# Patient Record
Sex: FEMALE | Race: WHITE | NOT HISPANIC OR LATINO | Employment: OTHER | URBAN - METROPOLITAN AREA
[De-identification: names, ages, dates, MRNs, and addresses within clinical notes are randomized per-mention and may not be internally consistent; named-entity substitution may affect disease eponyms.]

---

## 2017-01-18 ENCOUNTER — OFFICE VISIT (OUTPATIENT)
Dept: INTERNAL MEDICINE | Facility: CLINIC | Age: 82
End: 2017-01-18
Payer: COMMERCIAL

## 2017-01-18 ENCOUNTER — TELEPHONE (OUTPATIENT)
Dept: INTERNAL MEDICINE | Facility: CLINIC | Age: 82
End: 2017-01-18

## 2017-01-18 VITALS
WEIGHT: 160.7 LBS | HEIGHT: 61 IN | DIASTOLIC BLOOD PRESSURE: 62 MMHG | HEART RATE: 61 BPM | OXYGEN SATURATION: 97 % | SYSTOLIC BLOOD PRESSURE: 104 MMHG | BODY MASS INDEX: 30.34 KG/M2 | TEMPERATURE: 97.5 F

## 2017-01-18 DIAGNOSIS — Z12.31 VISIT FOR SCREENING MAMMOGRAM: ICD-10-CM

## 2017-01-18 DIAGNOSIS — F33.0 MAJOR DEPRESSIVE DISORDER, RECURRENT EPISODE, MILD (H): ICD-10-CM

## 2017-01-18 DIAGNOSIS — E78.5 HYPERLIPIDEMIA LDL GOAL <130: ICD-10-CM

## 2017-01-18 DIAGNOSIS — Z00.00 MEDICARE ANNUAL WELLNESS VISIT, SUBSEQUENT: Primary | ICD-10-CM

## 2017-01-18 DIAGNOSIS — I10 ESSENTIAL HYPERTENSION, BENIGN: ICD-10-CM

## 2017-01-18 LAB
ALBUMIN SERPL-MCNC: 4.1 G/DL (ref 3.4–5)
ALP SERPL-CCNC: 77 U/L (ref 40–150)
ALT SERPL W P-5'-P-CCNC: 23 U/L (ref 0–50)
ANION GAP SERPL CALCULATED.3IONS-SCNC: 9 MMOL/L (ref 3–14)
AST SERPL W P-5'-P-CCNC: 20 U/L (ref 0–45)
BILIRUB SERPL-MCNC: 0.5 MG/DL (ref 0.2–1.3)
BUN SERPL-MCNC: 18 MG/DL (ref 7–30)
CALCIUM SERPL-MCNC: 9.1 MG/DL (ref 8.5–10.1)
CHLORIDE SERPL-SCNC: 100 MMOL/L (ref 94–109)
CHOLEST SERPL-MCNC: 191 MG/DL
CO2 SERPL-SCNC: 27 MMOL/L (ref 20–32)
CREAT SERPL-MCNC: 0.96 MG/DL (ref 0.52–1.04)
GFR SERPL CREATININE-BSD FRML MDRD: 56 ML/MIN/1.7M2
GLUCOSE SERPL-MCNC: 99 MG/DL (ref 70–99)
HDLC SERPL-MCNC: 94 MG/DL
HGB BLD-MCNC: 13.1 G/DL (ref 11.7–15.7)
LDLC SERPL CALC-MCNC: 66 MG/DL
NONHDLC SERPL-MCNC: 97 MG/DL
POTASSIUM SERPL-SCNC: 3.9 MMOL/L (ref 3.4–5.3)
PROT SERPL-MCNC: 7.4 G/DL (ref 6.8–8.8)
SODIUM SERPL-SCNC: 136 MMOL/L (ref 133–144)
TRIGL SERPL-MCNC: 153 MG/DL

## 2017-01-18 PROCEDURE — 85018 HEMOGLOBIN: CPT | Performed by: INTERNAL MEDICINE

## 2017-01-18 PROCEDURE — 80061 LIPID PANEL: CPT | Performed by: INTERNAL MEDICINE

## 2017-01-18 PROCEDURE — 99397 PER PM REEVAL EST PAT 65+ YR: CPT | Performed by: INTERNAL MEDICINE

## 2017-01-18 PROCEDURE — 80053 COMPREHEN METABOLIC PANEL: CPT | Performed by: INTERNAL MEDICINE

## 2017-01-18 PROCEDURE — 36415 COLL VENOUS BLD VENIPUNCTURE: CPT | Performed by: INTERNAL MEDICINE

## 2017-01-18 RX ORDER — LISINOPRIL AND HYDROCHLOROTHIAZIDE 12.5; 2 MG/1; MG/1
1 TABLET ORAL 2 TIMES DAILY
Qty: 180 TABLET | Refills: 3 | Status: SHIPPED | OUTPATIENT
Start: 2017-01-18 | End: 2018-03-26

## 2017-01-18 RX ORDER — SIMVASTATIN 20 MG
20 TABLET ORAL AT BEDTIME
Qty: 90 TABLET | Refills: 3 | Status: SHIPPED | OUTPATIENT
Start: 2017-01-18 | End: 2018-02-26

## 2017-01-18 RX ORDER — AMLODIPINE BESYLATE 5 MG/1
5 TABLET ORAL EVERY MORNING
Qty: 90 TABLET | Refills: 3 | Status: SHIPPED | OUTPATIENT
Start: 2017-01-18 | End: 2018-01-26

## 2017-01-18 RX ORDER — CITALOPRAM HYDROBROMIDE 20 MG/1
20 TABLET ORAL DAILY
Qty: 90 TABLET | Refills: 3 | Status: SHIPPED | OUTPATIENT
Start: 2017-01-18 | End: 2018-02-26

## 2017-01-18 NOTE — PROGRESS NOTES
SUBJECTIVE:                                                            Juany Collazo is a 85 year old female who presents for Preventive Visit  Are you in the first 12 months of your Medicare Part B coverage?  No    Healthy Habits:    Do you get at least three servings of calcium containing foods daily (dairy, green leafy vegetables, etc.)? yes    Amount of exercise or daily activities, outside of work: 4 day(s) per week    Problems taking medications regularly No    Medication side effects: No    Have you had an eye exam in the past two years? no    Do you see a dentist twice per year? yes    Do you have sleep apnea, excessive snoring or daytime drowsiness?no    COGNITIVE SCREEN  1) Repeat 3 items (Banana, Sunrise, Chair)    2) Clock draw: NORMAL  3) 3 item recall: Recalls 1 object   Results: NORMAL clock, 1-2 items recalled: COGNITIVE IMPAIRMENT LESS LIKELY    Mini-CogTM Copyright S Gordon. Licensed by the author for use in [facility]; reprinted with permission (carol@[email or web address]). All rights reserved.          All Histories reviewed and updated in EPIC as appropriate.  Social History   Substance Use Topics     Smoking status: Former Smoker     Quit date: 06/07/1966     Smokeless tobacco: Never Used     Alcohol Use: Yes      Comment: One beer day       The patient does not drink >3 drinks per day nor >7 drinks per week.    Today's PHQ-2 Score:   PHQ-2 ( 1999 Pfizer) 1/10/2017 6/15/2016   Q1: Little interest or pleasure in doing things 0 0   Q2: Feeling down, depressed or hopeless 0 0   PHQ-2 Score 0 0       Do you feel safe in your environment - Yes    Do you have a Health Care Directive?: Yes: Advance Directive has been received and scanned.    Current providers sharing in care for this patient include:   Patient Care Team:  Martinez Shafer MD as PCP - General  Roxie Hanson RN as       Hearing impairment: No    Ability to successfully perform activities of daily living: No,  "needs assistance with: transportation and medications     Fall risk:    Home safety:  none identified      The following health maintenance items are reviewed in Epic and correct as of today:  Health Maintenance   Topic Date Due     CHLAMYDIA SCREENING  01/07/1932     PHQ-9 Q6 MONTHS (NO INBASKET)  04/17/2014     MAMMO Q1 YR INBASKET MESSAGE  12/23/2014     COLONOSCOPY Q10 YR INBASKET MESSAGE  03/07/2016     INFLUENZA VACCINE (SYSTEM ASSIGNED)  09/01/2016     LIPID MONITORING Q1 YEAR( NO INBASKET)  01/06/2017     ADVANCE DIRECTIVE PLANNING Q5 YRS (NO INBASKET)  03/14/2017     FALL RISK ASSESSMENT  06/15/2017     DEPRESSION ACTION PLAN Q1 YR (NO INBASKET)  01/10/2018     TETANUS Q10 YR  03/02/2021     PNEUMOCOCCAL  Completed         Immunization History   Administered Date(s) Administered     Influenza (High Dose) 3 valent vaccine 09/30/2013, 10/14/2014, 01/06/2016     Influenza (IIV3) 11/19/2002, 12/22/2004, 10/06/2010, 09/01/2011, 11/10/2012     Pneumococcal (PCV 13) 01/06/2016     Pneumococcal 23 valent 01/01/2010     TD (ADULT, 7+) 03/02/2011     Tetanus 08/02/1989     Zoster vaccine, live 02/21/2008          ROS:  C: NEGATIVE for fever, chills, change in weight  E/M: NEGATIVE for ear, mouth and throat problems  R: NEGATIVE for significant cough or SOB  CV: NEGATIVE for chest pain, palpitations or peripheral edema  GI: NEGATIVE for nausea, abdominal pain, heartburn, or change in bowel habits  : NEGATIVE for frequency, dysuria, or hematuria  M: NEGATIVE for significant arthralgias or myalgia  H: NEGATIVE for bleeding problems  P: NEGATIVE for changes in mood or affect      OBJECTIVE:                                                            /62 mmHg  Pulse 61  Temp(Src) 97.5  F (36.4  C) (Oral)  Ht 5' 1\" (1.549 m)  Wt 160 lb 11.2 oz (72.893 kg)  BMI 30.38 kg/m2  SpO2 97% Estimated body mass index is 27.46 kg/(m^2) as calculated from the following:    Height as of 10/24/16: 5' 3\" (1.6 m).    Weight " as of 10/24/16: 155 lb (70.308 kg).  EXAM:   GENERAL: healthy, alert and no distress  EYES: Eyes grossly normal to inspection, PERRL and conjunctivae and sclerae normal  HENT: ear canals and TM's normal, nose and mouth without ulcers or lesions  NECK: no adenopathy, no asymmetry, masses, or scars and thyroid normal to palpation  RESP: lungs clear to auscultation - no rales, rhonchi or wheezes  CV: regular rate and rhythm, normal S1 S2, no S3 or S4, no click or rub, no peripheral edema and peripheral pulses strong  ABDOMEN: soft, nontender, no hepatosplenomegaly, no masses and bowel sounds normal  MS: no gross musculoskeletal defects noted, no edema  NEURO: Normal strength and tone, mentation intact and speech normal  NEURO: baseline memory changes stable  PSYCH: mentation appears normal, affect normal/bright    ASSESSMENT / PLAN:                                                            1. Medicare annual wellness visit, subsequent  As ordered    2. Major depressive disorder, recurrent episode, mild (H)  Stable per PHQ 9 done    3. Essential hypertension, benign  Stable on therapy  - Hemoglobin  - Comprehensive metabolic panel  - amLODIPine (NORVASC) 5 MG tablet; Take 1 tablet (5 mg) by mouth every morning  Dispense: 90 tablet; Refill: 3  - lisinopril-hydrochlorothiazide (PRINZIDE/ZESTORETIC) 20-12.5 MG per tablet; Take 1 tablet by mouth 2 times daily  Dispense: 180 tablet; Refill: 3    4. Hyperlipidemia LDL goal <130  Labs as ordered  - Lipid Profile  - Comprehensive metabolic panel  - simvastatin (ZOCOR) 20 MG tablet; Take 1 tablet (20 mg) by mouth At Bedtime  Dispense: 90 tablet; Refill: 3    5. Visit for screening mammogram  As screenng  - *MA Screening Digital Bilateral; Future    End of Life Planning:  Patient currently has an advanced directive: unsure.  Practitioner advised verification.    COUNSELING:  Reviewed preventive health counseling, as reflected in patient instructions       Regular exercise        "Healthy diet/nutrition        Estimated body mass index is 27.46 kg/(m^2) as calculated from the following:    Height as of 10/24/16: 5' 3\" (1.6 m).    Weight as of 10/24/16: 155 lb (70.308 kg).     reports that she quit smoking about 50 years ago. She has never used smokeless tobacco.      Appropriate preventive services were discussed with this patient, including applicable screening as appropriate for cardiovascular disease, diabetes, osteopenia/osteoporosis, and glaucoma.  As appropriate for age/gender, discussed screening for colorectal cancer, prostate cancer, breast cancer, and cervical cancer. Checklist reviewing preventive services available has been given to the patient.    Reviewed patients plan of care and provided an AVS. The Basic Care Plan (routine screening as documented in Health Maintenance) for Juany meets the Care Plan requirement. This Care Plan has been established and reviewed with the Patient.    Counseling Resources:  ATP IV Guidelines  Pooled Cohorts Equation Calculator  Breast Cancer Risk Calculator  FRAX Risk Assessment  ICSI Preventive Guidelines  Dietary Guidelines for Americans, 2010  USDA's MyPlate  ASA Prophylaxis  Lung CA Screening    Martinez Shafer MD  [facility]  "

## 2017-01-18 NOTE — NURSING NOTE
"Chief Complaint   Patient presents with     Wellness Visit       Initial /62 mmHg  Pulse 61  Temp(Src) 97.5  F (36.4  C) (Oral)  Ht 5' 1\" (1.549 m)  Wt 160 lb 11.2 oz (72.893 kg)  BMI 30.38 kg/m2  SpO2 97% Estimated body mass index is 30.38 kg/(m^2) as calculated from the following:    Height as of this encounter: 5' 1\" (1.549 m).    Weight as of this encounter: 160 lb 11.2 oz (72.893 kg).  BP completed using cuff size:     Viviana Marcelino CMA      "

## 2017-01-18 NOTE — PATIENT INSTRUCTIONS
Services Typically covered by Medicare Recommended Completed   Vaccines    Pneumonoccol    Influenza    Hepatitis B (if medium/high risk)     Once for patients after age 65    Yearly  Medium/high risk factors:    End Stage Kidney Disease    Hemophiliacs who received Factor XIII or IX concentrates    Clients of institutions for developmentally disabled    Persons who live in same house as a Hepatitis B carrier    Homosexual men    Illicit injectable drug users    Health care workers     Mammogram Covered: One-time screen between age 35-39, annually for age 40+     Pap and Pelvic Exam Covered: Annually if  high risk,  or childbearing age with abnormal Pap in last 3 years.  Q24 months for all other women     Prostate Cancer Screening    Digital rectal exam    PSA Covered: Annually for all men > age 50     Corolrectal Cancer Screening Screening colonoscopy every 10 years, more often for high risk patients     Diabetes Self-Management Training Requires referral by treating physician for patient with diabetes     Diabetes Screening    Fasting blood sugar or glucose tolerance test   Once yearly, twice yearly if prediabetic     Cardiovascular Screening Blood Tests    Total Cholesterol    HDL    Triglycerides Every 5 years     Medical Nutrition Therapy for Diabetes or Renal Disease Requires referral by treating physician for patient with diabetes or kidney disease     Glaucoma Screening Annually for patients with one of the following risk factors:    Diabetes Mellitus    Family history of Glaucoma    -American age 50 and over    -American age 65 and over     Bone Mass Measurement Every 24 months if one of the following risk factors:    Estrogen deficiency    Vertebral abnormalities on x-ray indicative of Osteoporosis, Osteopenia, or Vertebral fracture    Receiving/expected to receive the equivalent of at least 5 mg of Prednisone per day for > 3 months    Hyperparathyroidism    Patient being monitored for  response to Osteoporosis Therapy     One-time AAA screen  Must be ordered as part of Medicare IPPE   Any patient with a family history of AAA    Males Age 65-75, with history of smoking at least 100 cigarettes in lifetime     Smoking Cessation Counseling Beneficiaries who use tobacco are eligible to receive 2 cessation attempts per year; each attempt includes maximum of 4 sessions     HIV Screening Annually for beneficiaries at increased risk:       Increased risk for HIV infection is defined in the  National Coverage Determinations (NCD) Manual,  Publication 100-03 Sections 190.14 (diagnostic) and 210.7 (screening). See http://www.cms.gov/manuals/downloads/kdv208k6_Etok4.pdf and http://www.cms.gov/manuals/downloads/nwg461y9_Ecmt5.pdf on the Internet.  Three times per pregnancy for beneficiaries who are pregnant.     Future Annual Wellness Visit Annually, for all beneficiaries.

## 2017-01-18 NOTE — MR AVS SNAPSHOT
After Visit Summary   1/18/2017    Juany Collazo    MRN: [id number]           Patient Information     Date Of Birth          1/7/1932        Visit Information        Provider Department      1/18/2017 9:40 AM Martinez Shafer MD [facility]        Today's Diagnoses     Medicare annual wellness visit, subsequent    -  1     Major depressive disorder, recurrent episode, mild (H)         Essential hypertension, benign         Hyperlipidemia LDL goal <130         Visit for screening mammogram           Care Instructions          Services Typically covered by Medicare Recommended Completed   Vaccines    Pneumonoccol    Influenza    Hepatitis B (if medium/high risk)     Once for patients after age 65    Yearly  Medium/high risk factors:    End Stage Kidney Disease    Hemophiliacs who received Factor XIII or IX concentrates    Clients of institutions for developmentally disabled    Persons who live in same house as a Hepatitis B carrier    Homosexual men    Illicit injectable drug users    Health care workers     Mammogram Covered: One-time screen between age 35-39, annually for age 40+     Pap and Pelvic Exam Covered: Annually if  high risk,  or childbearing age with abnormal Pap in last 3 years.  Q24 months for all other women     Prostate Cancer Screening    Digital rectal exam    PSA Covered: Annually for all men > age 50     Corolrectal Cancer Screening Screening colonoscopy every 10 years, more often for high risk patients     Diabetes Self-Management Training Requires referral by treating physician for patient with diabetes     Diabetes Screening    Fasting blood sugar or glucose tolerance test   Once yearly, twice yearly if prediabetic     Cardiovascular Screening Blood Tests    Total Cholesterol    HDL    Triglycerides Every 5 years     Medical Nutrition Therapy for Diabetes or Renal Disease Requires referral by treating physician for patient with diabetes or kidney  disease     Glaucoma Screening Annually for patients with one of the following risk factors:    Diabetes Mellitus    Family history of Glaucoma    -American age 50 and over    -American age 65 and over     Bone Mass Measurement Every 24 months if one of the following risk factors:    Estrogen deficiency    Vertebral abnormalities on x-ray indicative of Osteoporosis, Osteopenia, or Vertebral fracture    Receiving/expected to receive the equivalent of at least 5 mg of Prednisone per day for > 3 months    Hyperparathyroidism    Patient being monitored for response to Osteoporosis Therapy     One-time AAA screen  Must be ordered as part of Medicare IPPE   Any patient with a family history of AAA    Males Age 65-75, with history of smoking at least 100 cigarettes in lifetime     Smoking Cessation Counseling Beneficiaries who use tobacco are eligible to receive 2 cessation attempts per year; each attempt includes maximum of 4 sessions     HIV Screening Annually for beneficiaries at increased risk:       Increased risk for HIV infection is defined in the  National Coverage Determinations (NCD) Manual,  Publication 100-03 Sections 190.14 (diagnostic) and 210.7 (screening). See http://www.cms.gov/manuals/downloads/tcz489l0_Jsbr3.pdf and http://www.cms.gov/manuals/downloads/cto952w3_Ttts9.pdf on the Internet.  Three times per pregnancy for beneficiaries who are pregnant.     Future Annual Wellness Visit Annually, for all beneficiaries.             Follow-ups after your visit        Future tests that were ordered for you today     Open Future Orders        Priority Expected Expires Ordered    *MA Screening Digital Bilateral Routine  1/18/2018 1/18/2017            Who to contact     If you have questions or need follow up information about today's clinic visit or your schedule please contact [facility] directly at [phone].  Normal or non-critical lab and imaging results will be  "communicated to you by MyChart, letter or phone within 4 business days after the clinic has received the results. If you do not hear from us within 7 days, please contact the clinic through [employer] or phone. If you have a critical or abnormal lab result, we will notify you by phone as soon as possible.  Submit refill requests through [employer] or call your pharmacy and they will forward the refill request to us. Please allow 3 business days for your refill to be completed.          Additional Information About Your Visit        [employer] Information     [employer] lets you send messages to your doctor, view your test results, renew your prescriptions, schedule appointments and more. To sign up, go to [email or web address] . Click on \"Log in\" on the left side of the screen, which will take you to the Welcome page. Then click on \"Sign up Now\" on the right side of the page.     You will be asked to enter the access code listed below, as well as some personal information. Please follow the directions to create your username and password.     Your access code is: [email or web address]  Expires: 2017  9:31 AM     Your access code will  in 90 days. If you need help or a new code, please call your [place] clinic or [phone].        Care EveryWhere ID     This is your Care EveryWhere ID. This could be used by other organizations to access your [place] medical records  [id number]        Your Vitals Were     Pulse Temperature Height BMI (Body Mass Index) Pulse Oximetry       61 97.5  F (36.4  C) (Oral) 5' 1\" (1.549 m) 30.38 kg/m2 97%        Blood Pressure from Last 3 Encounters:   17 104/62   10/24/16 137/66   16 143/81    Weight from Last 3 Encounters:   17 160 lb 11.2 oz (72.893 kg)   10/24/16 155 lb (70.308 kg)   16 162 lb (73.483 kg)              We Performed the Following     Comprehensive metabolic panel     Hemoglobin     Lipid Profile          Where to get your medicines      These " medications were sent to [id number] - [place], [place] - [address]  [address], [facility] [address]     Phone:  [phone]    - amLODIPine 5 MG tablet  - citalopram 20 MG tablet  - lisinopril-hydrochlorothiazide 20-12.5 MG per tablet  - simvastatin 20 MG tablet       Primary Care Provider Office Phone # Fax #    Martinez Shafer [phone]       [facility] [address]  [facility] [address]        Thank you!     Thank you for choosing [facility]  for your care. Our goal is always to provide you with excellent care. Hearing back from our patients is one way we can continue to improve our services. Please take a few minutes to complete the written survey that you may receive in the mail after your visit with us. Thank you!             Your Updated Medication List - Protect others around you: Learn how to safely use, store and throw away your medicines at www.disposemymeds.org.          This list is accurate as of: 1/18/17 10:10 AM.  Always use your most recent med list.                   Brand Name Dispense Instructions for use    amLODIPine 5 MG tablet    NORVASC    90 tablet    Take 1 tablet (5 mg) by mouth every morning       citalopram 20 MG tablet    celeXA    90 tablet    Take 1 tablet (20 mg) by mouth daily       desoximetasone 0.25 % cream    TOPICORT    60 g    Apply to face QAM x 1- 2 weeks then only as needed       donepezil 5 MG tablet    ARICEPT    90 tablet    Take 2 tablets (10 mg) by mouth At Bedtime       lisinopril-hydrochlorothiazide 20-12.5 MG per tablet    PRINZIDE/ZESTORETIC    180 tablet    Take 1 tablet by mouth 2 times daily       omeprazole 20 MG CR capsule    priLOSEC    90 capsule    Take 1 capsule (20 mg) by mouth every morning       simvastatin 20 MG tablet    ZOCOR    90 tablet    Take 1 tablet (20 mg) by mouth At Bedtime

## 2017-01-19 ASSESSMENT — PATIENT HEALTH QUESTIONNAIRE - PHQ9: SUM OF ALL RESPONSES TO PHQ QUESTIONS 1-9: 0

## 2017-03-28 ENCOUNTER — TELEPHONE (OUTPATIENT)
Dept: ORTHOPEDICS | Facility: CLINIC | Age: 82
End: 2017-03-28

## 2017-03-28 ENCOUNTER — TELEPHONE (OUTPATIENT)
Dept: INTERNAL MEDICINE | Facility: CLINIC | Age: 82
End: 2017-03-28

## 2017-03-28 NOTE — TELEPHONE ENCOUNTER
Spoke with Jeannette, pt's daughter, regarding frequency of cortisone injections.  She states her mother is currently with her in [place] and is having some knee pain, she was wondering about having her seen either out there or when she returns in May.  We discussed that injections can be done quarterly and her mother's last injection was on 10/24/2016 so if she is having increased pain it would be reasonable for her to get repeat injections at this time.  We discussed that eventually patient's have less relief from them but up until that time it is fine to continue to get them as she questioned how long they could continue with getting the injections she was under the impression that there was a max number of them, explained rather it is the time frame between injections and as they become less effective is when patient's stop getting them because they are no longer effective.  She was happy for the explanation, will call back regarding follow up injections.

## 2017-03-28 NOTE — TELEPHONE ENCOUNTER
Called [facility]. Per [facility], they have all refills on file. [facility] [place] will call [facility] here and figure things out. Pt's Daughter, Jeannette, informed.

## 2017-03-28 NOTE — TELEPHONE ENCOUNTER
Reason for Call:  Medication or medication refill:    Do you use a [facility]?  Name of the pharmacy and phone number for the current request:  [facility] [place] [facility]  [phone]    Name of the medication requested: citalopram, lisinopril, amlodipine, simvastatin, and omeprazole    Other request: PLEASE SEND TO PHARMACY IN [place].     Can we leave a detailed message on this number? YES    Phone number patient can be reached at: Other phone number:  [phone] - Daughter     Best Time: Any time    Call taken on 3/28/2017 at 12:23 PM by Ebonie Knight

## 2017-03-30 ENCOUNTER — TELEPHONE (OUTPATIENT)
Dept: INTERNAL MEDICINE | Facility: CLINIC | Age: 82
End: 2017-03-30

## 2017-03-30 NOTE — TELEPHONE ENCOUNTER
Daughter Yulissa is calling with a question about her mother's RX for omeprazole, wanting to clarify her dose that she takes. Advised daughter that pt takes omeprazole (PRILOSEC) 20 MG CR capsule, 1 tablet daily.

## 2017-03-30 NOTE — TELEPHONE ENCOUNTER
Pts daughter Jeannette would like to talk with Dr Shafer about pts medications and her current symptoms. [phone]. Jeannette has spoken with a nurse and now wants to talk with

## 2017-03-31 NOTE — TELEPHONE ENCOUNTER
Spoke with daughter and she stated that patient is doing much better today. She will keep us updated if patient chooses to stop Prilosec due to loose stool's. But believes patient should be ok now that she is taking the correct dose.

## 2017-03-31 NOTE — TELEPHONE ENCOUNTER
"JALYNI-Spoke with patients daughter and she states ,\" Pt is just having constant diarrhea and she thought it may be the medication just catching up since the patient was taking 40 mg and then the ENT had her taking another 20 mg for the past 3 months\" .No call needed unless MD feels necessary . Pt is currently in [place] so is unable to come in .Rolanda Mckinnon RN    "

## 2017-05-11 ENCOUNTER — TELEPHONE (OUTPATIENT)
Dept: ORTHOPEDICS | Facility: CLINIC | Age: 82
End: 2017-05-11

## 2017-05-11 NOTE — TELEPHONE ENCOUNTER
Daughter, Jeannette Shin, left voicemail yesterday stating patient had a cortisone injection of her left knee while visiting Jeannette in [place] on 4/7/17. She would like this noted in her chart.     Phone call to daughter. She states patient has had good relief for the injection. Informed this will be noted in her chart. She was appreciative of the return call.     PATSY Qiunteros RN

## 2017-06-03 ENCOUNTER — HEALTH MAINTENANCE LETTER (OUTPATIENT)
Age: 82
End: 2017-06-03

## 2018-01-18 ENCOUNTER — TRANSFERRED RECORDS (OUTPATIENT)
Dept: HEALTH INFORMATION MANAGEMENT | Facility: CLINIC | Age: 83
End: 2018-01-18

## 2018-01-26 DIAGNOSIS — K21.9 GERD (GASTROESOPHAGEAL REFLUX DISEASE): Primary | ICD-10-CM

## 2018-01-26 DIAGNOSIS — I10 ESSENTIAL HYPERTENSION, BENIGN: Primary | ICD-10-CM

## 2018-01-26 NOTE — TELEPHONE ENCOUNTER
Requested Prescriptions   Pending Prescriptions Disp Refills     Omeprazole (PRILOSEC) 20 MG CR capsule 90 capsule 2     Sig: Take 1 capsule (20 mg) by mouth every morning    There is no refill protocol information for this order          Last Written Prescription Date:  12/26/16  Last Fill Quantity: 90,  # refills: 2   Last Office Visit with [facility] primary care provider:  01/18/17   Future Office Visit:   0

## 2018-01-26 NOTE — TELEPHONE ENCOUNTER
Requested Prescriptions   Pending Prescriptions Disp Refills     amLODIPine (NORVASC) 5 MG tablet 90 tablet 3     Sig: Take 1 tablet (5 mg) by mouth every morning    There is no refill protocol information for this order      Last Written Prescription Date:  01/18/17  Last Fill Quantity: 90,  # refills: 3   Last Office Visit with [facility] care provider:  01/18/17   Future Office Visit:   0

## 2018-01-29 RX ORDER — AMLODIPINE BESYLATE 5 MG/1
5 TABLET ORAL EVERY MORNING
Qty: 30 TABLET | Refills: 0 | Status: SHIPPED | OUTPATIENT
Start: 2018-01-29 | End: 2018-03-13

## 2018-01-29 NOTE — TELEPHONE ENCOUNTER
"Requested Prescriptions   Pending Prescriptions Disp Refills     amLODIPine (NORVASC) 5 MG tablet 90 tablet 3     Sig: Take 1 tablet (5 mg) by mouth every morning    Calcium Channel Blockers Protocol  Failed    1/26/2018  8:49 AM       Failed - Blood pressure under 140/90    BP Readings from Last 3 Encounters:   01/18/17 104/62   10/24/16 137/66   08/30/16 143/81                Failed - Recent or future visit with authorizing provider    Patient had office visit in the last year or has a visit in the next 30 days with authorizing provider.  See \"Patient Info\" tab in inbasket, or \"Choose Columns\" in Meds & Orders section of the refill encounter.            Failed - Normal serum creatinine on file in past 12 months    Recent Labs   Lab Test  01/18/17   1029   CR  0.96            Passed - Patient is age 18 or older       Passed - No active pregnancy on record       Passed - No positive pregnancy test in past 12 months          "

## 2018-01-29 NOTE — TELEPHONE ENCOUNTER
"Requested Prescriptions   Pending Prescriptions Disp Refills     omeprazole (PRILOSEC) 20 MG CR capsule 90 capsule 2     Sig: Take 1 capsule (20 mg) by mouth every morning    PPI Protocol Failed    1/26/2018  8:43 AM       Failed - Recent or future visit with authorizing provider's specialty    Patient had office visit in the last year or has a visit in the next 30 days with authorizing provider.  See \"Patient Info\" tab in inbasket, or \"Choose Columns\" in Meds & Orders section of the refill encounter.            Passed - Not on Clopidogrel (unless Pantoprazole ordered)       Passed - No diagnosis of osteoporosis on record       Passed - Patient is age 18 or older       Passed - No active pregnacy on record       Passed - No positive pregnancy test in past 12 months          "

## 2018-02-26 DIAGNOSIS — E78.5 HYPERLIPIDEMIA LDL GOAL <130: ICD-10-CM

## 2018-02-26 DIAGNOSIS — F33.0 MAJOR DEPRESSIVE DISORDER, RECURRENT EPISODE, MILD (H): Primary | ICD-10-CM

## 2018-02-26 NOTE — LETTER
[facility]  [address] [id number] [phone]            Juany Collazo  [address] [place] [address]        February 27, 2018    Dear Juany,    While refilling your prescription today, we noticed that you are due for an appointment with your provider.  We will refill your prescription for 30 days, but a follow-up appointment must be made before any additional refills can be approved.     Taking care of your health is important to us and we look forward to seeing you in the near future.  Please call us at [phone] or [phone] (or use [phone]) to schedule an appointment.     Please disregard this notice if you have already made an appointment.    Sincerely,        [facility]

## 2018-02-26 NOTE — TELEPHONE ENCOUNTER
"Requested Prescriptions   Pending Prescriptions Disp Refills     citalopram (CELEXA) 20 MG tablet  Last Written Prescription Date:  01/18/2017  Last Fill Quantity: 90,  # refills: 3   Last office visit: 1/18/2017 with prescribing provider:     Future Office Visit:     90 tablet 3     Sig: Take 1 tablet (20 mg) by mouth daily    SSRIs Protocol Failed    2/26/2018  4:25 PM       Failed - Recent or future visit with authorizing provider    Patient had office visit in the last year or has a visit in the next 30 days with authorizing provider.  See \"Patient Info\" tab in inbasket, or \"Choose Columns\" in Meds & Orders section of the refill encounter.            Failed - No positive pregnancy test in last 12 months       Passed - Patient is age 18 or older       Passed - No active pregnancy on record        simvastatin (ZOCOR) 20 MG tablet  Last Written Prescription Date:  01/18/2017  Last Fill Quantity: 90,  # refills: 3   Last office visit: 1/18/2017 with prescribing provider:     Future Office Visit:     90 tablet 3     Sig: Take 1 tablet (20 mg) by mouth At Bedtime    Statins Protocol Failed    2/26/2018  4:25 PM       Failed - LDL on file in past 12 months    Recent Labs   Lab Test  01/18/17   1029   LDL  66            Failed - Recent or future visit with authorizing provider    Patient had office visit in the last year or has a visit in the next 30 days with authorizing provider.  See \"Patient Info\" tab in inbasket, or \"Choose Columns\" in Meds & Orders section of the refill encounter.            Failed - No positive pregnancy test in past 12 months       Passed - No abnormal creatine kinase in past 12 months    No lab results found.         Passed - Patient is age 18 or older       Passed - No active pregnancy on record          "

## 2018-02-27 RX ORDER — SIMVASTATIN 20 MG
20 TABLET ORAL AT BEDTIME
Qty: 30 TABLET | Refills: 0 | Status: SHIPPED | OUTPATIENT
Start: 2018-02-27 | End: 2018-03-26

## 2018-02-27 RX ORDER — CITALOPRAM HYDROBROMIDE 20 MG/1
20 TABLET ORAL DAILY
Qty: 30 TABLET | Refills: 0 | Status: SHIPPED | OUTPATIENT
Start: 2018-02-27 | End: 2018-03-26

## 2018-03-06 DIAGNOSIS — I10 ESSENTIAL HYPERTENSION, BENIGN: ICD-10-CM

## 2018-03-09 ENCOUNTER — TELEPHONE (OUTPATIENT)
Dept: NURSING | Facility: CLINIC | Age: 83
End: 2018-03-09

## 2018-03-09 NOTE — TELEPHONE ENCOUNTER
I do not know if I can place a referral seeing orthopedist in [place] based on the patient's insurance.  I will have asked symptoms the nurses to round to referrals to verify the answer to this question and will also route message to see if possible.  I am unclear if a referral based on [facility] insurance is possible as requested by loss

## 2018-03-09 NOTE — TELEPHONE ENCOUNTER
Patients daughter Jeannette calling on behalf of the patient.  Patient continues to have L) knee pain.  History of cortisone injection in 2017, was told if/when cortisone injection wore off she may be a candidate for knee surgery.  Patient in [place] now visiting daughter.  Requesting referral for orthopedic doctor Dr. Sae Olivier in [place] to be evaluated to find out if surgery is an option.  Would like to have surgery in [place] if possible so daughter can be there to help post-op.  Patient is coming back to [place] on 3/22/18 and has appointment scheduled with PCP on 3/26/18.  Wondering if referral can be placed prior to patient leaving [place] so she can see Dr. Olivier for a consult.  Daughter understands patient would have to have a pre-op appointment too if surgery is decided on.      Is this referral something you can place and/or should this request be sent to referrals?      Orthopedic MD Info:  [facility]   Dr. Sae Olivier  [address] [address] [address]  Phone # [phone]

## 2018-03-13 NOTE — TELEPHONE ENCOUNTER
We do not issue out of state referrals (per Jessy in [facility].) Pt would need to contact her insurance company to inquire about out of state coverage/ benefit level.

## 2018-03-13 NOTE — TELEPHONE ENCOUNTER
Please see comments below.  I have discussed this with extensively with our referral department and we do not issue out of state referrals (per Jessy in [facility].) Pt would need to contact her insurance company to inquire about out of state coverage/ benefit level.

## 2018-03-13 NOTE — TELEPHONE ENCOUNTER
amLODIPine (NORVASC) 5 MG tablet    Routing refill request to provider for review/approval because:  Jessa given x1 and patient did not follow up, please advise  Pt has future appt with PCP on Monday 3/26 at 8:40am.  Pt is still in [place]. Please send [place] supply of RX to [place] pharmacy pending.

## 2018-03-14 RX ORDER — AMLODIPINE BESYLATE 5 MG/1
5 TABLET ORAL EVERY MORNING
Qty: 30 TABLET | Refills: 0 | Status: SHIPPED | OUTPATIENT
Start: 2018-03-14 | End: 2018-03-26

## 2018-03-26 ENCOUNTER — OFFICE VISIT (OUTPATIENT)
Dept: INTERNAL MEDICINE | Facility: CLINIC | Age: 83
End: 2018-03-26
Payer: COMMERCIAL

## 2018-03-26 VITALS
WEIGHT: 160.9 LBS | OXYGEN SATURATION: 96 % | BODY MASS INDEX: 30.38 KG/M2 | DIASTOLIC BLOOD PRESSURE: 66 MMHG | RESPIRATION RATE: 14 BRPM | SYSTOLIC BLOOD PRESSURE: 116 MMHG | HEIGHT: 61 IN | TEMPERATURE: 98 F | HEART RATE: 61 BPM

## 2018-03-26 DIAGNOSIS — K21.9 GASTROESOPHAGEAL REFLUX DISEASE WITHOUT ESOPHAGITIS: ICD-10-CM

## 2018-03-26 DIAGNOSIS — Z78.0 ASYMPTOMATIC POSTMENOPAUSAL STATUS: ICD-10-CM

## 2018-03-26 DIAGNOSIS — E78.5 HYPERLIPIDEMIA LDL GOAL <130: ICD-10-CM

## 2018-03-26 DIAGNOSIS — I10 ESSENTIAL HYPERTENSION, BENIGN: ICD-10-CM

## 2018-03-26 DIAGNOSIS — L71.0 DERMATITIS, PERIORAL: ICD-10-CM

## 2018-03-26 DIAGNOSIS — F33.0 MAJOR DEPRESSIVE DISORDER, RECURRENT EPISODE, MILD (H): ICD-10-CM

## 2018-03-26 DIAGNOSIS — Z00.00 MEDICARE ANNUAL WELLNESS VISIT, SUBSEQUENT: Primary | ICD-10-CM

## 2018-03-26 LAB
ALBUMIN SERPL-MCNC: 3.9 G/DL (ref 3.4–5)
ALP SERPL-CCNC: 80 U/L (ref 40–150)
ALT SERPL W P-5'-P-CCNC: 19 U/L (ref 0–50)
ANION GAP SERPL CALCULATED.3IONS-SCNC: 7 MMOL/L (ref 3–14)
AST SERPL W P-5'-P-CCNC: 22 U/L (ref 0–45)
BILIRUB SERPL-MCNC: 0.6 MG/DL (ref 0.2–1.3)
BUN SERPL-MCNC: 13 MG/DL (ref 7–30)
CALCIUM SERPL-MCNC: 9.3 MG/DL (ref 8.5–10.1)
CHLORIDE SERPL-SCNC: 103 MMOL/L (ref 94–109)
CHOLEST SERPL-MCNC: 180 MG/DL
CO2 SERPL-SCNC: 28 MMOL/L (ref 20–32)
CREAT SERPL-MCNC: 0.72 MG/DL (ref 0.52–1.04)
GFR SERPL CREATININE-BSD FRML MDRD: 77 ML/MIN/1.7M2
GLUCOSE SERPL-MCNC: 96 MG/DL (ref 70–99)
HDLC SERPL-MCNC: 86 MG/DL
HGB BLD-MCNC: 12.2 G/DL (ref 11.7–15.7)
LDLC SERPL CALC-MCNC: 73 MG/DL
NONHDLC SERPL-MCNC: 94 MG/DL
POTASSIUM SERPL-SCNC: 4.3 MMOL/L (ref 3.4–5.3)
PROT SERPL-MCNC: 7.1 G/DL (ref 6.8–8.8)
SODIUM SERPL-SCNC: 138 MMOL/L (ref 133–144)
TRIGL SERPL-MCNC: 104 MG/DL

## 2018-03-26 PROCEDURE — 80061 LIPID PANEL: CPT | Performed by: INTERNAL MEDICINE

## 2018-03-26 PROCEDURE — G0439 PPPS, SUBSEQ VISIT: HCPCS | Performed by: INTERNAL MEDICINE

## 2018-03-26 PROCEDURE — 80053 COMPREHEN METABOLIC PANEL: CPT | Performed by: INTERNAL MEDICINE

## 2018-03-26 PROCEDURE — 36415 COLL VENOUS BLD VENIPUNCTURE: CPT | Performed by: INTERNAL MEDICINE

## 2018-03-26 PROCEDURE — 85018 HEMOGLOBIN: CPT | Performed by: INTERNAL MEDICINE

## 2018-03-26 RX ORDER — DESOXIMETASONE 2.5 MG/G
CREAM TOPICAL
Qty: 60 G | Refills: 1 | Status: ON HOLD | OUTPATIENT
Start: 2018-03-26 | End: 2018-11-13

## 2018-03-26 RX ORDER — SIMVASTATIN 20 MG
20 TABLET ORAL AT BEDTIME
Qty: 90 TABLET | Refills: 3 | Status: ON HOLD | OUTPATIENT
Start: 2018-03-26 | End: 2018-11-14

## 2018-03-26 RX ORDER — LISINOPRIL AND HYDROCHLOROTHIAZIDE 12.5; 2 MG/1; MG/1
1 TABLET ORAL 2 TIMES DAILY
Qty: 180 TABLET | Refills: 3 | Status: ON HOLD | OUTPATIENT
Start: 2018-03-26 | End: 2018-11-14

## 2018-03-26 RX ORDER — CITALOPRAM HYDROBROMIDE 20 MG/1
20 TABLET ORAL DAILY
Qty: 90 TABLET | Refills: 3 | Status: ON HOLD | OUTPATIENT
Start: 2018-03-26 | End: 2018-11-14

## 2018-03-26 RX ORDER — AMLODIPINE BESYLATE 5 MG/1
5 TABLET ORAL EVERY MORNING
Qty: 90 TABLET | Refills: 3 | Status: ON HOLD | OUTPATIENT
Start: 2018-03-26 | End: 2018-11-14

## 2018-03-26 ASSESSMENT — PAIN SCALES - GENERAL: PAINLEVEL: MODERATE PAIN (4)

## 2018-03-26 NOTE — MR AVS SNAPSHOT
After Visit Summary   3/26/2018    Juany Collazo    MRN: [id number]           Patient Information     Date Of Birth          1/7/1932        Visit Information        Provider Department      3/26/2018 8:40 AM Martinez Shafer MD [facility]        Today's Diagnoses     Medicare annual wellness visit, subsequent    -  1    Essential hypertension, benign        Major depressive disorder, recurrent episode, mild (H)        Hyperlipidemia LDL goal <130        Dermatitis, perioral        Gastroesophageal reflux disease without esophagitis        Asymptomatic postmenopausal status          Care Instructions          Services Typically covered by Medicare Recommended Completed   Vaccines    Pneumonoccol    Influenza    Hepatitis B (if medium/high risk)     Once for patients after age 65    Yearly  Medium/high risk factors:    End Stage Kidney Disease    Hemophiliacs who received Factor XIII or IX concentrates    Clients of institutions for developmentally disabled    Persons who live in same house as a Hepatitis B carrier    Homosexual men    Illicit injectable drug users    Health care workers     Mammogram Covered: One-time screen between age 35-39, annually for age 40+     Pap and Pelvic Exam Covered: Annually if  high risk,  or childbearing age with abnormal Pap in last 3 years.  Q24 months for all other women     Prostate Cancer Screening    Digital rectal exam    PSA Covered: Annually for all men > age 50     Corolrectal Cancer Screening Screening colonoscopy every 10 years, more often for high risk patients     Diabetes Self-Management Training Requires referral by treating physician for patient with diabetes     Diabetes Screening    Fasting blood sugar or glucose tolerance test   Once yearly, twice yearly if prediabetic     Cardiovascular Screening Blood Tests    Total Cholesterol    HDL    Triglycerides Every 5 years     Medical Nutrition Therapy for Diabetes or Renal  Disease Requires referral by treating physician for patient with diabetes or kidney disease     Glaucoma Screening Annually for patients with one of the following risk factors:    Diabetes Mellitus    Family history of Glaucoma    -American age 50 and over    -American age 65 and over     Bone Mass Measurement Every 24 months if one of the following risk factors:    Estrogen deficiency    Vertebral abnormalities on x-ray indicative of Osteoporosis, Osteopenia, or Vertebral fracture    Receiving/expected to receive the equivalent of at least 5 mg of Prednisone per day for > 3 months    Hyperparathyroidism    Patient being monitored for response to Osteoporosis Therapy     One-time AAA screen  Must be ordered as part of Medicare IPPE   Any patient with a family history of AAA    Males Age 65-75, with history of smoking at least 100 cigarettes in lifetime     Smoking Cessation Counseling Beneficiaries who use tobacco are eligible to receive 2 cessation attempts per year; each attempt includes maximum of 4 sessions     HIV Screening Annually for beneficiaries at increased risk:       Increased risk for HIV infection is defined in the  National Coverage Determinations (NCD) Manual,  Publication 100-03 Sections 190.14 (diagnostic) and 210.7 (screening). See http://www.cms.gov/manuals/downloads/bhe358q8_Adpf7.pdf and http://www.cms.gov/manuals/downloads/jhm860y6_Hboj8.pdf on the Internet.  Three times per pregnancy for beneficiaries who are pregnant.     Future Annual Wellness Visit Annually, for all beneficiaries.               Follow-ups after your visit        Follow-up notes from your care team     Return if symptoms worsen or fail to improve.      Future tests that were ordered for you today     Open Future Orders        Priority Expected Expires Ordered    DX Hip/Pelvis/Spine Routine  3/26/2019 3/26/2018            Who to contact     If you have questions or need follow up information about today's  "clinic visit or your schedule please contact [facility] directly at [phone].  Normal or non-critical lab and imaging results will be communicated to you by MyChart, letter or phone within 4 business days after the clinic has received the results. If you do not hear from us within 7 days, please contact the clinic through [facility] or phone. If you have a critical or abnormal lab result, we will notify you by phone as soon as possible.  Submit refill requests through [employer] or call your pharmacy and they will forward the refill request to us. Please allow 3 business days for your refill to be completed.          Additional Information About Your Visit        MyChart Information     [employer] lets you send messages to your doctor, view your test results, renew your prescriptions, schedule appointments and more. To sign up, go to [email or web address] . Click on \"Log in\" on the left side of the screen, which will take you to the Welcome page. Then click on \"Sign up Now\" on the right side of the page.     You will be asked to enter the access code listed below, as well as some personal information. Please follow the directions to create your username and password.     Your access code is: [email or web address]  Expires: 2018  9:13 AM     Your access code will  in 90 days. If you need help or a new code, please call your [place] clinic or [phone].        Care EveryWhere ID     This is your Care EveryWhere ID. This could be used by other organizations to access your [place] medical records  [id number]        Your Vitals Were     Pulse Temperature Respirations Height Pulse Oximetry BMI (Body Mass Index)    61 98  F (36.7  C) (Oral) 14 5' 1\" (1.549 m) 96% 30.4 kg/m2       Blood Pressure from Last 3 Encounters:   18 116/66   17 104/62   10/24/16 137/66    Weight from Last 3 Encounters:   18 160 lb 14.4 oz (73 kg)   17 160 lb 11.2 oz (72.9 kg)   10/24/16 155 lb " (70.3 kg)              We Performed the Following     Comprehensive metabolic panel     DEPRESSION ACTION PLAN (DAP)     Hemoglobin     Lipid Profile          Where to get your medicines      These medications were sent to [id number] [place] [address]  [address], [facility] [address]     Phone:  [phone]     amLODIPine 5 MG tablet    citalopram 20 MG tablet    desoximetasone 0.25 % cream    lisinopril-hydrochlorothiazide 20-12.5 MG per tablet    omeprazole 20 MG CR capsule    simvastatin 20 MG tablet          Primary Care Provider Office Phone # Fax #    Martinez Shafer [phone]       [address] [facility] [address]        Equal Access to Services     KJ RUIZ : Hadii aad ku hadasho Soomaali, waaxda luqadaha, qaybta kaalmada adeegyada, waxay idiin hayflorinan carmine coolye . So [facility] [phone].    ATENCIÓN: Si habla español, tiene a troy disposición servicios gratuitos de asistencia lingüística. [facility] [phone].    We comply with applicable federal civil rights laws and [place] laws. We do not discriminate on the basis of race, color, national origin, age, disability, sex, sexual orientation, or gender identity.            Thank you!     Thank you for choosing [facility]  for your care. Our goal is always to provide you with excellent care. Hearing back from our patients is one way we can continue to improve our services. Please take a few minutes to complete the written survey that you may receive in the mail after your visit with us. Thank you!             Your Updated Medication List - Protect others around you: Learn how to safely use, store and throw away your medicines at www.disposemymeds.org.          This list is accurate as of 3/26/18  9:13 AM.  Always use your most recent med list.                   Brand Name Dispense Instructions for use Diagnosis    amLODIPine 5 MG tablet    NORVASC    90  tablet    Take 1 tablet (5 mg) by mouth every morning    Essential hypertension, benign       citalopram 20 MG tablet    celeXA    90 tablet    Take 1 tablet (20 mg) by mouth daily    Major depressive disorder, recurrent episode, mild (H)       desoximetasone 0.25 % cream    TOPICORT    60 g    Apply to face QAM x 1- 2 weeks then only as needed    Dermatitis, perioral       donepezil 5 MG tablet    ARICEPT    90 tablet    Take 2 tablets (10 mg) by mouth At Bedtime    Memory loss       lisinopril-hydrochlorothiazide 20-12.5 MG per tablet    PRINZIDE/ZESTORETIC    180 tablet    Take 1 tablet by mouth 2 times daily    Essential hypertension, benign       omeprazole 20 MG CR capsule    priLOSEC    90 capsule    Take 1 capsule (20 mg) by mouth every morning    Gastroesophageal reflux disease without esophagitis       simvastatin 20 MG tablet    ZOCOR    90 tablet    Take 1 tablet (20 mg) by mouth At Bedtime    Hyperlipidemia LDL goal <130

## 2018-03-26 NOTE — PROGRESS NOTES
SUBJECTIVE:      Juany Collazo is a 86 year old female who presents for Preventive Visit    Are you in the first 12 months of your Medicare Part B coverage?  No     Healthy Habits:    Do you get at least three servings of calcium containing foods daily (dairy, green leafy vegetables, etc.)? yes    Amount of exercise or daily activities, outside of work: 4 day(s) per week    Problems taking medications regularly No    Medication side effects: No    Have you had an eye exam in the past two years? no    Do you see a dentist twice per year? yes    Do you have sleep apnea, excessive snoring or daytime drowsiness?no     COGNITIVE SCREEN  1) Repeat 3 items (Banana, Sunrise, Chair)    2) Clock draw: NORMAL  3) 3 item recall: Recalls 1 object   Results: NORMAL clock, 1-2 items recalled: COGNITIVE IMPAIRMENT LESS LIKELY     Mini-CogTM Copyright S Gordon. Licensed by the author for use in [facility]; reprinted with permission ([email or web address]). All rights reserved.         All Histories reviewed and updated in EPIC as appropriate.         Social History   Substance Use Topics     Smoking status: Former Smoker       Quit date: 06/07/1966     Smokeless tobacco: Never Used     Alcohol Use: Yes         Comment: One beer day         The patient does not drink >3 drinks per day nor >7 drinks per week.     Prior PHQ-2 Score:   PHQ-2 ( 1999 Pfizer) 1/10/2017 6/15/2016   Q1: Little interest or pleasure in doing things 0 0   Q2: Feeling down, depressed or hopeless 0 0   PHQ-2 Score 0 0         Do you feel safe in your environment - Yes     Do you have a Health Care Directive?: Yes: Advance Directive has been received and scanned.     Current providers sharing in care for this patient include:   Patient Care Team:  Martinez Shafer MD as PCP - General  Roxie Hanson RN as        Hearing impairment: No    Ability to successfully perform activities of daily living: No, needs assistance with:  transportation and medications     Fall risk:    Home safety:  none identified        The following health maintenance items are reviewed in Epic and correct as of today:       Health Maintenance   Topic Date Due     CHLAMYDIA SCREENING  01/07/1932     PHQ-9 Q6 MONTHS (NO INBASKET)  04/17/2014     MAMMO Q1 YR INBASKET MESSAGE  12/23/2014     COLONOSCOPY Q10 YR INBASKET MESSAGE  03/07/2016     INFLUENZA VACCINE (SYSTEM ASSIGNED)  09/01/2016     LIPID MONITORING Q1 YEAR( NO INBASKET)  01/06/2017     ADVANCE DIRECTIVE PLANNING Q5 YRS (NO INBASKET)  03/14/2017     FALL RISK ASSESSMENT  06/15/2017     DEPRESSION ACTION PLAN Q1 YR (NO INBASKET)  01/10/2018     TETANUS Q10 YR  03/02/2021     PNEUMOCOCCAL  Completed                 Immunization History, prior   Administered Date(s) Administered     Influenza (High Dose) 3 valent vaccine 09/30/2013, 10/14/2014, 01/06/2016     Influenza (IIV3) 11/19/2002, 12/22/2004, 10/06/2010, 09/01/2011, 11/10/2012     Pneumococcal (PCV 13) 01/06/2016     Pneumococcal 23 valent 01/01/2010     TD (ADULT, 7+) 03/02/2011     Tetanus 08/02/1989     Zoster vaccine, live 02/21/2008            ROS:  C: NEGATIVE for fever, chills, change in weight  E/M: NEGATIVE for ear, mouth and throat problems  R: NEGATIVE for significant cough or SOB  CV: NEGATIVE for chest pain, palpitations or peripheral edema  GI: NEGATIVE for nausea, abdominal pain, heartburn, or change in bowel habits  : NEGATIVE for frequency, dysuria, or hematuria  M: NEGATIVE for significant arthralgias or myalgia less some knee pain for which the patient states that she is potentially going to be having surgery in [place] so that her daughter can care for her.  This issue has been addressed through our referral department in regards to out of network and out-of-state referrals.  We are unable to refer to an out-of-state orthopedist thus the patient will need to locate a physician on her own and I have suggested that she will  "likely need a preoperative assessment based on the timing of this prior to her scheduling.  H: NEGATIVE for bleeding problems  P: NEGATIVE for changes in mood or affect        OBJECTIVE:      /66  Pulse 61  Temp 98  F (36.7  C) (Oral)  Resp 14  Ht 5' 1\" (1.549 m)  Wt 160 lb 14.4 oz (73 kg)  SpO2 96%  BMI 30.4 kg/m2    EXAM:   GENERAL: alert and no distress  EYES: Eyes grossly normal to inspection, PERRL and conjunctivae and sclerae normal  HENT: ear canals and TM's normal, nose and mouth without ulcers or lesions, aides in palce  NECK: no adenopathy, no asymmetry, masses, or scars and thyroid normal to palpation  RESP: lungs clear to auscultation - no rales, rhonchi or wheezes  CV: regular rate and rhythm, normal S1 S2, no S3 or S4, no click or rub, no peripheral edema and peripheral pulses strong  ABDOMEN: soft, nontender, no hepatosplenomegaly, no masses and bowel sounds normal  MS: no gross musculoskeletal defects noted, no edema  NEURO: baseline memory changes stable. No focal neurological changes noted.  PSYCH: mentation appears normal, affect normal/bright     ASSESSMENT/PLAN:     (Z00.00) Medicare annual wellness visit, subsequent  (primary encounter diagnosis)  Comment: Stable and appears doing well  Plan: Hemoglobin, Comprehensive metabolic panel,         Lipid Profile        We discussed the screening mammograms, colonoscopies and routine preventative care options.  She will tentatively be working on her knee in [place].    (I10) Essential hypertension, benign  Comment: Stable on therapy continue with medications as directed  Plan: amLODIPine (NORVASC) 5 MG tablet,         lisinopril-hydrochlorothiazide         (PRINZIDE/ZESTORETIC) 20-12.5 MG per tablet,         Comprehensive metabolic panel            (F33.0) Major depressive disorder, recurrent episode, mild (H)  Comment: Stable per PHQ 9 score of 0.  Continue with medications as directed  Plan: citalopram (CELEXA) 20 MG tablet        "     (E78.5) Hyperlipidemia LDL goal <130  Comment: On therapy with fasting labs pending.  Continue medications as ordered per  Plan: simvastatin (ZOCOR) 20 MG tablet, Lipid Profile            (L71.0) Dermatitis, perioral  Comment: Refilled per request topical use only.  Plan: desoximetasone (TOPICORT) 0.25 % cream            (K21.9) Gastroesophageal reflux disease without esophagitis  Comment: Stable on therapy refilled per request  Plan: omeprazole (PRILOSEC) 20 MG CR capsule            (Z78.0) Asymptomatic postmenopausal status  Comment: Discussed benefits of obtaining bone density scan.  Patient is interested and I have ordered, patient will schedule.  Plan: DX Hip/Pelvis/Spine            THE MEDICATION LIST HAS BEEN FULLY RECONCILED BY THE M.D. AND THE NURSING STAFF.

## 2018-03-26 NOTE — LETTER
My Depression Action Plan  Name: Juany Collazo   Date of Birth 1/7/1932  Date: 3/26/2018    My doctor: Martinez Shafer   My clinic: [address] [address] [phone]          GREEN    ZONE   Good Control    What it looks like:     Things are going generally well. You have normal up s and down s. You may even feel depressed from time to time, but bad moods usually last less than a day.   What you need to do:  1. Continue to care for yourself (see self care plan)  2. Check your depression survival kit and update it as needed  3. Follow your physician s recommendations including any medication.  4. Do not stop taking medication unless you consult with your physician first.           YELLOW         ZONE Getting Worse    What it looks like:     Depression is starting to interfere with your life.     It may be hard to get out of bed; you may be starting to isolate yourself from others.    Symptoms of depression are starting to last most all day and this has happened for several days.     You may have suicidal thoughts but they are not constant.   What you need to do:     1. Call your care team, your response to treatment will improve if you keep your care team informed of your progress. Yellow periods are signs an adjustment may need to be made.     2. Continue your self-care, even if you have to fake it!    3. Talk to someone in your support network    4. Open up your depression survival kit           RED    ZONE Medical Alert - Get Help    What it looks like:     Depression is seriously interfering with your life.     You may experience these or other symptoms: You can t get out of bed most days, can t work or engage in other necessary activities, you have trouble taking care of basic hygiene, or basic responsibilities, thoughts of suicide or death that will not go away, self-injurious behavior.     What you need to do:  1. Call your care  team and request a same-day appointment. If they are not available (weekends or after hours) call your local crisis line, emergency room or 911.            Depression Self Care Plan / Survival Kit    Self-Care for Depression  Here s the deal. Your body and mind are really not as separate as most people think.  What you do and think affects how you feel and how you feel influences what you do and think. This means if you do things that people who feel good do, it will help you feel better.  Sometimes this is all it takes.  There is also a place for medication and therapy depending on how severe your depression is, so be sure to consult with your medical provider and/ or Behavioral Health Consultant if your symptoms are worsening or not improving.     In order to better manage my stress, I will:    Exercise  Get some form of exercise, every day. This will help reduce pain and release endorphins, the  feel good  chemicals in your brain. This is almost as good as taking antidepressants!  This is not the same as joining a gym and then never going! (they count on that by the way ) It can be as simple as just going for a walk or doing some gardening, anything that will get you moving.      Hygiene   Maintain good hygiene (Get out of bed in the morning, Make your bed, Brush your teeth, Take a shower, and Get dressed like you were going to work, even if you are unemployed).  If your clothes don't fit try to get ones that do.    Diet  I will strive to eat foods that are good for me, drink plenty of water, and avoid excessive sugar, caffeine, alcohol, and other mood-altering substances.  Some foods that are helpful in depression are: complex carbohydrates, B vitamins, flaxseed, fish or fish oil, fresh fruits and vegetables.    Psychotherapy  I agree to participate in Individual Therapy (if recommended).    Medication  If prescribed medications, I agree to take them.  Missing doses can result in serious side effects.  I  understand that drinking alcohol, or other illicit drug use, may cause potential side effects.  I will not stop my medication abruptly without first discussing it with my provider.    Staying Connected With Others  I will stay in touch with my friends, family members, and my primary care provider/team.    Use your imagination  Be creative.  We all have a creative side; it doesn t matter if it s oil painting, sand castles, or mud pies! This will also kick up the endorphins.    Witness Beauty  (AKA stop and smell the roses) Take a look outside, even in mid-winter. Notice colors, textures. Watch the squirrels and birds.     Service to others  Be of service to others.  There is always someone else in need.  By helping others we can  get out of ourselves  and remember the really important things.  This also provides opportunities for practicing all the other parts of the program.    Humor  Laugh and be silly!  Adjust your TV habits for less news and crime-drama and more comedy.    Control your stress  Try breathing deep, massage therapy, biofeedback, and meditation. Find time to relax each day.     My support system    Clinic Contact:  Phone number:    Contact 1:  Phone number:    Contact 2:  Phone number:    Judaism/:  Phone number:    Therapist:  Phone number:    Local crisis center:    Phone number:    Other community support:  Phone number:

## 2018-03-26 NOTE — LETTER
[address] [address]  [phone]      3/26/2018       Juany Collazo  [address] [place] [address]        Dear Juany,    I am pleased to inform you that your routine blood work including your hemoglobin, sodium, potassium, calcium, kidney and liver function tests are all normal.    Your cholesterol looks good and I would not change anything at this point but would repeat your labs in 12 months.    Sincerely,      Martinez Shafer MD  Internal Medicine

## 2018-03-26 NOTE — PATIENT INSTRUCTIONS
Services Typically covered by Medicare Recommended Completed   Vaccines    Pneumonoccol    Influenza    Hepatitis B (if medium/high risk)     Once for patients after age 65    Yearly  Medium/high risk factors:    End Stage Kidney Disease    Hemophiliacs who received Factor XIII or IX concentrates    Clients of institutions for developmentally disabled    Persons who live in same house as a Hepatitis B carrier    Homosexual men    Illicit injectable drug users    Health care workers     Mammogram Covered: One-time screen between age 35-39, annually for age 40+     Pap and Pelvic Exam Covered: Annually if  high risk,  or childbearing age with abnormal Pap in last 3 years.  Q24 months for all other women     Prostate Cancer Screening    Digital rectal exam    PSA Covered: Annually for all men > age 50     Corolrectal Cancer Screening Screening colonoscopy every 10 years, more often for high risk patients     Diabetes Self-Management Training Requires referral by treating physician for patient with diabetes     Diabetes Screening    Fasting blood sugar or glucose tolerance test   Once yearly, twice yearly if prediabetic     Cardiovascular Screening Blood Tests    Total Cholesterol    HDL    Triglycerides Every 5 years     Medical Nutrition Therapy for Diabetes or Renal Disease Requires referral by treating physician for patient with diabetes or kidney disease     Glaucoma Screening Annually for patients with one of the following risk factors:    Diabetes Mellitus    Family history of Glaucoma    -American age 50 and over    -American age 65 and over     Bone Mass Measurement Every 24 months if one of the following risk factors:    Estrogen deficiency    Vertebral abnormalities on x-ray indicative of Osteoporosis, Osteopenia, or Vertebral fracture    Receiving/expected to receive the equivalent of at least 5 mg of Prednisone per day for > 3 months    Hyperparathyroidism    Patient being monitored for  response to Osteoporosis Therapy     One-time AAA screen  Must be ordered as part of Medicare IPPE   Any patient with a family history of AAA    Males Age 65-75, with history of smoking at least 100 cigarettes in lifetime     Smoking Cessation Counseling Beneficiaries who use tobacco are eligible to receive 2 cessation attempts per year; each attempt includes maximum of 4 sessions     HIV Screening Annually for beneficiaries at increased risk:       Increased risk for HIV infection is defined in the  National Coverage Determinations (NCD) Manual,  Publication 100-03 Sections 190.14 (diagnostic) and 210.7 (screening). See http://www.cms.gov/manuals/downloads/bhf233r6_Jjrm4.pdf and http://www.cms.gov/manuals/downloads/dbw086r4_Puuk9.pdf on the Internet.  Three times per pregnancy for beneficiaries who are pregnant.     Future Annual Wellness Visit Annually, for all beneficiaries.

## 2018-03-27 ASSESSMENT — PATIENT HEALTH QUESTIONNAIRE - PHQ9: SUM OF ALL RESPONSES TO PHQ QUESTIONS 1-9: 0

## 2018-04-25 ENCOUNTER — TELEPHONE (OUTPATIENT)
Dept: INTERNAL MEDICINE | Facility: CLINIC | Age: 83
End: 2018-04-25

## 2018-04-25 NOTE — TELEPHONE ENCOUNTER
Called Keisha back and she is interested in getting the CBC, TSH and Vit B12 results sent over to Dr.Vanda Alcala who is [facility] with [facility]. I will be faxing them over to [phone].  We also do not have the lab work for TSH & Vit B12. Will send what we have for lab work over for [facility] to look at.      Message gong out to Keisha to see if she wants Juany to have the TSH and Vit B12 done.

## 2018-04-25 NOTE — TELEPHONE ENCOUNTER
Reason for Call:  Other call back    Detailed comments: Daughter, Jeannette Shin, has questions about the pt's previous labs.  Jeannette has Power of .  She is interested in CBC, THS and Vit B12.    Phone Number Patient can be reached at: Jeannette [phone]             Best Time: anytime    Can we leave a detailed message on this number? YES    Call taken on 4/25/2018 at 10:00 AM by CODIE NICHOLS

## 2018-04-30 ENCOUNTER — OFFICE VISIT (OUTPATIENT)
Dept: ORTHOPEDICS | Facility: CLINIC | Age: 83
End: 2018-04-30
Payer: COMMERCIAL

## 2018-04-30 VITALS
HEIGHT: 61 IN | WEIGHT: 160 LBS | BODY MASS INDEX: 30.21 KG/M2 | SYSTOLIC BLOOD PRESSURE: 110 MMHG | DIASTOLIC BLOOD PRESSURE: 64 MMHG

## 2018-04-30 DIAGNOSIS — M17.0 PRIMARY OSTEOARTHRITIS OF BOTH KNEES: Primary | ICD-10-CM

## 2018-04-30 PROCEDURE — 20611 DRAIN/INJ JOINT/BURSA W/US: CPT | Mod: LT | Performed by: FAMILY MEDICINE

## 2018-04-30 PROCEDURE — 99214 OFFICE O/P EST MOD 30 MIN: CPT | Mod: 25 | Performed by: FAMILY MEDICINE

## 2018-04-30 NOTE — PROGRESS NOTES
"ASSESSMENT & PLAN    1. Primary osteoarthritis of both knees      Advanced arthritis in both knee, but left is painful  Discussed that since you're getting greater than 4 months relief can continue with cortisone  If you get less than 4 months relief can do Viscosupplement injections. These can take up to 4 weeks to help with pain  Steroid injection of the left knee was performed today in clinic    If you elect to have a knee replacement will need a pre-op physical from your primary care physician prior to seeing a surgeon.  If you need it in the future, I have placed the referral to Dr. Marcial in [place].     Follow up as needed.      -----    SUBJECTIVE:  Juany Collazo is a 86 year old female who is seen in follow-up for left knee pain.They were last seen 10/24/2016 .     She had about 100% relief for about 4 months duration. She has been having increased soreness int he knee for the last two months. She reports 10/10 pain scale today. No new injuries to the knee since her last visit.    The patient is seen with their daughter.    Patient's past medical, surgical, social, and family histories were reviewed today and no changes are noted.    REVIEW OF SYSTEMS:  Constitutional: NEGATIVE for fever, chills, change in weight  Skin: NEGATIVE for worrisome rashes, moles or lesions  GI/: NEGATIVE for bowel or bladder changes  Neuro: NEGATIVE for weakness, dizziness or paresthesias    OBJECTIVE:  /64  Ht 5' 1\" (1.549 m)  Wt 160 lb (72.6 kg)  BMI 30.23 kg/m2   General: healthy, alert and in no distress  HEENT: no scleral icterus or conjunctival erythema  Skin: no suspicious lesions or rash. No jaundice.  CV: no pedal edema  Resp: normal respiratory effort without conversational dyspnea   Psych: normal mood and affect  Gait: Using a wheelchair, normal steady gait with appropriate coordination and balance  Neuro: normal light touch sensory exam of the extremities.    MSK:  Deferred    Independent " visualization of the below image:  Known end-stage bilateral osteoarthritis    Left Knee Intra-Articular Corticosteroid Injection     Diagnoses (preoperative and postoperative):  Left knee pain/Primary osteoarthritis of left knee  Current Procedure (include preoperative):  Sonographically guided left knee intra-articular corticosteroid injection  Current Indication (include preoperative):  Alleviation of pain  REASON FOR REFERRAL:  Juany has requested a left knee intra-articular corticosteroid injection to help with modulation of pain. Sonographic guidance will be used to ensure accurate placement of the medication within the joint space as the patient's landmarks are not easily palpated.  PATIENT EDUCATION:  Ready to learn with no apparent learning barriers identified.  Learning preferences include listening. Explained diagnosis and treatment plan as well as treatment alternatives. Patient expressed understanding of the content.  Following denial of allergy and review of potential side effects and complications including but not necessarily limited to infection, bleeding, allergic reaction, post-injection flare, local tissue breakdown, injury to soft tissue and/or nerves and seizure, patient indicated their understanding and agreed to proceed. A written consent was obtained and is scanned into the chart. Written and signed consent obtained and is scanned into the chart.  PROCEDURE:  Prior to the procedure, the left knee joint was examined with a 12 MHz linear transducer to visualize the joint space and determine the approach for the procedure.  Procedure was carried out using sterile technique including Chloraprep scrub, a sterile transducer cover, and sterile transducer gel. A simple surgical tray was used.  PROCEDURAL PAUSE:  Procedural pause conducted to verify correct patient identity, procedure to be performed, and as applicable, correct side/site, correct patient position, availability of implants, special  equipment, or special requirements.  Patient position:  Supine  Transducer type:  12 MHz linear array transducer  Approach: Lateral to medial parallel to long axis of transducer  Local Anesthesia:  22 gauge 2.5 inch needle was used to anesthetize the skin, subcutaneous tissue and advanced into the left knee joint with 5 ml of 1% Lidocaine  Aspirate:  22 gauge 3.5 inch needle was used to aspirate 55cc of normal appearing joint fluid.  Injection: After confirming needle tip position, syringe was replaced with one containing 1 ml of 40 mg/ml Depo Medrol and 4 ml of 1% Lidocaine which was injected and seen filling the joint space. Needle was removed bandage placed over the wound.  AFTERCARE:  Patient tolerated the procedure without complication. After a short observation period, the patient was discharged under their own power and in excellent condition.  Pain noted to be a 10/10 before completion of the procedure and 0/10 after completion of the procedure.    Patient's conditions were thoroughly discussed during today's visit with 100% of the visit spent counseling the patient with total time spent face-to-face with the patient being 30 minutes with injection and aspiration taking 5 minutes    Tanika Gaitan DO [facility] [place]

## 2018-04-30 NOTE — LETTER
"    4/30/2018         RE: Juany Collazo  [address] [place] [address]        Dear Colleague,    Thank you for referring your patient, Juany Collazo, to the [facility]. Please see a copy of my visit note below.    ASSESSMENT & PLAN    1. Primary osteoarthritis of both knees      Advanced arthritis in both knee, but left is painful  Discussed that since you're getting greater than 4 months relief can continue with cortisone  If you get less than 4 months relief can do Viscosupplement injections. These can take up to 4 weeks to help with pain  Steroid injection of the left knee was performed today in clinic    If you elect to have a knee replacement will need a pre-op physical from your primary care physician prior to seeing a surgeon.  If you need it in the future, I have placed the referral to Dr. Marcial in [place].     Follow up as needed.      -----    SUBJECTIVE:  Juany Collazo is a 86 year old female who is seen in follow-up for left knee pain.They were last seen 10/24/2016 .     She had about 100% relief for about 4 months duration. She has been having increased soreness int he knee for the last two months. She reports 10/10 pain scale today. No new injuries to the knee since her last visit.    The patient is seen with their daughter.    Patient's past medical, surgical, social, and family histories were reviewed today and no changes are noted.    REVIEW OF SYSTEMS:  Constitutional: NEGATIVE for fever, chills, change in weight  Skin: NEGATIVE for worrisome rashes, moles or lesions  GI/: NEGATIVE for bowel or bladder changes  Neuro: NEGATIVE for weakness, dizziness or paresthesias    OBJECTIVE:  /64  Ht 5' 1\" (1.549 m)  Wt 160 lb (72.6 kg)  BMI 30.23 kg/m2   General: healthy, alert and in no distress  HEENT: no scleral icterus or conjunctival erythema  Skin: no suspicious lesions or rash. No jaundice.  CV: no pedal edema  Resp: normal respiratory " effort without conversational dyspnea   Psych: normal mood and affect  Gait: Using a wheelchair, normal steady gait with appropriate coordination and balance  Neuro: normal light touch sensory exam of the extremities.    MSK:  Deferred    Independent visualization of the below image:  Known end-stage bilateral osteoarthritis    Left Knee Intra-Articular Corticosteroid Injection     Diagnoses (preoperative and postoperative):  Left knee pain/Primary osteoarthritis of left knee  Current Procedure (include preoperative):  Sonographically guided left knee intra-articular corticosteroid injection  Current Indication (include preoperative):  Alleviation of pain  REASON FOR REFERRAL:  Juany has requested a left knee intra-articular corticosteroid injection to help with modulation of pain. Sonographic guidance will be used to ensure accurate placement of the medication within the joint space as the patient's landmarks are not easily palpated.  PATIENT EDUCATION:  Ready to learn with no apparent learning barriers identified.  Learning preferences include listening. Explained diagnosis and treatment plan as well as treatment alternatives. Patient expressed understanding of the content.  Following denial of allergy and review of potential side effects and complications including but not necessarily limited to infection, bleeding, allergic reaction, post-injection flare, local tissue breakdown, injury to soft tissue and/or nerves and seizure, patient indicated their understanding and agreed to proceed. A written consent was obtained and is scanned into the chart. Written and signed consent obtained and is scanned into the chart.  PROCEDURE:  Prior to the procedure, the left knee joint was examined with a 12 MHz linear transducer to visualize the joint space and determine the approach for the procedure.  Procedure was carried out using sterile technique including Chloraprep scrub, a sterile transducer cover, and sterile  transducer gel. A simple surgical tray was used.  PROCEDURAL PAUSE:  Procedural pause conducted to verify correct patient identity, procedure to be performed, and as applicable, correct side/site, correct patient position, availability of implants, special equipment, or special requirements.  Patient position:  Supine  Transducer type:  12 MHz linear array transducer  Approach: Lateral to medial parallel to long axis of transducer  Local Anesthesia:  22 gauge 2.5 inch needle was used to anesthetize the skin, subcutaneous tissue and advanced into the left knee joint with 5 ml of 1% Lidocaine  Aspirate:  22 gauge 3.5 inch needle was used to aspirate 55cc of normal appearing joint fluid.  Injection: After confirming needle tip position, syringe was replaced with one containing 1 ml of 40 mg/ml Depo Medrol and 4 ml of 1% Lidocaine which was injected and seen filling the joint space. Needle was removed bandage placed over the wound.  AFTERCARE:  Patient tolerated the procedure without complication. After a short observation period, the patient was discharged under their own power and in excellent condition.  Pain noted to be a 10/10 before completion of the procedure and 0/10 after completion of the procedure.    Patient's conditions were thoroughly discussed during today's visit with 100% of the visit spent counseling the patient with total time spent face-to-face with the patient being 30 minutes with injection and aspiration taking 5 minutes    Tanika Gaitan DO [facility]          Again, thank you for allowing me to participate in the care of your patient.        Sincerely,        Tanika Gaitan DO

## 2018-04-30 NOTE — PATIENT INSTRUCTIONS
1. Primary osteoarthritis of both knees      Advanced arthritis in both knee, but left is painful  Discussed that since you're getting greater than 4 months relief can continue with cortisone  If you get less than 4 months relief can do Viscosupplement injections. These can take up to 4 weeks to help with pain  Steroid injection of the left knee was performed today in clinic  - Ok to shower  - No bathtub, hot tub or swimming for 2 days  - The lidocaine (what is giving you pain relief right now) will likely stop working in 1-2 hours.  You will then have pain again, similar to before you received the injection. The corticosteroid will not start working until approximately 1-2 weeks from now.    If you elect to have a knee replacement will need a pre-op physical from your primary care physician prior to seeing a surgeon.  If you need it in the future, I have placed the referral to Dr. Marcial in [place].     Follow up as needed.

## 2018-04-30 NOTE — MR AVS SNAPSHOT
After Visit Summary   4/30/2018    Juany Collazo    MRN: [id number]           Patient Information     Date Of Birth          1/7/1932        Visit Information        Provider Department      4/30/2018 1:20 PM Tanika Gaitan, DO [facility]        Today's Diagnoses     Primary osteoarthritis of both knees    -  1      Care Instructions    1. Primary osteoarthritis of both knees      Advanced arthritis in both knee, but left is painful  Discussed that since you're getting greater than 4 months relief can continue with cortisone  If you get less than 4 months relief can do Viscosupplement injections. These can take up to 4 weeks to help with pain  Steroid injection of the left knee was performed today in clinic  - Ok to shower  - No bathtub, hot tub or swimming for 2 days  - The lidocaine (what is giving you pain relief right now) will likely stop working in 1-2 hours.  You will then have pain again, similar to before you received the injection. The corticosteroid will not start working until approximately 1-2 weeks from now.    If you elect to have a knee replacement will need a pre-op physical from your primary care physician prior to seeing a surgeon.  If you need it in the future, I have placed the referral to Dr. Marcial in [place].     Follow up as needed.            Follow-ups after your visit        Who to contact     If you have questions or need follow up information about today's clinic visit or your schedule please contact [facility] directly at [phone].  Normal or non-critical lab and imaging results will be communicated to you by MyChart, letter or phone within 4 business days after the clinic has received the results. If you do not hear from us within 7 days, please contact the clinic through MyChart or phone. If you have a critical or abnormal lab result, we will notify you by phone as soon as possible.  Submit refill requests  "through [email or web address] or call your pharmacy and they will forward the refill request to us. Please allow 3 business days for your refill to be completed.          Additional Information About Your Visit        [employer] Information     [email or web address] lets you send messages to your doctor, view your test results, renew your prescriptions, schedule appointments and more. To sign up, go to [email or web address] . Click on \"Log in\" on the left side of the screen, which will take you to the Welcome page. Then click on \"Sign up Now\" on the right side of the page.     You will be asked to enter the access code listed below, as well as some personal information. Please follow the directions to create your username and password.     Your access code is: [email or web address]  Expires: 2018  9:13 AM     Your access code will  in 90 days. If you need help or a new code, please call your [facility] clinic or [phone].        Care EveryWhere ID     This is your Care EveryWhere ID. This could be used by other organizations to access your [facility] medical records  [id number]        Your Vitals Were     Height BMI (Body Mass Index)                5' 1\" (1.549 m) 30.23 kg/m2           Blood Pressure from Last 3 Encounters:   18 110/64   18 116/66   17 104/62    Weight from Last 3 Encounters:   18 160 lb (72.6 kg)   18 160 lb 14.4 oz (73 kg)   17 160 lb 11.2 oz (72.9 kg)              Today, you had the following     No orders found for display       Primary Care Provider Office Phone # Fax #    Martinez Shafer [phone]       [address] [place] [address]        Equal Access to Services     KJ RUIZ : Huang Chang, wasrinath parkinson, qaybta kaalmanuel barba, mendez albright. So [facility] [phone].    ATENCIÓN: Si habla español, tiene a troy disposición servicios gratuitos de asistencia lingüística. Llame al [phone].    We " comply with applicable federal civil rights laws and [place] laws. We do not discriminate on the basis of race, color, national origin, age, disability, sex, sexual orientation, or gender identity.            Thank you!     Thank you for choosing [facility]  for your care. Our goal is always to provide you with excellent care. Hearing back from our patients is one way we can continue to improve our services. Please take a few minutes to complete the written survey that you may receive in the mail after your visit with us. Thank you!             Your Updated Medication List - Protect others around you: Learn how to safely use, store and throw away your medicines at www.disposemymeds.org.          This list is accurate as of 4/30/18  2:48 PM.  Always use your most recent med list.                   Brand Name Dispense Instructions for use Diagnosis    amLODIPine 5 MG tablet    NORVASC    90 tablet    Take 1 tablet (5 mg) by mouth every morning    Essential hypertension, benign       citalopram 20 MG tablet    celeXA    90 tablet    Take 1 tablet (20 mg) by mouth daily    Major depressive disorder, recurrent episode, mild (H)       desoximetasone 0.25 % cream    TOPICORT    60 g    Apply to face QAM x 1- 2 weeks then only as needed    Dermatitis, perioral       donepezil 5 MG tablet    ARICEPT    90 tablet    Take 2 tablets (10 mg) by mouth At Bedtime    Memory loss       lisinopril-hydrochlorothiazide 20-12.5 MG per tablet    PRINZIDE/ZESTORETIC    180 tablet    Take 1 tablet by mouth 2 times daily    Essential hypertension, benign       omeprazole 20 MG CR capsule    priLOSEC    90 capsule    Take 1 capsule (20 mg) by mouth every morning    Gastroesophageal reflux disease without esophagitis       simvastatin 20 MG tablet    ZOCOR    90 tablet    Take 1 tablet (20 mg) by mouth At Bedtime    Hyperlipidemia LDL goal <130

## 2018-05-01 RX ORDER — METHYLPREDNISOLONE ACETATE 40 MG/ML
40 INJECTION, SUSPENSION INTRA-ARTICULAR; INTRALESIONAL; INTRAMUSCULAR; SOFT TISSUE ONCE
Qty: 1 ML | Refills: 0 | OUTPATIENT
Start: 2018-05-01 | End: 2019-01-31

## 2018-08-10 ENCOUNTER — APPOINTMENT (OUTPATIENT)
Dept: ULTRASOUND IMAGING | Facility: CLINIC | Age: 83
End: 2018-08-10
Attending: EMERGENCY MEDICINE
Payer: COMMERCIAL

## 2018-08-10 ENCOUNTER — APPOINTMENT (OUTPATIENT)
Dept: GENERAL RADIOLOGY | Facility: CLINIC | Age: 83
End: 2018-08-10
Attending: EMERGENCY MEDICINE
Payer: COMMERCIAL

## 2018-08-10 ENCOUNTER — HOSPITAL ENCOUNTER (EMERGENCY)
Facility: CLINIC | Age: 83
Discharge: HOME | End: 2018-08-10
Attending: EMERGENCY MEDICINE | Admitting: EMERGENCY MEDICINE
Payer: COMMERCIAL

## 2018-08-10 VITALS
RESPIRATION RATE: 16 BRPM | OXYGEN SATURATION: 99 % | SYSTOLIC BLOOD PRESSURE: 147 MMHG | DIASTOLIC BLOOD PRESSURE: 91 MMHG | TEMPERATURE: 98.1 F | HEART RATE: 51 BPM

## 2018-08-10 DIAGNOSIS — M25.562 ACUTE PAIN OF LEFT KNEE: ICD-10-CM

## 2018-08-10 LAB
BASOPHILS # BLD AUTO: 0.2 10E9/L (ref 0–0.2)
BASOPHILS NFR BLD AUTO: 2 %
DIFFERENTIAL METHOD BLD: NORMAL
EOSINOPHIL # BLD AUTO: 0.2 10E9/L (ref 0–0.7)
EOSINOPHIL NFR BLD AUTO: 2 %
ERYTHROCYTE [DISTWIDTH] IN BLOOD BY AUTOMATED COUNT: 13.7 % (ref 10–15)
HCT VFR BLD AUTO: 39 % (ref 35–47)
HGB BLD-MCNC: 12.3 G/DL (ref 11.7–15.7)
LYMPHOCYTES # BLD AUTO: 4.6 10E9/L (ref 0.8–5.3)
LYMPHOCYTES NFR BLD AUTO: 45 %
MCH RBC QN AUTO: 28.5 PG (ref 26.5–33)
MCHC RBC AUTO-ENTMCNC: 31.5 G/DL (ref 31.5–36.5)
MCV RBC AUTO: 91 FL (ref 78–100)
MONOCYTES # BLD AUTO: 0.9 10E9/L (ref 0–1.3)
MONOCYTES NFR BLD AUTO: 9 %
NEUTROPHILS # BLD AUTO: 4.3 10E9/L (ref 1.6–8.3)
NEUTROPHILS NFR BLD AUTO: 42 %
PLATELET # BLD AUTO: 228 10E9/L (ref 150–450)
PLATELET # BLD EST: NORMAL 10*3/UL
RBC # BLD AUTO: 4.31 10E12/L (ref 3.8–5.2)
RBC MORPH BLD: NORMAL
VARIANT LYMPHS BLD QL SMEAR: PRESENT
WBC # BLD AUTO: 10.2 10E9/L (ref 4–11)

## 2018-08-10 PROCEDURE — 85025 COMPLETE CBC W/AUTO DIFF WBC: CPT | Performed by: EMERGENCY MEDICINE

## 2018-08-10 PROCEDURE — 25000132 ZZH RX MED GY IP 250 OP 250 PS 637: Performed by: EMERGENCY MEDICINE

## 2018-08-10 PROCEDURE — 93971 EXTREMITY STUDY: CPT | Mod: LT

## 2018-08-10 PROCEDURE — 73562 X-RAY EXAM OF KNEE 3: CPT | Mod: LT

## 2018-08-10 PROCEDURE — 36415 COLL VENOUS BLD VENIPUNCTURE: CPT | Performed by: EMERGENCY MEDICINE

## 2018-08-10 PROCEDURE — 99283 EMERGENCY DEPT VISIT LOW MDM: CPT | Mod: 25

## 2018-08-10 RX ORDER — IBUPROFEN 200 MG
400 TABLET ORAL ONCE
Status: COMPLETED | OUTPATIENT
Start: 2018-08-10 | End: 2018-08-10

## 2018-08-10 RX ORDER — OXYCODONE HYDROCHLORIDE 5 MG/1
5 TABLET ORAL ONCE
Status: COMPLETED | OUTPATIENT
Start: 2018-08-10 | End: 2018-08-10

## 2018-08-10 RX ADMIN — OXYCODONE HYDROCHLORIDE 5 MG: 5 TABLET ORAL at 09:05

## 2018-08-10 RX ADMIN — IBUPROFEN 400 MG: 200 TABLET, FILM COATED ORAL at 09:05

## 2018-08-10 ASSESSMENT — ENCOUNTER SYMPTOMS
JOINT SWELLING: 1
MYALGIAS: 1
ARTHRALGIAS: 1

## 2018-08-10 NOTE — ED TRIAGE NOTES
ABC's intact.  Alert and oriented x4.      Pt states she has hx of L knee pain.  This morning pt states pain significantly worse than usual as she tried to get out of bed.  Pt required assistance getting out of car.  Able to bear weight, but movement is limited.  CMS intact.

## 2018-08-10 NOTE — ED AVS SNAPSHOT
[facility]    [address]    [facility] [address]    Phone:  [phone]    Fax:  [phone]                                       Juany Collazo   MRN: [id number]    Department:  [facility]   Date of Visit:  8/10/2018           After Visit Summary Signature Page     I have received my discharge instructions, and my questions have been answered. I have discussed any challenges I see with this plan with the nurse or doctor.    ..........................................................................................................................................  Patient/Patient Representative Signature      ..........................................................................................................................................  Patient Representative Print Name and Relationship to Patient    ..................................................               ................................................  Date                                            Time    ..........................................................................................................................................  Reviewed by Signature/Title    ...................................................              ..............................................  Date                                                            Time

## 2018-08-10 NOTE — DISCHARGE INSTRUCTIONS
Please use Ace bandage for compression    Please alternate Tylenol and ibuprofen as needed for pain.    Please follow-up with [facility].    Please keep your leg elevated to reduce swelling    Return immediately to the ER with redness, warmth, fevers, worsening pain, difficulty bearing weight, or any other concerns.        ACE Wrap  Minor muscle or joint injuries are often treated with an elastic bandage. The bandage provides support and compression to the injured area. An elastic bandage is a stretchy, rolled bandage. Elastic bandages range in width from 2 to 6 inches. They can be used for a variety of injuries. The bandages are often called ACE bandages, after the most common brand name.  If used correctly, elastic bandages help control swelling and ease pain. An elastic bandage is also a good reminder not to overuse the injured area. However, elastic bandages do not provide a lot of support and will not prevent reinjury.  Home care    To apply an elastic bandage:    Check the skin before wrapping the injury. It should be clean, dry, and free of drainage.    Start wrapping below the injury and work your way toward the body. For an ankle sprain, start wrapping around the foot and work up toward the calf. This will help control swelling.    Overlap the edges of the bandage so it stays snuggly in place.    Wrap the bandage firmly, but not too tightly. A tight bandage can increase swelling on either end of the bandage. Make sure the bandage is wrinkle free.    Leave fingers and toes exposed.    Secure ends of the bandage (even self-sticking ones) with clips or tape.    Check frequently to ensure adequate circulation, especially in the fingers and toes. Loosen the bandage if there is local swelling, numbness, tingling, discomfort, coldness, or discoloration (skin pale or bluish in color).    Rewrap the bandage as needed during the day. Reroll the bandage as you unwind it.  Continue using the  elastic bandage until the pain and swelling are gone or as your healthcare provider advises.  If you have been told to ice the area, the ice can be secured in place with the elastic bandage. Wrap the ice pack with a thin towel to protect the skin. Do not put ice or an ice pack directly on the skin.  Ice the area for no more than 20 minutes at a time.    Follow-up care  Follow up with your healthcare provider, as advised.  When to seek medical advice  Call your healthcare provider for any of the following:    Pain and swelling that doesn't get better or gets worse    Trouble moving injured area    Skin discoloration, numbness, or tingling that doesn t go away after bandage is removed  Date Last Reviewed: 9/13/2015 2000-2017 [employer]. [address] [place] [address]. All rights reserved. This information is not intended as a substitute for professional medical care. Always follow your healthcare professional's instructions.          Arthralgia    Arthralgia is the term for pain in or around the joint. It is a symptom, not a disease. This pain may involve one or more joints. In some cases, the pain moves from joint to joint.  There are many causes for joint pain. These include:    Injury    Osteoarthritis (wearing out of the joint surface)    Gout (inflammation of the joint due to crystals in the joint fluid)    Infection inside the joint      Bursitis (inflammation of the fluid-filled sacs around the joint)    Autoimmune disorders such as rheumatoid arthritis or lupus    Tendonitis (inflammation of chords that attach muscle to bone)  Home care    Rest the involved joint(s) until your symptoms improve.     You may be prescribed pain medicine. If none is prescribed, you may use acetaminophen or ibuprofen to control pain and inflammation.  Follow-up care  Follow up with your healthcare provider or as advised.  When to seek medical advice  Contact your healthcare provider right away if any of  the following occurs:    Pain, swelling, or redness of joint increases    Pain worsens or recurs after a period of improvement    Pain moves to other joints    You cannot bear weight on the affected joint     You cannot move the affected joint    Joint appears deformed    New rash appears    Fever of 100.4 F (38 C) or higher, or as directed by your healthcare provider  Date Last Reviewed: 3/1/2017    2262-9226 [employer]. [address] [place] [address]. All rights reserved. This information is not intended as a substitute for professional medical care. Always follow your healthcare professional's instructions.

## 2018-08-10 NOTE — ED AVS SNAPSHOT
[facility] Emergency Department    [address]    [facility] [address]    Phone:  [phone]    Fax:  [phone]                                       Juany Collazo   MRN: [id number]    Department:  [facility] Emergency Department   Date of Visit:  8/10/2018           Patient Information     Date Of Birth          1/7/1932        Your diagnoses for this visit were:     Acute pain of left knee        You were seen by Puneet Gillette MD.      Follow-up Information     Follow up with [facility] Emergency Department.    Specialty:  EMERGENCY MEDICINE    Why:  If symptoms worsen    Contact information:    [address]  [facility] [address] [phone]        Discharge Instructions       Please use Ace bandage for compression    Please alternate Tylenol and ibuprofen as needed for pain.    Please follow-up with [facility].    Please keep your leg elevated to reduce swelling    Return immediately to the ER with redness, warmth, fevers, worsening pain, difficulty bearing weight, or any other concerns.        ACE Wrap  Minor muscle or joint injuries are often treated with an elastic bandage. The bandage provides support and compression to the injured area. An elastic bandage is a stretchy, rolled bandage. Elastic bandages range in width from 2 to 6 inches. They can be used for a variety of injuries. The bandages are often called ACE bandages, after the most common brand name.  If used correctly, elastic bandages help control swelling and ease pain. An elastic bandage is also a good reminder not to overuse the injured area. However, elastic bandages do not provide a lot of support and will not prevent reinjury.  Home care    To apply an elastic bandage:    Check the skin before wrapping the injury. It should be clean, dry, and free of drainage.    Start wrapping below the injury and work your way toward the body. For  an ankle sprain, start wrapping around the foot and work up toward the calf. This will help control swelling.    Overlap the edges of the bandage so it stays snuggly in place.    Wrap the bandage firmly, but not too tightly. A tight bandage can increase swelling on either end of the bandage. Make sure the bandage is wrinkle free.    Leave fingers and toes exposed.    Secure ends of the bandage (even self-sticking ones) with clips or tape.    Check frequently to ensure adequate circulation, especially in the fingers and toes. Loosen the bandage if there is local swelling, numbness, tingling, discomfort, coldness, or discoloration (skin pale or bluish in color).    Rewrap the bandage as needed during the day. Reroll the bandage as you unwind it.  Continue using the elastic bandage until the pain and swelling are gone or as your healthcare provider advises.  If you have been told to ice the area, the ice can be secured in place with the elastic bandage. Wrap the ice pack with a thin towel to protect the skin. Do not put ice or an ice pack directly on the skin.  Ice the area for no more than 20 minutes at a time.    Follow-up care  Follow up with your healthcare provider, as advised.  When to seek medical advice  Call your healthcare provider for any of the following:    Pain and swelling that doesn't get better or gets worse    Trouble moving injured area    Skin discoloration, numbness, or tingling that doesn t go away after bandage is removed  Date Last Reviewed: 9/13/2015 2000-2017 [employer]. [address] [place], [place] [id number]. All rights reserved. This information is not intended as a substitute for professional medical care. Always follow your healthcare professional's instructions.          Arthralgia    Arthralgia is the term for pain in or around the joint. It is a symptom, not a disease. This pain may involve one or more joints. In some cases, the pain moves from joint to  joint.  There are many causes for joint pain. These include:    Injury    Osteoarthritis (wearing out of the joint surface)    Gout (inflammation of the joint due to crystals in the joint fluid)    Infection inside the joint      Bursitis (inflammation of the fluid-filled sacs around the joint)    Autoimmune disorders such as rheumatoid arthritis or lupus    Tendonitis (inflammation of chords that attach muscle to bone)  Home care    Rest the involved joint(s) until your symptoms improve.     You may be prescribed pain medicine. If none is prescribed, you may use acetaminophen or ibuprofen to control pain and inflammation.  Follow-up care  Follow up with your healthcare provider or as advised.  When to seek medical advice  Contact your healthcare provider right away if any of the following occurs:    Pain, swelling, or redness of joint increases    Pain worsens or recurs after a period of improvement    Pain moves to other joints    You cannot bear weight on the affected joint     You cannot move the affected joint    Joint appears deformed    New rash appears    Fever of 100.4 F (38 C) or higher, or as directed by your healthcare provider  Date Last Reviewed: 3/1/2017    2860-5933 [employer]. [address] [place] [address]. All rights reserved. This information is not intended as a substitute for professional medical care. Always follow your healthcare professional's instructions.          24 Hour Appointment Hotline       To make an appointment at any [facility], call [phone] ([phone]). If you don't have a family doctor or clinic, we will help you find one. [place] clinics are conveniently located to serve the needs of you and your family.             Review of your medicines      Our records show that you are taking the medicines listed below. If these are incorrect, please call your family doctor or clinic.        Dose / Directions Last dose taken    amLODIPine 5 MG  tablet   Commonly known as:  NORVASC   Dose:  5 mg   Quantity:  90 tablet        Take 1 tablet (5 mg) by mouth every morning   Refills:  3        citalopram 20 MG tablet   Commonly known as:  celeXA   Dose:  20 mg   Quantity:  90 tablet        Take 1 tablet (20 mg) by mouth daily   Refills:  3        desoximetasone 0.25 % cream   Commonly known as:  TOPICORT   Quantity:  60 g        Apply to face QAM x 1- 2 weeks then only as needed   Refills:  1        donepezil 5 MG tablet   Commonly known as:  ARICEPT   Dose:  10 mg   Quantity:  90 tablet        Take 2 tablets (10 mg) by mouth At Bedtime   Refills:  1        lisinopril-hydrochlorothiazide 20-12.5 MG per tablet   Commonly known as:  PRINZIDE/ZESTORETIC   Dose:  1 tablet   Quantity:  180 tablet        Take 1 tablet by mouth 2 times daily   Refills:  3        omeprazole 20 MG CR capsule   Commonly known as:  priLOSEC   Dose:  20 mg   Quantity:  90 capsule        Take 1 capsule (20 mg) by mouth every morning   Refills:  3        simvastatin 20 MG tablet   Commonly known as:  ZOCOR   Dose:  20 mg   Quantity:  90 tablet        Take 1 tablet (20 mg) by mouth At Bedtime   Refills:  3                Procedures and tests performed during your visit     CBC + differential    US Lower Extremity Venous Duplex Left    XR Knee Left 3 Views      Orders Needing Specimen Collection     None      Pending Results     Date and Time Order Name Status Description    8/10/2018 1140 CBC + differential In process     8/10/2018 0842 US Lower Extremity Venous Duplex Left Preliminary             Pending Culture Results     No orders found from 8/8/2018 to 8/11/2018.            Pending Results Instructions     If you had any lab results that were not finalized at the time of your Discharge, you can call the ED Lab Result RN at [phone]. You will be contacted by this team for any positive Lab results or changes in treatment. The nurses are available 7 days a week from 10A to 6:30P.  You  can leave a message 24 hours per day and they will return your call.        Test Results From Your Hospital Stay        8/10/2018 10:27 AM      Narrative     XR KNEE LT 3 VW 8/10/2018 10:15 AM    HISTORY: Pain.    COMPARISON: None.        Impression     IMPRESSION: No evidence of acute fracture or malalignment. Moderate  degenerative change with tricompartmental joint space loss and  osteophytosis. There is a moderate knee effusion.    VETO MUSTAFA MD         8/10/2018 10:15 AM      Narrative     VENOUS DOPPLER LEFT LOWER EXTREMITY 8/10/2018 10:11 AM    HISTORY: Left knee pain.    COMPARISON: None.    FINDINGS: Color-flow imaging and Doppler waveform spectral analysis  were utilized. There is normal compressibility and spontaneous flow  throughout the left common femoral, superficial femoral, popliteal and  visualized calf veins. There is a 6.4 x 4.8 x 1.7 cm fluid collection  containing some debris anterior to the knee. This may represent  prepatellar bursitis or a prepatellar hematoma/seroma.        Impression     IMPRESSION: No evidence for deep venous thrombosis. Prepatellar fluid  collection.         8/10/2018 12:14 PM      Component Results     Component Value Ref Range & Units Status    WBC 10.2 4.0 - 11.0 10e9/L Final    RBC Count 4.31 3.8 - 5.2 10e12/L Final    Hemoglobin 12.3 11.7 - 15.7 g/dL Final    Hematocrit 39.0 35.0 - 47.0 % Final    MCV 91 78 - 100 fl Final    MCH 28.5 26.5 - 33.0 pg Final    MCHC 31.5 31.5 - 36.5 g/dL Final    RDW 13.7 10.0 - 15.0 % Final    Platelet Count 228 150 - 450 10e9/L Final    Diff Method PENDING  Incomplete                Clinical Quality Measure: Blood Pressure Screening     Your blood pressure was checked while you were in the emergency department today. The last reading we obtained was  BP: 142/67 . Please read the guidelines below about what these numbers mean and what you should do about them.  If your systolic blood pressure (the top number) is less than 120 and  "your diastolic blood pressure (the bottom number) is less than 80, then your blood pressure is normal. There is nothing more that you need to do about it.  If your systolic blood pressure (the top number) is 120-139 or your diastolic blood pressure (the bottom number) is 80-89, your blood pressure may be higher than it should be. You should have your blood pressure rechecked within a year by a primary care provider.  If your systolic blood pressure (the top number) is 140 or greater or your diastolic blood pressure (the bottom number) is 90 or greater, you may have high blood pressure. High blood pressure is treatable, but if left untreated over time it can put you at risk for heart attack, stroke, or kidney failure. You should have your blood pressure rechecked by a primary care provider within the next 4 weeks.  If your provider in the emergency department today gave you specific instructions to follow-up with your doctor or provider even sooner than that, you should follow that instruction and not wait for up to 4 weeks for your follow-up visit.        Thank you for choosing [facility]       Thank you for choosing [facility] for your care. Our goal is always to provide you with excellent care. Hearing back from our patients is one way we can continue to improve our services. Please take a few minutes to complete the written survey that you may receive in the mail after you visit with us. Thank you!        [employer] Information     [employer] lets you send messages to your doctor, view your test results, renew your prescriptions, schedule appointments and more. To sign up, go to [email or web address] . Click on \"Log in\" on the left side of the screen, which will take you to the Welcome page. Then click on \"Sign up Now\" on the right side of the page.     You will be asked to enter the access code listed below, as well as some personal information. Please follow the directions to create your username and password.   "   Your access code is: [email or web address]  Expires: 2018 12:43 PM     Your access code will  in 90 days. If you need help or a new code, please call your [place] clinic or [phone].        Care EveryWhere ID     This is your Care EveryWhere ID. This could be used by other organizations to access your [place] medical records  [id number]        Equal Access to Services     [facility] : Hadii jeff evans Sojag, waaxda tesha, qaандрейta kaalmada freda, mendez cooley . So [facility] [phone].    ATENCIÓN: Si habla edison, tiene a troy disposición servicios gratuitos de asistencia lingüística. Llame al [phone].    We comply with applicable federal civil rights laws and [place] laws. We do not discriminate on the basis of race, color, national origin, age, disability, sex, sexual orientation, or gender identity.            After Visit Summary       This is your record. Keep this with you and show to your community pharmacist(s) and doctor(s) at your next visit.

## 2018-08-10 NOTE — ED PROVIDER NOTES
"  History     Chief Complaint:  Knee Pain    HPI   Juany Collazo is a 86 year old female with a history of hypertension and hyperlipidemia, who presents with her daughter left knee pain. The patient states that she has been receiving injections for chronic pain in her left knee, her last injection was a few months ago. She has been working with [facility] for this. This morning, however, she was unable to get out of bed due to this pain, and notes that \"she is ready for surgery.\" She denies fall or injury prior to the development of this chronic, or sudden worsening pain, and she does not endorse pain to any other region. The patient is unsure is she has a history of gout, but denies a history of blood clots. She is not currently taking any oral medication for this pain. The patient is otherwise generally healthy, and states that she does water aerobics 4 times a week at the [facility]. She further denies leg swelling or hip pain. She denies fevers, chills, skin redness, or recent infectious symptoms.    Allergies:  No known drug allergies    Medications:    Norvasc  Celexa  Topicort  Aricept  Prinzide  Prilosec  Zocor    Past Medical History:    Gastroesophageal reflux  Memory loss  Depression  Hypertension  Hyperlipidemia  Chronic knee pain    Past Surgical History:    Right hip surgery  Tubal ligation  Decompression lumbar    Family History:    Cardiovascular  Musculoskeletal disorder  Hypertension  Arthritis  Cataracts  Osteoporosis    Social History:  The patient was accompanied to the ED by her laughter Nany.  Smoking Status: Former  Smokeless Tobacco: Never  Alcohol Use: Yes  Marital Status:  Single     Review of Systems   Cardiovascular: Negative for leg swelling.   Musculoskeletal: Positive for arthralgias, joint swelling and myalgias.   All other systems reviewed and are negative.    Physical Exam     Patient Vitals for the past 24 hrs:   BP Temp Temp src Pulse Heart Rate Resp SpO2 "   08/10/18 1258 - - - - 62 16 99 %   08/10/18 1249 (!) 147/91 - - - - - -   08/10/18 1215 - - - - - - 100 %   08/10/18 1115 - - - - - - 99 %   08/10/18 1045 - - - - - - 99 %   08/10/18 0814 142/67 98.1  F (36.7  C) Temporal 51 - 18 99 %     Physical Exam  General:                        Well-nourished                        Speaking in full sentences  Eyes:                        Conjunctiva without injection or scleral icterus  ENT:                        Moist mucous membranes                        Nares patent                        Pinnae normal  Neck:                        Full ROM                        No stiffness appreciated  Resp:                        Lungs CTAB                        No crackles, wheezing or audible rubs                        Good air movement  CV:                                        Normal rate, regular rhythm                        S1 and S2 present                        No murmur, gallop or rub  Skin:                        Warm, dry, well perfused                        No rashes or open wounds on exposed skin  MSK:                        L Knee:                        Pain to palpation about lateral and superior aspect of knee                        No medial knee tenderness to palpation                        Minimal effusion                        Extremity warm and well-perfused                        No fullness to popliteal fossa                        2+ dorsalis pedis pulse                        No leg length discrepancy                        Able to passively flex at the hip                        Limited flexion at the knee secondary to pain             Gait stable  Neuro:                        Alert                        Answers questions appropriately                        Moves all extremities equally  Psych:                        Normal affect, normal mood    Emergency Department Course     Imaging:  Radiology findings were communicated with the patient  who voiced understanding of the findings.  XR Knee Left 3 Views:  IMPRESSION: No evidence of acute fracture or malalignment. Moderate  degenerative change with tricompartmental joint space loss and  osteophytosis. There is a moderate knee effusion.    VETO MUSTAFA MD    US Lower Extremity Venous Duplex Left:  IMPRESSION: No evidence for deep venous thrombosis. Prepatellar fluid  Collection.    Per report by radiology    Laboratory:  Laboratory findings were communicated with the patient who voiced understanding of the findings.  CBC: WNL (WBC 10.2, HGB 12.3, )    Interventions:  0905 - Advil 400 mg PO  0905 - Roxicodone 5 mg PO    Emergency Department Course:  Nursing notes and vitals reviewed.    The patient was sent for a XR knee and US lower extremity while in the emergency department, results above.     IV was inserted and blood was drawn for laboratory testing, results above.    0834: I performed an exam of the patient as documented above.     Case discussed with [facility].    1224: Patient rechecked and updated. She is feeling improved and ambulatory in the ED    Findings and plan explained to the Patient. Patient discharged home with instructions regarding supportive care, medications, and reasons to return. The importance of close follow-up was reviewed.     Impression & Plan      Medical Decision Making:  Vale is a pleasant 86 year old female with a history of chronic left knee pain status post multiple steroid injections in the past, accompanied to the ER by her daughter for evaluation of knee pain. VS on presentation reveal elevated BP though otherwise unremarkable. Examination is notable for a well appearing female, resting comfortably on the gurney, without associated fever. Her exam reveals tenderness to palpation over the superior and lateral aspect of the left knee. Symptoms at present are most likely secondary to underlying arthritis vs. possible prepatellar bursitis.  Patient denies any associated traumatic injuries or falls. X-ray obtained of the knee demonstrates moderate degenerative changes, as well as a associated knee effusion. US obtained which revealed no evidence of DVT, though note is made of a 6.4 by 4.8 by 1.7 cm fluid collection in the prepatellar position. I am most suspicious this is contributing to patient's discomfort, as she notes worsening pain with passive flexion at the knee. I strongly considered a septic process though clinically feel this to be unlikely. Patient is without associated fevers, and exhibits no overlying erythema or warmth. I discussed with the patient and daughter present at bedside the option of arthrocentesis, although clinically I feel the risks outweigh the benefits. WBC count return within normal limits. Septic bursitis is also felt unlikely in the absence of warmth, or overlying erythema about the knee. Case also discussed with [facility], who recommended supportive cares at this time, ACE bandage, and close follow-up.  At this point, I feel patient can be safely discharged home. She was noting improvement in symptoms following the above interventions. She was ambulatory in the ED. I have recommended rest, ice, elevation, alternating tylenol/ibuprofen, and follow-up early this coming week with [facility], with whom she has been following with previously. I counseled them to monitor closely for signs of worsening pain, or signs which would be suggestive of infection including erythema, warmth, severe pain, fevers, or any other concerns. Patient felt comfortable with this plan of care. All of her questions were answered prior to discharge.      Diagnosis:    ICD-10-CM    1. Acute pain of left knee M25.562 CBC + differential       Disposition:  discharged to home    Neena Heath  8/10/2018   [facility] EMERGENCY DEPARTMENT  INeena am serving as a scribe at 8:34 AM on  8/10/2018 to document services personally performed by Puneet Gillette MD based on my observations and the provider's statements to me.       Puneet Gillette MD  08/11/18 7182

## 2018-08-13 ENCOUNTER — TELEPHONE (OUTPATIENT)
Dept: ORTHOPEDICS | Facility: CLINIC | Age: 83
End: 2018-08-13

## 2018-08-13 NOTE — TELEPHONE ENCOUNTER
Juany Collazo is a 86 year old female whose daughter, Jeannette Shin, called and left [facility].  Patient has an appointment scheduled with Dr. Gaitan on 8/17/18. She wants to make sure we have the POA on file for herself so that she may call and speak to a nurse after her appointment regarding her care.     Per chart review there is no consent to communicate with Jeannette. There is a consent for 2 other daughters.   There is no MEDICAL power of  on file, only a FINANCIAL POA.   There is no Health Care Directive on file indicating a health care agent if patient is not able to speak for herself.     Phone call to Jeannette, daughter, who lives in [place], and informed we do not have a consent on file to speak with her. Explained that a written consent to communicate was needed or we could obtain a verbal consent from patient for this call only.    Also explained that a financial POA is separate from medical care and recommended they speak to PCP office to have an advance care directive completed if patient still had medical capacity.   She states patient is still making her own medical decisions with assistance from family. Other sister came with her to the appointment.   Patient does have LTC insurance and has caregivers that come into her home.   Mother does not remember something from one day to the next. They recently sold her piano and were downsizing patient's home. She states patient was agreeable to that.   A few weeks later, the patient went with a caregiver and bought a new piano.     She wants to make sure we get the medical records from her recent ED visit. Informed that if patient were seen at a [facility], we have access to the same computer system. Recommended she check with sister.   She also wanted to know if a patient needs knee replacement surgery, what the recovery was.   Informed it varies depending on a patient's health, but could be 6-12 weeks. She also asked if patient's went  to a TCU. Informed it is determined at the time of discharge if patient is not able to be cared for in the home or to go up and down stairs then they may be referred to TCU.   She states she can come stay with patient, patient can stay with her sister, or they can arrange 24 hr care if needed.   Asked that she write all her questions down and discuss with her sister, Nany,  that is coming to the appointment with patient on 8/17/18.   She asks that a consent to communicate be faxed to her at: [phone] to discuss with patient and sisters.   She was appreciative of information and verbalized understanding.     Consent faxed to number above.     PATSY Quinteros RN

## 2018-08-14 ENCOUNTER — DOCUMENTATION ONLY (OUTPATIENT)
Dept: OTHER | Facility: CLINIC | Age: 83
End: 2018-08-14

## 2018-08-17 ENCOUNTER — OFFICE VISIT (OUTPATIENT)
Dept: ORTHOPEDICS | Facility: CLINIC | Age: 83
End: 2018-08-17
Payer: COMMERCIAL

## 2018-08-17 DIAGNOSIS — M17.12 PRIMARY OSTEOARTHRITIS OF LEFT KNEE: Primary | ICD-10-CM

## 2018-08-17 PROCEDURE — 20611 DRAIN/INJ JOINT/BURSA W/US: CPT | Mod: LT | Performed by: FAMILY MEDICINE

## 2018-08-17 RX ORDER — METHYLPREDNISOLONE ACETATE 40 MG/ML
40 INJECTION, SUSPENSION INTRA-ARTICULAR; INTRALESIONAL; INTRAMUSCULAR; SOFT TISSUE
Status: DISCONTINUED | OUTPATIENT
Start: 2018-08-17 | End: 2018-11-14

## 2018-08-17 RX ADMIN — METHYLPREDNISOLONE ACETATE 40 MG: 40 INJECTION, SUSPENSION INTRA-ARTICULAR; INTRALESIONAL; INTRAMUSCULAR; SOFT TISSUE at 09:51

## 2018-08-17 NOTE — MR AVS SNAPSHOT
"              After Visit Summary   8/17/2018    Juany Collazo    MRN: [id number]           Patient Information     Date Of Birth          1/7/1932        Visit Information        Provider Department      8/17/2018 9:40 AM Tanika Gaitan DO [facility]        Today's Diagnoses     Primary osteoarthritis of left knee    -  1      Care Instructions    1. Primary osteoarthritis of left knee      Steroid injection of the left knee was performed today in clinic  - Ok to shower  - No bathtub, hot tub or swimming for 2 days  - The lidocaine (what is giving you pain relief right now) will likely stop working in 1-2 hours.  You will then have pain again, similar to before you received the injection. The corticosteroid will not start working until approximately 1-2 weeks from now.  Can repeat in 4 months or anytime thereafter    Follow up as needed.             Follow-ups after your visit        Who to contact     If you have questions or need follow up information about today's clinic visit or your schedule please contact [facility] directly at [phone].  Normal or non-critical lab and imaging results will be communicated to you by Screwpulphart, letter or phone within 4 business days after the clinic has received the results. If you do not hear from us within 7 days, please contact the clinic through [employer] or phone. If you have a critical or abnormal lab result, we will notify you by phone as soon as possible.  Submit refill requests through [employer] or call your pharmacy and they will forward the refill request to us. Please allow 3 business days for your refill to be completed.          Additional Information About Your Visit        [employer] Information     [employer] lets you send messages to your doctor, view your test results, renew your prescriptions, schedule appointments and more. To sign up, go to [email or web address] . Click on \"Log in\" on the left side of the " "screen, which will take you to the Welcome page. Then click on \"Sign up Now\" on the right side of the page.     You will be asked to enter the access code listed below, as well as some personal information. Please follow the directions to create your username and password.     Your access code is: [email or web address]  Expires: 2018 12:43 PM     Your access code will  in 90 days. If you need help or a new code, please call your [place] clinic or [phone].        Care EveryWhere ID     This is your Care EveryWhere ID. This could be used by other organizations to access your [place] medical records  [id number]         Blood Pressure from Last 3 Encounters:   08/10/18 (!) 147/91   18 110/64   18 116/66    Weight from Last 3 Encounters:   18 160 lb (72.6 kg)   18 160 lb 14.4 oz (73 kg)   17 160 lb 11.2 oz (72.9 kg)              We Performed the Following     Large Joint Injection/Arthocentesis        Primary Care Provider Office Phone # Fax #    Martinez Shafer [phone]       [address] [place] [id number]        Equal Access to Services     KJ RUIZ AH: Hadii aad ku hadasho Soomaali, waaxda luqadaha, qaybta kaalmada adeegyada, waxay idiin haychloe cooley . So [facility] [phone].    ATENCIÓN: Si habla español, tiene a troy disposición servicios gratuitos de asistencia lingüística. [facility] [phone].    We comply with applicable federal civil rights laws and [place] laws. We do not discriminate on the basis of race, color, national origin, age, disability, sex, sexual orientation, or gender identity.            Thank you!     Thank you for choosing [facility]  for your care. Our goal is always to provide you with excellent care. Hearing back from our patients is one way we can continue to improve our services. Please take a few minutes to complete the written survey that you may receive in the mail after your " visit with us. Thank you!             Your Updated Medication List - Protect others around you: Learn how to safely use, store and throw away your medicines at www.disposemymeds.org.          This list is accurate as of 8/17/18 10:08 AM.  Always use your most recent med list.                   Brand Name Dispense Instructions for use Diagnosis    amLODIPine 5 MG tablet    NORVASC    90 tablet    Take 1 tablet (5 mg) by mouth every morning    Essential hypertension, benign       citalopram 20 MG tablet    celeXA    90 tablet    Take 1 tablet (20 mg) by mouth daily    Major depressive disorder, recurrent episode, mild (H)       desoximetasone 0.25 % cream    TOPICORT    60 g    Apply to face QAM x 1- 2 weeks then only as needed    Dermatitis, perioral       donepezil 5 MG tablet    ARICEPT    90 tablet    Take 2 tablets (10 mg) by mouth At Bedtime    Memory loss       lisinopril-hydrochlorothiazide 20-12.5 MG per tablet    PRINZIDE/ZESTORETIC    180 tablet    Take 1 tablet by mouth 2 times daily    Essential hypertension, benign       omeprazole 20 MG CR capsule    priLOSEC    90 capsule    Take 1 capsule (20 mg) by mouth every morning    Gastroesophageal reflux disease without esophagitis       simvastatin 20 MG tablet    ZOCOR    90 tablet    Take 1 tablet (20 mg) by mouth At Bedtime    Hyperlipidemia LDL goal <130

## 2018-08-17 NOTE — LETTER
8/17/2018         RE: Juany Collazo  [address] [place] [address]        Dear Colleague,    Thank you for referring your patient, Juany Collazo, to the [facility]. Please see a copy of my visit note below.    SUBJECTIVE:  Juany Collazo is a 86 year old female who is seen in follow-up for left knee osteoarthritis.They were last seen April 30, 2018 with a left knee intra-articular corticosteroid injection completed at that time.  Patient reports moderate relief of pain that lasted for approximately 3 months.    They indicate that their current pain level is 2/10.     The patient is seen with their daughter.    Large Joint Injection/Arthocentesis  Date/Time: 8/17/2018 9:51 AM  Performed by: GREG GAITAN  Authorized by: GREG GAITAN     Indications:  Osteoarthritis and pain  Needle Size:  22 G  Guidance: ultrasound    Approach:  Superolateral  Location:  Knee  Site:  L knee joint  Medications:  40 mg methylPREDNISolone acetate 40 MG/ML  Aspirate amount (mL):  29  Aspirate:  Serous  Outcome:  Tolerated well, no immediate complications  Procedure discussed: discussed risks, benefits, and alternatives    Consent Given by:  Patient  Timeout: timeout called immediately prior to procedure    Prep: patient was prepped and draped in usual sterile fashion       Pain noted to be a 2/10 before completion of the procedure and 0/10 after completion of the procedure.    Greg Gaitan DO [facility]                Again, thank you for allowing me to participate in the care of your patient.        Sincerely,        Greg Gaitan DO

## 2018-08-17 NOTE — PATIENT INSTRUCTIONS
1. Primary osteoarthritis of left knee      Steroid injection of the left knee was performed today in clinic  - Ok to shower  - No bathtub, hot tub or swimming for 2 days  - The lidocaine (what is giving you pain relief right now) will likely stop working in 1-2 hours.  You will then have pain again, similar to before you received the injection. The corticosteroid will not start working until approximately 1-2 weeks from now.  Can repeat in 4 months or anytime thereafter    Follow up as needed.

## 2018-08-17 NOTE — PROGRESS NOTES
SUBJECTIVE:  Juany Collazo is a 86 year old female who is seen in follow-up for left knee osteoarthritis.They were last seen April 30, 2018 with a left knee intra-articular corticosteroid injection completed at that time.  Patient reports moderate relief of pain that lasted for approximately 3 months.    They indicate that their current pain level is 2/10.     The patient is seen with their daughter.    Large Joint Injection/Arthocentesis  Date/Time: 8/17/2018 9:51 AM  Performed by: GREG GAITAN  Authorized by: GREG GAITAN     Indications:  Osteoarthritis and pain  Needle Size:  22 G  Guidance: ultrasound    Approach:  Superolateral  Location:  Knee  Site:  L knee joint  Medications:  40 mg methylPREDNISolone acetate 40 MG/ML  Aspirate amount (mL):  29  Aspirate:  Serous  Outcome:  Tolerated well, no immediate complications  Procedure discussed: discussed risks, benefits, and alternatives    Consent Given by:  Patient  Timeout: timeout called immediately prior to procedure    Prep: patient was prepped and draped in usual sterile fashion       Pain noted to be a 2/10 before completion of the procedure and 0/10 after completion of the procedure.    Greg Gaitan DO [facility]

## 2018-11-13 ENCOUNTER — APPOINTMENT (OUTPATIENT)
Dept: GENERAL RADIOLOGY | Facility: CLINIC | Age: 83
End: 2018-11-13
Attending: EMERGENCY MEDICINE
Payer: COMMERCIAL

## 2018-11-13 ENCOUNTER — APPOINTMENT (OUTPATIENT)
Dept: CT IMAGING | Facility: CLINIC | Age: 83
End: 2018-11-13
Attending: EMERGENCY MEDICINE
Payer: COMMERCIAL

## 2018-11-13 ENCOUNTER — HOSPITAL ENCOUNTER (EMERGENCY)
Facility: CLINIC | Age: 83
Discharge: ANOTHER HOSPITAL | End: 2018-11-13
Attending: EMERGENCY MEDICINE | Admitting: EMERGENCY MEDICINE
Payer: COMMERCIAL

## 2018-11-13 ENCOUNTER — HOSPITAL ENCOUNTER (OUTPATIENT)
Facility: CLINIC | Age: 83
Setting detail: OBSERVATION
Discharge: HOME | End: 2018-11-14
Attending: EMERGENCY MEDICINE | Admitting: SURGERY
Payer: COMMERCIAL

## 2018-11-13 VITALS
OXYGEN SATURATION: 96 % | RESPIRATION RATE: 11 BRPM | SYSTOLIC BLOOD PRESSURE: 145 MMHG | TEMPERATURE: 98.5 F | DIASTOLIC BLOOD PRESSURE: 65 MMHG | HEART RATE: 59 BPM

## 2018-11-13 DIAGNOSIS — S05.91XA RIGHT EYE INJURY, INITIAL ENCOUNTER: ICD-10-CM

## 2018-11-13 DIAGNOSIS — K21.9 GASTROESOPHAGEAL REFLUX DISEASE WITHOUT ESOPHAGITIS: ICD-10-CM

## 2018-11-13 DIAGNOSIS — S05.8X1A BLUNT EYE TRAUMA, RIGHT, INITIAL ENCOUNTER: ICD-10-CM
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
## 2018-11-13 DIAGNOSIS — F33.0 MAJOR DEPRESSIVE DISORDER, RECURRENT EPISODE, MILD (H): ICD-10-CM

## 2018-11-13 DIAGNOSIS — I10 ESSENTIAL HYPERTENSION, BENIGN: ICD-10-CM

## 2018-11-13 DIAGNOSIS — S80.02XA CONTUSION OF LEFT KNEE, INITIAL ENCOUNTER: ICD-10-CM

## 2018-11-13 DIAGNOSIS — E78.5 HYPERLIPIDEMIA LDL GOAL <130: ICD-10-CM
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
## 2018-11-13 LAB
ALBUMIN SERPL-MCNC: 3.6 G/DL (ref 3.4–5)
ALBUMIN UR-MCNC: NEGATIVE MG/DL
ALP SERPL-CCNC: 78 U/L (ref 40–150)
ALT SERPL W P-5'-P-CCNC: 20 U/L (ref 0–50)
ANION GAP SERPL CALCULATED.3IONS-SCNC: 9 MMOL/L (ref 3–14)
APPEARANCE UR: CLEAR
AST SERPL W P-5'-P-CCNC: 17 U/L (ref 0–45)
BASOPHILS # BLD AUTO: 0 10E9/L (ref 0–0.2)
BASOPHILS NFR BLD AUTO: 0.3 %
BILIRUB SERPL-MCNC: 0.5 MG/DL (ref 0.2–1.3)
BILIRUB UR QL STRIP: ABNORMAL
BUN SERPL-MCNC: 12 MG/DL (ref 7–30)
CALCIUM SERPL-MCNC: 9 MG/DL (ref 8.5–10.1)
CHLORIDE SERPL-SCNC: 103 MMOL/L (ref 94–109)
CO2 SERPL-SCNC: 25 MMOL/L (ref 20–32)
COLOR UR AUTO: ABNORMAL
CREAT SERPL-MCNC: 0.72 MG/DL (ref 0.52–1.04)
DIFFERENTIAL METHOD BLD: NORMAL
EOSINOPHIL # BLD AUTO: 0.1 10E9/L (ref 0–0.7)
EOSINOPHIL NFR BLD AUTO: 0.6 %
ERYTHROCYTE [DISTWIDTH] IN BLOOD BY AUTOMATED COUNT: 13.7 % (ref 10–15)
ETHANOL SERPL-MCNC: <0.01 G/DL
GFR SERPL CREATININE-BSD FRML MDRD: 77 ML/MIN/1.7M2
GLUCOSE SERPL-MCNC: 93 MG/DL (ref 70–99)
GLUCOSE UR STRIP-MCNC: NEGATIVE MG/DL
HCT VFR BLD AUTO: 40.7 % (ref 35–47)
HGB BLD-MCNC: 13 G/DL (ref 11.7–15.7)
HGB BLD-MCNC: 13.1 G/DL (ref 11.7–15.7)
HGB UR QL STRIP: NEGATIVE
IMM GRANULOCYTES # BLD: 0 10E9/L (ref 0–0.4)
IMM GRANULOCYTES NFR BLD: 0.1 %
INTERPRETATION ECG - MUSE: NORMAL
KETONES UR STRIP-MCNC: NEGATIVE MG/DL
LEUKOCYTE ESTERASE UR QL STRIP: NEGATIVE
LYMPHOCYTES # BLD AUTO: 3.5 10E9/L (ref 0.8–5.3)
LYMPHOCYTES NFR BLD AUTO: 37.3 %
MAGNESIUM SERPL-MCNC: 2.2 MG/DL (ref 1.6–2.3)
MCH RBC QN AUTO: 29 PG (ref 26.5–33)
MCHC RBC AUTO-ENTMCNC: 31.9 G/DL (ref 31.5–36.5)
MCV RBC AUTO: 91 FL (ref 78–100)
MONOCYTES # BLD AUTO: 0.6 10E9/L (ref 0–1.3)
MONOCYTES NFR BLD AUTO: 6.8 %
NEUTROPHILS # BLD AUTO: 5.1 10E9/L (ref 1.6–8.3)
NEUTROPHILS NFR BLD AUTO: 54.9 %
NITRATE UR QL: NEGATIVE
NRBC # BLD AUTO: 0 10*3/UL
NRBC BLD AUTO-RTO: 0 /100
PH UR STRIP: 6.5 PH (ref 5–7)
PLATELET # BLD AUTO: 242 10E9/L (ref 150–450)
POTASSIUM SERPL-SCNC: 3.6 MMOL/L (ref 3.4–5.3)
PROT SERPL-MCNC: 6.7 G/DL (ref 6.8–8.8)
RBC # BLD AUTO: 4.48 10E12/L (ref 3.8–5.2)
RBC #/AREA URNS AUTO: 0 /HPF (ref 0–2)
SODIUM SERPL-SCNC: 137 MMOL/L (ref 133–144)
SOURCE: ABNORMAL
SP GR UR STRIP: 1.01 (ref 1–1.03)
UROBILINOGEN UR STRIP-MCNC: 0.2 MG/DL (ref 0–2)
WBC # BLD AUTO: 9.3 10E9/L (ref 4–11)
WBC #/AREA URNS AUTO: <1 /HPF (ref 0–5)

## 2018-11-13 PROCEDURE — 99285 EMERGENCY DEPT VISIT HI MDM: CPT | Mod: 25 | Performed by: EMERGENCY MEDICINE

## 2018-11-13 PROCEDURE — 80320 DRUG SCREEN QUANTALCOHOLS: CPT | Performed by: EMERGENCY MEDICINE

## 2018-11-13 PROCEDURE — 68200001 ZZH PARTIAL TRAUMA W/O CC LEVEL II: Performed by: EMERGENCY MEDICINE

## 2018-11-13 PROCEDURE — 93005 ELECTROCARDIOGRAM TRACING: CPT | Performed by: EMERGENCY MEDICINE

## 2018-11-13 PROCEDURE — 81001 URINALYSIS AUTO W/SCOPE: CPT | Performed by: EMERGENCY MEDICINE

## 2018-11-13 PROCEDURE — 83735 ASSAY OF MAGNESIUM: CPT | Performed by: EMERGENCY MEDICINE

## 2018-11-13 PROCEDURE — 96365 THER/PROPH/DIAG IV INF INIT: CPT | Performed by: EMERGENCY MEDICINE

## 2018-11-13 PROCEDURE — 70450 CT HEAD/BRAIN W/O DYE: CPT

## 2018-11-13 PROCEDURE — 25000132 ZZH RX MED GY IP 250 OP 250 PS 637: Performed by: SURGERY

## 2018-11-13 PROCEDURE — 99222 1ST HOSP IP/OBS MODERATE 55: CPT | Performed by: NURSE PRACTITIONER

## 2018-11-13 PROCEDURE — 40000141 ZZH STATISTIC PERIPHERAL IV START W/O US GUIDANCE

## 2018-11-13 PROCEDURE — 85018 HEMOGLOBIN: CPT | Performed by: EMERGENCY MEDICINE

## 2018-11-13 PROCEDURE — 99285 EMERGENCY DEPT VISIT HI MDM: CPT | Mod: 25

## 2018-11-13 PROCEDURE — 73560 X-RAY EXAM OF KNEE 1 OR 2: CPT | Mod: LT

## 2018-11-13 PROCEDURE — 40000827 ZZH STATISTIC TRAUMA CODE W/ ACCESS

## 2018-11-13 PROCEDURE — 93010 ELECTROCARDIOGRAM REPORT: CPT | Mod: Z6 | Performed by: EMERGENCY MEDICINE

## 2018-11-13 PROCEDURE — 96375 TX/PRO/DX INJ NEW DRUG ADDON: CPT | Performed by: EMERGENCY MEDICINE

## 2018-11-13 PROCEDURE — 71045 X-RAY EXAM CHEST 1 VIEW: CPT

## 2018-11-13 PROCEDURE — 25000132 ZZH RX MED GY IP 250 OP 250 PS 637: Performed by: NURSE PRACTITIONER

## 2018-11-13 PROCEDURE — 80053 COMPREHEN METABOLIC PANEL: CPT | Performed by: EMERGENCY MEDICINE

## 2018-11-13 PROCEDURE — 72125 CT NECK SPINE W/O DYE: CPT

## 2018-11-13 PROCEDURE — G0378 HOSPITAL OBSERVATION PER HR: HCPCS

## 2018-11-13 PROCEDURE — 85025 COMPLETE CBC W/AUTO DIFF WBC: CPT | Performed by: EMERGENCY MEDICINE

## 2018-11-13 PROCEDURE — 99291 CRITICAL CARE FIRST HOUR: CPT | Mod: 25 | Performed by: EMERGENCY MEDICINE

## 2018-11-13 PROCEDURE — 25000128 H RX IP 250 OP 636: Performed by: EMERGENCY MEDICINE

## 2018-11-13 PROCEDURE — 70480 CT ORBIT/EAR/FOSSA W/O DYE: CPT | Mod: XS

## 2018-11-13 RX ORDER — ONDANSETRON 4 MG/1
4 TABLET, ORALLY DISINTEGRATING ORAL EVERY 6 HOURS PRN
Status: DISCONTINUED | OUTPATIENT
Start: 2018-11-13 | End: 2018-11-14 | Stop reason: HOSPADM

## 2018-11-13 RX ORDER — DONEPEZIL HYDROCHLORIDE 10 MG/1
10 TABLET, FILM COATED ORAL AT BEDTIME
Status: ON HOLD | COMMUNITY
End: 2018-11-14

## 2018-11-13 RX ORDER — LISINOPRIL AND HYDROCHLOROTHIAZIDE 12.5; 2 MG/1; MG/1
1 TABLET ORAL 2 TIMES DAILY
Status: DISCONTINUED | OUTPATIENT
Start: 2018-11-13 | End: 2018-11-14 | Stop reason: HOSPADM

## 2018-11-13 RX ORDER — LEVOFLOXACIN 5 MG/ML
500 INJECTION, SOLUTION INTRAVENOUS ONCE
Status: COMPLETED | OUTPATIENT
Start: 2018-11-13 | End: 2018-11-13

## 2018-11-13 RX ORDER — DONEPEZIL HYDROCHLORIDE 10 MG/1
10 TABLET, FILM COATED ORAL AT BEDTIME
Status: DISCONTINUED | OUTPATIENT
Start: 2018-11-13 | End: 2018-11-13

## 2018-11-13 RX ORDER — LIDOCAINE 4 G/G
2 PATCH TOPICAL
Status: DISCONTINUED | OUTPATIENT
Start: 2018-11-13 | End: 2018-11-14 | Stop reason: HOSPADM

## 2018-11-13 RX ORDER — AMOXICILLIN 250 MG
2 CAPSULE ORAL AT BEDTIME
Status: DISCONTINUED | OUTPATIENT
Start: 2018-11-13 | End: 2018-11-14 | Stop reason: HOSPADM

## 2018-11-13 RX ORDER — ACETAMINOPHEN 650 MG/1
650 SUPPOSITORY RECTAL EVERY 4 HOURS PRN
Status: DISCONTINUED | OUTPATIENT
Start: 2018-11-13 | End: 2018-11-14 | Stop reason: HOSPADM

## 2018-11-13 RX ORDER — CITALOPRAM HYDROBROMIDE 20 MG/1
20 TABLET ORAL DAILY
Status: DISCONTINUED | OUTPATIENT
Start: 2018-11-13 | End: 2018-11-14 | Stop reason: HOSPADM

## 2018-11-13 RX ORDER — ACETAMINOPHEN 325 MG/1
650 TABLET ORAL EVERY 4 HOURS PRN
Status: DISCONTINUED | OUTPATIENT
Start: 2018-11-13 | End: 2018-11-14

## 2018-11-13 RX ORDER — AMLODIPINE BESYLATE 2.5 MG/1
5 TABLET ORAL EVERY MORNING
Status: DISCONTINUED | OUTPATIENT
Start: 2018-11-14 | End: 2018-11-14 | Stop reason: HOSPADM

## 2018-11-13 RX ORDER — SIMVASTATIN 20 MG
20 TABLET ORAL AT BEDTIME
Status: DISCONTINUED | OUTPATIENT
Start: 2018-11-13 | End: 2018-11-14 | Stop reason: HOSPADM

## 2018-11-13 RX ORDER — LIDOCAINE 40 MG/G
CREAM TOPICAL
Status: DISCONTINUED | OUTPATIENT
Start: 2018-11-13 | End: 2018-11-14 | Stop reason: HOSPADM

## 2018-11-13 RX ORDER — DONEPEZIL HYDROCHLORIDE 10 MG/1
10 TABLET, FILM COATED ORAL AT BEDTIME
Status: DISCONTINUED | OUTPATIENT
Start: 2018-11-13 | End: 2018-11-14 | Stop reason: HOSPADM

## 2018-11-13 RX ORDER — HYDROMORPHONE HYDROCHLORIDE 1 MG/ML
0.5 INJECTION, SOLUTION INTRAMUSCULAR; INTRAVENOUS; SUBCUTANEOUS
Status: COMPLETED | OUTPATIENT
Start: 2018-11-13 | End: 2018-11-13

## 2018-11-13 RX ORDER — LANOLIN ALCOHOL/MO/W.PET/CERES
3 CREAM (GRAM) TOPICAL
Status: DISCONTINUED | OUTPATIENT
Start: 2018-11-13 | End: 2018-11-14 | Stop reason: HOSPADM

## 2018-11-13 RX ORDER — NALOXONE HYDROCHLORIDE 0.4 MG/ML
.1-.4 INJECTION, SOLUTION INTRAMUSCULAR; INTRAVENOUS; SUBCUTANEOUS
Status: DISCONTINUED | OUTPATIENT
Start: 2018-11-13 | End: 2018-11-14 | Stop reason: HOSPADM

## 2018-11-13 RX ORDER — ONDANSETRON 2 MG/ML
4 INJECTION INTRAMUSCULAR; INTRAVENOUS EVERY 6 HOURS PRN
Status: DISCONTINUED | OUTPATIENT
Start: 2018-11-13 | End: 2018-11-14 | Stop reason: HOSPADM

## 2018-11-13 RX ADMIN — DONEPEZIL HYDROCHLORIDE 10 MG: 10 TABLET, FILM COATED ORAL at 21:32

## 2018-11-13 RX ADMIN — CITALOPRAM HYDROBROMIDE 20 MG: 20 TABLET ORAL at 14:42

## 2018-11-13 RX ADMIN — LIDOCAINE 1 PATCH: 560 PATCH PERCUTANEOUS; TOPICAL; TRANSDERMAL at 19:45

## 2018-11-13 RX ADMIN — OXYCODONE HYDROCHLORIDE 2.5 MG: 5 TABLET ORAL at 17:45

## 2018-11-13 RX ADMIN — MELATONIN TAB 3 MG 3 MG: 3 TAB at 22:09

## 2018-11-13 RX ADMIN — Medication 0.5 MG: at 11:57

## 2018-11-13 RX ADMIN — LEVOFLOXACIN 500 MG: 5 INJECTION, SOLUTION INTRAVENOUS at 10:03

## 2018-11-13 RX ADMIN — ACETAMINOPHEN 650 MG: 325 TABLET, FILM COATED ORAL at 15:42

## 2018-11-13 RX ADMIN — OXYCODONE HYDROCHLORIDE 2.5 MG: 5 TABLET ORAL at 21:32

## 2018-11-13 RX ADMIN — SENNOSIDES AND DOCUSATE SODIUM 2 TABLET: 8.6; 5 TABLET ORAL at 21:32

## 2018-11-13 RX ADMIN — SIMVASTATIN 20 MG: 20 TABLET, FILM COATED ORAL at 21:32

## 2018-11-13 RX ADMIN — ACETAMINOPHEN 650 MG: 325 TABLET, FILM COATED ORAL at 19:54

## 2018-11-13 RX ADMIN — LISINOPRIL AND HYDROCHLOROTHIAZIDE 1 TABLET: 12.5; 2 TABLET ORAL at 19:44

## 2018-11-13 ASSESSMENT — ENCOUNTER SYMPTOMS
ARTHRALGIAS: 1
MYALGIAS: 1
WOUND: 1
NECK PAIN: 0
EYE PAIN: 1
HEADACHES: 1
NUMBNESS: 0
HEADACHES: 1
WEAKNESS: 0
WOUND: 1
NECK PAIN: 0

## 2018-11-13 ASSESSMENT — PAIN DESCRIPTION - DESCRIPTORS
DESCRIPTORS: ACHING;TENDER
DESCRIPTORS: ACHING;TENDER

## 2018-11-13 NOTE — H&P
[facility]    History and Physical / Consult note: Trauma Service    Date of Admission:  11/13/2018    Time of Admission/Consult Request (page/call): TTA white activation 11/13/2018 @ 0947am    Time of my evaluation: 11/13 @0950am  Consulting services:  Opthalmology - Called by ED    Assessment & Plan   Trauma mechanism: Fall down 2-3 stairs  Time/date of injury:11/13 about 0700am  Known Injuries:  Possible right globe injury  Right medial canthus laceration  Right check abrasion  Left knee abrasion  Possible non displaced posterior 6th rib fracture  Other diagnoses:   1. Acute traumatic pain  2. Hypertension  3. HLD  4. GERD  5. Chronic left knee pain, arthritis  6. Chronic left knee joint effusion  7. 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 Admit to trauma Obs Dr. Patel. Completed trauma imaging includes CT head, C Spine, CT right orbit, CXR, Left knee xray.  2. Opthalmology consult for possible eye injury. Right pupil noted to be more dilated than the left on presentation and and slightly more oval.  3. Cervical spine stenosis: No report of cervical spine tenderness, no neurological deficits, denies numbness, tingling no focal weakness on exam. Neurosurgery consulted for further insight and recommendations.  Neurochecks every 4 hours. Per Neurosurgery no need for further imaging or bracing at this time. C spine cleared per Neurosurgery.  4. Left knee effusion, abrasion: Tender to palpation with adequate ROM. Will curbside Ortho for need for further imaging/ work up.  5. OK for diet  6. Possible non displaced posterior rib fracture: Pain control, incentive spirometer /acapella  7. Pain control: PRN Tylenol  8. OK out of bed, ambulate, ice to left knee for comfort  9. PT/ OT consults    Code status: Full code confirmed with patient .     General Cares:  GI Prophylaxis: Continue PTA PPI  DVT Prophylaxis:  PCD's  Date of last stool/Bowel Regimen:PTA/ ordered  Pulmonary toilet:IS    ETOH: This patient was asked if in the last 3-6 months there has been a time when she had 4 or more drinks in a single day/outing.. Patient answer to the screening question was in the negative. No intervention needed.  Primary Care Physician   Martinez Shafer    Chief Complaint   Fall down stairs    History is obtained from the patient, electronic health record and emergency department physician    History of Present Illness    Juany Collazo is a 86 year old female with a history of hypertension and hyperlipidemia who presents via EMS to the emergency department for evaluation after a fall. She patient reports that her stair case only has a railing part of the way down and she missed the bottom step and was not able to catch herself.  She hit her right eye on a piece of furniture resulting in a laceration above the right eye and pain in the eye. She activated her life alert button and was able to get help. She has had right blurry vision since but is able to see light. She also complains of left knee pain. She arrived with a right eye shield and mild right eye pain. She is not anticoagulated. Her last oral intact was at 0700am. Denies chest pain or shortness of breath.    Past Medical History    I have reviewed this patient's medical history and updated it with pertinent information if needed.   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       Past Surgical History   I have reviewed this patient's surgical history and updated it with pertinent information if needed.  Past Surgical History:   Procedure  Laterality Date     C NONSPECIFIC PROCEDURE  1998    right hip replacement     C NONSPECIFIC PROCEDURE  1970    tubal ligation     C NONSPECIFIC PROCEDURE  1998    hip replacement     DECOMPRESSION LUMBAR ONE LEVEL  4/19/2013    Procedure: DECOMPRESSION LUMBAR ONE LEVEL;  Decompression Bilateral L4-5  ;  Surgeon: Lang Garcia MD;  Location: [facility] OR     Prior to Admission Medications   Prior to Admission Medications   Prescriptions Last Dose Informant Patient Reported? Taking?   amLODIPine (NORVASC) 5 MG tablet   No No   Sig: Take 1 tablet (5 mg) by mouth every morning   citalopram (CELEXA) 20 MG tablet   No No   Sig: Take 1 tablet (20 mg) by mouth daily   desoximetasone (TOPICORT) 0.25 % cream   No No   Sig: Apply to face QAM x 1- 2 weeks then only as needed   donepezil (ARICEPT) 5 MG tablet   No No   Sig: Take 2 tablets (10 mg) by mouth At Bedtime   lisinopril-hydrochlorothiazide (PRINZIDE/ZESTORETIC) 20-12.5 MG per tablet   No No   Sig: Take 1 tablet by mouth 2 times daily   omeprazole (PRILOSEC) 20 MG CR capsule   No No   Sig: Take 1 capsule (20 mg) by mouth every morning   simvastatin (ZOCOR) 20 MG tablet   No No   Sig: Take 1 tablet (20 mg) by mouth At Bedtime      Facility-Administered Medications Last Administration Doses Remaining   methylPREDNISolone acetate (DEPO-MEDROL) injection 40 mg 8/17/2018  9:51 AM         Allergies   Allergies   Allergen Reactions     No Known Drug Allergies        Social History   Social History     Social History     Marital status: Single     Spouse name: N/A     Number of children: N/A     Years of education: N/A     Occupational History     Not on file.     Social History Main Topics     Smoking status: Former Smoker     Quit date: 6/7/1966     Smokeless tobacco: Never Used     Alcohol use Yes      Comment: One beer day     Drug use: No     Sexual activity: Yes     Birth control/ protection: Surgical      Comment: Has had TL     Other Topics Concern     Not on file      Social History Narrative       Family History   Family history reviewed with patient and is noncontributory.    Review of Systems   CONSTITUTIONAL: No fever, chills, sweats, fatigue   EYES: Right eye blurry vision  ENT: no decrease in hearing, no tinnitus, no vertigo, no hoarseness  RESPIRATORY: no shortness of breath, no cough, no sputum   CARDIOVASCULAR: no palpitations, no chest  pain, no exertional chest pain or pressure  GASTROINTESTINAL: no nausea or vomiting, or abd pain  GENITOURINARY: no dysuria, no frequency or hesitancy, no hematuria  MUSCULOSKELETAL: left knee pain  SKIN: right cheek abrasion, left knee abrasion  NEUROLOGIC: no numbness or tingling of hands, no numbness or tingling  of feet, no syncope, no tremors or weakness,  PSYCHIATRIC: no sleep disturbances, no anxiety or depression    Physical Exam                      Vital Signs with Ranges  Temp:  [98.5  F (36.9  C)] 98.5  F (36.9  C)  Pulse:  [59] 59  Heart Rate:  [55-62] 58  Resp:  [8-19] 11  BP: (136-149)/(62-74) 145/65  SpO2:  [95 %-100 %] 96 % 0 lbs 0 oz    Primary Survey:  Airway: patient talking  Breathing: symmetric respiratory effort bilaterally  Circulation: central pulses present and peripheral pulses present  Disability: Pupils - left 2 mm and brisk, right 4 mm more dilated and oval shaped     Zuly Coma Scale - Total 15/15    Secondary Survey:  General: alert, oriented to person, place, time  Neuro: PERRLA. EOMI. CN II-XII grossly intact. No focal deficits. Strength 5/5 x 4 extremities.  Sensation intact.  Head: atraumatic, normocephalic, trachea midline  Eyes: Pupils - left 2 mm and brisk, right 4 mm more dilated and oval shaped, left medial canthus laceration  Ears: mod cerumen bilateral TMs and non-inflamed external ear canals  Nose: nares patent, no drainage, nasal septum non-tender  Mouth/Throat: no exudates or erythema,  no dental tenderness or malocclusions, no tongue lacerations  Neck: switched to [place] cervical collar  present. No midline posterior tenderness, full AROM without pain.   Chest/Pulmonary: normal respiratory rate and rhythm,  bilateral clear breath sounds, no wheezes, rales or rhonchi, no chest wall tenderness or deformities,   Cardiovascular: S1, S2,  normal and regular rate and rhythm, no murmurs  Abdomen: soft, non-tender, no guarding, no rebound tenderness and no tenderness to palpation  : normal external genitalia, pelvis stable to lateral compression,  no shaw, no urine assess  Musculoskel/Extremities: normal extremities, full AROM of major joints, left knee tenderness with abrasion,+2 PP. no edema.   Back/Spine: old surgical scar, depressed about the L1-L2 level, no midline tenderness, no sacral tenderness, no abrasions or contusions  Hands: no gross deformities of hands or fingers. Full AROM of hand and fingers in flexion and extension.  strength equal and symmetric.   Psychiatric: affect/mood normal, cooperative, normal judgement/insight and memory intact  Skin: left check abrasion, left medial canthus laceration,      Results for orders placed or performed during the hospital encounter of 11/13/18 (from the past 24 hour(s))   Alcohol ethyl   Result Value Ref Range    Ethanol g/dL <0.01 <0.01 g/dL   Hemoglobin   Result Value Ref Range    Hemoglobin 13.1 11.7 - 15.7 g/dL   CBC with platelets differential   Result Value Ref Range    WBC 9.3 4.0 - 11.0 10e9/L    RBC Count 4.48 3.8 - 5.2 10e12/L    Hemoglobin 13.0 11.7 - 15.7 g/dL    Hematocrit 40.7 35.0 - 47.0 %    MCV 91 78 - 100 fl    MCH 29.0 26.5 - 33.0 pg    MCHC 31.9 31.5 - 36.5 g/dL    RDW 13.7 10.0 - 15.0 %    Platelet Count 242 150 - 450 10e9/L    Diff Method Automated Method     % Neutrophils 54.9 %    % Lymphocytes 37.3 %    % Monocytes 6.8 %    % Eosinophils 0.6 %    % Basophils 0.3 %    % Immature Granulocytes 0.1 %    Nucleated RBCs 0 0 /100    Absolute Neutrophil 5.1 1.6 - 8.3 10e9/L    Absolute Lymphocytes 3.5 0.8 - 5.3 10e9/L    Absolute  Monocytes 0.6 0.0 - 1.3 10e9/L    Absolute Eosinophils 0.1 0.0 - 0.7 10e9/L    Absolute Basophils 0.0 0.0 - 0.2 10e9/L    Abs Immature Granulocytes 0.0 0 - 0.4 10e9/L    Absolute Nucleated RBC 0.0    Comprehensive metabolic panel   Result Value Ref Range    Sodium 137 133 - 144 mmol/L    Potassium 3.6 3.4 - 5.3 mmol/L    Chloride 103 94 - 109 mmol/L    Carbon Dioxide 25 20 - 32 mmol/L    Anion Gap 9 3 - 14 mmol/L    Glucose 93 70 - 99 mg/dL    Urea Nitrogen 12 7 - 30 mg/dL    Creatinine 0.72 0.52 - 1.04 mg/dL    GFR Estimate 77 >60 mL/min/1.7m2    GFR Estimate If Black >90 >60 mL/min/1.7m2    Calcium 9.0 8.5 - 10.1 mg/dL    Bilirubin Total 0.5 0.2 - 1.3 mg/dL    Albumin 3.6 3.4 - 5.0 g/dL    Protein Total 6.7 (L) 6.8 - 8.8 g/dL    Alkaline Phosphatase 78 40 - 150 U/L    ALT 20 0 - 50 U/L    AST 17 0 - 45 U/L   Magnesium   Result Value Ref Range    Magnesium 2.2 1.6 - 2.3 mg/dL   EKG 12-lead, tracing only   Result Value Ref Range    Interpretation ECG Click View Image link to view waveform and result    XR Chest Port 1 View    Narrative    PRELIMINARY REPORT - The following report is a preliminary  interpretation.      Impression    IMPRESSION:  1.  Question of nondisplaced fracture vs artifact in the posterior  right sixth rib. Consider dedicated rib series if clinically  indicated.  2.  No nondisplaced rib fractures, pneumothorax, or appreciable  airspace disease.    Case discussed with senior radiology resident.   XR Knee Port Left 1/2 Views    Narrative    Exam: 2 views of the left knee dated 11/13/2018.    COMPARISON: 8/10/2018.    CLINICAL HISTORY: Fall.    FINDINGS: AP and lateral views of the left knee were obtained. Large  left knee joint effusion. Tricompartment osteoarthrosis in the left  knee with joint space narrowing and osteophytic spurring in all 3  joint compartments. No displaced fractures are noted.      Impression    IMPRESSION:  1. Large left knee joint effusion, stable since the  comparison  radiographs of 8/10/2018. No displaced fractures noted.  2. 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  3. Findings were discussed with the ordering ER physician Dr. Tere Miranda at 1040 on 11/13/2018.    KRUNAL LEWIS MD       Studies:  XR Chest Port 1 View    (Results Pending)   XR Knee Port Left 1/2 Views    (Results Pending)       Aurora De Los Santos

## 2018-11-13 NOTE — ED NOTES
Bed: ED08  Expected date: 11/13/18  Expected time: 6:19 AM  Means of arrival: Ambulance  Comments:  Eye injury

## 2018-11-13 NOTE — IP AVS SNAPSHOT
"` `     UNIT 6D OBSERVATION [facility]: [phone]                                              INTERAGENCY TRANSFER FORM - NURSING   2018                    Hospital Admission Date: 2018  OLE ROBISON   : 1932  Sex: Female        Attending Provider: Saúl Patel MD     Allergies:  No Known Drug Allergies    Infection:  None   Service:  TRAUMA    Ht:  1.549 m (5' 1\")   Wt:  73.7 kg (162 lb 6.4 oz)   Admission Wt:  73.7 kg (162 lb 6.4 oz)    BMI:  30.69 kg/m 2   BSA:  1.78 m 2            Patient PCP Information     Provider PCP Type    Martinez Shafer MD General      Current Code Status     Date Active Code Status Order ID Comments User Context       Prior      Code Status History     Date Active Date Inactive Code Status Order ID Comments User Context    2018  3:07 PM  Full Code [id number]  Aurora Wong APRN CNP Outpatient    2018  2:11 PM 2018  3:07 PM Full Code [id number]  Aurora Wong APRN CNP Inpatient    2013  7:41 PM 2013  3:46 PM Full Code [id number]  Krysten Garcia, IZABELA Inpatient      Advance Directives        Scanned docmt in ACP Activity?           No scanned doc        Hospital Problems as of 2018              Priority Class Noted POA    Fall Medium  2018 Yes      Non-Hospital Problems as of 2018              Priority Class Noted    Essential hypertension, benign Medium  10/27/2003    Disorder of bone and cartilage Medium  2006    HYPERLIPIDEMIA LDL GOAL <130 Medium  10/31/2010    Lumbar stenosis Medium  2013    Health Care Home Medium  2013    Confusion Medium  2013    Memory loss Medium  2014    Major depressive disorder, recurrent episode, mild (H) Medium  2017    Gastroesophageal reflux disease without esophagitis Medium  3/26/2018      Immunizations     Name Date      Influenza (High Dose) 3 valent vaccine 10/01/16     Influenza (High Dose) 3 valent vaccine 16     " "Influenza (High Dose) 3 valent vaccine 10/14/14     Influenza (High Dose) 3 valent vaccine 09/30/13     Influenza (IIV3) PF 11/10/12     Influenza (IIV3) PF 09/01/11     Influenza (IIV3) PF 10/06/10     Influenza (IIV3) PF 12/22/04     Influenza (IIV3) PF 11/19/02     Pneumo Conj 13-V (2010&after) 01/06/16     Pneumococcal 23 valent 01/01/10     TD (ADULT, 7+) 03/02/11     Tetanus 08/02/89     Zoster vaccine, live 02/21/08          END      ASSESSMENT     Discharge Profile Flowsheet     DISCHARGE NEEDS ASSESSMENT     Inspection of bony prominences  Full 11/14/18 1105    Concerns Comments  -- (Pt would like HC RN and possible Hospital bed.) 04/20/13 1125   Full except areas not inspected   Buttock, left;Hip, right;Hip, left;Buttock, right;Sacrum;Coccyx 11/14/18 0021    Anticipated Changes Related to Illness  none 11/13/18 1458   Inspection under devices  Full 11/14/18 0021    Equipment Used at Home  grab bar 09/30/13 1008   Skin WDL  ex;characteristics 11/14/18 1105    GASTROINTESTINAL (ADULT,PEDIATRIC,OB)     Skin Integrity  scar(s) 11/14/18 1105    GI WDL  WDL 11/14/18 1105   SAFETY      Passing flatus  yes 11/14/18 1105   Safety WDL  WDL 11/14/18 1105    COMMUNICATION ASSESSMENT     Safety Factors  ID band on;upper side rails raised x 2;call light in reach;bed in low position 11/14/18 0021    Patient's communication style  spoken language (English or Bilingual) 11/13/18 1458   All Alarms  alarm(s) activated and audible 11/13/18 1701    SKIN                        Assessment WDL (Within Defined Limits) Definitions           Safety WDL     Effective: 09/28/15    Row Information: <b>WDL Definition:</b> Bed in low position, wheels locked; call light in reach; upper side rails up x 2; ID band on<br> <font color=\"gray\"><i>Item=AS safety wdl>>List=AS safety wdl>>Version=F14</i></font>      Skin WDL     Effective: 09/28/15    Row Information: <b>WDL Definition:</b> Warm; dry; intact; elastic; without discoloration; " "pressure points without redness<br> <font color=\"gray\"><i>Item=AS skin wdl>>List=AS skin wdl>>Version=F14</i></font>      Vitals     Vital Signs Flowsheet     VITAL SIGNS     Pain Control  partially effective 11/13/18 2217    Temp  98.2  F (36.8  C) 11/14/18 1446   Functioning  can do most things, but pain gets in the way of some 11/13/18 2217    Temp src  Oral 11/14/18 1446   Sleep  awake with occasional pain 11/13/18 1031    Resp  16 11/14/18 1446   BILL COMA SCALE      Pulse  59 11/14/18 1021   Best Eye Response  4-->(E4) spontaneous 11/14/18 1105    Heart Rate  52 11/14/18 1446   Best Motor Response  6-->(M6) obeys commands 11/14/18 1105    Pulse/Heart Rate Source  Monitor 11/14/18 1446   Best Verbal Response  5-->(V5) oriented 11/14/18 1105    BP  130/67 11/14/18 1446   [place] Coma Scale Score  15 11/14/18 1105    BP Location  Right arm 11/14/18 1446   HEIGHT AND WEIGHT      OXYGEN THERAPY     Weight  73.7 kg (162 lb 6.4 oz) 11/13/18 1031    SpO2  97 % 11/14/18 1446   POSITIONING      O2 Device  None (Room air) 11/14/18 1446   Body Position  independently positioning 11/14/18 1105    PAIN/COMFORT     Head of Bed (HOB)  HOB at 20-30 degrees 11/14/18 1105    Patient Currently in Pain  yes 11/14/18 1108   DAILY CARE      Preferred Pain Scale  CAPA (Clinically Aligned Pain Assessment) ([facility] and [facility] Ad[facility] Only) 11/14/18 1108   Activity Management  activity adjusted per tolerance 11/14/18 1105    Pain Location  Knee 11/14/18 1108   Activity Assistance Provided  assistance, 1 person 11/14/18 1105    Pain Orientation  Left 11/14/18 1108   RESPIRATORY MONITORING      Pain Descriptors  Aching 11/14/18 1108   Respiratory Monitoring (EtCO2)  0 mmHg 11/13/18 1321    Pain Intervention(s)  Medication (See eMAR);Cold applied;Repositioned 11/14/18 1108   EKG MONITORING      CLINICALLY ALIGNED PAIN ASSESSMENT (CAPA) ([facility] ADULTS ONLY)     Cardiac Regularity  Regular 11/13/18 1350    Comfort "  tolerable with discomfort 11/13/18 2217   Cardiac Rhythm  NSR 11/13/18 1350    Change in Pain  about the same 11/13/18 2217                 Patient Lines/Drains/Airways Status    Active LINES/DRAINS/AIRWAYS     Name: Placement date: Placement time: Site: Days: Last dressing change:    Peripheral IV 11/13/18 Left;Anterior Lower forearm 11/13/18   0957   Lower forearm   1     Peripheral IV 11/13/18 Right Upper forearm 11/13/18      Upper forearm   1     Wound 11/13/18 Anterior Forehead Abrasion(s) Scabed 11/13/18   1500   Forehead   1     Wound 11/13/18 Right Cheek Abrasion(s) Scabed 11/13/18   1500   Cheek   1     Incision/Surgical Site 04/19/13 Midline;Lower Back 04/19/13   0803    2035             Patient Lines/Drains/Airways Status    Active PICC/CVC     None            Intake/Output Detail Report     Date Intake Output Net    Shift IV Piggyback Total Urine Total       Layne 11/13/18 0700 - 11/13/18 1459 -- -- 400 400 -400    Noc 11/13/18 1500 - 11/13/18 2359 -- -- -- -- 0    Day 11/14/18 0000 - 11/14/18 0659 -- -- 200 200 -200    Layne 11/14/18 0700 - 11/14/18 1459 -- -- -- -- 0    Noc 11/14/18 1500 - 11/14/18 2359 -- -- -- -- 0      Last Void/BM       Most Recent Value    Urine Occurrence 1 at 11/14/2018 0030    Stool Occurrence       Case Management/Discharge Planning     Case Management/Discharge Planning Flowsheet     REFERRAL INFORMATION     Anticipated Changes Related to Illness  none 11/13/18 1458    Arrived From  home 04/20/13 1125   Equipment Used at Home  grab bar 09/30/13 1008    LIVING ENVIRONMENT     ABUSE RISK SCREEN      Lives With  alone 11/14/18 0935   QUESTION TO PATIENT:  Has a member of your family or a partner(now or in the past) intimidated, hurt, manipulated, or controlled you in any way?  no 11/13/18 1032    Living Arrangements  house 11/14/18 0935   QUESTION TO PATIENT: Do you feel safe going back to the place where you are living?  yes 11/13/18 1032    COPING/STRESS     OBSERVATION: Is  there reason to believe there has been maltreatment of a vulnerable adult (ie. Physical/Sexual/Emotional abuse, self neglect, lack of adequate food, shelter, medical care, or financial exploitation)?  no 11/13/18 1032    Major Change/Loss/Stressor  none 09/29/13 2018   OTHER      Patient Personal Strengths  expressive of needs 04/20/13 1125   Are you depressed or being treated for depression?  Yes 11/13/18 1840    ASSESSMENT/CONCERNS TO BE ADDRESSED     HOMICIDE RISK      Concerns Comments  -- (Pt would like HC RN and possible Hospital bed.) 04/20/13 1125   Feels Like Hurting Others  no 11/13/18 1032    DISCHARGE PLANNING

## 2018-11-13 NOTE — ED NOTES
This RN called patient's daughter, Kelly, and updated her on patient status and transfer to [facility]. Patient's daughter has no other questions at this time.

## 2018-11-13 NOTE — IP AVS SNAPSHOT
` `     UNIT 6D OBSERVATION [facility] BANK: [phone]            Medication Administration Report for Juany Collazo as of 11/14/18 1514   Legend:    Given Hold Not Given Due Canceled Entry Other Actions    Time Time (Time) Time  Time-Action       Inactive    Active    Linked        Medications 11/08/18 11/09/18 11/10/18 11/11/18 11/12/18 11/13/18 11/14/18    acetaminophen (TYLENOL) Suppository 650 mg  Dose: 650 mg  Freq: EVERY 4 HOURS PRN Route: RE  PRN Reason: mild pain  Start: 11/13/18 1411   Admin Instructions: Alternate ibuprofen (if ordered) with acetaminophen.  Maximum acetaminophen dose from all sources = 75 mg/kg/day not to exceed 4 grams/day.    Admin. Amount: 1 suppository (1 × 650 mg suppository)  Dispense Loc: [facility]               acetaminophen (TYLENOL) tablet 975 mg  Dose: 975 mg  Freq: EVERY 6 HOURS Route: PO  Start: 11/14/18 1047   Admin Instructions: Alternate ibuprofen (if ordered) with acetaminophen  Maximum acetaminophen dose from all sources = 75 mg/kg/day not to exceed 4 grams/day.    Admin. Amount: 3 tablet (3 × 325 mg tablet)  Last Admin: 11/14/18 1021  Dispense Loc: [facility] ADS 6DO           1021 (975 mg)-Given       [ ] 1700       [ ] 2300           amLODIPine (NORVASC) tablet 5 mg  Dose: 5 mg  Freq: EVERY MORNING Route: PO  Start: 11/14/18 0800   Admin. Amount: 2 tablet (2 × 2.5 mg tablet)  Last Admin: 11/14/18 1022  Dispense Loc: [facility] ADS 6DO           1022 (5 mg)-Given           citalopram (celeXA) tablet 20 mg  Dose: 20 mg  Freq: DAILY Route: PO  Start: 11/13/18 1415   Admin. Amount: 1 tablet (1 × 20 mg tablet)  Last Admin: 11/14/18 1022  Dispense Loc: [facility] 6DO          1442 (20 mg)-Given        1022 (20 mg)-Given           cyclopentolate (CYCLOGYL) 1 % ophthalmic solution 1 drop  Dose: 1 drop  Freq: 2 TIMES DAILY Route: RIGHT EYE  Start: 11/14/18 1430   Admin. Amount: 1 drop  Dispense Loc: [facility]  Volume: 15 mL           [ ] 1430       [  ] 2000           donepezil (ARICEPT) tablet 10 mg  Dose: 10 mg  Freq: AT BEDTIME Route: PO  Start: 11/13/18 2200   Admin. Amount: 1 tablet (1 × 10 mg tablet)  Last Admin: 11/13/18 2132  Dispense Loc: [facility]          2132 (10 mg)-Given        [ ] 2200           erythromycin (ROMYCIN) ophthalmic ointment  Freq: 3 TIMES DAILY Route: RIGHT EYE  Start: 11/14/18 1400   Admin Instructions: To eye lid abrasion    Last Admin: 11/14/18 1315  Dispense Loc: [facility]           1315 (1 g)-Given       [ ] 2000           ibuprofen (ADVIL/MOTRIN) tablet 200 mg  Dose: 200 mg  Freq: EVERY 6 HOURS PRN Route: PO  PRN Reason: moderate pain  Start: 11/14/18 1145   Admin. Amount: 1 tablet (1 × 200 mg tablet)  Last Admin: 11/14/18 1316  Dispense Loc: [facility] ADS 6DO           1316 (200 mg)-Given           Lidocaine (LIDOCARE) 4 % Patch 2 patch  Dose: 2 patch  Freq: EVERY 24 HOURS 2000 Route: TD  Start: 11/13/18 2000   Admin Instructions: Apply patch(s) to both knees. To prevent lidocaine toxicity, patient should be patch free for 12 hrs daily. Patches may be cut to smaller size prior to removing release liner.  NEVER APPLY HEAT OVER PATCH which increases absorption and risk of local anesthetic toxicity. Do not apply over area where liposomal bupivacaine was injected for 96 hours post injection.    Admin. Amount: 2 patch  Last Admin: 11/13/18 1945  Dispense Loc: [facility] ADS 6DO  Infused Over: 12 Hours          1945 (1 patch)-Given [C]        [ ] 2000          And  lidocaine patch REMOVAL  Freq: EVERY 24 HOURS 0800 Route: TD  Start: 11/14/18 0800   Admin Instructions: Remove lidocaine Patch.    Dispense Loc: [facility]           0734 ( )-Patch Removed          And  lidocaine patch in PLACE  Freq: EVERY 8 HOURS Route: TD  Start: 11/13/18 2000   Admin Instructions: Chart every shift, confirming that patch is still in place on patient (no barcode scan needed). See patch order for dose information.  NEVER  "APPLY HEAT OVER PATCH which will increase absorption and may lead to risk of local anesthetic toxicity. Do not apply over area where liposomal bupivacaine injected for 96 hours.    Last Admin: 11/14/18 0400  Dispense Loc: [facility]          1946 ( )-Given        0400 ( )-Patch in Place       1233 ( )-Patch Removed       [ ] 2000           lidocaine (LMX4) cream  Freq: EVERY 1 HOUR PRN Route: Top  PRN Reason: pain  PRN Comment: with VAD insertion or accessing implanted port.  Start: 11/13/18 1411   Admin Instructions: Do NOT give if patient has a history of allergy to any local anesthetic or any \"gita\" product.  Apply 30 minutes prior to VAD insertion or port access. MAX Dose: 2.5 g (  of 5 g tube).    Dispense Loc: [facility] ADS 6DO               lidocaine 1 % 1 mL  Dose: 1 mL  Freq: EVERY 1 HOUR PRN Route: OTHER  PRN Comment: mild pain with VAD insertion or accessing implanted port  Start: 11/13/18 1411   Admin Instructions: Do NOT give if patient has a history of allergy to any local anesthetic or any \"gita\" product. MAX dose 1 mL subcutaneous OR intradermal in divided doses.    Admin. Amount: 1 mL  Dispense Loc: [facility] ADS 6DO  Volume: 2 mL               lisinopril-hydrochlorothiazide (PRINZIDE/ZESTORETIC) 20-12.5 MG per tablet 1 tablet  Dose: 1 tablet  Freq: 2 TIMES DAILY Route: PO  Start: 11/13/18 2000   Admin. Amount: 1 tablet  Last Admin: 11/14/18 1022  Dispense Loc: [facility]          1944 (1 tablet)-Given        1022 (1 tablet)-Given       [ ] 2000           melatonin tablet 3 mg  Dose: 3 mg  Freq: AT BEDTIME PRN Route: PO  PRN Reason: sleep  Start: 11/13/18 2155   Admin. Amount: 1 tablet (1 × 3 mg tablet)  Last Admin: 11/13/18 2209  Dispense Loc: [facility] ADS 6DO          2209 (3 mg)-Given            menthol (ICY HOT) 5 % patch 1 patch  Dose: 1 patch  Freq: EVERY 8 HOURS PRN Route: Top  PRN Reason: muscle soreness  Start: 11/14/18 0854   Admin Instructions: Please apply when " lidoderm patch is off   Apply to Skin.  Remove 'old patch' and chart on Medication Patch Removal order when new patch is applied. Avoid placing heating pad over the patch.    Admin. Amount: 1 patch  Dispense Loc: [facility]              And  menthol (ICY HOT) Patch in Place  Freq: EVERY 8 HOURS Route: TD  Start: 11/14/18 0900   Admin Instructions: Chart every shift, confirming that patch is still in place on patient (no barcode scan needed). See patch order for dose information.    Dispense Loc: [facility]           (1022)-Not Given       [ ] 1700          And  menthol (ICY HOT) patch REMOVAL  Freq: EVERY 8 HOURS PRN Route: TD  PRN Comment: for patch removal  Start: 11/14/18 0854   Admin Instructions: Remove patch when new patch is applied or patch is discontinued.    Dispense Loc: [facility]               naloxone (NARCAN) injection 0.1-0.4 mg  Dose: 0.1-0.4 mg  Freq: EVERY 2 MIN PRN Route: IV  PRN Reason: opioid reversal  Start: 11/13/18 1411   Admin Instructions: For respiratory rate LESS than or EQUAL to 8.  Partial reversal dose:  0.1 mg titrated q 2 minutes for Analgesia Side Effects Monitoring Sedation Level of 3 (frequently drowsy, arousable, drifts to sleep during conversation).Full reversal dose:  0.4 mg bolus for Analgesia Side Effects Monitoring Sedation Level of 4 (somnolent, minimal or no response to stimulation).  For ordered IV doses 0.1-2mg give IVP. Give each 0.4mg over 15 seconds in emergency situations. For non-emergent situations further dilute in 9mL of NS to facilitate titration of response.    Admin. Amount: 0.1-0.4 mg = 0.25-1 mL Conc: 0.4 mg/mL  Dispense Loc: [facility] ADS 6DO  Volume: 1 mL               omeprazole (priLOSEC) CR capsule 20 mg  Dose: 20 mg  Freq: EVERY MORNING Route: PO  Start: 11/14/18 0800   Admin. Amount: 1 capsule (1 × 20 mg capsule)  Last Admin: 11/14/18 1022  Dispense Loc: [facility] 6DO           1022 (20 mg)-Given           ondansetron  (ZOFRAN-ODT) ODT tab 4 mg  Dose: 4 mg  Freq: EVERY 6 HOURS PRN Route: PO  PRN Reasons: nausea,vomiting  Start: 11/13/18 1411   Admin Instructions: This is Step 1 of nausea and vomiting management.  If nausea not resolved in 15 minutes, go to Step 2 prochlorperazine (COMPAZINE). Do not push through foil backing. Peel back foil and gently remove. Place on tongue immediately. Administration with liquid unnecessary  With dry hands, peel back foil backing and gently remove tablet; do not push oral disintegrating tablet through foil backing; administer immediately on tongue and oral disintegrating tablet dissolves in seconds; then swallow with saliva; liquid not required.    Admin. Amount: 1 tablet (1 × 4 mg tablet)  Dispense Loc: [facility] ADS 6DO              Or  ondansetron (ZOFRAN) injection 4 mg  Dose: 4 mg  Freq: EVERY 6 HOURS PRN Route: IV  PRN Reasons: nausea,vomiting  Start: 11/13/18 1411   Admin Instructions: This is Step 1 of nausea and vomiting management.  If nausea not resolved in 15 minutes, go to Step 2 prochlorperazine (COMPAZINE).  Irritant. For ordered IV doses 0.1-4 mg, give IV Push undiluted over 2-5 minutes.    Admin. Amount: 4 mg = 2 mL Conc: 4 mg/2 mL  Dispense Loc: [facility] 6DO  Infused Over: 2-5 Minutes  Volume: 2 mL               oxyCODONE IR (ROXICODONE) tablet 5 mg  Dose: 5 mg  Freq: EVERY 4 HOURS PRN Route: PO  PRN Reason: moderate to severe pain  Start: 11/14/18 1200   Admin. Amount: 1 tablet (1 × 5 mg tablet)  Last Admin: 11/14/18 1316  Dispense Loc: [facility] 6DO           1316 (5 mg)-Given           polyethylene glycol (MIRALAX/GLYCOLAX) Packet 17 g  Dose: 17 g  Freq: DAILY Route: PO  Start: 11/14/18 1200   Admin Instructions: 1 Packet = 17 grams. Mixed prescribed dose in 8 ounces of water. Follow with 8 oz. of water.    Admin. Amount: 17 g  Dispense Loc: [facility] 6DO           (0674)-Not Given [C]           prednisoLONE acetate (PRED FORTE) 1 % ophthalmic susp 1 drop  Dose: 1  drop  Freq: 3 TIMES DAILY Route: RIGHT EYE  Start: 18 1400   Admin. Amount: 1 drop  Last Admin: 18 1316  Dispense Loc: [facility]  Volume: 1 mL           1316 (1 drop)-Given       [ ] 2000           senna-docusate (SENOKOT-S;PERICOLACE) 8.6-50 MG per tablet 2 tablet  Dose: 2 tablet  Freq: AT BEDTIME Route: PO  Start: 18   Admin. Amount: 2 tablet  Last Admin: 18  Dispense Loc: [facility] ADS 6DO          2132 (2 tablet)-Given        [ ] 220           simvastatin (ZOCOR) tablet 20 mg  Dose: 20 mg  Freq: AT BEDTIME Route: PO  Start: 18   Admin. Amount: 1 tablet (1 × 20 mg tablet)  Last Admin: 18  Dispense Loc: [facility] AD[facility] 6DO          2132 (20 mg)-Given        [ ] 2200           sodium chloride (PF) 0.9% PF flush 3 mL  Dose: 3 mL  Freq: EVERY 8 HOURS Route: IK  Start: 18 1415   Admin Instructions: And Q1H PRN, to lock peripheral IV dormant line.    Admin. Amount: 3 mL  Last Admin: 18 0655  Dispense Loc: [facility] Stock  Volume: 3 mL   Current Line: Peripheral IV 18 Left;Anterior Lower forearm         1442 (3 mL)-Given       2137 (3 mL)-Given        0655 (3 mL)-Given       (1316)-Not Given       [ ] 2215           sodium chloride (PF) 0.9% PF flush 3 mL  Dose: 3 mL  Freq: EVERY 1 HOUR PRN Route: IK  PRN Reason: line flush  Start: 18 1411   Admin Instructions: for peripheral IV flush post IV meds    Admin. Amount: 3 mL  Dispense Loc: [facility]  Volume: 3 mL              Completed Medications  Medications 11/08/18 11/09/18 11/10/18 11/11/18 11/12/18 11/13/18 11/14/18         Dose: 0.5 mg  Freq: ONCE PRN Route: IV  PRN Reason: moderate to severe pain  Start: 18 1153   End: 18 1157   Admin Instructions: For ordered IV doses 0.1-4 mg give IV Push undiluted. Administer each 2mg over 2-5 minutes.    Admin. Amount: 0.5 mg  Last Admin: 18  Dispense Loc: [facility] ADS ED  Administrations Remainin    Current Line: Peripheral IV 18 Left;Anterior Lower forearm         1157 (0.5 mg)-Given              Dose: 500 mg  Freq: ONCE Route: IV  Indications Comment: possible open globe  Last Dose: Stopped (18 110)  Start: 18 0957   End: 18 110   Admin Instructions: Irritant. Administer at a rate of no greater than 100 mL/hr    Admin. Amount: 500 mg = 100 mL Conc: 500 mg/100 mL  Last Admin: 18 1003  Dispense Loc: [facility] ADS ED  Infused Over: 60 Minutes  Administrations Remainin          1003 (500 mg)-New Bag       1105-Stopped           Discontinued Medications  Medications 11/08/18 11/09/18 11/10/18 11/11/18 11/12/18 11/13/18 11/14/18         Dose: 650 mg  Freq: EVERY 4 HOURS PRN Route: PO  PRN Reason: mild pain  Start: 18 1411   End: 18 0854   Admin Instructions: Alternate ibuprofen (if ordered) with acetaminophen  Maximum acetaminophen dose from all sources = 75 mg/kg/day not to exceed 4 grams/day.    Admin. Amount: 2 tablet (2 × 325 mg tablet)  Last Admin: 18 0447  Dispense Loc: [facility] 6DO          1542 (650 mg)-Given       1954 (650 mg)-Given        0026 (650 mg)-Given       0447 (650 mg)-Given       0854-Med Discontinued         Dose: 1 drop  Freq: 2 TIMES DAILY Route: RIGHT EYE  Start: 18 1030   End: 18 1412   Admin. Amount: 1 drop  Dispense Loc: [facility]  Volume: 15 mL           [ ] 1030       1412-Med Discontinued         Dose: 10 mg  Freq: AT BEDTIME Route: PO  Start: 18 2200   End: 18 1422   Admin. Amount: 1 tablet (1 × 10 mg tablet)          1422-Med Discontinued          Dose: 2.5 mg  Freq: EVERY 4 HOURS PRN Route: PO  PRN Reason: moderate to severe pain  Start: 18 1740   End: 18 1147   Admin. Amount: 1 half-tab (1 × 2.5 mg half-tab)  Last Admin: 18 1022  Dispense Loc: [facility] 6DO          1745 (2.5 mg)-Given       2132 (2.5 mg)-Given        0447 (2.5 mg)-Given       1022 (2.5 mg)-Given        1147-Med Discontinued

## 2018-11-13 NOTE — ED NOTES
Bed: ED01  Expected date:   Expected time:   Means of arrival:   Comments:  [facility] Transfer    86 y.o. Female  Fall this AM, missed last step and fell onto knees then hit right eye on furniture. VSS. No pain meds given at [facility]. Chronic knee pain, Left knee pain has worsened since fall. Concern for eye injury. CT of neck showed compression fracture - has c-collar placed.   
Trauma RN Response to TTA Summary:    Paper charting assumed upon arrival at 09:52AM; activation 09:47AM.    EMS collar changed to ASPEN C-collar 10:08AM - CMS intact pre/post change.    
c-collar removed by neurosurgeon.   
none

## 2018-11-13 NOTE — CONSULTS
"[facility]       NEUROSURGERY CONSULTATION NOTE    This consultation was requested by Aurora Wong NP from the Trauma service.    Reason for Consultation: \"severe cervical stenosis with possible central cord compression, no tenderness or symptoms\"    HPI:  Juany Collazo is a 85 yo female with history of decompressive lumbar laminectomy, GERD, depression, hypertension, and hyperlipidemia who presents after a mechanical fall this morning. Patient stated she woke up and was walking down her stairs but missed the bottom step. She thereafter fell forward and hit her wall. She states its secondary to not turning on the lights this morning. Patient was brought into hospital after she triggered her lifealert system. The patient does state that she hit her head. She is able to recall today's events. She denies any warfarin, aspirin, or plavix use. She denies any neck pain at present. She denies any numbness or tingling in any extremity at present or focal limb weakness. She denies any historical issue with coordination issues of any limb.     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     PAST SURGICAL HISTORY:   Past Surgical History:   Procedure Laterality Date     C NONSPECIFIC PROCEDURE  1998    right hip replacement     C NONSPECIFIC PROCEDURE  1970    tubal ligation     C NONSPECIFIC PROCEDURE  1998    hip replacement     DECOMPRESSION LUMBAR ONE LEVEL  4/19/2013    Procedure: " DECOMPRESSION LUMBAR ONE LEVEL;  Decompression Bilateral L4-5  ;  Surgeon: Lang Garcia MD;  Location: [facility] OR     FAMILY HISTORY:   Family History   Problem Relation Age of Onset     Cardiovascular Brother      Musculoskeletal Disorder Brother      Muscular Dystrophy     Hypertension Father      Cardiovascular Father      Hypertension Mother      Arthritis Mother      Eye Disorder Mother      Cataract     Osteoporosis Mother      Osteoporosis Maternal Aunt      SOCIAL HISTORY:   Social History   Substance Use Topics     Smoking status: Former Smoker     Quit date: 6/7/1966     Smokeless tobacco: Never Used     Alcohol use Yes      Comment: One beer day     MEDICATIONS:  Current Outpatient Prescriptions   Medication Sig Dispense Refill     amLODIPine (NORVASC) 5 MG tablet Take 1 tablet (5 mg) by mouth every morning 90 tablet 3     citalopram (CELEXA) 20 MG tablet Take 1 tablet (20 mg) by mouth daily 90 tablet 3     desoximetasone (TOPICORT) 0.25 % cream Apply to face QAM x 1- 2 weeks then only as needed 60 g 1     donepezil (ARICEPT) 5 MG tablet Take 2 tablets (10 mg) by mouth At Bedtime 90 tablet 1     lisinopril-hydrochlorothiazide (PRINZIDE/ZESTORETIC) 20-12.5 MG per tablet Take 1 tablet by mouth 2 times daily 180 tablet 3     omeprazole (PRILOSEC) 20 MG CR capsule Take 1 capsule (20 mg) by mouth every morning 90 capsule 3     simvastatin (ZOCOR) 20 MG tablet Take 1 tablet (20 mg) by mouth At Bedtime 90 tablet 3     Allergies:  Allergies   Allergen Reactions     No Known Drug Allergies        ROS: 10 point ROS of systems including Constitutional, Eyes, Respiratory, Cardiovascular, Gastroenterology, Genitourinary, Integumentary, Muscularskeletal, Psychiatric were all negative except for pertinent positives noted in my HPI.    Physical exam:   Blood pressure 146/68, pulse 58, temperature 97.6  F (36.4  C), temperature source Oral, resp. rate 18, weight 73.7 kg (162 lb 6.4 oz), SpO2 96 %.  General: awake and  "alert  HEENT: multiple lacerations and contusions. Eye with corneal injection. Somewhat limited neck range of motion (stated to be at baseline), and no pain with neck flexion or bilateral rotation. No midline spinal tenderness.   PULM: breathing comfortably on room air  NEUROLOGIC:  -- Awake; Alert; oriented x 4  -- Follows commands briskly  -- Speech fluent, spontaneous. No aphasia or dysarthria.  -- no gaze preference. No apparent hemineglect.  -- pupils reactive bilaterally. Extraocular muscles intact.   -- face symmetrical, tongue midline  -- palate elevates symmetrically, uvula midline  -- hard of hearing bilaterally   Motor:   Delt Bi Tri Hand Flexion/  Extension Iliopsoas Quadriceps Hamstrings Tibialis Anterior Gastroc    C5 C6 C7 C8/T1 L2 L3 L4-S1 L4 S1   R 5 5 5 5 5 3* 3* 5 5   L 5 5 5 5 5 3* 3* 5 5   * - pain-limited  Sensory:  intact to LT x 4 extremities   Reflexes:   Bi BR Pollo Pat Bab    C5-6 C6 UMN L2-4 UMN   R 2+ 2+ Norm Unable to elicit Mute   L 2+ 2+ Norm Unable to elicit Mute     IMAGING:  CT cervical spine 11/13/18:   1. Marked hypertrophic changes and calcification posterior to the  odontoid process. Although on an unenhanced scan, the relationship of  this to the cervical cord is poorly seen, significant central stenosis  is suspected. This could be better visualized with MR. Nonetheless,  this could result in compression of the cervical cord at the C1 level.  2. No apparent acute fracture or dislocation.  3. Degenerative changes throughout the cervical spine as above.     LABS:   BMP: within normal limits except for hypoproteinemia  CBC: within normal limits     ASSESSMENT:  Asymptomatic cervical stenosis     RECOMMENDATIONS:  No neurosurgical intervention indicated at this time   No additional imaging  No need for cervical collar    The patient was discussed with Dr. Bruce David, neurosurgery chief resident, and he agrees with the above.    Brandyn \"Bradley\" MD Robyn   Neurosurgery, " PGY-2    Please contact neurosurgery resident on call with questions.    Dial * * [phone], enter [phone] when prompted.

## 2018-11-13 NOTE — ED PROVIDER NOTES
History     Chief Complaint   Patient presents with     Fall     HPI  Juany Collazo is a 86 year old female with a history of hypertension, hyperlipidemia, lumbar stenosis, GERD and depression, who presents to the Emergency Department by ambulance from [facility] for evaluation following a mechanical fall earlier this morning. Patient reportedly was attempting to walk down her steps, however, she missed a step and subsequently fell down three steps, during which she reportedly struck her head against furniture in the process, and sustained an injury to her right eye resulting in a laceration above the eye. Patient denies any loss of consciousness and this was an unwitnessed fall, but she was able to use her Life Alert necklace in order to contact paramedics. Patient was initially evaluated at [facility] around 06:30 am, at which time she rated her pain a 2/10 in severity and complained of a mild headache. Additionally, she reported having an exacerbation of her chronic left knee pain. During her evaluation there, patient received CT imaging of the orbits, head and cervical spine, all which were unremarkable besides the CT spine concerning for a C1 cord compression due to cervical stenosis without signs of fracture, however, there was concern of there being a possible globe rupture based on her exam prompting her to be sent here for further evaluation. While en route here, patient received 100 mg of fentanyl. Per EMS, her O2 saturation was noted to drop to 90 about three times after fentanyl, otherwise VS were WNL. Here, patient continues to have some anterior left knee pain. Patient reports having a change in vision from her baseline. She denies any neck pain but has a mild headache. She denies any weakness, numbness or paraesthesias of the upper and lower extremities. Her last known meal or drink was yesterday evening.     I have reviewed the Medications, Allergies, Past Medical and Surgical  History, and Social History in the [facility] system.    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       PAST SURGICAL HISTORY:   Past Surgical History:   Procedure Laterality Date     C NONSPECIFIC PROCEDURE  1998    right hip replacement     C NONSPECIFIC PROCEDURE  1970    tubal ligation     C NONSPECIFIC PROCEDURE  1998    hip replacement     DECOMPRESSION LUMBAR ONE LEVEL  4/19/2013    Procedure: DECOMPRESSION LUMBAR ONE LEVEL;  Decompression Bilateral L4-5  ;  Surgeon: Lang Garcia MD;  Location: [facility] OR       FAMILY HISTORY:   Family History   Problem Relation Age of Onset     Cardiovascular Brother      Musculoskeletal Disorder Brother      Muscular Dystrophy     Hypertension Father      Cardiovascular Father      Hypertension Mother      Arthritis Mother      Eye Disorder Mother      Cataract     Osteoporosis Mother      Osteoporosis Maternal Aunt        SOCIAL HISTORY:   Social History   Substance Use Topics     Smoking status: Former Smoker     Quit date: 6/7/1966     Smokeless tobacco: Never Used     Alcohol use Yes      Comment: One beer day     Current Facility-Administered Medications   Medication     levofloxacin (LEVAQUIN) infusion 500 mg     methylPREDNISolone acetate (DEPO-MEDROL) injection 40 mg     Current Outpatient Prescriptions   Medication     amLODIPine (NORVASC) 5 MG tablet     citalopram (CELEXA) 20 MG tablet     desoximetasone (TOPICORT) 0.25 % cream     donepezil (ARICEPT) 5 MG tablet     lisinopril-hydrochlorothiazide (PRINZIDE/ZESTORETIC) 20-12.5 MG per tablet     omeprazole (PRILOSEC) 20 MG CR capsule     simvastatin (ZOCOR)  20 MG tablet        Allergies   Allergen Reactions     No Known Drug Allergies        Review of Systems   Eyes: Positive for visual disturbance (right eye).   Musculoskeletal: Negative for neck pain.        Positive for left anterior knee pain.   Skin: Positive for wound (laceration at right medial canthus).   Neurological: Positive for headaches (mild). Negative for syncope, weakness and numbness.   All other systems reviewed and are negative.      Physical Exam        Physical Exam   General: patient is alert and oriented, GCS 15   Head: atraumatic and normocephalic   EENT: moist mucus membranes without tonsillar erythema or exudates, no malocclusion, no dental trauma, left pupil 2 mm reactive, right pupil oblong shaped 3mm and unreactive, injection of the conjunctiva,  Small laceration along the right medial canthus, no hemotypanum  Neck: patient in cervical spine collar, no midline c-spine TTP  Cardiovascular: regular rate and rhythm, extremities warm and well perfused, no lower extremity edema  Pulmonary: lungs clear to auscultation bilaterally, no chest wall TTP   Abdomen: soft, non-tender, non-distended  Musculoskeletal: normal range of motion of the extremities, TTP of the left anterior knee with superficial abrasion, no midline thoracic or lumbar spine TTP  Neurological: alert and oriented, moving all extremities symmetrically, no facial droop, no slurring of speech, strength 5/5 and symmetric in , elbow flexion/extension, knee flexion/extension and ankle plantar/dorsiflexion, sensation to light touch in distal upper and lower extremities intact  Skin: warm, dry, small abrasion to right cheek      ED Course     ED Course     Procedures             EKG Interpretation:      Interpreted by Tere Miranda  Time reviewed: 1025  Symptoms at time of EKG: None   Rhythm: sinus bradycardia  Rate: Bradycardia  Axis: Normal  Ectopy: none  Conduction: normal  ST Segments/ T Waves: No acute ischemic changes  Q  Waves: none  Comparison to prior: Unchanged    Clinical Impression: sinus bradycardia        Critical Care time:  was 30 minutes for this patient excluding procedures.  Trauma:  Level of trauma activation: Partial  C-collar and immobilization: applied prior to arrival.  CSpine Clearance: Patient left in collar  GCS at arrival: 15  GCS at disposition: unchanged  Full Primary and Secondary survey with appropriate immobilization of spine completed in exam section.  Consults prior to admission or transfer: Neurosurgery called at 1030, ophthalmology called at 1000  Procedures done in the ED: none          Labs Ordered and Resulted from Time of ED Arrival Up to the Time of Departure from the ED - No data to display         Assessments & Plan (with Medical Decision Making)   Patient is an 86-year-old female who presents following a mechanical fall down approximately 3 stairs striking a piece of furniture at the bottom of the stairs. Patient was initially seen at [facility] and transferred for further trauma evaluation as well as Ophthalmology consultation. Imaging has been reviewed from the outside facility including CT of the head, orbit and cervical spine. CT head and orbital CT are unremarkable. Her cervical spine is concerning for possible C1 cord compression. She is she is neurovascularly intact without any reported numbness or weakness into the extremities. Patient was placed in an [place] collar and will await Neurosurgery consultation. Patient has been placed on cervical spine precautions until that time. In addition, she reports left knee pain following a fall. X-rays are unremarkable. She does have chronic left knee pain and likely exacerbation of her underlying degenerative disease. Ophthalmology has been consulted in regards to eye trauma. She does have a small tear of the medial canthus as well as an unreactive irregularly shaped pupil on the right eye. Patient has had an eye patch in place and was given IV  levofloxacin in the ED per Ophthalmology recommendations. Patient has been made n.p.o. Her last tetanus immunization was in 2011. Patient will be admitted to Trauma Surgery for further workup and management.     RE-evaluation: Patient seen by ophthalmology no concerns for open globe but felt to have traumatic anisocoria.  Recommended Pred forte drops, cyclopentolate, and erythromycin ointment.  Patient is also been seen by neurosurgery and cleared cervical spine.  The x-ray shows a large left joint effusion unchanged from prior imaging without evidence of fracture.  Chest x-ray shows a possible nondisplaced fracture of the posterior right sixth rib however does not have significant overlying tenderness in this area.  Patient awaiting trauma admission.    This part of the medical record was transcribed by Uzma Avalos, Medical Scribe, from a dictation done by Tere Miranda MD.     I have reviewed the nursing notes.    I have reviewed the findings, diagnosis, plan and need for follow up with the patient.    New Prescriptions    No medications on file       Final diagnoses:   Fall, initial encounter   Contusion of left knee, initial encounter   Blunt eye trauma, right, initial encounter     I, Uzma Avalos, am serving as a trained medical scribe to document services personally performed by Tere Miranda MD, based on the provider's statements to me.   I, Tere Miranda MD, was physically present and have reviewed and verified the accuracy of this note documented by Uzma Avalos.    11/13/2018   [facility], [place], EMERGENCY DEPARTMENT     Tere Miranda MD  11/13/18 6810

## 2018-11-13 NOTE — PLAN OF CARE
Problem: Patient Care Overview  Goal: Plan of Care/Patient Progress Review  Outcome: No Change  Patient admitted to unit at about 1415. Patient brought from ED downstairs. Originally was at [facility].   Patient stated she fell at home.  Admission started. Will need to be finished.   Focused assessment completed. A more general assessment will need to be completed.   Granddaughter came to see her at 1445. Is with her now.

## 2018-11-13 NOTE — IP AVS SNAPSHOT
Unit 6D Observation [address] [address]    Phone:  [phone]    Fax:  [phone]                                       After Visit Summary   11/13/2018    Juany Collazo    MRN: [id number]           After Visit Summary Signature Page     I have received my discharge instructions, and my questions have been answered. I have discussed any challenges I see with this plan with the nurse or doctor.    ..........................................................................................................................................  Patient/Patient Representative Signature      ..........................................................................................................................................  Patient Representative Print Name and Relationship to Patient    ..................................................               ................................................  Date                                   Time    ..........................................................................................................................................  Reviewed by Signature/Title    ...................................................              ..............................................  Date                                               Time          22EPIC Rev 08/18

## 2018-11-13 NOTE — IP AVS SNAPSHOT
` ` Patient Information     Patient Name Sex Juany Stephen ([id number]) Female 1932       Room Bed    7526 6509-56      Patient Demographics     Address Phone E-mail Address    [address] [place] [address] [phone] (Home)  [phone] (Work) *Preferred*  [phone] (Mobile) [email or web address]      Patient Ethnicity & Race     Ethnic Group Patient Race    American White      Emergency Contact(s)     Name Relation Home Work Mobile    Jeannette Shin  Daughter [phone]    Kelly Downing Daughter [phone]    Nany Beltran Daughter [phone]        Documents on File        Status Date Received Description       Documents for the Patient    Face Sheet Received () 09     Privacy Notice - [place] Received 03     Insurance Card  () 10/27/03     Insurance Card  () 04     Insurance Card  () 05     Consent Form  05     Waiver - Payment  05     Other  05 CAN POLICY    Insurance Card  () 06     Insurance Card  () 08     External Medication Information Consent Accepted () 10/06/10     Face Sheet Received () 04/06/10     Patient ID Received 13     Consent for Services - Hospital/Clinic  () 10/06/10     Consent for Services - Hospital/Clinic Received () 11     Consent for Services - Hospital/Clinic Received () 11     Consent to Communicate Received () 11     External Medication Information Consent Accepted () 12     Consent for Services - Hospital/Clinic Received () 12     Insurance Card Received () 12 [facility]    Consent for EHR Access  13 Copied from existing Consent for services - C/HOD collected on 2012    [facility] Specified Other       External Medication Information Consent Accepted 13     Insurance Card Received () 13 [facility]     Consent to Communicate Received () 13     Consent to Communicate Received 13 EMAIL CONSENT    Consent for Services - Hospital/Clinic Received () 13     Insurance Card Received 12/23/15 MEDICARE    Consent for Services - Hospital/Clinic Received () 12/23/15     Insurance Card Received () 12/23/15     Insurance Card Received () 16 ucare    Consent for Services/Privacy Notice - Hospital/Clinic Received () 16     Legal Documentation  16 STATUTORY SHORT FORM POWER OF   2.12.15    HIM CANELO Authorization - File Only  16 MYCHART ACCESS    HIM CANELO Authorization - File Only  16 MYCHART ACCESS    Insurance Card Received () 17 ucare    HIM CANELO Authorization  () 17 Authorization for batch [employer] 17    HIM CANELO Authorization  () 17 Authorization for batch [employer] een 17    Care Everywhere Prospective Auth Received 18     Consent for Services/Privacy Notice - Hospital/Clinic Received 18     Consent for Services - Hospital and Clinic Received 18     HIE Auth Received 18     Insurance Card Received 18 [place] [address]    Power of  Received 18 Legal Guardianship 2/12/15    Consent to Communicate Received 18     Advance Directives and Living Will Received 18 Health Care Directive 13    Advance Directives and Living Will Not Received  Validation of AD 13    Advance Directives and Living Will Received (Deleted) 08     Consent to Communicate Received (Deleted) 12     Insurance Card  (Deleted)  [facility]    Power of  Received (Deleted) 18 to be deleted    Advance Directives and Living Will Received (Deleted) 18     Advance Directives and Living Will Received (Deleted) 18        Documents for the Encounter    CMS IM for Patient Signature Received 18       Admission Information     Attending Provider  Admitting Provider Admission Type Admission Date/Time    Saúl Patel MD Harmon, Saúl Powell MD Emergency 11/13/18  0948    Discharge Date Hospital Service Auth/Cert Status Service Area     Trauma Incomplete [facility]    Unit Room/Bed Admission Status       UU U6D OBSERVATION 6509/6509-01 Admission (Confirmed)       Admission     Complaint    Fall      Hospital Account     Name Acct ID Class Status Primary Coverage    Juany Collazo [id number] Observation Open UCARE - UCARE FOR SENIORS            Guarantor Account (for Hospital Account #[id number])     Name Relation to Pt Service Area Active? Acct Type    Juany Collazo  FCS Yes Personal/Family    Address Phone          [address] [place] [address] [phone](H)  [phone](O)              Coverage Information (for Hospital Account #[id number])     F/O Payor/Plan Precert #    UCARE/UCARE FOR SENIORS     Subscriber Subscriber #    Juany Collazo [id number]    Address Phone    [address]  [place] [address] [phone]

## 2018-11-13 NOTE — CONSULTS
OPHTHALMOLOGY CONSULT NOTE  11/13/18    Patient: Juany Collazo  Consulted by: Trauma  Reason for Consult: Globe trauma    HISTORY OF PRESENTING ILLNESS:     Juany Collazo is a 86 year old female who presents with right eye blunt trauma who reportedly fell by missing the last step walking down stairs and afterward she struck a smooth wall at the corner. She believes it was a smooth surface and no object penetrated her eye. She denies loss of consciousness and could not recall if her affected eye had any vision changes immediately after the injury. She pressed her Life Alert button shortly thereafter. She cannot assess if her vision has gotten progressively worse since the injury but states it is decreased from baseline. She does not endorse flashes of light or eruption of floaters. She does not endorse endorse blackened visual field cuts. Does not report pain with eye movements or diplopia. Denies any discharge.    Review of systems were otherwise negative except for that which has been stated above.    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       Past Surgical History:   Procedure Laterality Date     C NONSPECIFIC PROCEDURE  1998    right hip replacement     C NONSPECIFIC PROCEDURE  1970    tubal ligation     C NONSPECIFIC PROCEDURE  1998    hip replacement     DECOMPRESSION LUMBAR ONE LEVEL  4/19/2013    Procedure: DECOMPRESSION LUMBAR ONE LEVEL;  Decompression Bilateral L4-5  ;  Surgeon: Lang Garcia MD;  Location:  OR       EXAMINATION:     Visual Acuity:  Right Eye 20/400 ; Left Eye 20/40   Pupils: Anisocoria with R > L,   Right pupil irregular; no afferent pupillary defect by reverse  Left regular and reactive to light; with no afferent pupillary defect.    Intraocular Pressure: RE  16 ; LE 14  mmHg by tonopen (<5% error).   Motility: RE Full; LE Full  Confrontational Visual Field: RE Full; LE Full     External/Slit Lamp Exam   RIGHT EYE   Lids/Lashes: Small lower lid abrasion   Conj/Sclera: Injected nasally   Cornea:  Clear   Ant Chamber:  Deep, C/F   Iris: Irregular, minimally reactive   Lens: 3+ nuclear sclerosis, cortical cataract  LEFT   Lids/lashes: No Abnormality   Conj/Sclera: White and Quiet   Cornea:  Clear   Ant Chamber:  Deep and Quiet   Iris: Round and Reactive   Lens: posterior chamber intraocular lens (PCIOL)    Dilated Fundus Exam  Eyes Dilated? Yes   Time: 10 AM With Phenylephrine 2.5% and Mydriacyl 1%    (Normally, dilation with the above lasts about 4-6 hours)  RIGHT:   Anterior Vitreous: Clear   Media: Clear   Optic Nerve: Normal Contours and Size   Macula: Flat, Drusen   Vessels: Normal Caliber and Distribution   Retinal Periphery: No Holes or Tears Identified, Pigmentary changes  LEFT:   Anterior Vitreous: Clear   Media: Clear   Optic Nerve: Normal Contours and Size   Macula: Flat, Drusen   Vessels: Normal Caliber and Distribution   Retinal Periphery: No Holes or Tears Identified, Pigmentary changes    Labs/Studies/Imaging Performed  CT ORIBT: There are no facial bone fractures. The paranasal sinuses  are well aerated. The globes bilaterally are unremarkable.       ASSESSMENT/PLAN:     Juany Collazo is a 86 year old female who presents with:    Trauma to the right eye   No globe rupture   Anterior chamber formed   Irregular pupil   Traumatic iritis   Retina attached    Nuclear sclerosis and cortical cataract right eye   Work up post hospital admission    Pseudophakia, left eye   Monitor    Plan:   Start Pred Forte three times a  day   Cyclopentolate three times a day   Erythromycin ointment for eyelid abrasions   Follow up in eye clinic in 2 weeks      It is our pleasure to participate in this patient's care and treatment. Please contact us with any further questions or concerns.    Trevon Emerson MD   PGY-2 Ophthalmology  [phone]      I did not physically examine this patient; however, I did review the findings, assessment, and plan with the resident and agree with the above note.    Dottie Jose MD  Cornea Fellow

## 2018-11-13 NOTE — IP AVS SNAPSHOT
MRN:[id number]                      After Visit Summary   11/13/2018    Juany Collazo    MRN: [id number]           Thank you!     Thank you for choosing [facility] for your care. Our goal is always to provide you with excellent care. Hearing back from our patients is one way we can continue to improve our services. Please take a few minutes to complete the written survey that you may receive in the mail after you visit with us. Thank you!        Patient Information     Date Of Birth          1/7/1932        About your hospital stay     You were admitted on:  November 13, 2018 You last received care in the:  Unit 6D [facility]ation [facility]    You were discharged on:  November 14, 2018        Reason for your hospital stay       Fall  Left knee contussion                  Who to Call     For medical emergencies, please call 911.  For non-urgent questions about your medical care, please call your primary care provider or clinic, [phone]          Attending Provider     Provider Specialty    Tere Miranda MD Emergency Medicine Emergency Medical Services    Saúl Patel MD General Surgery       Primary Care Provider Office Phone # Fax #    Martinez Shafer [phone]      After Care Instructions     Activity - Up with nursing assistance           Advance Diet as Tolerated       Follow this diet upon discharge: Regular            Fall precautions           General info for SNF       Length of Stay Estimate: Short Term Care: Estimated # of Days <30  Condition at Discharge: Stable  Level of care:skilled   Rehabilitation Potential: Good  Admission H&P remains valid and up-to-date: Yes  Recent Chemotherapy: N/A  Use Nursing Home Standing Orders: Yes            Intake and output       Every shift            Mantoux instructions       Give two-step Mantoux (PPD) Per Facility Policy Yes                  Follow-up Appointments     Follow Up and recommended labs and tests   "     Follow up with your primary care provider for continued medical care and hospital follow up in 5-10 days.     Medication Therapy Management Services  If you have any questions regarding your medications after discharge, this service is available to you.  Please call:  [phone] or [phone] (toll-free)  [facility]  [address] [place] [address]  [email or web address]    Orthopaedic Clinic  follow up with Dr. Steen as soon as she is able to discuss further treatment options/  Elective TKA of her left knee  [facility]  Floor 4   [address] [place] [address]   Appointments: [phone]     Ophthalmology (Eye) Clinic : Follow up in eye clinic in 2 weeks  [facility]  Floor 4   [address] [place] [address]   Appointments: [phone]     You have been involved in a recent trauma incident resulting in an injury.  Studies show us that people affected by trauma have higher levels of post-traumatic stress disorder (PTSD) and/or depressive symptoms during the year following an injury.     Please answer the following:  Had migraines about the event(s) or thought about the event(s) when you didn't want to?  Tried hard not to think about the event(s) or went out of your way to avoid situations that reminded you of the event(s)?  Been constantly on guard, watchful, or easily startled?  Felt numb or detached from people, activities, or your surroundings?   Felt guilty or unable to stop blaming yourself or others for the event(s) or any problems the event (s) may have caused?    If you answered \"yes\" to 3 or more of these questions, or if you simply want to discuss any of your feelings further, we recommend that you talk with your Primary Care Provider or a mental health professional.                  Your next 10 appointments already scheduled     Dec 12, 2018  1:30 PM CST   (Arrive by 1:15 PM)   NEW " "KNEE with Darci Steen MD   [facility]    [address]  4th Floor  [facility] [place] [address] [phone]            Mar 27, 2019  9:20 AM CDT   PHYSICAL with Martinez Shafer MD   [facility]    [address] [id number] [phone]              Additional Services     Occupational Therapy Adult Consult       Evaluate and treat as clinically indicated.    Reason:  Fall, knee pain            Physical Therapy Adult Consult       Evaluate and treat as clinically indicated.    Reason:  Fall, knee pain                  Pending Results     Date and Time Order Name Status Description    11/14/2018 1012 XR Pelvis and Hip Bilateral 2 Views In process             Statement of Approval     Ordered          11/14/18 1507  I have reviewed and agree with all the recommendations and orders detailed in this document.  EFFECTIVE NOW     Approved and electronically signed by:  Aurora Wong APRN CNP             Admission Information     Date & Time Provider Department Dept. Phone    11/13/2018 Saúl Patel MD Unit [facility]D [facility] [phone]      Your Vitals Were     Blood Pressure Pulse Temperature Respirations Weight Pulse Oximetry    130/67 (BP Location: Right arm) 59 98.2  F (36.8  C) (Oral) 16 73.7 kg (162 lb 6.4 oz) 97%    BMI (Body Mass Index)                   30.69 kg/m2           [employer] Information     in2nite lets you send messages to your doctor, view your test results, renew your prescriptions, schedule appointments and more. To sign up, go to [email or web address] . Click on \"Log in\" on the left side of the screen, which will take you to the Welcome page. Then click on \"Sign up Now\" on the right side of the page.     You will be asked to enter the access code listed below, as well as some personal information. Please follow the " directions to create your username and password.     Your access code is: [email or web address]  Expires: 2019  1:21 PM     Your access code will  in 90 days. If you need help or a new code, please call your [place] clinic or [phone].        Care EveryWhere ID     This is your Care EveryWhere ID. This could be used by other organizations to access your [place] medical records  [id number]        Equal Access to Services     [facility] : Hadii aad ku hadasho Soomaali, waaxda luqadaha, qaybta kaalmada adeegyada, waxay duncanin hayaan adedarío neffpricillajuani cooley . So [facility] [phone].    ATENCIÓN: Si habla español, tiene a troy disposición servicios gratuitos de asistencia lingüística. [facility] [phone].    We comply with applicable federal civil rights laws and [place] laws. We do not discriminate on the basis of race, color, national origin, age, disability, sex, sexual orientation, or gender identity.               Review of your medicines      START taking        Dose / Directions    acetaminophen 325 MG tablet   Commonly known as:  TYLENOL   Used for:  Contusion of left knee, initial encounter        Dose:  975 mg   Take 3 tablets (975 mg) by mouth every 6 hours   Quantity:  30 tablet   Refills:  0       cyclopentolate 1 % ophthalmic solution   Commonly known as:  CYCLOGYL   Used for:  Right eye injury, initial encounter        Dose:  1 drop   Place 1 drop into the right eye 2 times daily   Quantity:  1 Bottle   Refills:  0       erythromycin ophthalmic ointment   Commonly known as:  ROMYCIN   Used for:  Right eye injury, initial encounter        Place into the right eye 3 times daily   Refills:  0       ibuprofen 200 MG tablet   Commonly known as:  ADVIL/MOTRIN   Used for:  Contusion of left knee, initial encounter        Dose:  200 mg   Take 1 tablet (200 mg) by mouth every 6 hours as needed for moderate pain   Quantity:  30 tablet   Refills:  0       Lidocaine 4 % Patch   Commonly known as:  LIDOCARE    Used for:  Contusion of left knee, initial encounter        Dose:  2 patch   Place 2 patches onto the skin every 24 hours   Quantity:  12 patch   Refills:  0       oxyCODONE IR 5 MG tablet   Commonly known as:  ROXICODONE   Used for:  Contusion of left knee, initial encounter        Dose:  5 mg   Take 1 tablet (5 mg) by mouth every 4 hours as needed for moderate to severe pain   Quantity:  15 tablet   Refills:  0       polyethylene glycol Packet   Commonly known as:  MIRALAX/GLYCOLAX   Used for:  Contusion of left knee, initial encounter        Dose:  17 g   Start taking on:  11/15/2018   Take 17 g by mouth daily   Quantity:  7 packet   Refills:  0       prednisoLONE acetate 1 % ophthalmic susp   Commonly known as:  PRED FORTE   Used for:  Right eye injury, initial encounter        Dose:  1 drop   Place 1 drop into the right eye 3 times daily   Quantity:  1 Bottle   Refills:  0       senna-docusate 8.6-50 MG per tablet   Commonly known as:  SENOKOT-S;PERICOLACE   Used for:  Contusion of left knee, initial encounter        Dose:  2 tablet   Take 2 tablets by mouth At Bedtime   Quantity:  30 tablet   Refills:  0         CONTINUE these medicines which have NOT CHANGED        Dose / Directions    amLODIPine 5 MG tablet   Commonly known as:  NORVASC   Used for:  Essential hypertension, benign        Dose:  5 mg   Take 1 tablet (5 mg) by mouth every morning   Quantity:  90 tablet   Refills:  0       citalopram 20 MG tablet   Commonly known as:  celeXA   Used for:  Major depressive disorder, recurrent episode, mild (H)        Dose:  20 mg   Take 1 tablet (20 mg) by mouth daily   Quantity:  30 tablet   Refills:  3       donepezil 10 MG tablet   Commonly known as:  ARICEPT   Used for:  Major depressive disorder, recurrent episode, mild (H)        Dose:  10 mg   Take 1 tablet (10 mg) by mouth At Bedtime   Quantity:  30 tablet   Refills:  0       lisinopril-hydrochlorothiazide 20-12.5 MG per tablet   Commonly known as:   PRINZIDE/ZESTORETIC   Used for:  Essential hypertension, benign        Dose:  1 tablet   Take 1 tablet by mouth 2 times daily   Quantity:  30 tablet   Refills:  3       omeprazole 20 MG CR capsule   Commonly known as:  priLOSEC   Used for:  Gastroesophageal reflux disease without esophagitis        Dose:  20 mg   Take 1 capsule (20 mg) by mouth every morning   Quantity:  30 capsule   Refills:  3       simvastatin 20 MG tablet   Commonly known as:  ZOCOR   Used for:  Hyperlipidemia LDL goal <130        Dose:  20 mg   Take 1 tablet (20 mg) by mouth At Bedtime   Quantity:  90 tablet   Refills:  3            Where to get your medicines      Some of these will need a paper prescription and others can be bought over the counter. Ask your nurse if you have questions.     You don't need a prescription for these medications     acetaminophen 325 MG tablet    amLODIPine 5 MG tablet    citalopram 20 MG tablet    cyclopentolate 1 % ophthalmic solution    donepezil 10 MG tablet    erythromycin ophthalmic ointment    ibuprofen 200 MG tablet    Lidocaine 4 % Patch    lisinopril-hydrochlorothiazide 20-12.5 MG per tablet    omeprazole 20 MG CR capsule    polyethylene glycol Packet    prednisoLONE acetate 1 % ophthalmic susp    senna-docusate 8.6-50 MG per tablet    simvastatin 20 MG tablet         Information about where to get these medications is not yet available     ! Ask your nurse or doctor about these medications     oxyCODONE IR 5 MG tablet                Protect others around you: Learn how to safely use, store and throw away your medicines at www.disposemymeds.org.        Information about OPIOIDS     PRESCRIPTION OPIOIDS: WHAT YOU NEED TO KNOW   We gave you an opioid (narcotic) pain medicine. It is important to manage your pain, but opioids are not always the best choice. You should first try all the other options your care team gave you. Take this medicine for as short a time (and as few doses) as possible.    Some  activities can increase your pain, such as bandage changes or therapy sessions. It may help to take your pain medicine 30 to 60 minutes before these activities. Reduce your stress by getting enough sleep, working on hobbies you enjoy and practicing relaxation or meditation. Talk to your care team about ways to manage your pain beyond prescription opioids.    These medicines have risks:    DO NOT drive when on new or higher doses of pain medicine. These medicines can affect your alertness and reaction times, and you could be arrested for driving under the influence (DUI). If you need to use opioids long-term, talk to your care team about driving.    DO NOT operate heavy machinery    DO NOT do any other dangerous activities while taking these medicines.    DO NOT drink any alcohol while taking these medicines.     If the opioid prescribed includes acetaminophen, DO NOT take with any other medicines that contain acetaminophen. Read all labels carefully. Look for the word  acetaminophen  or  Tylenol.  Ask your pharmacist if you have questions or are unsure.    You can get addicted to pain medicines, especially if you have a history of addiction (chemical, alcohol or substance dependence). Talk to your care team about ways to reduce this risk.    All opioids tend to cause constipation. Drink plenty of water and eat foods that have a lot of fiber, such as fruits, vegetables, prune juice, apple juice and high-fiber cereal. Take a laxative (Miralax, milk of magnesia, Colace, Senna) if you don t move your bowels at least every other day. Other side effects include upset stomach, sleepiness, dizziness, throwing up, tolerance (needing more of the medicine to have the same effect), physical dependence and slowed breathing.    Store your pills in a secure place, locked if possible. We will not replace any lost or stolen medicine. If you don t finish your medicine, please throw away (dispose) as directed by your pharmacist. The  [place] Pollution Control Agency has more information about safe disposal: https://[email or web address]/living-green/managing-unwanted-medications             Medication List: This is a list of all your medications and when to take them. Check marks below indicate your daily home schedule. Keep this list as a reference.      Medications           Morning Afternoon Evening Bedtime As Needed    acetaminophen 325 MG tablet   Commonly known as:  TYLENOL   Take 3 tablets (975 mg) by mouth every 6 hours   Last time this was given:  975 mg on 11/14/2018 10:21 AM                                amLODIPine 5 MG tablet   Commonly known as:  NORVASC   Take 1 tablet (5 mg) by mouth every morning   Last time this was given:  5 mg on 11/14/2018 10:22 AM                                citalopram 20 MG tablet   Commonly known as:  celeXA   Take 1 tablet (20 mg) by mouth daily   Last time this was given:  20 mg on 11/14/2018 10:22 AM                                cyclopentolate 1 % ophthalmic solution   Commonly known as:  CYCLOGYL   Place 1 drop into the right eye 2 times daily                                donepezil 10 MG tablet   Commonly known as:  ARICEPT   Take 1 tablet (10 mg) by mouth At Bedtime   Last time this was given:  10 mg on 11/13/2018  9:32 PM                                erythromycin ophthalmic ointment   Commonly known as:  ROMYCIN   Place into the right eye 3 times daily   Last time this was given:  1 g on 11/14/2018  1:15 PM                                ibuprofen 200 MG tablet   Commonly known as:  ADVIL/MOTRIN   Take 1 tablet (200 mg) by mouth every 6 hours as needed for moderate pain   Last time this was given:  200 mg on 11/14/2018  1:16 PM                                Lidocaine 4 % Patch   Commonly known as:  LIDOCARE   Place 2 patches onto the skin every 24 hours   Last time this was given:  1 patch on 11/13/2018  7:45 PM                                lisinopril-hydrochlorothiazide 20-12.5 MG per  tablet   Commonly known as:  PRINZIDE/ZESTORETIC   Take 1 tablet by mouth 2 times daily   Last time this was given:  1 tablet on 11/14/2018 10:22 AM                                omeprazole 20 MG CR capsule   Commonly known as:  priLOSEC   Take 1 capsule (20 mg) by mouth every morning   Last time this was given:  20 mg on 11/14/2018 10:22 AM                                oxyCODONE IR 5 MG tablet   Commonly known as:  ROXICODONE   Take 1 tablet (5 mg) by mouth every 4 hours as needed for moderate to severe pain   Last time this was given:  5 mg on 11/14/2018  1:16 PM                                polyethylene glycol Packet   Commonly known as:  MIRALAX/GLYCOLAX   Take 17 g by mouth daily   Start taking on:  11/15/2018                                prednisoLONE acetate 1 % ophthalmic susp   Commonly known as:  PRED FORTE   Place 1 drop into the right eye 3 times daily   Last time this was given:  1 drop on 11/14/2018  1:16 PM                                senna-docusate 8.6-50 MG per tablet   Commonly known as:  SENOKOT-S;PERICOLACE   Take 2 tablets by mouth At Bedtime   Last time this was given:  2 tablets on 11/13/2018  9:32 PM                                simvastatin 20 MG tablet   Commonly known as:  ZOCOR   Take 1 tablet (20 mg) by mouth At Bedtime   Last time this was given:  20 mg on 11/13/2018  9:32 PM

## 2018-11-13 NOTE — PHARMACY-ADMISSION MEDICATION HISTORY
Admission medication history interview status for the 11/13/2018 admission is complete. See Epic admission navigator for allergy information, pharmacy, prior to admission medications and immunization status.     Medication history interview sources:  Patient, patient's pharmacy ([facility]), [employer], Care Everywhere    Changes made to PTA medication list (reason)  Added: None  Deleted: desoximetasone (Topicort) cream (no longer taking per patient)  Changed: donepezil (Aricept) 5 mg tablet was changed to 10 mg tablet; no dose change (per patient and per clarification with [facility])    Additional medication history information (including reliability of information, actions taken by pharmacist):  - Patient does not recall taking citalopram (Celexa). Patient stated that she fills all her medications at [facility] in [place], therefore called the pharmacy to clarify about citalopram and they stated that citalopram was dispensed on 10/1 as a 90 day supply. Patient stated that she is taking a total of six medications, therefore kept citalopram on patient's PTA medication list.   - Patient stated that she did not take any of her medications today PTA.  - Clarified with patient about medication allergies and NKDA was noted.  - Patient has moderate reliability as a medication history source. She was somewhat unsure of her medication strengths but was able to recall medication frequency and last doses taken. Patient's pharmacy was able to confirm medication strengths and sig information.     Prior to Admission medications    Medication Sig Last Dose Taking? Auth Provider   amLODIPine (NORVASC) 5 MG tablet Take 1 tablet (5 mg) by mouth every morning 11/12/2018 at AM Yes Martinez Shafer MD   citalopram (CELEXA) 20 MG tablet Take 1 tablet (20 mg) by mouth daily 11/12/2018 at PM Yes Martinez Shafer MD   donepezil (ARICEPT) 10 MG tablet Take 10 mg by mouth At Bedtime 11/12/2018 at HS Yes Unknown,  Entered By History   lisinopril-hydrochlorothiazide (PRINZIDE/ZESTORETIC) 20-12.5 MG per tablet Take 1 tablet by mouth 2 times daily 11/12/2018 at PM Yes Martinez Shafer MD   omeprazole (PRILOSEC) 20 MG CR capsule Take 1 capsule (20 mg) by mouth every morning 11/12/2018 at AM Yes Martinez Shafer MD   simvastatin (ZOCOR) 20 MG tablet Take 1 tablet (20 mg) by mouth At Bedtime 11/12/2018 at HS Yes Martinez Shafer MD     Medication history completed by: Cyndi Stuart, Pharm.D. IV Student

## 2018-11-13 NOTE — ED PROVIDER NOTES
History     Chief Complaint:  Fall     HPI:   The history is provided by the patient.      Juany Collazo is a 86 year old female with a history of hypertension and hyperlipidemia who presents via EMS to the emergency department for evaluation after a fall. The patient reports that her stair case only has a railing part of the way down and she missed the bottom step and was not able to catch herself.  She hit her right eye on a piece of furniture resulting in a laceration above the right eye and pain in the eye. She has had right blurry vision since but is able to see light. She also developed left arm pain and acute on chronic left knee pain. EMS was activated and she was brought to the emergency department for evaluation. Here, the patient rates her overall pain a 2/10 in severity and has a mild headache. She denies any recent alcohol use and has no neck pain. She has no other concerns at this time.     Allergies:  No known drug allergies      Medications:    Amlodipine   Celexa   Aricept   Lisinopril-hydrochlorothiazide   Prilosec   Zocor      Past Medical History:    Hypertension, benign   Bone cartilage disorder  Carpal tunnel syndrome   Chronic pain   GERD  Blood transfusion  Hyperlipidemia   Major depression     Past Surgical History:    Right hip replacement   Tubal ligation   Decompression lumbar one level    Family History:    Brother  - cardiovascular, muscular dystrophy   Father - hypertension, cardiovascular   Mother - hypertension, arthritis, cataract, osteoporosis     Social History:  Presents via EMS    Tobacco use: Former smoker quit 1966   Alcohol use: one beer a day   PCP: Martinez Shafer    Marital Status:  Single      Review of Systems   Eyes: Positive for pain.   Musculoskeletal: Positive for arthralgias and myalgias. Negative for neck pain.   Skin: Positive for wound.   Neurological: Positive for headaches.   All other systems reviewed and are negative.      Physical Exam     Patient  Vitals for the past 24 hrs:   BP Temp Temp src Pulse Heart Rate Resp SpO2   11/13/18 0845 145/65 - - - 58 11 96 %   11/13/18 0830 138/63 - - - 56 14 96 %   11/13/18 0815 136/62 - - - 55 8 95 %   11/13/18 0730 149/67 - - - 57 12 96 %   11/13/18 0715 148/67 - - - 55 10 95 %   11/13/18 0700 145/66 - - - 62 19 100 %   11/13/18 0645 147/70 - - - 56 9 98 %   11/13/18 0633 148/74 98.5  F (36.9  C) Temporal 59 - 18 99 %        Physical Exam    General: Patient is alert and interactive when I enter the room  Head:  The scalp, face, and head appear normal  Eyes:  Mild right conjunctival injection, right pupil irregular and minimally reactive, left pupil 1mm and reactive, tear in medial cantus of right eye, no proptorsis, corneal clear bilaterally   ENT:    The nose is normal, two punctate lesions on forehead and just underneath right eye     Pinnae are normal    External acoustic canals are normal  Neck:  Trachea midline, no cervical spine tenderness  CV:  Pulses are normal, RRR  Resp:  No respiratory distress, CTAB    Abdomen:      Soft, non-tender, non-distended  Musc:  Normal muscular tone    Moderate left knee effusion with tenderness, limited ROM secondary to pain, abrasion overlying knee     No asymmetric leg swelling    Good capillary refill noted  Skin:  No rash or lesions noted  Neuro:  Speech is normal and fluent. Face is symmetric.     Moving all extremities well.   Psych: Awake. Alert.  Normal affect.  Appropriate interactions.    Emergency Department Course     Imaging:  Radiographic findings were communicated with the patient who voiced understanding of the findings.     CT Orbits wo contrast:  IMPRESSION: There are no facial bone fractures. The paranasal sinuses  are well aerated. The globes bilaterally are unremarkable.    CT Head without contrast:  IMPRESSION: Diffuse cerebral volume loss and cerebral white matter  changes consistent with chronic small vessel ischemic disease. No  evidence for acute  intracranial pathology.      CT Cervical spine without contrast:  IMPRESSION:  1. Marked hypertrophic changes and calcification posterior to the  odontoid process. Although on an unenhanced scan, the relationship of  this to the cervical cord is poorly seen, significant central stenosis  is suspected. This could be better visualized with MR. Nonetheless,  this could result in compression of the cervical cord at the C1 level.  2. No apparent acute fracture or dislocation.  3. Degenerative changes throughout the cervical spine as above.    Imaging independently reviewed and agree with radiologist interpretation.     Emergency Department Course:  Past medical records, nursing notes, and vitals reviewed.  0713: I performed an exam of the patient and obtained history, as documented above.    The patient was sent for a CT x 3 while in the emergency department, findings above.     Findings and plan explained to the Patient.    0814: Patient will be transferred to [facility] [place] via EMS. Discussed the case with Dr. Miranda, who will admit the patient to a monitored bed for further monitoring, evaluation, and treatment.      Impression & Plan      Medical Decision Making:  Juany Collazo is a 86 year old female who presents with a fall. Patient arrived in a C-collar. Of note, the patient is complaining of right eye pain and left knee pain, she does have a tear in her medial canthus and her pupil is quite irregularly shaped. It is also nonreactive. Her left pupil is about 1 mm. She has no proptosis and no obvious scleral laceration or corneal laceration. However, I am very concerned about globe rupture. Minimal trauma was done to the eye in terms of the exam. Patient will need an opthalmology consult, however, here we will get CT's of her orbits, head, and neck. CT of orbits and head were normal. C-spine was done and revealed possible C1 compression. She has no neck pain and her neuro was intact at this point. However, I  do not think we should remove her form the C-collar. She is also complaining of knee pain, but due to the concern for globe rupture I think she can wait to obtain x-rays at the [place]. I discussed the case with the ER at the . We will arrange for transfer. Patient remained hemodynamically stable here so I believe she is stable for transfer. Patient is comfortable with this plan. Patient transferred.     Diagnosis:    ICD-10-CM    1. Right eye injury, initial encounter S05.91XA        Disposition:  Transferred to the [facility], under the care of Dr. Miranda.     Scribe Disclosure:   I, Wayne Guevara, am serving as a scribe at 7:13 AM on 11/13/2018 to document services personally performed by Christine Nava MD based on my observations and the provider's statements to me.       Christine Nava MD  11/13/2018   [facility] EMERGENCY DEPARTMENT       Christine Nava MD  11/14/18 0845

## 2018-11-13 NOTE — IP AVS SNAPSHOT
` `     UNIT 6D OBSERVATION [facility]: [phone]                 INTERAGENCY TRANSFER FORM - NOTES (H&P, Discharge Summary, Consults, Procedures, Therapies)   2018                    Hospital Admission Date: 2018  OLE ROBISON   : 1932  Sex: Female        Patient PCP Information     Provider PCP Type    Martinez Shafer MD General         History & Physicals      H&P by Aurora Wong APRN CNP at 2018 10:26 AM     Author:  Aurora Wong APRN CNP Service:  Trauma Author Type:  Nurse Practitioner    Filed:  2018  5:08 PM Date of Service:  2018 10:26 AM Creation Time:  2018 10:26 AM    Status:  Attested :  Aurora Wong APRN CNP (Nurse Practitioner)    Cosigner:  Saúl Patel MD at 2018  5:36 PM        Attestation signed by Saúl Patel MD at 2018  5:36 PM         Trauma Staff:    I agree with the assessment and the plan that I formulated for the trauma care.  I agree with the documentation above.    Saúl Patel MD, PhD                               [facility], [place]    History and Physical / Consult note: Trauma Service    Date of Admission:  2018    Time of Admission/Consult Request (page/call):[AK1.1] TTA white activation[AK1.2] 2018 @ 0947am[AK1.3]    Time of my evaluation:[AK1.1]  @0950am[AK1.3]  Consulting services:[AK1.1]  Opthalmology - Called by ED[AK1.3]    Assessment & Plan   Trauma mechanism:[AK1.1] Fall down 2-3 stairs[AK1.3]  Time/date of injury:[AK1.1] about 0700am[AK1.3]  Known Injuries:[AK1.1]  Possible right globe injury  Right medial canthus laceration  Right check abrasion  Left knee abrasion  Possible non displaced posterior 6th rib fracture[AK1.4]  Other diagnoses:[AK1.1]   1. Acute traumatic pain  2. Hypertension  3. HLD  4. GERD  5. Chronic left knee pain, arthritis  6. Chronic left knee joint effusion  7. 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 Admit to trauma Obs Dr. Patel. Completed trauma imaging includes CT head, C Spine, CT right orbit, CXR, Left knee xray.  2. Opthalmology consult for possible eye injury. Right pupil noted to be more dilated than the left on presentation and and slightly more oval.  3. Cervical spine stenosis: No report of cervical spine tenderness, no neurological deficits, denies numbness, tingling no focal weakness on exam. Neurosurgery consulted for further insight and recommendations.  Neurochecks every 4 hours.[AK1.4] Per Neurosurgery no need for further imaging or bracing at this time. C spine cleared per Neurosurgery.[AK1.5]  4. Left knee effusion, abrasion: Tender to palpation with adequate ROM. Will curbside Ortho for need for further imaging/ work up.  5. OK for diet  6. Possible non displaced posterior rib fracture: Pain control, incentive spirometer /acapella  7. Pain control: PRN Tylenol[AK1.4]  8. OK out of bed, ambulate, ice to left knee for comfort[AK1.5]  9. PT/ OT consults[AK1.4]    Code status:[AK1.1] Full code[AK1.4] confirmed with[AK1.1] patient[AK1.4] .     General Cares:  GI Prophylaxis:[AK1.1] Continue PTA PPI[AK1.4]  DVT Prophylaxis:[AK1.1] PCD's[AK1.4]  Date of last stool/Bowel Regimen:[AK1.1]PTA/ ordered[AK1.4]  Pulmonary toilet:[AK1.1]IS[AK1.4]    ETOH: This patient was asked if in the last 3-6 months there has been a time when[AK1.1] she had 4 or more drinks in a single day/outing.[AK1.4]. Patient answer to the screening question was[AK1.1] in the negative. No intervention needed.[AK1.4]  Primary Care Physician   Martinez Shafer    Chief Complaint[AK1.1]   Fall down stairs    History is obtained from the patient, electronic health record and emergency department physician[AK1.4]    History of Present Illness[AK1.1]    Juany Collazo is a 86 year old female with a history of hypertension  and hyperlipidemia who presents via EMS to the emergency department for evaluation after a fall. She patient reports that her stair case only has a railing part of the way down and she missed the bottom step and was not able to catch herself.  She hit her right eye on a piece of furniture resulting in a laceration above the right eye and pain in the eye. She activated her life alert button and was able to get help. She has had right blurry vision since but is able to see light. She also complains of left knee pain. She arrived with a right eye shield and mild right eye pain. She is not anticoagulated. Her last oral intact was at 0700am. 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[AK1.6]       Past Surgical History[AK1.1]   I have reviewed this patient's surgical history and updated it with pertinent information if needed.[AK1.4]  Past Surgical History:   Procedure Laterality Date     C NONSPECIFIC PROCEDURE  1998    right hip replacement     C NONSPECIFIC PROCEDURE  1970    tubal ligation     C NONSPECIFIC PROCEDURE  1998    hip replacement     DECOMPRESSION LUMBAR ONE LEVEL  4/19/2013    Procedure: DECOMPRESSION LUMBAR ONE LEVEL;  Decompression Bilateral L4-5  ;  Surgeon: Lang Garcia MD;  Location:  OR[AK1.6]     Prior to Admission Medications   Prior to Admission Medications   Prescriptions Last Dose Informant Patient Reported? Taking?   amLODIPine (NORVASC) 5 MG tablet   No No    Sig: Take 1 tablet (5 mg) by mouth every morning   citalopram (CELEXA) 20 MG tablet   No No   Sig: Take 1 tablet (20 mg) by mouth daily   desoximetasone (TOPICORT) 0.25 % cream   No No   Sig: Apply to face QAM x 1- 2 weeks then only as needed   donepezil (ARICEPT) 5 MG tablet   No No   Sig: Take 2 tablets (10 mg) by mouth At Bedtime   lisinopril-hydrochlorothiazide (PRINZIDE/ZESTORETIC) 20-12.5 MG per tablet   No No   Sig: Take 1 tablet by mouth 2 times daily   omeprazole (PRILOSEC) 20 MG CR capsule   No No   Sig: Take 1 capsule (20 mg) by mouth every morning   simvastatin (ZOCOR) 20 MG tablet   No No   Sig: Take 1 tablet (20 mg) by mouth At Bedtime      Facility-Administered Medications Last Administration Doses Remaining   methylPREDNISolone acetate (DEPO-MEDROL) injection 40 mg 8/17/2018  9:51 AM         Allergies   Allergies   Allergen Reactions     No Known Drug Allergies        Social History   Social History     Social History     Marital status: Single     Spouse name: N/A     Number of children: N/A     Years of education: N/A     Occupational History     Not on file.     Social History Main Topics     Smoking status: Former Smoker     Quit date: 6/7/1966     Smokeless tobacco: Never Used     Alcohol use Yes      Comment: One beer day     Drug use: No     Sexual activity: Yes     Birth control/ protection: Surgical      Comment: Has had TL     Other Topics Concern     Not on file     Social History Narrative       Family History[AK1.1]   Family history reviewed with patient and is noncontributory.[AK1.4]    Review of Systems   CONSTITUTIONAL: No fever, chills, sweats, fatigue   EYES:[AK1.1] Right eye blurry vision[AK1.4]  ENT: no decrease in hearing, no tinnitus, no vertigo, no hoarseness  RESPIRATORY: no shortness of breath, no cough, no sputum   CARDIOVASCULAR: no palpitations, no chest  pain, no exertional chest pain or pressure  GASTROINTESTINAL: no nausea or vomiting, or abd pain  GENITOURINARY: no  dysuria, no frequency or hesitancy, no hematuria  MUSCULOSKELETAL:[AK1.1] left knee pain[AK1.4]  SKIN:[AK1.1] right cheek abrasion, left knee abrasion[AK1.4]  NEUROLOGIC: no numbness or tingling of hands, no numbness or tingling  of feet, no syncope, no tremors or weakness[AK1.1],[AK1.4]  PSYCHIATRIC: no sleep disturbances, no anxiety or depression    Physical Exam                      Vital Signs with Ranges  Temp:  [98.5  F (36.9  C)] 98.5  F (36.9  C)  Pulse:  [59] 59  Heart Rate:  [55-62] 58  Resp:  [8-19] 11  BP: (136-149)/(62-74) 145/65  SpO2:  [95 %-100 %] 96 % 0 lbs 0 oz    Primary Survey:  Airway: patient talking  Breathing: symmetric respiratory effort bilaterally  Circulation: central pulses present and peripheral pulses present  Disability: Pupils - left[AK1.1] 2[AK1.4] mm and brisk, right 4 mm[AK1.1] more dilated and oval shaped[AK1.4]     Hico Coma Scale - Total[AK1.1] 15[AK1.4]/15    Secondary Survey:  General: alert, oriented to person, place, time  Neuro: PERRLA. EOMI. CN II-XII grossly intact. No focal deficits. Strength[AK1.1] 5[AK1.4]/5 x 4 extremities.  Sensation intact.  Head: atraumatic, normocephalic, trachea midline  Eyes: Pupils - left[AK1.1] 2[AK1.4] mm and brisk, right 4 mm[AK1.1] more dilated and oval shaped, left medial canthus laceration[AK1.4]  Ears:[AK1.1] mod cerumen[AK1.4] bilateral TMs and non-inflamed external ear canals  Nose: nares patent, no drainage, nasal septum non-tender  Mouth/Throat: no exudates or erythema,  no dental tenderness or malocclusions, no tongue lacerations  Neck:[AK1.1] switched to [place][AK1.4] cervical collar present. No midline posterior tenderness, full AROM without pain.   Chest/Pulmonary: normal respiratory rate and rhythm,  bilateral clear breath sounds, no wheezes, rales or rhonchi, no chest wall tenderness or deformities,   Cardiovascular: S1, S2,  normal and regular rate and rhythm, no murmurs  Abdomen: soft, non-tender, no guarding, no rebound  tenderness and no tenderness to palpation  : normal external genitalia, pelvis stable to lateral compression,  no shaw, no urine assess  Musculoskel/Extremities: normal extremities, full AROM of major joints,[AK1.1] left knee tenderness with abrasion,+2[AK1.4] PP.[AK1.1] no[AK1.4] edema.   Back/Spine:[AK1.1] old surgical scar, depressed about the L1-L2 level,[AK1.4] no midline tenderness, no sacral tenderness,[AK1.1] no[AK1.4] abrasions or contusions  Hands: no gross deformities of hands or fingers. Full AROM of hand and fingers in flexion and extension.  strength equal and symmetric.   Psychiatric: affect/mood normal, cooperative, normal judgement/insight and memory intact  Skin:[AK1.1] left check abrasion, left medial canthus laceration,[AK1.4]      Results for orders placed or performed during the hospital encounter of 11/13/18 (from the past 24 hour(s))   Alcohol ethyl   Result Value Ref Range    Ethanol g/dL <0.01 <0.01 g/dL   Hemoglobin   Result Value Ref Range    Hemoglobin 13.1 11.7 - 15.7 g/dL   CBC with platelets differential   Result Value Ref Range    WBC 9.3 4.0 - 11.0 10e9/L    RBC Count 4.48 3.8 - 5.2 10e12/L    Hemoglobin 13.0 11.7 - 15.7 g/dL    Hematocrit 40.7 35.0 - 47.0 %    MCV 91 78 - 100 fl    MCH 29.0 26.5 - 33.0 pg    MCHC 31.9 31.5 - 36.5 g/dL    RDW 13.7 10.0 - 15.0 %    Platelet Count 242 150 - 450 10e9/L    Diff Method Automated Method     % Neutrophils 54.9 %    % Lymphocytes 37.3 %    % Monocytes 6.8 %    % Eosinophils 0.6 %    % Basophils 0.3 %    % Immature Granulocytes 0.1 %    Nucleated RBCs 0 0 /100    Absolute Neutrophil 5.1 1.6 - 8.3 10e9/L    Absolute Lymphocytes 3.5 0.8 - 5.3 10e9/L    Absolute Monocytes 0.6 0.0 - 1.3 10e9/L    Absolute Eosinophils 0.1 0.0 - 0.7 10e9/L    Absolute Basophils 0.0 0.0 - 0.2 10e9/L    Abs Immature Granulocytes 0.0 0 - 0.4 10e9/L    Absolute Nucleated RBC 0.0    Comprehensive metabolic panel   Result Value Ref Range    Sodium 137 133 - 144  mmol/L    Potassium 3.6 3.4 - 5.3 mmol/L    Chloride 103 94 - 109 mmol/L    Carbon Dioxide 25 20 - 32 mmol/L    Anion Gap 9 3 - 14 mmol/L    Glucose 93 70 - 99 mg/dL    Urea Nitrogen 12 7 - 30 mg/dL    Creatinine 0.72 0.52 - 1.04 mg/dL    GFR Estimate 77 >60 mL/min/1.7m2    GFR Estimate If Black >90 >60 mL/min/1.7m2    Calcium 9.0 8.5 - 10.1 mg/dL    Bilirubin Total 0.5 0.2 - 1.3 mg/dL    Albumin 3.6 3.4 - 5.0 g/dL    Protein Total 6.7 (L) 6.8 - 8.8 g/dL    Alkaline Phosphatase 78 40 - 150 U/L    ALT 20 0 - 50 U/L    AST 17 0 - 45 U/L   Magnesium   Result Value Ref Range    Magnesium 2.2 1.6 - 2.3 mg/dL   EKG 12-lead, tracing only   Result Value Ref Range    Interpretation ECG Click View Image link to view waveform and result    XR Chest Port 1 View    Narrative    PRELIMINARY REPORT - The following report is a preliminary  interpretation.      Impression    IMPRESSION:  1.  Question of nondisplaced fracture vs artifact in the posterior  right sixth rib. Consider dedicated rib series if clinically  indicated.  2.  No nondisplaced rib fractures, pneumothorax, or appreciable  airspace disease.    Case discussed with senior radiology resident.   XR Knee Port Left 1/2 Views    Narrative    Exam: 2 views of the left knee dated 11/13/2018.    COMPARISON: 8/10/2018.    CLINICAL HISTORY: Fall.    FINDINGS: AP and lateral views of the left knee were obtained. Large  left knee joint effusion. Tricompartment osteoarthrosis in the left  knee with joint space narrowing and osteophytic spurring in all 3  joint compartments. No displaced fractures are noted.      Impression    IMPRESSION:  1. Large left knee joint effusion, stable since the comparison  radiographs of 8/10/2018. No displaced fractures noted.  2. 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  3. Findings were discussed with the ordering ER physician Dr. Tere Miranda at 1040 on 11/13/2018.    KRUNAL LEWIS MD[AK1.7]       Studies:  XR Chest Port 1 View     (Results Pending)   XR Knee Port Left 1/2 Views    (Results Pending)       Aurora OBREGON Kube[AK1.1]CNP[AK1.8]       Revision History        User Key Date/Time User Provider Type Action    > AK1.2 11/13/2018  5:08 PM Aurora Wong, BETH MOSES Nurse Practitioner Sign     [N/A] 11/13/2018  2:44 PM KubeAuroraa, APRN CNP Nurse Practitioner Sign     AK1.8 11/13/2018  2:43 PM MiobeAurora, APRN CNP Nurse Practitioner Incomplete Revision     AK1.5 11/13/2018  2:42 PM MiobeAuroraa, BETH CNP Nurse Practitioner      AK1.7 11/13/2018 12:03 PM Aurora Wong, BETH CNP Nurse Practitioner Sign     AK1.6 11/13/2018 11:56 AM Aurora Wong, BETH MOSES Nurse Practitioner      AK1.4 11/13/2018 11:38 AM Aurora Wonga, BETH CNP Nurse Practitioner      AK1.3 11/13/2018 10:46 AM Aurora Wonga, BETH CNP Nurse Practitioner      AK1.1 11/13/2018 10:26 AM Aurora Wong, BETH MOSES Nurse Practitioner                   Discharge Summaries     No notes of this type exist for this encounter.         Consult Notes      Consults by Darci Moran PA-C at 11/14/2018 11:08 AM     Author:  Darci Moran PA-C Service:  Orthopedics Author Type:  Physician Assistant - C    Filed:  11/14/2018 11:08 AM Date of Service:  11/14/2018 11:08 AM Creation Time:  11/14/2018 10:13 AM    Status:  Signed :  Darci Moran PA-C (Physician Assistant - C)     Consult Orders:    1. Orthopaedic Surgery Adult/Peds IP Consult: Patient to be seen: Routine within 24 hrs; Call back #: [phone]; persistent left knee pain, imaging with stable effusion, please eval thanks you; Consultant may enter orders: Yes [[id number]] ordered by Aurora Wong APRN CNP at 11/14/18 0853                [facility]  ORTHOPAEDIC SURGERY CONSULT - HISTORY AND PHYSICAL    DATE OF CONSULT: 11/14/2018   REQUESTING PROVIDER: Saúl Patel MD, MD    TIME OF CONSULT: 0930  TIME  CONSULT PAGE RETURNED TO REQUESTING PROVIDER: [id number]  TIME OF PATIENT EVALUATION: 9080    CC: left knee pain  DATE OF INJURY: acute on chronic, 11/3/2018      HISTORY OF PRESENT ILLNESS:   Juany Collazo is a 86 year old female with PMH including hypertension, hyperlipidemia, end-stage bilateral knee osteoarthritis who presents with complaint of left knee pain.  Patient reports that she has had ongoing issues with left knee pain dating back several years.  Unfortunately, yesterday she was mobilizing down the stairs of her home when she lost her footing and tripped on the second last stair.  She fell to the ground, striking her forehead and left knee in the process, and was unable to get up.  The fall was unwitnessed, however, she denies any consciousness or experiencing symptoms of dizziness before and after the fall.  She immediately called life alert and was brought to the [facility] emergency department for evaluation.     Today, Juany complains of 10/10 dull, throbbing left knee pain.  Although she endorses baseline 7 out of 10 pain, she reports that her current pain symptoms are worse than usual.  In addition to usual range of motion activities, she reports that walking has exacerbated her symptoms since the incident yesterday.  In fact, she has been unable to ambulate with physical therapy while being admitted to observation the hospital.    As mentioned, Juany has been dealing with bilateral knee pain for several years.  Of note, she has [facility] sports medicine providers with some frequency over the last 2 years.  In addition to the usual conservative measures (daily Advil, activity modification, and pool therapy) she has attempted a handful of Synvisc injections.  Although these interventions were initially useful in decreasing her symptoms of pain, these conservative measures are no longer useful.  During her last appointment with the sports medicine provider, Juany  discussed the possibility of pursuing surgical intervention. She has not yet met with an orthopedic surgeon to discuss this matter.    With regard to Juany's baseline physical activity, she is a very active 86-year-old woman.  She lives independently in a two-story home and receives support from a daughter that lives locally.  She enjoys participating in activities at the [facility], and particularly enjoys pool therapy.  She enjoys traveling to visit both the son in [place] and a daughter in [place].  It is her hope to return to these activities without the nuisance of knee pain.    In addition to the above complaints, the patient also reports the following:   General: denies recent fever, chills, sweats, fatigue, recent changes in weight or appetite  Integument: denies new rashes, sores, lumps/bumps not otherwise noted above; she did receive a contusion around the left orbit as a result of her fall yesterday  Respiratory: denies recent cough, sputum production, hemoptysis, dyspnea, and wheezing  Cardiac: denies chest pain, SOB, palpitations  GI: denies abdominal pain, nausea, vomiting, change in bowel habits - diarrhea or constipation  Peripheral Vascular: denies history of DVT, peripheral edema  Musculoskeletal:  denies joint pain, swollen or stiff joints not otherwise noted above; she reports some additional pain in her left shoulder  Neurological: denies fainting, blackouts, focal weakness or paralysis, numbness/loss of sensation, tingling/altered sensation, tremors or other involuntary movements not otherwise noted above  Hematological: denies blood abnormalities other than those noted above    History obtained from patient interview and chart review. All relevant notes were reviewed and HPI/pertinent ROS were updated to reflect their current presentation and hospital course.       PAST MEDICAL HISTORY:   Past Medical History:   Diagnosis Date     BENIGN HYPERTENSION 10/27/2003     BONE & CARTILAGE DIS  NOS 1/18/2006     Carpal tunnel syndrome      Chronic pain     right leg     Gastro-oesophageal reflux disease      GERD (gastroesophageal reflux disease) 10/14/2008     History of blood transfusion      Hyperlipidemia LDL goal <130 10/31/2010     Mild major depression (H) 4/23/2014       Patient denies any personal history of bleeding disorders, clotting disorders, or adverse reactions to anesthesia.      PAST SURGICAL HISTORY:   Past Surgical History:   Procedure Laterality Date     C NONSPECIFIC PROCEDURE  1998    right hip replacement     C NONSPECIFIC PROCEDURE  1970    tubal ligation     C NONSPECIFIC PROCEDURE  1998    hip replacement     DECOMPRESSION LUMBAR ONE LEVEL  4/19/2013    Procedure: DECOMPRESSION LUMBAR ONE LEVEL;  Decompression Bilateral L4-5  ;  Surgeon: Lang Garcia MD;  Location:  OR         MEDICATIONS:   Prior to Admission medications    Medication Sig Last Dose Taking? Auth Provider   amLODIPine (NORVASC) 5 MG tablet Take 1 tablet (5 mg) by mouth every morning 11/12/2018 at AM Yes Martinez Shafer MD   citalopram (CELEXA) 20 MG tablet Take 1 tablet (20 mg) by mouth daily 11/12/2018 at PM Yes Martinez Shafer MD   donepezil (ARICEPT) 10 MG tablet Take 10 mg by mouth At Bedtime 11/12/2018 at HS Yes Unknown, Entered By History   lisinopril-hydrochlorothiazide (PRINZIDE/ZESTORETIC) 20-12.5 MG per tablet Take 1 tablet by mouth 2 times daily 11/12/2018 at PM Yes Martinez Shafer MD   omeprazole (PRILOSEC) 20 MG CR capsule Take 1 capsule (20 mg) by mouth every morning 11/12/2018 at AM Yes Martinez Shafer MD   simvastatin (ZOCOR) 20 MG tablet Take 1 tablet (20 mg) by mouth At Bedtime 11/12/2018 at HS Yes Martinez Shafer MD         ALLERGIES:   No known drug allergies      SOCIAL HISTORY:   Living situation: Juany lives at home in a two-story, and receives support from a daughter that lives locally in [place].  She has a service that comes to her home 3 hours a day and helps her  transport to various activities.  In addition, the service provides assistance with cooking and cleaning.    Social History     Occupational History     Not on file.     Social History Main Topics     Smoking status: Former Smoker     Quit date: 6/7/1966     Smokeless tobacco: Never Used     Alcohol use Yes      Comment: One beer day     Drug use: No     Sexual activity: Yes     Birth control/ protection: Surgical      Comment: Has had TL       FAMILY HISTORY:  Patient denies known family history of bleeding, clotting, or anesthesia related complications.     REVIEW OF SYSTEMS:   A brief 10-point ROS was performed during the patient encounter. All pertinent items are included in the HPI. Other systems were negative, as below:    Head:  denies new headache, dizziness, light headedness  Eyes: denies new changes in vision, blurred vision, diplopia, excessive tearing  Ears: denies new hearing loss, pain  Nose: denies recent nasal congestion   Mouth: denies new oral sores, ulcers  Throat:  denies new pain, hoarseness, difficulty swallowing  Urinary: denies new onset dysuria, hematuria, polyuria, nocturia   Endocrine:  denies excessive sweating, polyuria, polydipsia, polyphagia   Psychiatric: denies new onset depression, sleep disturbances, substance abuse      PHYSICAL EXAM:   Vitals:    11/13/18 1513 11/13/18 2040 11/13/18 2209 11/14/18 0700   BP: 142/59  112/54 117/61   BP Location: Left arm  Left arm Left arm   Pulse:       Resp: 16  16 16   Temp: 98.1  F (36.7  C)  98  F (36.7  C) 97.7  F (36.5  C)   TempSrc: Oral  Oral Oral   SpO2: 99% 98% 98% 96%   Weight:         General: Awake, alert, appropriate, following commands, NAD  Neuro: CN II-XII grossly intact  Psych: Appropriate affect, orientation to place but not time and date  Skin: contusion of the the left orbit but no orther rashes, lumps or bumps; skin color normal  HEENT:  Normocephalic, contusion as noted above; EOMs grossly intact; external ear without  erythema or edema bilaterally; no septal deviation  Lungs: Breathing comfortably and nonlabored, no wheezes or stridor noted  Heart/Cardiovascular: Regular pulse, no peripheral cyanosis  Abdomen: Soft, non-tender, non-distended   Musculoskeletal:   Pelvis: Stable to AP and lateral compression, non-tender.  Upper Extremity:     No deformity, skin intact.     No significant tenderness to palpation over clavicle, AC joint, shoulder, arm, elbow, forearm, wrist.     Normal ROM of shoulder, elbow, and wrist, without pain    Motor intact distally throughout the radial, ulnar, and median nerve distributions as indicated by intact, 5/5 strength with thumbs up, finger crossing, and OK sign    Neurologic: SILT ax/m/r/u nerve distributions.     Vascular: Radial pulse palpable, 2+.   Right Lower Extremity:     No deformity, skin intact.     No significant tenderness to palpation over thigh,  leg, ankle/foot.  Mild medial lateral knee joint line tenderness    Normal ROM of hip, knee, and ankle, without tenderness     Motor intact distally throughout the tibial and peroneal nerve distributions as indicated by intact 5/5 strength with TA/GSC/EHL/FHL firing    SILT sp/dp/tibial/saph/sural nerves    DP/PT pulses palpable, 2+, toes warm and well perfused  Left Lower Extremity:     Small, superficial contusion over anterior knee, skin intact.  Moderate effusion of left knee with no fluid wave appreciated    No significant tenderness to palpation over thigh, leg, ankle/foot; moderate joint line tenderness of knee medially, laterally, and posteriorly     ROM of hip, knee limited due to knee pain; ROM ankle, without tenderness; knee is stable to varus and valgus stress; anterior drawer and posterior drawer are negative with no ligamentous laxity     Motor intact distally throughout the tibial and peroneal nerve distributions as indicated by intact 5/5 strength with TA/GSC/EHL/FHL firing    SILT sp/dp/tibial/saph/sural nerves    DP/PT  pulses palpable, 2+, toes warm and well perfused      LABS:  CBC  Hemoglobin   Date Value Ref Range Status   11/13/2018 13.1 11.7 - 15.7 g/dL Final   11/13/2018 13.0 11.7 - 15.7 g/dL Final     WBC   Date Value Ref Range Status   11/13/2018 9.3 4.0 - 11.0 10e9/L Final     Hematocrit   Date Value Ref Range Status   11/13/2018 40.7 35.0 - 47.0 % Final     Platelet Count   Date Value Ref Range Status   11/13/2018 242 150 - 450 10e9/L Final       CREATININE  Creatinine   Date Value Ref Range Status   11/13/2018 0.72 0.52 - 1.04 mg/dL Final       GLUCOSE  Glucose   Date Value Ref Range Status   11/13/2018 93 70 - 99 mg/dL Final       INR  INR   Date Value Ref Range Status   09/29/2013 0.92 0.86 - 1.14 Final       INFLAMMATORY LABS  WBC   Date Value Ref Range Status   11/13/2018 9.3 4.0 - 11.0 10e9/L Final         IMAGING:  AP and lateral imaging of the left knee obtained on 11/13/2018 were reviewed.  Imaging demonstrated genu valgum alignment.  Tricompartmental osteoarthritis of the left knee, lateral compartment greater than medial compartment.  There are extensive osteophytes about the lateral compartment. Mild, stable effusion of the left knee when compared to previous images obtained in July 2018.  There are no fractures demonstrated on imaging.  No lipohemarthrosis appreciated on lateral imaging. I/S ratio 0.85    ASSESSMENT AND PLAN:    Juany Collazo is a 86 year old female with PMH including hypertension and hyperlipidemia who presents with complaint of left knee pain secondary to acute on chronic left knee osteoarthritis.     This patient with history of known tricompartmental osteoarthritis presents with left knee pain following a fall yesterday. She is afebrile and WBC count is within normal limits. XR imaging demonstrates the presence of a stable effusion, tricompartmental joint space narrowing with the presence of osteophytes, and no evidence of fracture of lipohemarthrosis. The I/S ratio is 0.85. On PE,  "there is no obvious ligamentous laxity. Therefore, the likelihood of infectious etiologies and fracture/ligamentous injuries are low. SLR is limited by knee pain, however, I/S ratio is within normal limits making quad or patellar tendon rupture unlikely. Instead, the origin of her pain is most likely related to acute on chronic osteoarthritis, exacerbated by her recent fall.     The plan going forward should be to optimize conservative measures - aggressive elevation and compression (\"toes above nose\") to improve effusion, ice and NSAIDs to provide symptom relief. She may participate in PT/OT as tolerated. Given that she has attempted injections and other conservative measures in the past and has not experienced significant relief, a conversation about elective total knee arthroplasty is likely the most reasonable next step in her treatment. She will be referred to Darci Steen MD to discuss elective left TKA.     Activity: as tolerated; emphasize quad strength  Weight bearing status: as tolerated  Imaging: obtain XR imaging of pelvis to assess prior ANTONIO components  Labs: none indicated  Bracing/Splinting: none; consider compression (tubigrip, ace wrap, or JEFFERSON hose) around knee and lower extremity to decrease effusion   Elevation: Elevate frequently on pillows to maintain toes above nose     Follow-up: Clinic with Dr. Steen at the earliest convenience of patient to discuss elective TKA   Disposition: Pending normal imaging of AP/lateral pelvis, patient may be discharged per primary team      Darci Moran PA-C  Orthopaedic Surgery  Pager: [phone]     Thank you for allowing me to participate in this patient's care. Please page me at [phone] with any questions/concerns. If there is no response, if it is a weekend, or if it is during evening hours, please page the orthopaedic surgery resident on call.[PH1.1]       Revision History        User Key Date/Time User Provider Type Action    > PH1.1 11/14/2018 " 11:08 AM Darci Moran PA-C Physician Assistant - [facility] Sign            Consults by Brigid Tong LICSW at 11/14/2018  9:22 AM     Author:  Brigid Tong LICSW Service:  Social Work Author Type:      Filed:  11/14/2018  9:49 AM Date of Service:  11/14/2018  9:22 AM Creation Time:  11/14/2018  9:20 AM    Status:  Signed :  Brigid Tong LICSW ()     Consult Orders:    1. Social Work IP Consult [[id number]] ordered by Aurora Wong APRN CNP at 11/14/18 0746                Social Work Services Progress Note    Hospital Day: 1  Date of Initial Social Work Evaluation:  Not Completed   Collaborated with:  PT, pt and pt's dtr, Jeannette Shin ([phone])    Data:  SW consulted for discharge planning. Pt was transferred from [facility], yesterday, after sustain a fall in her home. Pt sustained eye injury and is having difficulty walking due to knee pain. PTA, pt lives alone in a three story home, was receiving 3hrs/day of private pay help and had family checking in on her. Today, pt unable to safely mobilize with PT and is agreeable to TCU placement. Discussed with pt that potentially [facility] may waive 3 night inpatient stay requirement, but this cannot be confirmed until a referral has been made. Pt is aware that if [facility] does not waive the 3 night inpatient stay Discussed location of where she would want to be placed and she prefers [place] as her dtr lives in [place]. Discussed [facility] and pt requested referral to this facility.     Intervention:  Discharge Planning     Assessment:  Pt is pleasant and participating in discharge planning. Pt appears motivated to return to her home after a short stay at TCU. Pt's dtr, Jeannette, is primary contact, and has been updated on plan and agreeable. Family would prefer that transportation be arranged by writer once TCU location finalized. Pt's dtr aware that pt's insurance will likely not pay for  discharge transport and she has no concerns.     Plan:    Anticipated Disposition:  Facility:  [facility] -referral sent via [facility]    Barriers to d/c plan:  None anticipated     Follow Up:  CHARLES will continue to follow.[NC1.1]          Revision History        User Key Date/Time User Provider Type Action    > NC1.1 11/14/2018  9:49 AM Brigid Tong, [facility]  Sign            Consults by Trevon Emerson MD at 11/13/2018 10:37 AM     Author:  Trevon Emerson MD Service:  Ophthalmology Author Type:  Resident    Filed:  11/13/2018  7:17 PM Date of Service:  11/13/2018 10:37 AM Creation Time:  11/13/2018 12:47 PM    Status:  Cosign Needed :  Trevon Emerson MD (Resident)    Cosign Required:  Yes               OPHTHALMOLOGY CONSULT NOTE  11/13/18    Patient: Juany Collazo  Consulted by:[SW1.1] Trauma[SW1.2]  Reason for Consult:[SW1.1] Globe trauma[TT1.1]    HISTORY OF PRESENTING ILLNESS:     Juany Collazo is a 86 year old female who[SW1.1] presents with right eye blunt trauma who reportedly fell by missing the last step walking down stairs[TT1.2] and[SW1.3] afterward she struck a smooth wall at the corner. She believes it was a smooth surface and no object penetrated her ey[TT1.2]e[SW1.3]. She denies loss of consciousness and could not recall if her affected eye had any vision changes immediately after the injury. She pressed her Life Alert button shortly thereafter. She cannot assess if her vision has gotten progressively worse since the injury but[TT1.2] states[SW1.3] it is decreased from baseline. She does not endorse flashes of light or eruption of floaters. She does not endorse endorse blackened visual field cuts.[TT1.2] Does not report pain with eye movements or diplopia. Denies any discharge.[SW1.3]    Review of systems were otherwise negative except for that which has been stated above.    OCULAR/MEDICAL/SURGICAL HISTORIES:     Past Ocular History:[SW1.1]  Cataract  extraction/PCIOL left eye[SW1.3]    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       Past Surgical History:   Procedure Laterality Date     C NONSPECIFIC PROCEDURE  1998    right hip replacement     C NONSPECIFIC PROCEDURE  1970    tubal ligation     C NONSPECIFIC PROCEDURE  1998    hip replacement     DECOMPRESSION LUMBAR ONE LEVEL  4/19/2013    Procedure: DECOMPRESSION LUMBAR ONE LEVEL;  Decompression Bilateral L4-5  ;  Surgeon: Lang Garcia MD;  Location: [facility] OR[SW1.4]       EXAMINATION:     Visual Acuity: Right Eye 20/[SW1.1]400[TT1.1] ; Left Eye 20/[SW1.1]40[TT1.1]   Pupils:[SW1.1] Anisocoria with[SW1.3] R > L,[TT1.1]   Right pupil irregular; no afferent pupillary defect by reverse  Left regular and reactive to light;[SW1.3] with no afferent pupillary defect.    Intraocular Pressure: RE[SW1.1]  16[TT1.1] ; LE[SW1.1] 14[TT1.1]  mmHg by tonopen (<5% error).   Motility: RE Full; LE Full  Confrontational Visual Field: RE Full; LE Full     External/Slit Lamp Exam   RIGHT EYE   Lids/Lashes:[SW1.1] Small lower lid abrasion[SW1.3]   Conj/Sclera:[SW1.1] Injected nasally[SW1.3]   Cornea:  Clear   Ant Chamber:  Deep[SW1.1], C/F[SW1.3]   Iris:[SW1.1] Irregular, minimally reactive[SW1.3]   Lens:[SW1.1] 3+ nuclear sclerosis, cortical cataracts[SW1.3]  LEFT   Lids/lashes: No Abnormality   Conj/Sclera: White and Quiet   Cornea:  Clear   Ant Chamber:  Deep and Quiet   Iris: Round and Reactive   Lens:[SW1.1] posterior chamber intraocular lens (PCIOL)[SW1.3]    Dilated Fundus Exam  Eyes Dilated?[SW1.1] Yes[SW1.3]   Time:[SW1.1] 10 AM[SW1.3] With Phenylephrine 2.5% and Mydriacyl 1%     (Normally, dilation with the above lasts about 4-6 hours)  RIGHT:   Anterior Vitreous: Clear   Media: Clear   Optic Nerve: Normal Contours and Size   Macula: Flat[SW1.1],[SW1.2] Drusen[SW1.3]   Vessels: Normal Caliber and Distribution   Retinal Periphery: No Holes or Tears Identified[SW1.1], Pigmentary changes[SW1.2]  LEFT:   Anterior Vitreous: Clear   Media: Clear   Optic Nerve: Normal Contours and Size   Macula: Flat[SW1.1], Drusen[SW1.2]   Vessels: Normal Caliber and Distribution   Retinal Periphery: No Holes or Tears Identified[SW1.1], Pigmentary changes[SW1.2]    Labs/Studies/Imaging Performed[SW1.1]  CT ORIBT: There are no facial bone fractures. The paranasal sinuses  are well aerated. The globes bilaterally are unremarkable.[SW1.2]       ASSESSMENT/PLAN:     Juany Collazo is a 86 year old female who presents with:    Trauma to the right eye   No globe rupture[SW1.1]   Anterior chamber[SW1.2] formed[SW1.1]   Irregular pupil   Traumatic iritis   Retina attached    Nuclear sclerosis and cortical cataract right eye   Work up post hospital admission    Pseudophakia, left eye   Monitor[SW1.2]    Plan:   Start Pred Forte three times a day   Cyclopentolate three times a day   Erythromycin ointment for eyelid abrasions   Follow up in eye clinic in 2 weeks      It is our pleasure to participate in this patient's care and treatment. Please contact us with any further questions or concerns.    Trevon Emerson MD   PGY-2 Ophthalmology  [phone][SW1.1]       Revision History        User Key Date/Time User Provider Type Action    > SW1.2 11/13/2018  7:17 PM Trevon Emerson MD Resident Sign     SW1.3 11/13/2018  2:45 PM Trevon Emerson MD Resident      TT1.1 11/13/2018  1:25 PM Kulkarni,  Pharmacy Intern Pend     TT1.2 11/13/2018  1:12 PM Kulkarni,  Pharmacy Intern      SW1.4 11/13/2018 12:49 PM Trevon Emerson MD Resident Pend     SW1.1 11/13/2018 12:47 PM Trevon Emerson MD Resident             Consults by Do  "MD Brandyn at 11/13/2018 11:33 AM     Author:  Brandyn Carlson MD Service:  Neurosurgery Author Type:  Resident    Filed:  11/13/2018  2:46 PM Date of Service:  11/13/2018 11:33 AM Creation Time:  11/13/2018 11:33 AM    Status:  Attested :  Brandyn Carlson MD (Resident)    Cosigner:  Maite Belle MD at 11/13/2018  6:28 PM         Consult Orders:    1. Neurosurgery Adult IP Consult: Patient to be seen: Routine within 24 hrs; Call back #: [phone]; severe cervical stenosis with possible central cord compression, no tenderness or symptoms; Consultant may enter orders: Yes [[id number]] ordered by Aurora Wong APRN CNP at 11/13/18 1045           Attestation signed by Maite Belle MD at 11/13/2018  6:28 PM        Attestation:  Physician Attestation   I, Maite Belle, saw this patient with the resident and agree with the resident/fellow's findings and plan of care as documented in the note.      I personally reviewed vital signs, medications, labs and imaging.    Key findings: patient has no signs or symptoms of cervical myelopathy or radiculopathy, therefore we do not recommend any further imaging workup of her cervical stenosis. She suffered a mechanical fall without lights on and has no focal neurologic deficits and no myelopathic findings on her exam. Neurosurgery will sign off.    Maite Belle MD  Date of Service (when I saw the patient): 11/13/18                               [facility]       NEUROSURGERY CONSULTATION NOTE    This consultation was requested by[TD1.1] Aurora Wong NP[TD1.2] from the[TD1.1] Trauma[TD1.2] service.    Reason for Consultation[TD1.1]: \"severe cervical stenosis with possible central cord compression, no tenderness or symptoms\"[TD1.2]    HPI:[TD1.1]  Juany Collazo is a 87 yo female with history of decompressive lumbar laminectomy, GERD, depression, hypertension, and hyperlipidemia " who presents after a mechanical fall this morning. Patient stated she woke up and was walking down her stairs but missed the bottom step. She thereafter fell forward and hit her wall. She states its secondary to not turning on the lights this morning. Patient was brought into hospital after she triggered her [employer] system. The patient does state that she hit her head. She is able to recall today's events. She denies any warfarin, aspirin, or plavix use.[TD1.2] She denies any neck pain at present. She denies any numbness or tingling in any extremity at present or focal limb weakness. She denies any historical issue with coordination issues of any limb.[TD1.3]     Trauma:  Consult[TD1.2] order[TD1.4] placed @ 1045 11/13  Patient seen @ 1039[TD1.2] 11/13  Cervical collar cleared in ED   No procedures performed[TD1.4]     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     PAST SURGICAL HISTORY:   Past Surgical History:   Procedure Laterality Date     C NONSPECIFIC PROCEDURE  1998    right hip replacement     C NONSPECIFIC PROCEDURE  1970    tubal ligation     C NONSPECIFIC PROCEDURE  1998    hip replacement     DECOMPRESSION LUMBAR ONE LEVEL  4/19/2013    Procedure: DECOMPRESSION LUMBAR ONE LEVEL;  Decompression Bilateral L4-5  ;  Surgeon: Lang Garcia MD;  Location: [facility] OR     FAMILY HISTORY:   Family History   Problem Relation Age of Onset     Cardiovascular Brother      Musculoskeletal Disorder Brother      Muscular Dystrophy     Hypertension Father      Cardiovascular Father      Hypertension Mother      Arthritis Mother      Eye Disorder Mother       Cataract     Osteoporosis Mother      Osteoporosis Maternal Aunt      SOCIAL HISTORY:   Social History   Substance Use Topics     Smoking status: Former Smoker     Quit date: 6/7/1966     Smokeless tobacco: Never Used     Alcohol use Yes      Comment: One beer day     MEDICATIONS:  Current Outpatient Prescriptions   Medication Sig Dispense Refill     amLODIPine (NORVASC) 5 MG tablet Take 1 tablet (5 mg) by mouth every morning 90 tablet 3     citalopram (CELEXA) 20 MG tablet Take 1 tablet (20 mg) by mouth daily 90 tablet 3     desoximetasone (TOPICORT) 0.25 % cream Apply to face QAM x 1- 2 weeks then only as needed 60 g 1     donepezil (ARICEPT) 5 MG tablet Take 2 tablets (10 mg) by mouth At Bedtime 90 tablet 1     lisinopril-hydrochlorothiazide (PRINZIDE/ZESTORETIC) 20-12.5 MG per tablet Take 1 tablet by mouth 2 times daily 180 tablet 3     omeprazole (PRILOSEC) 20 MG CR capsule Take 1 capsule (20 mg) by mouth every morning 90 capsule 3     simvastatin (ZOCOR) 20 MG tablet Take 1 tablet (20 mg) by mouth At Bedtime 90 tablet 3     Allergies:  Allergies   Allergen Reactions     No Known Drug Allergies        ROS: 10 point ROS of systems including Constitutional, Eyes, Respiratory, Cardiovascular, Gastroenterology, Genitourinary, Integumentary, Muscularskeletal, Psychiatric were all negative except for pertinent positives noted in my HPI.    Physical exam:   Blood pressure 146/68, pulse 58, temperature 97.6  F (36.4  C), temperature source Oral, resp. rate 18, weight 73.7 kg (162 lb 6.4 oz), SpO2 96 %.  General: awake and alert  HEENT:[TD1.1] multiple lacerations and contusions. Eye with corneal injection.[TD1.2] Somewhat limited neck range of motion (stated to be at baseline), and no pain with neck flexion or bilateral rotation. No midline spinal tenderness.[TD1.3]   PULM: breathing comfortably on room air  NEUROLOGIC:  -- Awake; Alert; oriented x[TD1.1] 4[TD1.2]  -- Follows commands briskly  -- Speech fluent,  "spontaneous. No aphasia or dysarthria.  -- no gaze preference. No apparent hemineglect.  --[TD1.1] pupils reactive bilaterally. Extraocular muscles intact.[TD1.2]   -- face symmetrical, tongue midline  -- palate elevates symmetrically, uvula midline  --[TD1.1] hard of hearing bilaterally[TD1.2]   Motor:   Delt Bi Tri Hand Flexion/  Extension Iliopsoas Quadriceps Hamstrings Tibialis Anterior Gastroc    C5 C6 C7 C8/T1 L2 L3 L4-S1 L4 S1   R 5 5 5 5 5[TD1.1] 3* 3*[TD1.2] 5 5   L 5 5 5 5 5[TD1.1] 3* 3*[TD1.2] 5 5[TD1.1]   * - pain-limited[TD1.2]  Sensory:  intact to LT x 4 extremities   Reflexes:   Bi BR Pollo Pat Bab    C5-6 C6 UMN L2-4 UMN   R 2+ 2+ Norm[TD1.1] Unable to elicit Mute[TD1.2]   L 2+ 2+ Norm[TD1.1] Unable to elicit Mute[TD1.2]     IMAGING:  CT[TD1.1] cervical spine 11/13/18:   1. Marked hypertrophic changes and calcification posterior to the  odontoid process. Although on an unenhanced scan, the relationship of  this to the cervical cord is poorly seen, significant central stenosis  is suspected. This could be better visualized with MR. Nonetheless,  this could result in compression of the cervical cord at the C1 level.  2. No apparent acute fracture or dislocation.  3. Degenerative changes throughout the cervical spine as above.   [TD1.2]  LABS:   BMP:[TD1.1] within normal limits except for hypoproteinemia  CBC: within normal limits[TD1.2]     ASSESSMENT:[TD1.1]  Asymptomatic cervical stenosis[TD1.2]     RECOMMENDATIONS:  No neurosurgical intervention indicated at this time[TD1.1]   No additional imaging  No need for cervical collar[TD1.4]    The patient was discussed with [TD1.1] Bruce David[TD1.4], neurosurgery chief resident, and he agrees with the above.    Brandyn \"Bradley\" MD Robyn   Neurosurgery, PGY-2    Please contact neurosurgery resident on call with questions.    Dial * * *[phone], enter [phone] when prompted.[TD1.1]          Revision History        User Key Date/Time User Provider Type Action    > " TD1.3 11/13/2018  2:46 PM Brandyn Carlson MD Resident Sign     TD1.4 11/13/2018 12:24 PM Brandyn Carlson MD Resident Sign     TD1.2 11/13/2018 12:20 PM Brandyn Carlson MD Resident Share     TD1.1 11/13/2018 11:33 AM Brandyn Carlson MD Resident Share                     Progress Notes - Physician (Notes from 11/11/18 through 11/14/18)      Progress Notes by Jessica Forbes Chi, OD at 11/14/2018  1:21 PM     Author:  Jessica Forbes Chi, OD Service:  Ophthalmology Author Type:  Optometrist    Filed:  11/14/2018  2:38 PM Date of Service:  11/14/2018  1:21 PM Creation Time:  11/14/2018  1:21 PM    Status:  Signed :  Jessica Forbes Chi, OD (Optometrist)           OPHTHALMOLOGY PROGRESS NOTE  11/1[DT1.1]4[DT1.2]/18    Patient: Juany Collazo  Consulted by: Trauma  Reason for Consult: Globe trauma    HISTORY OF PRESENTING ILLNESS:     Juany Collazo is a 86 year old female who presents with right eye blunt trauma who reportedly fell by missing the last step walking down stairs and afterward she struck a smooth wall at the corner. She believes it was a smooth surface and no object penetrated her eye. She denies loss of consciousness and could not recall if her affected eye had any vision changes immediately after the injury. She pressed her Life Alert button shortly thereafter. She cannot assess if her vision has gotten progressively worse since the injury but states it is decreased from baseline. She does not endorse flashes of light or eruption of floaters. She does not endorse endorse blackened visual field cuts. Does not report pain with eye movements or diplopia. Denies any discharge.[DT1.1]    Interval history: Patient is feeling better. Vision is stable.[DT1.2]    Review of systems were otherwise negative except for that which has been stated above.    OCULAR/MEDICAL/SURGICAL HISTORIES:     Past Ocular History:  Cataract extraction/PCIOL left eye    Past Medical History:   Diagnosis Date     BENIGN HYPERTENSION 10/27/2003      BONE & CARTILAGE DIS NOS 1/18/2006     Carpal tunnel syndrome      Chronic pain     right leg     Gastro-oesophageal reflux disease      GERD (gastroesophageal reflux disease) 10/14/2008     History of blood transfusion      Hyperlipidemia LDL goal <130 10/31/2010     Mild major depression (H) 4/23/2014       Past Surgical History:   Procedure Laterality Date     C NONSPECIFIC PROCEDURE  1998    right hip replacement     C NONSPECIFIC PROCEDURE  1970    tubal ligation     C NONSPECIFIC PROCEDURE  1998    hip replacement     DECOMPRESSION LUMBAR ONE LEVEL  4/19/2013    Procedure: DECOMPRESSION LUMBAR ONE LEVEL;  Decompression Bilateral L4-5  ;  Surgeon: Lang Garcia MD;  Location: [facility] OR       EXAMINATION:     Visual Acuity[DT1.1] with +2.50 readers[DT1.3]: Right Eye 20/40[DT1.1]-2[DT1.3] ; Left Eye 20/[DT1.1]25+2[DT1.3]   Pupils: Anisocoria with R > L,   Right pupil irregular; no afferent pupillary defect by reverse  Left regular and reactive to light; with no afferent pupillary defect.    Intraocular Pressure: RE  1[DT1.1]2[DT1.3] ; LE 1[DT1.1]5[DT1.3]  mmHg by tonopen (<5% error).   Motility: RE Full; LE Full  Confrontational Visual Field: RE Full; LE Full     External/Slit Lamp Exam   RIGHT EYE   Lids/Lashes: Small lower lid abrasion   Conj/Sclera:[DT1.1] 1+ injection[DT1.3]   Cornea:  Clear   Ant Chamber:  Deep[DT1.1] and Quiet[DT1.2]   Iris: Irregular, minimally reactive   Lens: 3+ nuclear sclerosis, cortical cataracts  LEFT   Lids/lashes: No Abnormality   Conj/Sclera: White and Quiet   Cornea:  Clear   Ant Chamber:  Deep and Quiet   Iris: Round and Reactive   Lens: posterior chamber intraocular lens (PCIOL)    Dilated Fundus Exam  Eyes Dilated?[DT1.1] No[DT1.3]    Labs/Studies/Imaging Performed  CT ORIBT: There are no facial bone fractures. The paranasal sinuses  are well aerated. The globes bilaterally are unremarkable.       ASSESSMENT/PLAN:     Juany Collazo is a 86 year old female who presents  with:    Trauma to the right eye   No globe rupture   Anterior chamber formed   Irregular pupil   Traumatic iritis   Retina attached    Nuclear sclerosis and cortical cataract right eye   Work up post hospital admission    Pseudophakia, left eye   Monitor    Plan:   Continue Pred Forte three times a day[DT1.1]   Start c[DT1.2]yclopentolate three times a day   Erythromycin ointment for eyelid abrasions   Follow up in eye clinic in 2 weeks      It is our pleasure to participate in this patient's care and treatment. Please contact us with any further questions or concerns.[DT1.1]    Jessica Forbes O.D.  Ophthalmology  Adjunct [DT1.2]       Revision History        User Key Date/Time User Provider Type Action    > DT1.2 11/14/2018  2:38 PM Jessica Forbes Chi, OD Optometrist Sign     DT1.3 11/14/2018  2:17 PM Jessica Forbes Chi, OD Optometrist      DT1.1 11/14/2018  1:21 PM Jessica Forbes Chi, ELVIRA Optometrist             Progress Notes by Katie Law PT at 11/14/2018  9:52 AM     Author:  Katie Law PT Service:  (none) Author Type:  Physical Therapist    Filed:  11/14/2018  9:53 AM Date of Service:  11/14/2018  9:52 AM Creation Time:  11/14/2018  9:52 AM    Status:  Signed :  Katie Law PT (Physical Therapist)            11/14/18 0927   Quick Adds   Type of Visit Initial PT Evaluation   Living Environment   Lives With alone   Living Arrangements house   Number of Stairs to Enter Home 2   Number of Stairs Within Home 13   Living Environment Comment pt lives alone in 3 level home; reports 13 stairs to access second level.    Self-Care   Usual Activity Tolerance moderate   Current Activity Tolerance poor   Activity/Exercise/Self-Care Comment indep with ADLs   Functional Level Prior   Ambulation 0-->independent   Transferring 0-->independent   Toileting 0-->independent   Bathing 0-->independent   Dressing 0-->independent   Eating 0-->independent   Communication 0-->understands/communicates without  difficulty   Swallowing 0-->swallows foods/liquids without difficulty   Cognition 1 - attention or memory deficits   Fall history within last six months yes   Number of times patient has fallen within last six months 1   Prior Functional Level Comment indep with functional mobility   General Information   Onset of Illness/Injury or Date of Surgery - Date 11/13/18   Referring Physician Aurora Wong APRN CNP   Patient/Family Goals Statement reduce pain L knee   Pertinent History of Current Problem (include personal factors and/or comorbidities that impact the POC) Juany Collazo is a 85 yo female with history of decompressive lumbar laminectomy, GERD, depression, hypertension, and hyperlipidemia who presents after a mechanical fall this morning. Patient stated she woke up and was walking down her stairs but missed the bottom step.   Precautions/Limitations fall precautions   General Info Comments activity: up with assist   Cognitive Status Examination   Cognitive Comment A&Ox4   Pain Assessment   Patient Currently in Pain Yes, see Vital Sign flowsheet   Integumentary/Edema   Integumentary/Edema Comments L knee swollen   Strength   Strength Comments fair functional strength, needs CGA for sit to stand transfers   Bed Mobility   Bed Mobility Comments mod I sup to sit   Transfer Skills   Transfer Comments CGA sit to stand   Gait   Gait Comments 2 steps with FWW CGA x2   Balance   Balance Comments seated and standing static balance good   Sensory Examination   Sensory Perception no deficits were identified   Coordination   Coordination no deficits were identified   Muscle Tone   Muscle Tone no deficits were identified   General Therapy Interventions   Planned Therapy Interventions balance training;bed mobility training;gait training;ROM;strengthening;transfer training;risk factor education;progressive activity/exercise;home program guidelines   Clinical Impression   Criteria for Skilled Therapeutic Intervention  "yes, treatment indicated   PT Diagnosis impaired functional mobility   Influenced by the following impairments weakness, pain, decreased activity tolerance   Functional limitations due to impairments decreased indep with functional mobility   Clinical Presentation Stable/Uncomplicated   Clinical Presentation Rationale clinical judgment   Clinical Decision Making (Complexity) Low complexity   Therapy Frequency` daily   Predicted Duration of Therapy Intervention (days/wks) 2 days   Anticipated Equipment Needs at Discharge front wheeled walker   Anticipated Discharge Disposition Transitional Care Facility   Risk & Benefits of therapy have been explained Yes   Patient, Family & other staff in agreement with plan of care Yes   [facility] TM \"6 Clicks\"   2016, Trustees of [facility], under license to [employer].  All rights reserved.   6 Clicks Short Forms Basic Mobility Inpatient Short Form   [facility]  \"6 Clicks\" V.2 Basic Mobility Inpatient Short Form   1. Turning from your back to your side while in a flat bed without using bedrails? 4 - None   2. Moving from lying on your back to sitting on the side of a flat bed without using bedrails? 4 - None   3. Moving to and from a bed to a chair (including a wheelchair)? 2 - A Lot   4. Standing up from a chair using your arms (e.g., wheelchair, or bedside chair)? 3 - A Little   5. To walk in hospital room? 2 - A Lot   6. Climbing 3-5 steps with a railing? 1 - Total   Basic Mobility Raw Score (Score out of 24.Lower scores equate to lower levels of function) 16   Total Evaluation Time   Total Evaluation Time (Minutes) 7[KA1.1]        Revision History        User Key Date/Time User Provider Type Action    > KA1.1 11/14/2018  9:53 AM Katie Law, PT Physical Therapist Sign            Progress Notes by Han, Soo Yeon, MSW at 11/13/2018  7:58 PM     Author:  Han, Soo Yeon, MSW Service:  Social Work Author Type:      Filed:  " "11/13/2018  8:33 PM Date of Service:  11/13/2018  7:58 PM Creation Time:  11/13/2018  7:58 PM    Status:  Signed :  Han, Soo Yeon, MSW ()         Adult On Call Social Work Services Progress Note    Hospital Day: 1  Date of Initial Social Work Evaluation: Not assessed  Collaborated with:  6D bedside RN, patient's daughter-Jeannette    Data:  Patient is an 86 year old female on observation status on Unit 6D after a mechanical fall at home.  Adult On Call SW received page from 6D RN reporting patient's daughter Jeannette would like return phone call from SW regarding concerns about patient's safety/ability to return home following anticipated discharge tomorrow (11/14).    Intervention:  Social work contacted patient's daughter, Jeannette, over the phone.  Jeannette indicated that the family (particularly patient's children) have attempted to talk with patient about moving out of her home in the past but \"she does not want to.\" Jeannette stated that she is concerned about patient's ability to be safe in the home which she described as \"a three story house where you can't stay on just one level and it's a bit cluttered.\" Jeannette also reported that her siblings attempt to check on the patient multiple times during a week in addition to patient having [facility] [employer] and [facility] home care services.  Jeannette indicated [facility] staff comes into the home at least four mornings a week to \"drive mom around to different errands, picking up food, [facility].\" Daughter indicated she and her siblings are \"interested in hearing any recommendations from the medical team.\" SW explained that an order had been placed for physical therapy to see patient.  SW explained that therapy would assist by providing recommendations about patient's safety and needs following an assessment.  SW also explained that patient is currently observation status and would therefore likely not have insurance coverage for a TCU stay " due to lack of three day inpatient hospitalization (Medicare requirement, patient has [facility] insurance).  SW also explained to Jeannette that patient may receive recommendations for Home health services or outpatient therapies and explained these options.  SW indicated that per report of RN, it likely appears that patient would discharge home at this time and discussed strategies for maximizing safety in the home.  Daughter Jeannette indicated understanding of education given and options discussed.  She also requested an update from PT once they have worked with patient.  SW agreed to pass on this request and notified bedside RN of Jeannette's wishes.     Assessment:  Daughter expressed concerns about patient's safety in home. Patient has been resistant to discussion with family about moving out of her home, currently receives homecare and life alert services.  PT has been consulted to see patient, will await recommendations. Patient currently on observation status.    Plan:    Anticipated Disposition:  TBD; likely home    Barriers to d/c plan:  Medical stability, patient needing rehab stay    Follow Up:  SW continues to be available to assist with discharge planning, as needed.    Soo Yeon Han, MSW, Health system  Emergency Department/Adult On Call Social Work Pager: [phone][SH1.1]         Revision History        User Key Date/Time User Provider Type Action    > SH1.1 11/13/2018  8:33 PM Han, Soo Yeon, MSW  Sign            Progress Notes by Shona Retana RN at 11/13/2018  2:11 PM     Author:  Shona Retana RN Service:  Charge Nurse ([facility]) Author Type:  Registered Nurse    Filed:  11/13/2018  2:11 PM Date of Service:  11/13/2018  2:11 PM Creation Time:  11/13/2018  2:11 PM    Status:  Signed :  Shona Retana RN (Registered Nurse)         Arrived to unit via cart[WT1.1]     Revision History        User Key Date/Time User Provider Type Action    > WT1.1 11/13/2018  2:11 PM Shona Retana RN Registered  Nurse Sign            ED Provider Notes by Tere Miranda MD at 11/13/2018  9:47 AM     Author:  Tere Miarnda MD Service:  Emergency Medicine Author Type:  Physician    Filed:  11/13/2018  2:02 PM Date of Service:  11/13/2018  9:47 AM Creation Time:  11/13/2018  9:49 AM    Status:  Signed :  Tere Miranda MD (Physician)           History     Chief Complaint   Patient presents with     Fall     HPI  Juany Collazo is a 86 year old female with a history of[LM1.1] hypertension, hyperlipidemia, lumbar stenosis, GERD and depression[LM1.2], who presents to the Emergency Department by ambulance from [facility] for evaluation following a mechanical fall earlier this morning. Patient reportedly[LM1.1] was attempting to walk down her steps, however, she missed a step and subsequently fell down three steps, during which she reportedly struck her head against furniture in the process, and sustained an injury to her right eye resulting in a laceration above the eye. Patient denies any loss of consciousness and this was an unwitnessed fall, but she was able to use her Life [employer] necklace in order to contact paramedics. Patient was initially evaluated at [facility] around 06:30 am, at which time she rated her pain a 2/10 in severity and complained of a mild headache. Additionally, she reported having[LM1.2] an[AO1.1] exacerbation of her chronic left knee pain. During her evaluation there, patient received CT imaging of the orbits, head and cervical spine, all which were unremarkable besides the CT spine concerning for a C1 cord compression[LM1.2] due to cervical stenosis without signs of fracture[AO1.1], however, there was concern of there being a possible globe rupture based on her exam prompting her to be sent here for further evaluation. While en route here, patient received 100 mg of[LM1.2] fentanyl[AO1.1]. Per EMS, her[LM1.2] O2 saturation[LM1.1] was noted to drop to[LM1.2]  90[LM1.1] about three times[LM1.2] after fentanyl, otherwise VS were WNL[AO1.1]. Here, patient continues to have some anterior left knee pain. Patient reports having a change in vision from her baseline. She denies any neck pain but has a mild headache. She denies any weakness, numbness or paraesthesias of the upper and lower extremities. 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[LM1.3]       PAST SURGICAL HISTORY:[LM1.2]   Past Surgical History:   Procedure Laterality Date     C NONSPECIFIC PROCEDURE  1998    right hip replacement     C NONSPECIFIC PROCEDURE  1970    tubal ligation     C NONSPECIFIC PROCEDURE  1998    hip replacement     DECOMPRESSION LUMBAR ONE LEVEL  4/19/2013    Procedure: DECOMPRESSION LUMBAR ONE LEVEL;  Decompression Bilateral L4-5  ;  Surgeon: Lnag Garcia MD;  Location: [facility] OR[LM1.3]       FAMILY HISTORY:[LM1.2]   Family History   Problem Relation Age of Onset     Cardiovascular Brother      Musculoskeletal Disorder Brother      Muscular Dystrophy     Hypertension Father      Cardiovascular Father      Hypertension Mother      Arthritis Mother      Eye Disorder Mother      Cataract     Osteoporosis Mother      Osteoporosis Maternal Aunt[LM1.3]        SOCIAL HISTORY:[LM1.2]   Social History   Substance Use Topics     Smoking status: Former Smoker     Quit date: 6/7/1966     Smokeless tobacco: Never Used     Alcohol use Yes       Comment: One beer day     Current Facility-Administered Medications   Medication     levofloxacin (LEVAQUIN) infusion 500 mg     methylPREDNISolone acetate (DEPO-MEDROL) injection 40 mg     Current Outpatient Prescriptions   Medication     amLODIPine (NORVASC) 5 MG tablet     citalopram (CELEXA) 20 MG tablet     desoximetasone (TOPICORT) 0.25 % cream     donepezil (ARICEPT) 5 MG tablet     lisinopril-hydrochlorothiazide (PRINZIDE/ZESTORETIC) 20-12.5 MG per tablet     omeprazole (PRILOSEC) 20 MG CR capsule     simvastatin (ZOCOR) 20 MG tablet        Allergies   Allergen Reactions     No Known Drug Allergies[LM1.3]        Review of Systems   Eyes: Positive for visual disturbance (right eye).   Musculoskeletal: Negative for neck pain.        Positive for left anterior knee pain.   Skin: Positive for[LM1.2] wound (laceration at right medial canthus)[AO1.1].   Neurological: Positive for headaches (mild). Negative for syncope, weakness and numbness.   All other systems reviewed and are negative.[LM1.2]      Physical Exam        Physical Exam[LM1.1]   General: patient is alert and oriented, GCS 15   Head: atraumatic and normocephalic   EENT: moist mucus membranes without tonsillar erythema or exudates, no malocclusion, no dental trauma, left pupil 2 mm reactive, right pupil oblong shaped 3mm and unreactive, injection of the conjunctiva,  Small laceration along the right medial canthus, no hemotypanum  Neck: patient in cervical spine collar, no midline c-spine TTP  Cardiovascular: regular rate and rhythm, extremities warm and well perfused, no lower extremity edema  Pulmonary: lungs clear to auscultation bilaterally, no chest wall TTP   Abdomen: soft, non-tender, non-distended  Musculoskeletal: normal range of motion of the extremities, TTP of the left anterior knee with superficial abrasion, no midline thoracic or lumbar spine TTP  Neurological: alert and oriented, moving all extremities symmetrically, no facial droop, no  slurring of speech, strength 5/5 and symmetric in , elbow flexion/extension, knee flexion/extension and ankle plantar/dorsiflexion, sensation to light touch in distal upper and lower extremities intact  Skin: warm, dry, small abrasion to right cheek[AO1.1]      ED Course     ED Course     Procedures[LM1.1]             EKG Interpretation:      Interpreted by Tere Miranda  Time reviewed: 1025  Symptoms at time of EKG: None   Rhythm: sinus bradycardia  Rate: Bradycardia  Axis: Normal  Ectopy: none  Conduction: normal  ST Segments/ T Waves: No acute ischemic changes  Q Waves: none  Comparison to prior: Unchanged    Clinical Impression: sinus bradycardia[AO1.1]        Critical Care time:[LM1.1]  was 30 minutes for this patient excluding procedures.  Trauma:  Level of trauma activation: Partial  C-collar and immobilization: applied prior to arrival.  CSpine Clearance: Patient left in collar  GCS at arrival: 15  GCS at disposition: unchanged  Full Primary and Secondary survey with appropriate immobilization of spine completed in exam section.  Consults prior to admission or transfer: Neurosurgery called at[AO1.1] 1030[AO1.2], ophthalmology called at[AO1.1] 1000[AO1.2]  Procedures done in the ED: none[AO1.1]          Labs Ordered and Resulted from Time of ED Arrival Up to the Time of Departure from the ED - No data to display         Assessments & Plan (with Medical Decision Making)[LM1.1]   Patient is an 86-year-old female who presents following a mechanical fall down approximately 3 stairs striking a piece of furniture at the bottom of the stairs. Patient was initially seen at [facility] and transferred for further trauma evaluation as well as Ophthalmology consultation. Imaging has been reviewed from the outside facility including CT of the head, orbit and cervical spine. CT head and orbital CT are unremarkable. Her cervical spine is concerning for possible C1 cord compression. She is she is  neurovascularly intact without any reported numbness or weakness into the extremities. Patient was placed in an [place] collar and will await Neurosurgery consultation. Patient has been placed on cervical spine precautions until that time. In addition, she reports left knee pain following a fall. X-rays are unremarkable. She does have chronic left knee pain and likely exacerbation of her underlying degenerative disease. Ophthalmology has been consulted in regards to eye trauma. She does have a small tear of the medial canthus as well as an unreactive irregularly shaped pupil on the right eye. Patient has had an eye patch in place and was given IV levofloxacin in the ED per Ophthalmology recommendations. Patient has been made n.p.o. Her last tetanus immunization was in 2011. Patient will be admitted to Trauma Surgery for further workup and management.[LM1.2]     RE-evaluation: Patient seen by ophthalmology no concerns for open globe but felt to have traumatic anisocoria.  Recommended Pred forte drops[AO1.1], cyclopentolate, and erythromycin ointment[AO1.2].  Patient is also been seen by neurosurgery and cleared cervical spine.  The x-ray shows a large left joint effusion unchanged from prior imaging without evidence of fracture.  Chest x-ray shows a possible nondisplaced fracture of the posterior right sixth rib however does not have significant overlying tenderness in this area.  Patient awaiting trauma admission.[AO1.1]    This part of the medical record was transcribed by Uzma Avalos, Medical Scribe, from a dictation done by Tere Miranda MD.[LM1.2]     I have reviewed the nursing notes.    I have reviewed the findings, diagnosis, plan and need for follow up with the patient.[LM1.1]    New Prescriptions    No medications on file[AO1.3]       Final diagnoses:   Fall, initial encounter   Contusion of left knee, initial encounter   Blunt eye trauma, right, initial encounter[AO1.4]     I, Uzma Avalos, am serving as a  trained medical scribe to document services personally performed by Tere Miranda MD, based on the provider's statements to me.   I, Tere Miranda MD, was physically present and have reviewed and verified the accuracy of this note documented by Uzma Avalos.[LM1.2]    11/13/2018   [facility], [place], EMERGENCY DEPARTMENT[LM1.1]     Tere Miranda MD  11/13/18 1402  [AO1.5]     Revision History        User Key Date/Time User Provider Type Action    > AO1.5 11/13/2018  2:02 PM Tere Miranda MD Physician Sign     AO1.4 11/13/2018  2:01 PM Tere Miranda MD Physician      AO1.3 11/13/2018  2:00 PM Tere Miranda MD Physician      AO1.2 11/13/2018  1:59 PM Tere Miranda MD Physician Share     AO1.1 11/13/2018  1:58 PM Tere Miranda MD Physician Share     LM1.3 11/13/2018 10:36 AM Uzma Avalos Share     LM1.2 11/13/2018 10:10 AM Uzma Avalos      LM1.1 11/13/2018  9:54 AM Uzma Avalos            ED Notes by Bibi Leos RN at 11/13/2018  9:48 AM     Author:  Bibi Leos RN Service:  (none) Author Type:  Registered Nurse    Filed:  11/13/2018  1:50 PM Date of Service:  11/13/2018  9:48 AM Creation Time:  11/13/2018  9:48 AM    Status:  Addendum :  Bibi Leos RN (Registered Nurse)         Bed: ED01  Expected date:   Expected time:   Means of arrival:   Comments:  [facility] Transfer[CS1.1]    86 y.o. Female  Fall this AM, missed last step and fell onto knees then hit right eye on furniture. VSS. No pain meds given at [facility]. Chronic knee pain, Left knee pain has worsened since fall. Concern for eye injury. CT of neck showed compression fracture - has c-collar placed.[MM1.1]      Revision History        User Key Date/Time User Provider Type Action    > MM1.1 11/13/2018  1:50 PM Bibi Leos, RN Registered Nurse Addend     CS1.1 11/13/2018  9:48 AM Flex Ji, RN Registered Nurse Sign            ED Notes by Dafne,  IZABELA Mtz at 11/13/2018 12:00 PM     Author:  Bibi Leos RN Service:  (none) Author Type:  Registered Nurse    Filed:  11/13/2018  1:46 PM Date of Service:  11/13/2018 12:00 PM Creation Time:  11/13/2018  1:46 PM    Status:  Signed :  Bibi Leos RN (Registered Nurse)         c-collar removed by neurosurgeon.[MM1.1]      Revision History        User Key Date/Time User Provider Type Action    > MM1.1 11/13/2018  1:46 PM Bibi Leos RN Registered Nurse Sign            ED Notes by Chester Harris RN at 11/13/2018 10:08 AM     Author:  Steven, Chester, RN Service:  Emergency Medicine Author Type:  Registered Nurse    Filed:  11/13/2018 11:08 AM Date of Service:  11/13/2018 10:08 AM Creation Time:  11/13/2018 10:45 AM    Status:  Signed :  Chester Harris RN (Registered Nurse)         Trauma RN Response to TTA Summary:    Paper charting assumed upon arrival at 09:52AM; activation 09:47AM.    EMS collar changed to ASPEN C-collar 10:08AM - CMS intact pre/post change.[MB1.1]       Revision History        User Key Date/Time User Provider Type Action    > MB1.1 11/13/2018 11:08 AM Chester Harris, RN Registered Nurse Sign                  Procedure Notes     No notes of this type exist for this encounter.         Progress Notes - Therapies (Notes from 11/11/18 through 11/14/18)      Progress Notes by Katie Law PT at 11/14/2018  9:52 AM     Author:  Katie Law PT Service:  (none) Author Type:  Physical Therapist    Filed:  11/14/2018  9:53 AM Date of Service:  11/14/2018  9:52 AM Creation Time:  11/14/2018  9:52 AM    Status:  Signed :  Katie Law PT (Physical Therapist)            11/14/18 0927   Quick Adds   Type of Visit Initial PT Evaluation   Living Environment   Lives With alone   Living Arrangements house   Number of Stairs to Enter Home 2   Number of Stairs Within Home 13   Living Environment Comment pt lives alone in 3 level home; reports 13 stairs to access  second level.    Self-Care   Usual Activity Tolerance moderate   Current Activity Tolerance poor   Activity/Exercise/Self-Care Comment indep with ADLs   Functional Level Prior   Ambulation 0-->independent   Transferring 0-->independent   Toileting 0-->independent   Bathing 0-->independent   Dressing 0-->independent   Eating 0-->independent   Communication 0-->understands/communicates without difficulty   Swallowing 0-->swallows foods/liquids without difficulty   Cognition 1 - attention or memory deficits   Fall history within last six months yes   Number of times patient has fallen within last six months 1   Prior Functional Level Comment indep with functional mobility   General Information   Onset of Illness/Injury or Date of Surgery - Date 11/13/18   Referring Physician Aurora Wong APRN CNP   Patient/Family Goals Statement reduce pain L knee   Pertinent History of Current Problem (include personal factors and/or comorbidities that impact the POC) Juany Collazo is a 85 yo female with history of decompressive lumbar laminectomy, GERD, depression, hypertension, and hyperlipidemia who presents after a mechanical fall this morning. Patient stated she woke up and was walking down her stairs but missed the bottom step.   Precautions/Limitations fall precautions   General Info Comments activity: up with assist   Cognitive Status Examination   Cognitive Comment A&Ox4   Pain Assessment   Patient Currently in Pain Yes, see Vital Sign flowsheet   Integumentary/Edema   Integumentary/Edema Comments L knee swollen   Strength   Strength Comments fair functional strength, needs CGA for sit to stand transfers   Bed Mobility   Bed Mobility Comments mod I sup to sit   Transfer Skills   Transfer Comments CGA sit to stand   Gait   Gait Comments 2 steps with FWW CGA x2   Balance   Balance Comments seated and standing static balance good   Sensory Examination   Sensory Perception no deficits were identified   Coordination  "  Coordination no deficits were identified   Muscle Tone   Muscle Tone no deficits were identified   General Therapy Interventions   Planned Therapy Interventions balance training;bed mobility training;gait training;ROM;strengthening;transfer training;risk factor education;progressive activity/exercise;home program guidelines   Clinical Impression   Criteria for Skilled Therapeutic Intervention yes, treatment indicated   PT Diagnosis impaired functional mobility   Influenced by the following impairments weakness, pain, decreased activity tolerance   Functional limitations due to impairments decreased indep with functional mobility   Clinical Presentation Stable/Uncomplicated   Clinical Presentation Rationale clinical judgment   Clinical Decision Making (Complexity) Low complexity   Therapy Frequency` daily   Predicted Duration of Therapy Intervention (days/wks) 2 days   Anticipated Equipment Needs at Discharge front wheeled walker   Anticipated Discharge Disposition Transitional Care Facility   Risk & Benefits of therapy have been explained Yes   Patient, Family & other staff in agreement with plan of care Yes   [facility] \"6 Clicks\"   2016, Trustees of [facility], under license to "[employer].  All rights reserved.   6 Clicks Short Forms Basic Mobility Inpatient Short Form   [facility]  \"6 Clicks\" V.2 Basic Mobility Inpatient Short Form   1. Turning from your back to your side while in a flat bed without using bedrails? 4 - None   2. Moving from lying on your back to sitting on the side of a flat bed without using bedrails? 4 - None   3. Moving to and from a bed to a chair (including a wheelchair)? 2 - A Lot   4. Standing up from a chair using your arms (e.g., wheelchair, or bedside chair)? 3 - A Little   5. To walk in hospital room? 2 - A Lot   6. Climbing 3-5 steps with a railing? 1 - Total   Basic Mobility Raw Score (Score out of 24.Lower scores equate to lower levels of " function) 16   Total Evaluation Time   Total Evaluation Time (Minutes) 7[KA1.1]        Revision History        User Key Date/Time User Provider Type Action    > KA1.1 11/14/2018  9:53 AM Katie Law PT Physical Therapist Sign

## 2018-11-13 NOTE — ED TRIAGE NOTES
Pt fell down last 3 stairs at home, hit rt eye on edge of furniture, now lac above rt eye and also apparent tear to sclera of eye. Pt also exacerbated pain to lt knee (chronic).

## 2018-11-13 NOTE — IP AVS SNAPSHOT
"    UNIT 6D OBSERVATION [facility]: [phone]                                              INTERAGENCY TRANSFER FORM - PHYSICIAN ORDERS   2018                    Hospital Admission Date: 2018  OLE ROBISON   : 1932  Sex: Female        Attending Provider: Saúl Patel MD     Allergies:  No Known Drug Allergies    Infection:  None   Service:  TRAUMA    Ht:  1.549 m (5' 1\")   Wt:  73.7 kg (162 lb 6.4 oz)   Admission Wt:  73.7 kg (162 lb 6.4 oz)    BMI:  30.69 kg/m 2   BSA:  1.78 m 2            Patient PCP Information     Provider PCP Type    Martinez Shafer MD General      ED Clinical Impression     Diagnosis Description Comment Added By Time Added    Fall, initial encounter [W19.XXXA] Fall, initial encounter [W19.XXXA]  Tere Miranda MD 2018 10:07 AM    Contusion of left knee, initial encounter [S80.02XA] Contusion of left knee, initial encounter [S80.02XA]  Tere Miranda MD 2018 10:08 AM    Blunt eye trauma, right, initial encounter [S05.8X1A] Blunt eye trauma, right, initial encounter [S05.8X1A]  Tere Miranda MD 2018  2:01 PM      Hospital Problems as of 2018              Priority Class Noted POA    Fall Medium  2018 Yes      Non-Hospital Problems as of 2018              Priority Class Noted    Essential hypertension, benign Medium  10/27/2003    Disorder of bone and cartilage Medium  2006    HYPERLIPIDEMIA LDL GOAL <130 Medium  10/31/2010    Lumbar stenosis Medium  2013    Health Care Home Medium  2013    Confusion Medium  2013    Memory loss Medium  2014    Major depressive disorder, recurrent episode, mild (H) Medium  2017    Gastroesophageal reflux disease without esophagitis Medium  3/26/2018      Code Status History     Date Active Date Inactive Code Status Order ID Comments User Context    2018  3:07 PM  Full Code [id number]  Aurora Wong APRN CNP Outpatient    " 11/13/2018  2:11 PM 11/14/2018  3:07 PM Full Code [id number]  Aurora Wong, BETH CNP Inpatient    9/29/2013  7:41 PM 9/30/2013  3:46 PM Full Code [id number]  Krysten Garcia RN Inpatient         Medication Review      START taking        Dose / Directions Comments    acetaminophen 325 MG tablet   Commonly known as:  TYLENOL   Used for:  Contusion of left knee, initial encounter        Dose:  975 mg   Take 3 tablets (975 mg) by mouth every 6 hours   Quantity:  30 tablet   Refills:  0        cyclopentolate 1 % ophthalmic solution   Commonly known as:  CYCLOGYL   Used for:  Right eye injury, initial encounter        Dose:  1 drop   Place 1 drop into the right eye 2 times daily   Quantity:  1 Bottle   Refills:  0        erythromycin ophthalmic ointment   Commonly known as:  ROMYCIN   Used for:  Right eye injury, initial encounter        Place into the right eye 3 times daily   Refills:  0        ibuprofen 200 MG tablet   Commonly known as:  ADVIL/MOTRIN   Used for:  Contusion of left knee, initial encounter        Dose:  200 mg   Take 1 tablet (200 mg) by mouth every 6 hours as needed for moderate pain   Quantity:  30 tablet   Refills:  0        Lidocaine 4 % Patch   Commonly known as:  LIDOCARE   Used for:  Contusion of left knee, initial encounter        Dose:  2 patch   Place 2 patches onto the skin every 24 hours   Quantity:  12 patch   Refills:  0        oxyCODONE IR 5 MG tablet   Commonly known as:  ROXICODONE   Used for:  Contusion of left knee, initial encounter        Dose:  5 mg   Take 1 tablet (5 mg) by mouth every 4 hours as needed for moderate to severe pain   Quantity:  15 tablet   Refills:  0        polyethylene glycol Packet   Commonly known as:  MIRALAX/GLYCOLAX   Used for:  Contusion of left knee, initial encounter        Dose:  17 g   Start taking on:  11/15/2018   Take 17 g by mouth daily   Quantity:  7 packet   Refills:  0        prednisoLONE acetate 1 % ophthalmic susp   Commonly known as:   PRED FORTE   Used for:  Right eye injury, initial encounter        Dose:  1 drop   Place 1 drop into the right eye 3 times daily   Quantity:  1 Bottle   Refills:  0        senna-docusate 8.6-50 MG per tablet   Commonly known as:  SENOKOT-S;PERICOLACE   Used for:  Contusion of left knee, initial encounter        Dose:  2 tablet   Take 2 tablets by mouth At Bedtime   Quantity:  30 tablet   Refills:  0          CONTINUE these medications which have NOT CHANGED        Dose / Directions Comments    amLODIPine 5 MG tablet   Commonly known as:  NORVASC   Used for:  Essential hypertension, benign        Dose:  5 mg   Take 1 tablet (5 mg) by mouth every morning   Quantity:  90 tablet   Refills:  0        citalopram 20 MG tablet   Commonly known as:  celeXA   Used for:  Major depressive disorder, recurrent episode, mild (H)        Dose:  20 mg   Take 1 tablet (20 mg) by mouth daily   Quantity:  30 tablet   Refills:  3        donepezil 10 MG tablet   Commonly known as:  ARICEPT   Used for:  Major depressive disorder, recurrent episode, mild (H)        Dose:  10 mg   Take 1 tablet (10 mg) by mouth At Bedtime   Quantity:  30 tablet   Refills:  0        lisinopril-hydrochlorothiazide 20-12.5 MG per tablet   Commonly known as:  PRINZIDE/ZESTORETIC   Used for:  Essential hypertension, benign        Dose:  1 tablet   Take 1 tablet by mouth 2 times daily   Quantity:  30 tablet   Refills:  3        omeprazole 20 MG CR capsule   Commonly known as:  priLOSEC   Used for:  Gastroesophageal reflux disease without esophagitis        Dose:  20 mg   Take 1 capsule (20 mg) by mouth every morning   Quantity:  30 capsule   Refills:  3        simvastatin 20 MG tablet   Commonly known as:  ZOCOR   Used for:  Hyperlipidemia LDL goal <130        Dose:  20 mg   Take 1 tablet (20 mg) by mouth At Bedtime   Quantity:  90 tablet   Refills:  3                Summary of Visit     Reason for your hospital stay       Fall  Left knee contussion              After Care     Activity - Up with nursing assistance           Advance Diet as Tolerated       Follow this diet upon discharge: Regular       Fall precautions           General info for SNF       Length of Stay Estimate: Short Term Care: Estimated # of Days <30  Condition at Discharge: Stable  Level of care:skilled   Rehabilitation Potential: Good  Admission H&P remains valid and up-to-date: Yes  Recent Chemotherapy: N/A  Use Nursing Home Standing Orders: Yes       Intake and output       Every shift       Mantoux instructions       Give two-step Mantoux (PPD) Per Facility Policy Yes             Referrals     Occupational Therapy Adult Consult       Evaluate and treat as clinically indicated.    Reason:  Fall, knee pain       Physical Therapy Adult Consult       Evaluate and treat as clinically indicated.    Reason:  Fall, knee pain             Your next 10 appointments already scheduled     Dec 12, 2018  1:30 PM CST   (Arrive by 1:15 PM)   NEW KNEE with Darci Steen MD   [facility] ([facility])    [address] [place] [address] [phone]            Mar 27, 2019  9:20 AM CDT   PHYSICAL with Martinez Shafer MD   [facility] ([facility])    [address] [place] [address] [phone]              Follow-Up Appointment Instructions     Future Labs/Procedures    Follow Up and recommended labs and tests     Comments:    Follow up with your primary care provider for continued medical care and hospital follow up in 5-10 days.     Medication Therapy Management Services  If you have any questions regarding your medications after discharge, this service is available to you.  Please call:  [phone] or [phone] (toll-free)  [facility]  [address] [place] [address]  [email or web address]  [email or web address]    Orthopaedic Clinic  follow up with  "Dr. 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  Studies show us that people affected by trauma have higher levels of post-traumatic stress disorder (PTSD) and/or depressive symptoms during the year following an injury.     Please answer the following:  Had migraines about the event(s) or thought about the event(s) when you didn't want to?  Tried hard not to think about the event(s) or went out of your way to avoid situations that reminded you of the event(s)?  Been constantly on guard, watchful, or easily startled?  Felt numb or detached from people, activities, or your surroundings?   Felt guilty or unable to stop blaming yourself or others for the event(s) or any problems the event (s) may have caused?    If you answered \"yes\" to 3 or more of these questions, or if you simply want to discuss any of your feelings further, we recommend that you talk with your Primary Care Provider or a mental health professional.      Follow-Up Appointment Instructions     Follow Up and recommended labs and tests       Follow up with your primary care provider for continued medical care and hospital follow up in 5-10 days.     Medication Therapy Management Services  If you have any questions regarding your medications after discharge, this service is available to you.  Please call:  [phone] or [phone] (toll-free)  [facility]  [address] [place] [address] [place] [address]  [email or web address]  [email or web address]    Orthopaedic Clinic  follow up with Dr. 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  Studies show us that people affected by trauma have higher levels of post-traumatic stress disorder (PTSD) and/or depressive symptoms during the year following an injury.     Please answer the following:  Had migraines about the event(s) or thought about the event(s) when you didn't want to?  Tried hard not to think about the event(s) or went out of your way to avoid situations that reminded you of the event(s)?  Been constantly on guard, watchful, or easily startled?  Felt numb or detached from people, activities, or your surroundings?   Felt guilty or unable to stop blaming yourself or others for the event(s) or any problems the event (s) may have caused?    If you answered \"yes\" to 3 or more of these questions, or if you simply want to discuss any of your feelings further, we recommend that you talk with your Primary Care Provider or a mental health professional.             Statement of Approval     Ordered          11/14/18 1507  I have reviewed and agree with all the recommendations and orders detailed in this document.  EFFECTIVE NOW     Approved and electronically signed by:  Aurora Wong APRN CNP               "

## 2018-11-13 NOTE — IP AVS SNAPSHOT
"          UNIT 6D OBSERVATION [facility]: [phone]                                              INTERAGENCY TRANSFER FORM - LAB / IMAGING / EKG / EMG RESULTS   2018                    Hospital Admission Date: 2018  OLE ROBISON   : 1932  Sex: Female        Attending Provider: Saúl Patel MD     Allergies:  No Known Drug Allergies    Infection:  None   Service:  TRAUMA    Ht:  1.549 m (5' 1\")   Wt:  73.7 kg (162 lb 6.4 oz)   Admission Wt:  73.7 kg (162 lb 6.4 oz)    BMI:  30.69 kg/m 2   BSA:  1.78 m 2            Patient PCP Information     Provider PCP Type    Martinez Shafer MD General         Lab Results - 3 Days      UA with Microscopic [[id number]] (Abnormal)  Resulted: 18 1312, Result status: Final result    Ordering provider: Tere Miranda MD  18 1014 Resulting lab: [facility]    Specimen Information    Type Source Collected On   Midstream Urine Urine clean catch 18 1218          Components       Value Reference Range Flag Lab   Color Urine Straw   51   Appearance Urine Clear   51   Glucose Urine Negative NEG^Negative mg/dL  51   Bilirubin Urine Moderate NEG^Negative A 51   Comment:         This is an unconfirmed screening test result. A positive result may be false.   Ketones Urine Negative NEG^Negative mg/dL  51   Specific Gravity Urine 1.010 1.003 - 1.035  51   Blood Urine Negative NEG^Negative  51   pH Urine 6.5 5.0 - 7.0 pH  51   Protein Albumin Urine Negative NEG^Negative mg/dL  51   Urobilinogen mg/dL 0.2 0.0 - 2.0 mg/dL  51   Nitrite Urine Negative NEG^Negative  51   Leukocyte Esterase Urine Negative NEG^Negative  51   Source Midstream Urine   51   WBC Urine <1 0 - 5 /HPF  51   RBC Urine 0 0 - 2 /HPF  51            Comprehensive metabolic panel [[id number]] (Abnormal)  Resulted: 18 1114, Result status: Final result    Ordering provider: Tere Miranda MD  18 0915 Resulting lab: " [facility]    Specimen Information    Type Source Collected On     11/13/18 0952          Components       Value Reference Range Flag Lab   Sodium 137 133 - 144 mmol/L  51   Potassium 3.6 3.4 - 5.3 mmol/L  51   Chloride 103 94 - 109 mmol/L  51   Carbon Dioxide 25 20 - 32 mmol/L  51   Anion Gap 9 3 - 14 mmol/L  51   Glucose 93 70 - 99 mg/dL  51   Urea Nitrogen 12 7 - 30 mg/dL  51   Creatinine 0.72 0.52 - 1.04 mg/dL  51   GFR Estimate 77 >60 mL/min/1.7m2  51   Comment:  Non  GFR Calc   GFR Estimate If Black >90 >60 mL/min/1.7m2  51   Comment:  African American GFR Calc   Calcium 9.0 8.5 - 10.1 mg/dL  51   Bilirubin Total 0.5 0.2 - 1.3 mg/dL  51   Albumin 3.6 3.4 - 5.0 g/dL  51   Protein Total 6.7 6.8 - 8.8 g/dL L 51   Alkaline Phosphatase 78 40 - 150 U/L  51   ALT 20 0 - 50 U/L  51   AST 17 0 - 45 U/L  51            Magnesium [[id number]]  Resulted: 11/13/18 1114, Result status: Final result    Ordering provider: Tere Miranda MD  11/13/18 0952 Resulting lab: [facility]    Specimen Information    Type Source Collected On     11/13/18 0952          Components       Value Reference Range Flag Lab   Magnesium 2.2 1.6 - 2.3 mg/dL  51            CBC with platelets differential [[id number]]  Resulted: 11/13/18 1111, Result status: Final result    Ordering provider: Tere Miranda MD  11/13/18 1014 Resulting lab: [facility]    Specimen Information    Type Source Collected On   Blood  11/13/18 0952          Components       Value Reference Range Flag Lab   WBC 9.3 4.0 - 11.0 10e9/L  51   RBC Count 4.48 3.8 - 5.2 10e12/L  51   Hemoglobin 13.0 11.7 - 15.7 g/dL  51   Hematocrit 40.7 35.0 - 47.0 %  51   MCV 91 78 - 100 fl  51   MCH 29.0 26.5 - 33.0 pg  51   MCHC 31.9 31.5 - 36.5 g/dL  51   RDW 13.7 10.0 - 15.0 %  51   Platelet Count 242 150 - 450 10e9/L  51   Diff Method Automated Method   51   %  Neutrophils 54.9 %  51   % Lymphocytes 37.3 %  51   % Monocytes 6.8 %  51   % Eosinophils 0.6 %  51   % Basophils 0.3 %  51   % Immature Granulocytes 0.1 %  51   Nucleated RBCs 0 0 /100  51   Absolute Neutrophil 5.1 1.6 - 8.3 10e9/L  51   Absolute Lymphocytes 3.5 0.8 - 5.3 10e9/L  51   Absolute Monocytes 0.6 0.0 - 1.3 10e9/L  51   Absolute Eosinophils 0.1 0.0 - 0.7 10e9/L  51   Absolute Basophils 0.0 0.0 - 0.2 10e9/L  51   Abs Immature Granulocytes 0.0 0 - 0.4 10e9/L  51   Absolute Nucleated RBC 0.0   51            Alcohol ethyl [[id number]]  Resulted: 11/13/18 1022, Result status: Final result    Ordering provider: Tere Miranda MD  11/13/18 0952 Resulting lab: [facility]    Specimen Information    Type Source Collected On     11/13/18 0952          Components       Value Reference Range Flag Lab   Ethanol g/dL <0.01 <0.01 g/dL  51            Hemoglobin [[id number]]  Resulted: 11/13/18 1016, Result status: Final result    Ordering provider: Tere Miranda MD  11/13/18 0952 Resulting lab: [facility]    Specimen Information    Type Source Collected On     11/13/18 0952          Components       Value Reference Range Flag Lab   Hemoglobin 13.1 11.7 - 15.7 g/dL  51            Testing Performed By     Lab - Abbreviation Name Director Address Valid Date Range    51 - Unknown [facility] Unknown [address] [address] 12/31/14 1010 - Present            Unresulted Labs     None         Imaging Results - 3 Days      XR Chest Port 1 View [[id number]]  Resulted: 11/13/18 1509, Result status: Final result    Ordering provider: Tere Miranda MD  11/13/18 0956 Resulted by: Yesika Mccabe MD Clegg, Jeffrey W, MD    Performed: 11/13/18 1009 - 11/13/18 1033 Resulting lab: RADIOLOGY RESULTS    Narrative:       EXAM: XR CHEST PORT 1 VW  11/13/2018 10:33 AM     HISTORY:  s/p fall down stairs;        COMPARISON:  1/3/2016    FINDINGS:   The cardiomediastinal silhouette is normal. The pulmonary vasculature  is distinct. No pneumothorax. No pleural effusion. Lung fields are  clear. No nondisplaced rib fracture. Question of nondisplaced fracture  the posterior right sixth rib, approximately 6-7cm from the thoracic  spine. Visualized abdomen and soft tissues are unremarkable. Biapical  fibrotic changes.      Impression:       IMPRESSION:   1.  Question of nondisplaced fracture vs artifact in the posterior  right sixth rib. Consider dedicated rib series if clinically  indicated.  2.  No nondisplaced rib fractures, pneumothorax, or appreciable  airspace disease.    [Result: Possible rib fracture]    Finding was identified on 11/13/2018 10:48 AM.     Dr. Miranda was contacted by Dr. Stanton at 11/13/2018 11:01 AM and  verbalized understanding of the urgent finding.     I have personally reviewed the examination and initial interpretation  and I agree with the findings.    SURESH RETANA MD      XR Knee Port Left 1/2 Views [[id number]]  Resulted: 11/13/18 1040, Result status: Final result    Ordering provider: Tere Miranda MD  11/13/18 0956 Resulted by: Christie Kraus MD    Performed: 11/13/18 1009 - 11/13/18 1033 Resulting lab: RADIOLOGY RESULTS    Narrative:       Exam: 2 views of the left knee dated 11/13/2018.    COMPARISON: 8/10/2018.    CLINICAL HISTORY: Fall.    FINDINGS: AP and lateral views of the left knee were obtained. Large  left knee joint effusion. Tricompartment osteoarthrosis in the left  knee with joint space narrowing and osteophytic spurring in all 3  joint compartments. No displaced fractures are noted.      Impression:       IMPRESSION:  1. Large left knee joint effusion, stable since the comparison  radiographs of 8/10/2018. No displaced fractures noted.  2. 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  3. Findings were discussed with the ordering ER physician Dr. Tere Miranda  at 1040 on 11/13/2018.    KRUNAL LEWIS MD      Testing Performed By     Lab - Abbreviation Name Director Address Valid Date Range    104 - Rad Rslts RADIOLOGY RESULTS Unknown Unknown 02/16/05 1553 - Present            Encounter-Level Documents:     There are no encounter-level documents.      Order-Level Documents:     There are no order-level documents.

## 2018-11-14 ENCOUNTER — APPOINTMENT (OUTPATIENT)
Dept: GENERAL RADIOLOGY | Facility: CLINIC | Age: 83
End: 2018-11-14
Attending: PHYSICIAN ASSISTANT
Payer: COMMERCIAL

## 2018-11-14 ENCOUNTER — TELEPHONE (OUTPATIENT)
Dept: ORTHOPEDICS | Facility: CLINIC | Age: 83
End: 2018-11-14

## 2018-11-14 ENCOUNTER — APPOINTMENT (OUTPATIENT)
Dept: PHYSICAL THERAPY | Facility: CLINIC | Age: 83
End: 2018-11-14
Attending: NURSE PRACTITIONER
Payer: COMMERCIAL

## 2018-11-14 VITALS
TEMPERATURE: 98.2 F | BODY MASS INDEX: 30.69 KG/M2 | RESPIRATION RATE: 16 BRPM | SYSTOLIC BLOOD PRESSURE: 130 MMHG | HEART RATE: 59 BPM | OXYGEN SATURATION: 97 % | WEIGHT: 162.4 LBS | DIASTOLIC BLOOD PRESSURE: 67 MMHG

## 2018-11-14 PROCEDURE — 40000193 ZZH STATISTIC PT WARD VISIT

## 2018-11-14 PROCEDURE — 99239 HOSP IP/OBS DSCHRG MGMT >30: CPT | Performed by: NURSE PRACTITIONER

## 2018-11-14 PROCEDURE — 73523 X-RAY EXAM HIPS BI 5/> VIEWS: CPT

## 2018-11-14 PROCEDURE — G8978 MOBILITY CURRENT STATUS: HCPCS | Mod: GP,CK

## 2018-11-14 PROCEDURE — G0378 HOSPITAL OBSERVATION PER HR: HCPCS

## 2018-11-14 PROCEDURE — 97161 PT EVAL LOW COMPLEX 20 MIN: CPT | Mod: GP

## 2018-11-14 PROCEDURE — G8980 MOBILITY D/C STATUS: HCPCS | Mod: GP,CK

## 2018-11-14 PROCEDURE — 25000125 ZZHC RX 250: Performed by: NURSE PRACTITIONER

## 2018-11-14 PROCEDURE — 25000132 ZZH RX MED GY IP 250 OP 250 PS 637: Performed by: NURSE PRACTITIONER

## 2018-11-14 PROCEDURE — G8979 MOBILITY GOAL STATUS: HCPCS | Mod: GP,CI

## 2018-11-14 PROCEDURE — 97530 THERAPEUTIC ACTIVITIES: CPT | Mod: GP

## 2018-11-14 PROCEDURE — 97116 GAIT TRAINING THERAPY: CPT | Mod: GP,XU

## 2018-11-14 RX ORDER — PREDNISOLONE ACETATE 10 MG/ML
1 SUSPENSION/ DROPS OPHTHALMIC 3 TIMES DAILY
Status: DISCONTINUED | OUTPATIENT
Start: 2018-11-14 | End: 2018-11-14 | Stop reason: HOSPADM

## 2018-11-14 RX ORDER — IBUPROFEN 200 MG
200 TABLET ORAL EVERY 6 HOURS PRN
Qty: 30 TABLET | DISCHARGE
Start: 2018-11-14 | End: 2018-12-12

## 2018-11-14 RX ORDER — PREDNISOLONE ACETATE 10 MG/ML
1 SUSPENSION/ DROPS OPHTHALMIC 3 TIMES DAILY
Qty: 1 BOTTLE | DISCHARGE
Start: 2018-11-14 | End: 2018-12-05 | Stop reason: DRUGHIGH

## 2018-11-14 RX ORDER — ACETAMINOPHEN 325 MG/1
975 TABLET ORAL EVERY 6 HOURS
Qty: 30 TABLET | DISCHARGE
Start: 2018-11-14 | End: 2018-11-15

## 2018-11-14 RX ORDER — ERYTHROMYCIN 5 MG/G
OINTMENT OPHTHALMIC 3 TIMES DAILY
Status: DISCONTINUED | OUTPATIENT
Start: 2018-11-14 | End: 2018-11-14 | Stop reason: HOSPADM

## 2018-11-14 RX ORDER — AMOXICILLIN 250 MG
2 CAPSULE ORAL AT BEDTIME
Qty: 30 TABLET | DISCHARGE
Start: 2018-11-14 | End: 2018-11-21

## 2018-11-14 RX ORDER — CITALOPRAM HYDROBROMIDE 20 MG/1
20 TABLET ORAL DAILY
Qty: 30 TABLET | Refills: 3 | DISCHARGE
Start: 2018-11-14

## 2018-11-14 RX ORDER — DONEPEZIL HYDROCHLORIDE 10 MG/1
10 TABLET, FILM COATED ORAL AT BEDTIME
Qty: 30 TABLET | DISCHARGE
Start: 2018-11-14 | End: 2024-01-27

## 2018-11-14 RX ORDER — LIDOCAINE 4 G/G
2 PATCH TOPICAL EVERY 24 HOURS
Qty: 12 PATCH | DISCHARGE
Start: 2018-11-14 | End: 2018-12-05

## 2018-11-14 RX ORDER — CYCLOPENTOLATE HYDROCHLORIDE 10 MG/ML
1 SOLUTION/ DROPS OPHTHALMIC 2 TIMES DAILY
Qty: 1 BOTTLE | DISCHARGE
Start: 2018-11-14 | End: 2018-12-05

## 2018-11-14 RX ORDER — POLYETHYLENE GLYCOL 3350 17 G/17G
17 POWDER, FOR SOLUTION ORAL DAILY
Qty: 7 PACKET | DISCHARGE
Start: 2018-11-15 | End: 2018-11-27

## 2018-11-14 RX ORDER — AMLODIPINE BESYLATE 5 MG/1
5 TABLET ORAL EVERY MORNING
Qty: 90 TABLET | Refills: 0 | DISCHARGE
Start: 2018-11-14 | End: 2018-11-20

## 2018-11-14 RX ORDER — ERYTHROMYCIN 5 MG/G
OINTMENT OPHTHALMIC 3 TIMES DAILY
Refills: 0 | DISCHARGE
Start: 2018-11-14 | End: 2018-11-15

## 2018-11-14 RX ORDER — LISINOPRIL AND HYDROCHLOROTHIAZIDE 12.5; 2 MG/1; MG/1
1 TABLET ORAL 2 TIMES DAILY
Qty: 30 TABLET | Refills: 3 | Status: ON HOLD | DISCHARGE
Start: 2018-11-14 | End: 2020-08-11

## 2018-11-14 RX ORDER — CYCLOPENTOLATE HYDROCHLORIDE 5 MG/ML
1 SOLUTION/ DROPS OPHTHALMIC 2 TIMES DAILY
Status: DISCONTINUED | OUTPATIENT
Start: 2018-11-14 | End: 2018-11-14

## 2018-11-14 RX ORDER — OXYCODONE HYDROCHLORIDE 5 MG/1
5 TABLET ORAL EVERY 4 HOURS PRN
Qty: 15 TABLET | Refills: 0 | Status: SHIPPED | DISCHARGE
Start: 2018-11-14 | End: 2018-12-12

## 2018-11-14 RX ORDER — POLYETHYLENE GLYCOL 3350 17 G/17G
17 POWDER, FOR SOLUTION ORAL DAILY
Status: DISCONTINUED | OUTPATIENT
Start: 2018-11-14 | End: 2018-11-14 | Stop reason: HOSPADM

## 2018-11-14 RX ORDER — OXYCODONE HYDROCHLORIDE 5 MG/1
5 TABLET ORAL EVERY 4 HOURS PRN
Status: DISCONTINUED | OUTPATIENT
Start: 2018-11-14 | End: 2018-11-14 | Stop reason: HOSPADM

## 2018-11-14 RX ORDER — SIMVASTATIN 20 MG
20 TABLET ORAL AT BEDTIME
Qty: 90 TABLET | Refills: 3 | DISCHARGE
Start: 2018-11-14

## 2018-11-14 RX ORDER — CYCLOPENTOLATE HYDROCHLORIDE 10 MG/ML
1 SOLUTION/ DROPS OPHTHALMIC 2 TIMES DAILY
Status: DISCONTINUED | OUTPATIENT
Start: 2018-11-14 | End: 2018-11-14 | Stop reason: HOSPADM

## 2018-11-14 RX ORDER — ACETAMINOPHEN 325 MG/1
975 TABLET ORAL EVERY 6 HOURS
Status: DISCONTINUED | OUTPATIENT
Start: 2018-11-14 | End: 2018-11-14 | Stop reason: HOSPADM

## 2018-11-14 RX ORDER — IBUPROFEN 200 MG
200 TABLET ORAL EVERY 6 HOURS PRN
Status: DISCONTINUED | OUTPATIENT
Start: 2018-11-14 | End: 2018-11-14 | Stop reason: HOSPADM

## 2018-11-14 RX ADMIN — PREDNISOLONE ACETATE 1 DROP: 10 SUSPENSION/ DROPS OPHTHALMIC at 13:16

## 2018-11-14 RX ADMIN — OXYCODONE HYDROCHLORIDE 5 MG: 5 TABLET ORAL at 13:16

## 2018-11-14 RX ADMIN — CITALOPRAM HYDROBROMIDE 20 MG: 20 TABLET ORAL at 10:22

## 2018-11-14 RX ADMIN — ACETAMINOPHEN 975 MG: 325 TABLET, FILM COATED ORAL at 10:21

## 2018-11-14 RX ADMIN — AMLODIPINE BESYLATE 5 MG: 2.5 TABLET ORAL at 10:22

## 2018-11-14 RX ADMIN — LISINOPRIL AND HYDROCHLOROTHIAZIDE 1 TABLET: 12.5; 2 TABLET ORAL at 10:22

## 2018-11-14 RX ADMIN — ACETAMINOPHEN 650 MG: 325 TABLET, FILM COATED ORAL at 04:47

## 2018-11-14 RX ADMIN — OXYCODONE HYDROCHLORIDE 2.5 MG: 5 TABLET ORAL at 04:47

## 2018-11-14 RX ADMIN — OMEPRAZOLE 20 MG: 20 CAPSULE, DELAYED RELEASE ORAL at 10:22

## 2018-11-14 RX ADMIN — ACETAMINOPHEN 650 MG: 325 TABLET, FILM COATED ORAL at 00:26

## 2018-11-14 RX ADMIN — IBUPROFEN 200 MG: 200 TABLET, FILM COATED ORAL at 13:16

## 2018-11-14 RX ADMIN — CYCLOPENTOLATE HYDROCHLORIDE 1 DROP: 10 SOLUTION/ DROPS OPHTHALMIC at 16:21

## 2018-11-14 RX ADMIN — OXYCODONE HYDROCHLORIDE 2.5 MG: 5 TABLET ORAL at 10:22

## 2018-11-14 RX ADMIN — ERYTHROMYCIN 1 G: 5 OINTMENT OPHTHALMIC at 13:15

## 2018-11-14 ASSESSMENT — ENCOUNTER SYMPTOMS
ROS SKIN COMMENTS: FACIAL ABRASIONS
JOINT SWELLING: 1
PSYCHIATRIC NEGATIVE: 1
ENDOCRINE NEGATIVE: 1
EYES NEGATIVE: 1
PHOTOPHOBIA: 0
WOUND: 1
CONSTITUTIONAL NEGATIVE: 1
RESPIRATORY NEGATIVE: 1
EYE PAIN: 0

## 2018-11-14 ASSESSMENT — PAIN DESCRIPTION - DESCRIPTORS: DESCRIPTORS: ACHING

## 2018-11-14 NOTE — PROGRESS NOTES
Social Work Services Discharge Note      Patient Name:  Juany Collazo     Anticipated Discharge Date:  11/14/2018    Discharge Disposition:   TCU:  [facility] [phone] f: [phone]    Following MD:  per facility      Pre-Admission Screening (PAS) online form has been completed.  The Level of Care (LOC) is:  Determined  Confirmation Code is:  [id number]  Patient/caregiver informed of referral to [facility] for Pre-Admission Screening for skilled nursing facility (SNF) placement and to expect a phone call post discharge from SNF.     Additional Services/Equipment Arranged:  HE w/c transport arranged for 1630. Pt and family aware transport could be private pay and requested that transport be arranged.      Patient / Family response to discharge plan:  Pt updated at bedside and agreeable to plan. Dtr, Jeannette Shin, updated via phone ([phone]) and also agreeable on plan.      Persons notified of above discharge plan:  Pt, dtr, TCU admissions, Primary Team, bedside RN    Staff Discharge Instructions:  Please fax discharge orders and signed hard scripts for any controlled substances.  Please print a packet and send with patient.     CTS Handoff completed:  NO    Medicare Notice of Rights provided to the patient/family:  NO-observation

## 2018-11-14 NOTE — PLAN OF CARE
Problem: Patient Care Overview  Goal: Discharge Needs Assessment  Outpatient/Observation goals to be met before discharge home:           - Adequate pain control on oral analgesia. -Yes  - Vital Signs normal or at patient baseline. -Yes  - Tolerating oral intake to maintain hydration. -Yes  - Diagnostic tests and consults completed and resulted -No  - Cleared for discharge from consultants' standpoint if involved in care -No  - Safe disposition plan has been identified -No  - Return to baseline functional status   - Nurse to notify provider when observation goals have been met and patient is ready for discharge.      PRN oxycodone for knee pain. ACE wrapped and elevated. SBA to commode.

## 2018-11-14 NOTE — PLAN OF CARE
Problem: Patient Care Overview  Goal: Plan of Care/Patient Progress Review  Outpatient/Observation goals to be met before discharge home:      Observation goals - Acute Traumatic pain PRIOR TO DISCHARGE     Comments: List all goals to be met before discharge home:   - Adequate pain control on oral analgesia. - no, left knee pain tolerable at this time.  - Vital Signs normal or at patient baseline. - yes  - Tolerating oral intake to maintain hydration. - yes  - Diagnostic tests and consults completed and resulted - yes  - Cleared for discharge from consultants' standpoint if involved in care - no  - Safe disposition plan has been identified - no  - Return to baseline functional status - no          Observation goals - trauma cervical spine clearance PRIOR TO DISCHARGE     Comments: List all goals to be met before discharge:   - Cervical spine cleared - yes  - Vital Signs normal or at patient baseline. - yes  - Tolerating oral intake to maintain hydration. - yes  - Cleared for discharge from consultants' standpoint if involved in care - no  - Return to baseline functional status - no  - Safe disposition plan has been identified - no

## 2018-11-14 NOTE — PLAN OF CARE
Problem: Patient Care Overview  Goal: Discharge Needs Assessment  Outpatient/Observation goals to be met before discharge home:  Observation goals - Acute Traumatic pain PRIOR TO DISCHARGE     Comments: List all goals to be met before discharge home:   - Adequate pain control on oral analgesia. - no, left knee painful.  - Vital Signs normal or at patient baseline. - yes  - Tolerating oral intake to maintain hydration. - yes  - Diagnostic tests and consults completed and resulted - yes  - Cleared for discharge from consultants' standpoint if involved in care - no  - Safe disposition plan has been identified - no  - Return to baseline functional status - no        Observation goals - trauma cervical spine clearance PRIOR TO DISCHARGE     Comments: List all goals to be met before discharge:   - Cervical spine cleared - yes  - Vital Signs normal or at patient baseline. - yes  - Tolerating oral intake to maintain hydration. - yes  - Cleared for discharge from consultants' standpoint if involved in care - no  - Return to baseline functional status - no  - Safe disposition plan has been identified - no          Pt complaining of consistent pain in left knee despite, pain medications, cold packs and a lidocaine patch. Pt refusing to walk on knee. Pt complaining of a slight headache. VSS. Will continue to monitor.

## 2018-11-14 NOTE — PLAN OF CARE
Problem: Patient Care Overview  Goal: Discharge Needs Assessment  Outpatient/Observation goals to be met before discharge home:        - Adequate pain control on oral analgesia. -Yes  - Vital Signs normal or at patient baseline. -Yes  - Tolerating oral intake to maintain hydration. -Yes  - Diagnostic tests and consults completed and resulted -No  - Cleared for discharge from consultants' standpoint if involved in care -No  - Safe disposition plan has been identified -No  - Return to baseline functional status   - Nurse to notify provider when observation goals have been met and patient is ready for discharge.

## 2018-11-14 NOTE — TELEPHONE ENCOUNTER
----- Message from Darci Moran PA-C sent at 11/14/2018 11:09 AM CST -----  Hello,    Could you please schedule this patient with Dr. Steen at her convenience? She has severe tricompartmental osteoarthritis and has exhausted most conservative interventions. I would like her to meet with him to discuss the possibility of an elective TKA.    Thanks,  Darci

## 2018-11-14 NOTE — PROGRESS NOTES
11/14/18 0927   Quick Adds   Type of Visit Initial PT Evaluation   Living Environment   Lives With alone   Living Arrangements house   Number of Stairs to Enter Home 2   Number of Stairs Within Home 13   Living Environment Comment pt lives alone in 3 level home; reports 13 stairs to access second level.    Self-Care   Usual Activity Tolerance moderate   Current Activity Tolerance poor   Activity/Exercise/Self-Care Comment indep with ADLs   Functional Level Prior   Ambulation 0-->independent   Transferring 0-->independent   Toileting 0-->independent   Bathing 0-->independent   Dressing 0-->independent   Eating 0-->independent   Communication 0-->understands/communicates without difficulty   Swallowing 0-->swallows foods/liquids without difficulty   Cognition 1 - attention or memory deficits   Fall history within last six months yes   Number of times patient has fallen within last six months 1   Prior Functional Level Comment indep with functional mobility   General Information   Onset of Illness/Injury or Date of Surgery - Date 11/13/18   Referring Physician Aurora Wong APRN CNP   Patient/Family Goals Statement reduce pain L knee   Pertinent History of Current Problem (include personal factors and/or comorbidities that impact the POC) Juany Collazo is a 85 yo female with history of decompressive lumbar laminectomy, GERD, depression, hypertension, and hyperlipidemia who presents after a mechanical fall this morning. Patient stated she woke up and was walking down her stairs but missed the bottom step.   Precautions/Limitations fall precautions   General Info Comments activity: up with assist   Cognitive Status Examination   Cognitive Comment A&Ox4   Pain Assessment   Patient Currently in Pain Yes, see Vital Sign flowsheet   Integumentary/Edema   Integumentary/Edema Comments L knee swollen   Strength   Strength Comments fair functional strength, needs CGA for sit to stand transfers   Bed Mobility   Bed  "Mobility Comments mod I sup to sit   Transfer Skills   Transfer Comments CGA sit to stand   Gait   Gait Comments 2 steps with FWW CGA x2   Balance   Balance Comments seated and standing static balance good   Sensory Examination   Sensory Perception no deficits were identified   Coordination   Coordination no deficits were identified   Muscle Tone   Muscle Tone no deficits were identified   General Therapy Interventions   Planned Therapy Interventions balance training;bed mobility training;gait training;ROM;strengthening;transfer training;risk factor education;progressive activity/exercise;home program guidelines   Clinical Impression   Criteria for Skilled Therapeutic Intervention yes, treatment indicated   PT Diagnosis impaired functional mobility   Influenced by the following impairments weakness, pain, decreased activity tolerance   Functional limitations due to impairments decreased indep with functional mobility   Clinical Presentation Stable/Uncomplicated   Clinical Presentation Rationale clinical judgment   Clinical Decision Making (Complexity) Low complexity   Therapy Frequency` daily   Predicted Duration of Therapy Intervention (days/wks) 2 days   Anticipated Equipment Needs at Discharge front wheeled walker   Anticipated Discharge Disposition Transitional Care Facility   Risk & Benefits of therapy have been explained Yes   Patient, Family & other staff in agreement with plan of care Yes   [facility] [employer] \"6 Clicks\"   2016, Trustees of [facility], under license to [employer].  All rights reserved.   6 Clicks Short Forms Basic Mobility Inpatient Short Form   [facility] [email or web address]  \"6 Clicks\" V.2 Basic Mobility Inpatient Short Form   1. Turning from your back to your side while in a flat bed without using bedrails? 4 - None   2. Moving from lying on your back to sitting on the side of a flat bed without using bedrails? 4 - None   3. Moving to and from a bed to a chair (including a " wheelchair)? 2 - A Lot   4. Standing up from a chair using your arms (e.g., wheelchair, or bedside chair)? 3 - A Little   5. To walk in hospital room? 2 - A Lot   6. Climbing 3-5 steps with a railing? 1 - Total   Basic Mobility Raw Score (Score out of 24.Lower scores equate to lower levels of function) 16   Total Evaluation Time   Total Evaluation Time (Minutes) 7

## 2018-11-14 NOTE — PLAN OF CARE
Problem: Patient Care Overview  Goal: Plan of Care/Patient Progress Review  Discharge Planner PT   Patient plan for discharge: TCU  Current status: patient limited by L knee pain especially in WB, unable to tolerate ambulation. Patient performs bed mobility mod I, sit <> stands with CGA, stand pivot transfer bed <> commode with mod A x1. Attempted gait with use of FWW, patient not able to tolerate WB through L LE impacting ambulation; only able to take ~2 steps before needing to sit down.  Barriers to return to prior living situation: level of assist, stairs  Recommendations for discharge: TCU  Rationale for recommendations: pt would benefit from TCU to progress indep and safety with ambulation, stairs       Entered by: Katie Law 11/14/2018 9:19 AM       Physical Therapy Discharge Summary    Reason for therapy discharge:    Discharged to transitional care facility.    Progress towards therapy goal(s). See goals on Care Plan in [facility] electronic health record for goal details.  Goals not met.  Barriers to achieving goals:   discharge from facility.    Therapy recommendation(s):    Continued therapy is recommended.  Rationale/Recommendations:  TCU; see above.

## 2018-11-14 NOTE — PLAN OF CARE
Problem: Patient Care Overview  Goal: Plan of Care/Patient Progress Review  Outpatient/Observation goals to be met before discharge home:      Observation goals - Acute Traumatic pain PRIOR TO DISCHARGE     Comments: List all goals to be met before discharge home:   - Adequate pain control on oral analgesia. - no, left knee pain tolerable at this time.  - Vital Signs normal or at patient baseline. - yes  - Tolerating oral intake to maintain hydration. - yes  - Diagnostic tests and consults completed and resulted - yes  - Cleared for discharge from consultants' standpoint if involved in care - no  - Safe disposition plan has been identified - no  - Return to baseline functional status - no          Observation goals - trauma cervical spine clearance PRIOR TO DISCHARGE     Comments: List all goals to be met before discharge:   - Cervical spine cleared - yes  - Vital Signs normal or at patient baseline. - yes  - Tolerating oral intake to maintain hydration. - yes  - Cleared for discharge from consultants' standpoint if involved in care - no  - Return to baseline functional status - no  - Safe disposition plan has been identified - no     Alert and oriented x 4. Complained of headache and left knee pain, Tylenol given. Up to the [facility] and voided with assist of 1. Bed alarm is on.

## 2018-11-14 NOTE — PLAN OF CARE
Problem: Patient Care Overview  Goal: Discharge Needs Assessment  Outpatient/Observation goals to be met before discharge home:  Observation goals - Acute Traumatic pain PRIOR TO DISCHARGE     Comments: List all goals to be met before discharge home:   - Adequate pain control on oral analgesia. - no, left knee painful.  - Vital Signs normal or at patient baseline. - yes  - Tolerating oral intake to maintain hydration. - yes  - Diagnostic tests and consults completed and resulted - yes  - Cleared for discharge from consultants' standpoint if involved in care - no  - Safe disposition plan has been identified - no  - Return to baseline functional status - no      Observation goals - trauma cervical spine clearance PRIOR TO DISCHARGE     Comments: List all goals to be met before discharge:   - Cervical spine cleared - yes  - Vital Signs normal or at patient baseline. - yes  - Tolerating oral intake to maintain hydration. - yes  - Cleared for discharge from consultants' standpoint if involved in care - no  - Return to baseline functional status - no  - Safe disposition plan has been identified - no

## 2018-11-14 NOTE — PROGRESS NOTES
"Adult On Call Social Work Services Progress Note    Hospital Day: 1  Date of Initial Social Work Evaluation: Not assessed  Collaborated with:  6D bedside RN, patient's daughter-Jeannette    Data:  Patient is an 86 year old female on observation status on Unit 6D after a mechanical fall at home.  Adult On Call SW received page from 6D RN reporting patient's daughter Jeannette would like return phone call from CHARLES regarding concerns about patient's safety/ability to return home following anticipated discharge tomorrow (11/14).    Intervention:  Social work contacted patient's daughter, Jeannette, over the phone.  Jeannette indicated that the family (particularly patient's children) have attempted to talk with patient about moving out of her home in the past but \"she does not want to.\" Jeannette stated that she is concerned about patient's ability to be safe in the home which she described as \"a three story house where you can't stay on just one level and it's a bit cluttered.\" Jeannette also reported that her siblings attempt to check on the patient multiple times during a week in addition to patient having [facility] and [facility] home care services.  Jeannette indicated [facility] staff comes into the home at least four mornings a week to \"drive mom around to different errands, picking up food, [facility].\" Daughter indicated she and her siblings are \"interested in hearing any recommendations from the medical team.\" SW explained that an order had been placed for physical therapy to see patient.  SW explained that therapy would assist by providing recommendations about patient's safety and needs following an assessment.  CHARLES also explained that patient is currently observation status and would therefore likely not have insurance coverage for a TCU stay due to lack of three day inpatient hospitalization (Medicare requirement, patient has [facility] insurance).  CHARLES also explained to Jeannette that patient may " receive recommendations for Home health services or outpatient therapies and explained these options.  SW indicated that per report of RN, it likely appears that patient would discharge home at this time and discussed strategies for maximizing safety in the home.  Daughter Jeannette indicated understanding of education given and options discussed.  She also requested an update from PT once they have worked with patient.  SW agreed to pass on this request and notified bedside RN of Jeannette's wishes.     Assessment:  Daughter expressed concerns about patient's safety in home. Patient has been resistant to discussion with family about moving out of her home, currently receives homecare and life alert services.  PT has been consulted to see patient, will await recommendations. Patient currently on observation status.    Plan:    Anticipated Disposition:  TBD; likely home    Barriers to d/c plan:  Medical stability, patient needing rehab stay    Follow Up:  SW continues to be available to assist with discharge planning, as needed.    Soo Yeon Han, MSW, [facility]  Emergency Department/Adult On Call Social Work Pager: [phone]

## 2018-11-14 NOTE — PROGRESS NOTES
OPHTHALMOLOGY PROGRESS NOTE  11/14/18    Patient: Juany Collazo  Consulted by: Trauma  Reason for Consult: Globe trauma    HISTORY OF PRESENTING ILLNESS:     Juany Collazo is a 86 year old female who presents with right eye blunt trauma who reportedly fell by missing the last step walking down stairs and afterward she struck a smooth wall at the corner. She believes it was a smooth surface and no object penetrated her eye. She denies loss of consciousness and could not recall if her affected eye had any vision changes immediately after the injury. She pressed her [facility] Alert button shortly thereafter. She cannot assess if her vision has gotten progressively worse since the injury but states it is decreased from baseline. She does not endorse flashes of light or eruption of floaters. She does not endorse endorse blackened visual field cuts. Does not report pain with eye movements or diplopia. Denies any discharge.    Interval history: Patient is feeling better. Vision is stable.    Review of systems were otherwise negative except for that which has been stated above.    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       Past Surgical History:   Procedure Laterality Date     C NONSPECIFIC PROCEDURE  1998    right hip replacement     C NONSPECIFIC PROCEDURE  1970    tubal ligation     C NONSPECIFIC PROCEDURE  1998    hip replacement     DECOMPRESSION LUMBAR ONE LEVEL  4/19/2013    Procedure: DECOMPRESSION LUMBAR ONE LEVEL;  Decompression Bilateral L4-5  ;  Surgeon: Radha  Lang HENDRIX MD;  Location: [facility] OR       EXAMINATION:     Visual Acuity with +2.50 readers: Right Eye 20/40-2 ; Left Eye 20/25+2   Pupils: Anisocoria with R > L,   Right pupil irregular; no afferent pupillary defect by reverse  Left regular and reactive to light; with no afferent pupillary defect.    Intraocular Pressure: RE  12 ; LE 15  mmHg by tonopen (<5% error).   Motility: RE Full; LE Full  Confrontational Visual Field: RE Full; LE Full     External/Slit Lamp Exam   RIGHT EYE   Lids/Lashes: Small lower lid abrasion   Conj/Sclera: 1+ injection   Cornea:  Clear   Ant Chamber:  Deep and Quiet   Iris: Irregular, minimally reactive   Lens: 3+ nuclear sclerosis, cortical cataracts  LEFT   Lids/lashes: No Abnormality   Conj/Sclera: White and Quiet   Cornea:  Clear   Ant Chamber:  Deep and Quiet   Iris: Round and Reactive   Lens: posterior chamber intraocular lens (PCIOL)    Dilated Fundus Exam  Eyes Dilated? No    Labs/Studies/Imaging Performed  CT ORIBT: There are no facial bone fractures. The paranasal sinuses  are well aerated. The globes bilaterally are unremarkable.       ASSESSMENT/PLAN:     Juany Collazo is a 86 year old female who presents with:    Trauma to the right eye   No globe rupture   Anterior chamber formed   Irregular pupil   Traumatic iritis   Retina attached    Nuclear sclerosis and cortical cataract right eye   Work up post hospital admission    Pseudophakia, left eye   Monitor    Plan:   Continue Pred Forte three times a day   Start cyclopentolate three times a day   Erythromycin ointment for eyelid abrasions   Follow up in eye clinic in 2 weeks      It is our pleasure to participate in this patient's care and treatment. Please contact us with any further questions or concerns.    Jessica Forbes O.D.  Ophthalmology  Adjunct

## 2018-11-14 NOTE — CONSULTS
[facility]  ORTHOPAEDIC SURGERY CONSULT - HISTORY AND PHYSICAL    DATE OF CONSULT: 11/14/2018   REQUESTING PROVIDER: Saúl Patel MD, MD    TIME OF CONSULT: 0930  TIME CONSULT PAGE RETURNED TO REQUESTING PROVIDER: 0974  TIME OF PATIENT EVALUATION: 0945    CC: left knee pain  DATE OF INJURY: acute on chronic, 11/3/2018      HISTORY OF PRESENT ILLNESS:   Juany Collazo is a 86 year old female with PMH including hypertension, hyperlipidemia, end-stage bilateral knee osteoarthritis who presents with complaint of left knee pain.  Patient reports that she has had ongoing issues with left knee pain dating back several years.  Unfortunately, yesterday she was mobilizing down the stairs of her home when she lost her footing and tripped on the second last stair.  She fell to the ground, striking her forehead and left knee in the process, and was unable to get up.  The fall was unwitnessed, however, she denies any consciousness or experiencing symptoms of dizziness before and after the fall.  She immediately called life alert and was brought to the [facility] emergency department for evaluation.     Today, Juany complains of 10/10 dull, throbbing left knee pain.  Although she endorses baseline 7 out of 10 pain, she reports that her current pain symptoms are worse than usual.  In addition to usual range of motion activities, she reports that walking has exacerbated her symptoms since the incident yesterday.  In fact, she has been unable to ambulate with physical therapy while being admitted to observation the hospital.    As mentioned, Juany has been dealing with bilateral knee pain for several years.  Of note, she has [facility] sports medicine providers with some frequency over the last 2 years.  In addition to the usual conservative measures (daily Advil, activity modification, and pool therapy) she has attempted a handful of Synvisc injections.  Although these  interventions were initially useful in decreasing her symptoms of pain, these conservative measures are no longer useful.  During her last appointment with the sports medicine provider, Juany discussed the possibility of pursuing surgical intervention. She has not yet met with an orthopedic surgeon to discuss this matter.    With regard to Juany's baseline physical activity, she is a very active 86-year-old woman.  She lives independently in a two-story home and receives support from a daughter that lives locally.  She enjoys [facility] in [facility] the [facility], and particularly enjoys pool therapy.  She enjoys traveling to visit both the son in [place] and a daughter in [place].  It is her hope to return to these activities without the nuisance of knee pain.    In addition to the above complaints, the patient also reports the following:   General: denies recent fever, chills, sweats, fatigue, recent changes in weight or appetite  Integument: denies new rashes, sores, lumps/bumps not otherwise noted above; she did receive a contusion around the left orbit as a result of her fall yesterday  Respiratory: denies recent cough, sputum production, hemoptysis, dyspnea, and wheezing  Cardiac: denies chest pain, SOB, palpitations  GI: denies abdominal pain, nausea, vomiting, change in bowel habits - diarrhea or constipation  Peripheral Vascular: denies history of DVT, peripheral edema  Musculoskeletal:  denies joint pain, swollen or stiff joints not otherwise noted above; she reports some additional pain in her left shoulder  Neurological: denies fainting, blackouts, focal weakness or paralysis, numbness/loss of sensation, tingling/altered sensation, tremors or other involuntary movements not otherwise noted above  Hematological: denies blood abnormalities other than those noted above    History obtained from patient interview and chart review. All relevant notes were reviewed and HPI/pertinent ROS were  updated to reflect their current presentation and hospital course.       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      PAST SURGICAL HISTORY:   Past Surgical History:   Procedure Laterality Date     C NONSPECIFIC PROCEDURE  1998    right hip replacement     C NONSPECIFIC PROCEDURE  1970    tubal ligation     C NONSPECIFIC PROCEDURE  1998    hip replacement     DECOMPRESSION LUMBAR ONE LEVEL  4/19/2013    Procedure: DECOMPRESSION LUMBAR ONE LEVEL;  Decompression Bilateral L4-5  ;  Surgeon: Lang Garcia MD;  Location:  OR         MEDICATIONS:   Prior to Admission medications    Medication Sig Last Dose Taking? Auth Provider   amLODIPine (NORVASC) 5 MG tablet Take 1 tablet (5 mg) by mouth every morning 11/12/2018 at AM Yes Martinez Shafer MD   citalopram (CELEXA) 20 MG tablet Take 1 tablet (20 mg) by mouth daily 11/12/2018 at PM Yes Martinez Shafer MD   donepezil (ARICEPT) 10 MG tablet Take 10 mg by mouth At Bedtime 11/12/2018 at HS Yes Unknown, Entered By History   lisinopril-hydrochlorothiazide (PRINZIDE/ZESTORETIC) 20-12.5 MG per tablet Take 1 tablet by mouth 2 times daily 11/12/2018 at PM Yes Martinez Shafer MD   omeprazole (PRILOSEC) 20 MG CR capsule Take 1 capsule (20 mg) by mouth every morning 11/12/2018 at AM Yes Martinez Shafer MD   simvastatin (ZOCOR) 20 MG tablet Take 1 tablet (20 mg) by mouth At Bedtime 11/12/2018 at HS Yes Martinez Shafer MD         ALLERGIES:   No known drug allergies      SOCIAL HISTORY:    Living situation: Juany lives at home in a two-story, and receives support from a daughter that lives locally in [place].  She has a service that comes to her home 3 hours a day and helps her transport to various activities.  In addition, the service provides assistance with cooking and cleaning.    Social History     Occupational History     Not on file.     Social History Main Topics     Smoking status: Former Smoker     Quit date: 6/7/1966     Smokeless tobacco: Never Used     Alcohol use Yes      Comment: One beer day     Drug use: No     Sexual activity: Yes     Birth control/ protection: Surgical      Comment: Has had TL       FAMILY HISTORY:  Patient denies known family history of bleeding, clotting, or anesthesia related complications.     REVIEW OF SYSTEMS:   A brief 10-point ROS was performed during the patient encounter. All pertinent items are included in the HPI. Other systems were negative, as below:    Head:  denies new headache, dizziness, light headedness  Eyes: denies new changes in vision, blurred vision, diplopia, excessive tearing  Ears: denies new hearing loss, pain  Nose: denies recent nasal congestion   Mouth: denies new oral sores, ulcers  Throat:  denies new pain, hoarseness, difficulty swallowing  Urinary: denies new onset dysuria, hematuria, polyuria, nocturia   Endocrine:  denies excessive sweating, polyuria, polydipsia, polyphagia   Psychiatric: denies new onset depression, sleep disturbances, substance abuse      PHYSICAL EXAM:   Vitals:    11/13/18 1513 11/13/18 2040 11/13/18 2209 11/14/18 0700   BP: 142/59  112/54 117/61   BP Location: Left arm  Left arm Left arm   Pulse:       Resp: 16  16 16   Temp: 98.1  F (36.7  C)  98  F (36.7  C) 97.7  F (36.5  C)   TempSrc: Oral  Oral Oral   SpO2: 99% 98% 98% 96%   Weight:         General: Awake, alert, appropriate, following commands, NAD  Neuro: CN II-XII grossly intact  Psych: Appropriate affect, orientation to place but not time  and date  Skin: contusion of the the left orbit but no orther rashes, lumps or bumps; skin color normal  HEENT:  Normocephalic, contusion as noted above; EOMs grossly intact; external ear without erythema or edema bilaterally; no septal deviation  Lungs: Breathing comfortably and nonlabored, no wheezes or stridor noted  Heart/Cardiovascular: Regular pulse, no peripheral cyanosis  Abdomen: Soft, non-tender, non-distended   Musculoskeletal:   Pelvis: Stable to AP and lateral compression, non-tender.  Upper Extremity:     No deformity, skin intact.     No significant tenderness to palpation over clavicle, AC joint, shoulder, arm, elbow, forearm, wrist.     Normal ROM of shoulder, elbow, and wrist, without pain    Motor intact distally throughout the radial, ulnar, and median nerve distributions as indicated by intact, 5/5 strength with thumbs up, finger crossing, and OK sign    Neurologic: SILT ax/m/r/u nerve distributions.     Vascular: Radial pulse palpable, 2+.   Right Lower Extremity:     No deformity, skin intact.     No significant tenderness to palpation over thigh,  leg, ankle/foot.  Mild medial lateral knee joint line tenderness    Normal ROM of hip, knee, and ankle, without tenderness     Motor intact distally throughout the tibial and peroneal nerve distributions as indicated by intact 5/5 strength with TA/GSC/EHL/FHL firing    SILT sp/dp/tibial/saph/sural nerves    DP/PT pulses palpable, 2+, toes warm and well perfused  Left Lower Extremity:     Small, superficial contusion over anterior knee, skin intact.  Moderate effusion of left knee with no fluid wave appreciated    No significant tenderness to palpation over thigh, leg, ankle/foot; moderate joint line tenderness of knee medially, laterally, and posteriorly     ROM of hip, knee limited due to knee pain; ROM ankle, without tenderness; knee is stable to varus and valgus stress; anterior drawer and posterior drawer are negative with no ligamentous laxity      Motor intact distally throughout the tibial and peroneal nerve distributions as indicated by intact 5/5 strength with TA/GSC/EHL/FHL firing    SILT sp/dp/tibial/saph/sural nerves    DP/PT pulses palpable, 2+, toes warm and well perfused      LABS:  CBC  Hemoglobin   Date Value Ref Range Status   11/13/2018 13.1 11.7 - 15.7 g/dL Final   11/13/2018 13.0 11.7 - 15.7 g/dL Final     WBC   Date Value Ref Range Status   11/13/2018 9.3 4.0 - 11.0 10e9/L Final     Hematocrit   Date Value Ref Range Status   11/13/2018 40.7 35.0 - 47.0 % Final     Platelet Count   Date Value Ref Range Status   11/13/2018 242 150 - 450 10e9/L Final       CREATININE  Creatinine   Date Value Ref Range Status   11/13/2018 0.72 0.52 - 1.04 mg/dL Final       GLUCOSE  Glucose   Date Value Ref Range Status   11/13/2018 93 70 - 99 mg/dL Final       INR  INR   Date Value Ref Range Status   09/29/2013 0.92 0.86 - 1.14 Final       INFLAMMATORY LABS  WBC   Date Value Ref Range Status   11/13/2018 9.3 4.0 - 11.0 10e9/L Final         IMAGING:  AP and lateral imaging of the left knee obtained on 11/13/2018 were reviewed.  Imaging demonstrated genu valgum alignment.  Tricompartmental osteoarthritis of the left knee, lateral compartment greater than medial compartment.  There are extensive osteophytes about the lateral compartment. Mild, stable effusion of the left knee when compared to previous images obtained in July 2018.  There are no fractures demonstrated on imaging.  No lipohemarthrosis appreciated on lateral imaging. I/S ratio 0.85    ASSESSMENT AND PLAN:    Juany Collazo is a 86 year old female with PMH including hypertension and hyperlipidemia who presents with complaint of left knee pain secondary to acute on chronic left knee osteoarthritis.     This patient with history of known tricompartmental osteoarthritis presents with left knee pain following a fall yesterday. She is afebrile and WBC count is within normal limits. XR imaging  "demonstrates the presence of a stable effusion, tricompartmental joint space narrowing with the presence of osteophytes, and no evidence of fracture of lipohemarthrosis. The I/S ratio is 0.85. On PE, there is no obvious ligamentous laxity. Therefore, the likelihood of infectious etiologies and fracture/ligamentous injuries are low. SLR is limited by knee pain, however, I/S ratio is within normal limits making quad or patellar tendon rupture unlikely. Instead, the origin of her pain is most likely related to acute on chronic osteoarthritis, exacerbated by her recent fall.     The plan going forward should be to optimize conservative measures - aggressive elevation and compression (\"toes above nose\") to improve effusion, ice and NSAIDs to provide symptom relief. She may participate in PT/OT as tolerated. Given that she has attempted injections and other conservative measures in the past and has not experienced significant relief, a conversation about elective total knee arthroplasty is likely the most reasonable next step in her treatment. She will be referred to Darci Steen MD to discuss elective left TKA.     Activity: as tolerated; emphasize quad strength  Weight bearing status: as tolerated  Imaging: obtain XR imaging of pelvis to assess prior ANTONIO components  Labs: none indicated  Bracing/Splinting: none; consider compression (tubigrip, ace wrap, or JEFFERSON hose) around knee and lower extremity to decrease effusion   Elevation: Elevate frequently on pillows to maintain toes above nose     Follow-up: Clinic with Dr. Steen at the earliest convenience of patient to discuss elective TKA   Disposition: Pending normal imaging of AP/lateral pelvis, patient may be discharged per primary team      Darci Moran PA-C  Orthopaedic Surgery  Pager: [phone]     Thank you for allowing me to participate in this patient's care. Please page me at [phone] with any questions/concerns. If there is no response, if it is a " weekend, or if it is during evening hours, please page the orthopaedic surgery resident on call.

## 2018-11-14 NOTE — DISCHARGE SUMMARY
[facility], [place]    Discharge Summary  Trauma Surgery Service    Date of Admission:  11/13/2018  Date of Discharge:  11/14/2018  Discharging Provider: Aurora Wong CNP/ Dr. Lowe  Date of Service (when I saw the patient): 11/14/18    Primary Provider: Martinez Shafer  Primary Care clinic: [address] [place] [id number]  Phone: [phone]  Fax number: [phone]     Discharge Diagnoses      Fall, initial encounter  Contusion of left knee  Facial abrasions  Right eye traumatic iritis    Hospital Course    Juany Collazo is a 86 year old female with a history of hypertension and hyperlipidemia who presents via EMS to the emergency department for evaluation after a fall. She patient reports that her stair case only has a railing part of the way down and she missed the bottom step and was not able to catch herself.  She hit her right eye on a piece of furniture resulting in a laceration above the right eye and pain in the eye. She activated her life alert button and was able to get help. She has had right blurry vision since but is able to see light. She also complains of left knee pain. She arrived with a right eye shield and mild right eye pain. She is not anticoagulated. Her main complaint overnight was left knee pain. Denies chest pain or shortness of breath.    Traumatic Injury  The patient sustained the above injury as a result of fall.  She was seen by the Trauma team and injury prevention education was performed.  The mechanism of injury and factors contributing to the accident were discussed with the patient.  Strategies on how to prevent future accidents were reviewed.  The patient underwent tertiary examination to evaluate for additional injuries.  The systematic review did not find any other injuries.    Left knee contusion/ pain  Juany Collazo was evaluated by Orthopedics and is recommended for outpatient follow up of her chronic knee arthritis and  effusion. She was discharged with the following recommendations:  Bracing/Splinting:  consider compression (tubigrip, ace wrap, or JEFFERSON hose) around knee and lower extremity to decrease effusion   Elevation: Elevate frequently on pillows to maintain toes above nose      Follow-up: Clinic with Dr. Steen at the earliest convenience of patient to discuss elective total knee arthroplasty    Traumatic right eye iritis   Opthalmology was consulted for possible eye injury. Right pupil noted to be more dilated than the left on presentation and and slightly more oval. No globe rupture per Opthalmology. Discharge with the following recommendations  1. Start Pred Forte three times a day  2. Cyclopentolate three times a day  3. Erythromycin ointment for eyelid abrasions  4. Follow up in eye clinic in 2 weeks  Acute traumatic on chronic arthritic pain  Pain was controlled with a multi-modality approach.  The current regimen for Juany Collazo includes the following, scheduled acetaminophen, low dose NSAIDs topical analgesic and PRN short acting opioids.  Adequate pain control was achieved with this regimen.  We anticipate that they will taper off this regimen over the next several weeks.    # Discharge Pain Plan:   - During her hospitalization, Juany experienced pain due to fall.  The pain plan for discharge was discussed with Juany and the plan was created in a collaborative fashion.    - Opioids prescribed on discharge: Oxycodone  - Duration of opioids after discharge: Per CDC opioid prescribing guidelines, a 3 day prescription of opioids was provided.  - Bowel regimen: senna and miralax  - Pharmacologic adjuvants:  NSAIDs, Acetaminophen and Lidocaine patch  - Non-pharmacologic adjuvants: Ice, Physical Therapy and elevation  - Follow-up with Dr. Steen at Orthopedic Clinic     Physical Therapy Recommendations:   Current status of physical therapies on discharge:   Clinical Impression   Criteria for Skilled Therapeutic  Intervention yes, treatment indicated   PT Diagnosis impaired functional mobility   Influenced by the following impairments weakness, pain, decreased activity tolerance   Functional limitations due to impairments decreased indep with functional mobility   Clinical Presentation Stable/Uncomplicated   Clinical Presentation Rationale clinical judgment   Clinical Decision Making (Complexity) Low complexity   Therapy Frequency` daily   Predicted Duration of Therapy Intervention (days/wks) 2 days   Anticipated Equipment Needs at Discharge front wheeled walker   Anticipated Discharge Disposition Transitional Care Facility       Significant Results and Procedures     Code Status   Full Code    SUBJECTIVE: Mrs. Collazo was seen prior to discharge. She noted some improvement in her pain with addition of NSAID and narcotic. She tolerated her meal and was able to work with therapies.    Physical Exam   Temp: 98.2  F (36.8  C) Temp src: Oral BP: 130/67 Pulse: 59 Heart Rate: 52 Resp: 16 SpO2: 97 % O2 Device: None (Room air)    Vitals:    11/13/18 0950   Weight: 73.7 kg (162 lb 6.4 oz)     Vital Signs with Ranges  Temp:  [97.7  F (36.5  C)-98.2  F (36.8  C)] 98.2  F (36.8  C)  Pulse:  [59] 59  Heart Rate:  [50-54] 52  Resp:  [16] 16  BP: (112-131)/(54-67) 130/67  SpO2:  [96 %-98 %] 97 %  I/O last 3 completed shifts:  In: -   Out: 200 [Urine:200]    Physical Exam   Constitutional: She is oriented to person, place, and time. She appears well-developed and well-nourished. No distress.   HENT: Right eye lid and cheek abrasions, scabbed   Eyes: EOM are normal. Right eye exhibits no discharge. Left eye exhibits no discharge.   Right pupil is slightly more dilated @ 4mm and slightly oval in shape   Neck: Normal range of motion.   Cardiovascular: Normal rate and regular rhythm.    Pulmonary/Chest: Effort normal and breath sounds normal. No respiratory distress. She has no wheezes.   Abdominal: Soft. Bowel sounds are normal.    Musculoskeletal: She exhibits tenderness.   left knee tenderness to palpation, +4 dorsi/ plantar flexion   Neurological: She is alert and oriented to person, place, and time.   Skin: Skin is warm and dry.   Facial abrasions are scabbed   Psychiatric: She has a normal mood and affect. Her behavior is normal.     Discharge Disposition   Discharged to rehabilitation facility  Condition at discharge: Stable  Discharge VS: Blood pressure 130/67, pulse 59, temperature 98.2  F (36.8  C), temperature source Oral, resp. rate 16, weight 73.7 kg (162 lb 6.4 oz), SpO2 97 %.    Consultations This Hospital Stay   NEUROSURGERY ADULT IP CONSULT  PHYSICAL THERAPY ADULT IP CONSULT  SOCIAL WORK IP CONSULT  ORTHOPAEDIC SURGERY ADULT/PEDS IP CONSULT      Discharge Orders     General info for SNF   Length of Stay Estimate: Short Term Care: Estimated # of Days <30  Condition at Discharge: Stable  Level of care:skilled   Rehabilitation Potential: Good  Admission H&P remains valid and up-to-date: Yes  Recent Chemotherapy: N/A  Use Nursing Home Standing Orders: Yes     Mantoux instructions   Give two-step Mantoux (PPD) Per Facility Policy Yes     Reason for your hospital stay   Fall  Left knee contussion     Intake and output   Every shift     Activity - Up with nursing assistance     Follow Up and recommended labs and tests   Follow up with your primary care provider for continued medical care and hospital follow up in 5-10 days.     Medication Therapy Management Services  If you have any questions regarding your medications after discharge, this service is available to you.  Please call:  [phone] or [phone] (toll-free)  [facility]  [address] [place] [address]  [email or web address]  [email or web address]    Orthopaedic Clinic  follow up with Dr. Steen as soon as she is able to discuss further treatment options/  Elective TKA of her left knee  [facility]  Floor 4   [address] [facility]  "[place] [address]   Appointments: [phone]     Ophthalmology (Eye) Clinic : Follow up in eye clinic in 2 weeks  [facility]  Floor 4   [address] [place] [address]   Appointments: [phone]     You have been involved in a recent trauma incident resulting in an injury.  Studies show us that people affected by trauma have higher levels of post-traumatic stress disorder (PTSD) and/or depressive symptoms during the year following an injury.    Please answer the following:  Had migraines about the event(s) or thought about the event(s) when you didn't want to?  Tried hard not to think about the event(s) or went out of your way to avoid situations that reminded you of the event(s)?  Been constantly on guard, watchful, or easily startled?  Felt numb or detached from people, activities, or your surroundings?   Felt guilty or unable to stop blaming yourself or others for the event(s) or any problems the event (s) may have caused?    If you answered \"yes\" to 3 or more of these questions, or if you simply want to discuss any of your feelings further, we recommend that you talk with your Primary Care Provider or a mental health professional.     Full Code     Physical Therapy Adult Consult   Evaluate and treat as clinically indicated.    Reason:  Fall, knee pain     Occupational Therapy Adult Consult   Evaluate and treat as clinically indicated.    Reason:  Fall, knee pain     Fall precautions     Advance Diet as Tolerated   Follow this diet upon discharge: Regular       Discharge Medications   Current Discharge Medication List      START taking these medications    Details   acetaminophen (TYLENOL) 325 MG tablet Take 3 tablets (975 mg) by mouth every 6 hours  Qty: 30 tablet    Associated Diagnoses: Contusion of left knee, initial encounter      cyclopentolate (CYCLOGYL) 1 % ophthalmic solution Place 1 drop into the right eye 2 times daily  Qty: 1 Bottle    Associated " Diagnoses: Right eye injury, initial encounter      erythromycin (ROMYCIN) ophthalmic ointment Place into the right eye 3 times daily  Refills: 0    Associated Diagnoses: Right eye injury, initial encounter      ibuprofen (ADVIL/MOTRIN) 200 MG tablet Take 1 tablet (200 mg) by mouth every 6 hours as needed for moderate pain  Qty: 30 tablet    Associated Diagnoses: Contusion of left knee, initial encounter      Lidocaine (LIDOCARE) 4 % Patch Place 2 patches onto the skin every 24 hours  Qty: 12 patch    Associated Diagnoses: Contusion of left knee, initial encounter      oxyCODONE IR (ROXICODONE) 5 MG tablet Take 1 tablet (5 mg) by mouth every 4 hours as needed for moderate to severe pain  Qty: 15 tablet, Refills: 0    Associated Diagnoses: Contusion of left knee, initial encounter      polyethylene glycol (MIRALAX/GLYCOLAX) Packet Take 17 g by mouth daily  Qty: 7 packet    Associated Diagnoses: Contusion of left knee, initial encounter      prednisoLONE acetate (PRED FORTE) 1 % ophthalmic susp Place 1 drop into the right eye 3 times daily  Qty: 1 Bottle    Associated Diagnoses: Right eye injury, initial encounter      senna-docusate (SENOKOT-S;PERICOLACE) 8.6-50 MG per tablet Take 2 tablets by mouth At Bedtime  Qty: 30 tablet    Associated Diagnoses: Contusion of left knee, initial encounter         CONTINUE these medications which have CHANGED    Details   amLODIPine (NORVASC) 5 MG tablet Take 1 tablet (5 mg) by mouth every morning  Qty: 90 tablet, Refills: 0    Associated Diagnoses: Essential hypertension, benign      citalopram (CELEXA) 20 MG tablet Take 1 tablet (20 mg) by mouth daily  Qty: 30 tablet, Refills: 3    Associated Diagnoses: Major depressive disorder, recurrent episode, mild (H)      donepezil (ARICEPT) 10 MG tablet Take 1 tablet (10 mg) by mouth At Bedtime  Qty: 30 tablet    Associated Diagnoses: Major depressive disorder, recurrent episode, mild (H)      lisinopril-hydrochlorothiazide  (PRINZIDE/ZESTORETIC) 20-12.5 MG per tablet Take 1 tablet by mouth 2 times daily  Qty: 30 tablet, Refills: 3    Associated Diagnoses: Essential hypertension, benign      omeprazole (PRILOSEC) 20 MG CR capsule Take 1 capsule (20 mg) by mouth every morning  Qty: 30 capsule, Refills: 3    Associated Diagnoses: Gastroesophageal reflux disease without esophagitis      simvastatin (ZOCOR) 20 MG tablet Take 1 tablet (20 mg) by mouth At Bedtime  Qty: 90 tablet, Refills: 3    Associated Diagnoses: Hyperlipidemia LDL goal <130           Allergies   Allergies   Allergen Reactions     No Known Drug Allergies      Data   Most Recent 3 CBC's:  Recent Labs   Lab Test  11/13/18   0952  08/10/18   1154  03/26/18   0921   09/29/13   1600   WBC  9.3  10.2   --    --   8.7   HGB  13.0  13.1  12.3  12.2   < >  12.5   MCV  91  91   --    --   88   PLT  242  228   --    --   269    < > = values in this interval not displayed.      Most Recent 3 BMP's:  Recent Labs   Lab Test  11/13/18   0952  03/26/18   0921  01/18/17   1029   NA  137  138  136   POTASSIUM  3.6  4.3  3.9   CHLORIDE  103  103  100   CO2  25  28  27   BUN  12  13  18   CR  0.72  0.72  0.96   ANIONGAP  9  7  9   MAUREEN  9.0  9.3  9.1   GLC  93  96  99     Most Recent 2 LFT's:  Recent Labs   Lab Test  11/13/18   0952  03/26/18   0921   AST  17  22   ALT  20  19   ALKPHOS  78  80   BILITOTAL  0.5  0.6     Most Recent INR's and Anticoagulation Dosing History:  Anticoagulation Dose History     Recent Dosing and Labs Latest Ref Rng & Units 6/3/2009 9/29/2013    INR 0.86 - 1.14 0.93 0.92        Most Recent 3 Troponin's:  Recent Labs   Lab Test  09/29/13   1600   TROPI  <0.012     Most Recent 6 Bacteria Isolates From Any Culture (See EPIC Reports for Culture Details):  Recent Labs   Lab Test  01/02/16   2335  12/03/12   1459   CULT  No Beta Streptococcus isolated  No Beta Streptococcus isolated     Most Recent TSH, T4 and A1c Labs:  Recent Labs   Lab Test  09/29/13   1600   TSH  2.14      Results for orders placed or performed during the hospital encounter of 11/13/18   XR Chest Port 1 View    Narrative    EXAM: XR CHEST PORT 1 VW  11/13/2018 10:33 AM     HISTORY:  s/p fall down stairs;       COMPARISON:  1/3/2016    FINDINGS:   The cardiomediastinal silhouette is normal. The pulmonary vasculature  is distinct. No pneumothorax. No pleural effusion. Lung fields are  clear. No nondisplaced rib fracture. Question of nondisplaced fracture  the posterior right sixth rib, approximately 6-7cm from the thoracic  spine. Visualized abdomen and soft tissues are unremarkable. Biapical  fibrotic changes.      Impression    IMPRESSION:   1.  Question of nondisplaced fracture vs artifact in the posterior  right sixth rib. Consider dedicated rib series if clinically  indicated.  2.  No nondisplaced rib fractures, pneumothorax, or appreciable  airspace disease.    [Result: Possible rib fracture]    Finding was identified on 11/13/2018 10:48 AM.     Dr. Miranda was contacted by Dr. Stanton at 11/13/2018 11:01 AM and  verbalized understanding of the urgent finding.     I have personally reviewed the examination and initial interpretation  and I agree with the findings.    SURESH RETANA MD   XR Knee Port Left 1/2 Views    Narrative    Exam: 2 views of the left knee dated 11/13/2018.    COMPARISON: 8/10/2018.    CLINICAL HISTORY: Fall.    FINDINGS: AP and lateral views of the left knee were obtained. Large  left knee joint effusion. Tricompartment osteoarthrosis in the left  knee with joint space narrowing and osteophytic spurring in all 3  joint compartments. No displaced fractures are noted.      Impression    IMPRESSION:  1. Large left knee joint effusion, stable since the comparison  radiographs of 8/10/2018. No displaced fractures noted.  2. 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  3. Findings were discussed with the ordering ER physician Dr. Tere Miranda at 1040 on 11/13/2018.    KRUNAL LEWIS MD        Time Spent on this Encounter   I, Aurora Wong, personally saw the patient today and spent greater than 30 minutes discharging this patient.    We appreciate the opportunity to care for your patient while in the hospital.  Should you have any questions about their injuries or this discharge summary our contact information is below.    Trauma Services  [facility]   Department of Critical Care and Acute Care Surgery  [address] [place] [id number]  Office: [phone]

## 2018-11-14 NOTE — PROGRESS NOTES
[facility], [place]    Trauma Service Tertiary Survey     Date of Service: 11/14/2018  Trauma mechanism: Fall down 2-3 stairs  Time/date of injury:11/13 about 0700am  Known Injuries:  Possible right globe injury  Right medial canthus abrasion  Right check abrasion  Left knee abrasion  Possible non displaced posterior 6th rib fracture  Other diagnoses:   1. Acute traumatic pain  2. Hypertension  3. HLD  4. GERD  5. Tricompartmental osteoarthrosis of the left knee with intermittent steroid injections  6. Chronic left knee joint effusion  7. 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 Tertiary exam completed today. Continues to complain of left knee pain, no neurovascular deficits. Will consult Orthopedics to weight in on the need for further imaging and management given known chronic knee effusion, severe arthritis.  2. Opthalmology consult for possible eye injury. Right pupil noted to be more dilated than the left on presentation and and slightly more oval. No globe rupture per Opthalmology. Discharge with the following recommendations  1. Start Pred Forte three times a day- ordered  2. Cyclopentolate three times a day- ordered  3. Erythromycin ointment for eyelid abrasions- ordered  4. Follow up in eye clinic in 2 weeks  3. Cervical spine stenosis: No report of cervical spine tenderness, no neurological deficits, denies numbness, tingling no focal weakness on exam. Neurosurgery consulted for further insight and recommendations.  Neurochecks every 4 hours. Per Neurosurgery no need for further imaging or bracing at this time. C spine cleared per Neurosurgery.  4. Acute on chronic knee pain: Known left knee effusion, abrasion:Tender to palpation, reports increased pain limiting ROM.  Appreciate Ortho recommendations.  1. Ice and elevated to pain  2. Added Ibuprofen and Oxycodone for pain control, Scheduled Tylenol  3. ACE wrap  for comfort  4. Patient to follow up with Dr. Steen as soon as she is able to discuss further treatment options/  Elective TKA of her left knee.  5. OK for diet  6. Possible non displaced posterior rib fracture: Not tender to palpation at this location. No oxygenation issues at this time. Continue with routine pulmonary toilet  7. OK out of bed, ambulate, ice to left knee for comfort  8. PT/ OT consults     Code status: Full code confirmed with patient .     General Cares:       PPI/H2 blocker:  PTA PPI      DVT prophylaxis: Mechanical, ambulate      Bowel Regimen/Date of last stool: PTA/ ordered      Pulmonary toilet: Cough/ deep breath      Lines / drains:PIV    Code status: Full code      Disposition: TCU per PT recs when bed available    ETOH: Juany Collazo was asked if in the last 3-6 months there has been a time when she had 4 or more drinks in a single day/outing.. Patient answer to the screening question was in the negative. No intervention needed..    SUBJECTIVE:  Review of Systems   Constitutional: Negative.    HENT: Negative.    Eyes: Negative.  Negative for photophobia, pain and visual disturbance.   Respiratory: Negative.    Endocrine: Negative.    Genitourinary: Negative.    Musculoskeletal: Positive for gait problem and joint swelling.        Left Knee pain   Skin: Positive for wound.        Facial abrasions   Psychiatric/Behavioral: Negative.        OBJECTIVE:  Blood pressure 117/61, pulse 58, temperature 97.7  F (36.5  C), temperature source Oral, resp. rate 16, weight 73.7 kg (162 lb 6.4 oz), SpO2 96 %.  Physical Exam   Constitutional: She is oriented to person, place, and time. She appears well-developed and well-nourished. No distress.   HENT:   Right eye lid and cheek abrasions, scabbed   Eyes: EOM are normal. Right eye exhibits no discharge. Left eye exhibits no discharge.   Right pupil is slightly more dilated @ 4mm and slightly oval in shape   Neck: Normal range of motion.    Cardiovascular: Normal rate and regular rhythm.    Pulmonary/Chest: Effort normal and breath sounds normal. No respiratory distress. She has no wheezes.   Abdominal: Soft. Bowel sounds are normal.   Musculoskeletal: She exhibits tenderness.   left knee tenderness to palpation, +4 dorsi/ plantar flexion   Neurological: She is alert and oriented to person, place, and time.   Skin: Skin is warm and dry.   Facial abrasions are scabbed   Psychiatric: She has a normal mood and affect. Her behavior is normal.       ROUTINE LABS: (Last four results)  CMP  Recent Labs  Lab 11/13/18  0952      POTASSIUM 3.6   CHLORIDE 103   CO2 25   ANIONGAP 9   GLC 93   BUN 12   CR 0.72   GFRESTIMATED 77   GFRESTBLACK >90   MAUREEN 9.0   MAG 2.2   PROTTOTAL 6.7*   ALBUMIN 3.6   BILITOTAL 0.5   ALKPHOS 78   AST 17   ALT 20     CBC  Recent Labs  Lab 11/13/18  0952   WBC 9.3   RBC 4.48   HGB 13.0  13.1   HCT 40.7   MCV 91   MCH 29.0   MCHC 31.9   RDW 13.7        INRNo lab results found in last 7 days.  Arterial Blood GasNo lab results found in last 7 days.    RADIOLOGY:  All radiology reviewed.    BETH Duran CNP

## 2018-11-14 NOTE — CONSULTS
Social Work Services Progress Note    Hospital Day: 1  Date of Initial Social Work Evaluation:  Not Completed   Collaborated with:  PT, pt and pt's dtr, Jeannette Shin ([phone])    Data:  SW consulted for discharge planning. Pt was transferred from [facility], yesterday, after sustain a fall in her home. Pt sustained eye injury and is having difficulty walking due to knee pain. PTA, pt lives alone in a three story home, was receiving 3hrs/day of private pay help and had family checking in on her. Today, pt unable to safely mobilize with PT and is agreeable to TCU placement. Discussed with pt that potentially [facility] may waive 3 night inpatient stay requirement, but this cannot be confirmed until a referral has been made. Pt is aware that if [facility] does not waive the 3 night inpatient stay Discussed location of where she would want to be placed and she prefers [place] as her dtr lives in [place]. Discussed [facility] and pt requested referral to this facility.     Intervention:  Discharge Planning     Assessment:  Pt is pleasant and participating in discharge planning. Pt appears motivated to return to her home after a short stay at TCU. Pt's dtr, Jeannette, is primary contact, and has been updated on plan and agreeable. Family would prefer that transportation be arranged by writer once TCU location finalized. Pt's dtr aware that pt's insurance will likely not pay for discharge transport and she has no concerns.     Plan:    Anticipated Disposition:  Facility:  [facility] -referral sent via [facility]    Barriers to d/c plan:  None anticipated     Follow Up:  SW will continue to follow.

## 2018-11-15 ENCOUNTER — NURSING HOME VISIT (OUTPATIENT)
Dept: GERIATRICS | Facility: CLINIC | Age: 83
End: 2018-11-15
Payer: COMMERCIAL

## 2018-11-15 VITALS
OXYGEN SATURATION: 96 % | RESPIRATION RATE: 16 BRPM | HEART RATE: 69 BPM | BODY MASS INDEX: 30.58 KG/M2 | TEMPERATURE: 97.4 F | DIASTOLIC BLOOD PRESSURE: 63 MMHG | SYSTOLIC BLOOD PRESSURE: 114 MMHG | HEIGHT: 61 IN | WEIGHT: 162 LBS

## 2018-11-15 DIAGNOSIS — K21.9 GASTROESOPHAGEAL REFLUX DISEASE WITHOUT ESOPHAGITIS: ICD-10-CM

## 2018-11-15 DIAGNOSIS — M25.562 LEFT KNEE PAIN, UNSPECIFIED CHRONICITY: Primary | ICD-10-CM

## 2018-11-15 DIAGNOSIS — F33.0 MAJOR DEPRESSIVE DISORDER, RECURRENT EPISODE, MILD (H): ICD-10-CM

## 2018-11-15 DIAGNOSIS — M17.12 OSTEOARTHRITIS OF LEFT KNEE, UNSPECIFIED OSTEOARTHRITIS TYPE: ICD-10-CM

## 2018-11-15 DIAGNOSIS — I10 ESSENTIAL HYPERTENSION, BENIGN: ICD-10-CM

## 2018-11-15 DIAGNOSIS — H20.9 TRAUMATIC IRITIS: ICD-10-CM

## 2018-11-15 DIAGNOSIS — R53.81 PHYSICAL DECONDITIONING: ICD-10-CM

## 2018-11-15 DIAGNOSIS — E78.5 HYPERLIPIDEMIA, UNSPECIFIED HYPERLIPIDEMIA TYPE: ICD-10-CM

## 2018-11-15 PROCEDURE — 99310 SBSQ NF CARE HIGH MDM 45: CPT | Performed by: NURSE PRACTITIONER

## 2018-11-15 NOTE — LETTER
11/15/2018        RE: Juany Collazo  [address] [place] [address]        [facility]  PRIMARY CARE PROVIDER AND CLINIC:  Martinez Shafer [address] [place] [address] / [facility] [id number]  Chief Complaint   Patient presents with     Hospital F/U     [facility] Record Number:  [id number]  Place of Service where encounter took place:  [facility] [[id number]]    HPI:    Juany Collazo is a 86 year old  (1/7/1932),admitted to the above facility from  [facility].  Hospital stay 11/13/18 through 11/14/18.  Admitted to this facility for  rehab, medical management and nursing care.  HPI information obtained from: facility chart records, facility staff, patient report and [facility] chart review.  Current issues are:      Left knee pain, unspecified chronicity  Osteoarthritis of left knee, unspecified osteoarthritis type  Physical deconditioning  Patient fell at home while going down steps at home and she missing the bottom step and was not able to catch herself. Patient has chronic knee pain, but it is worse after fall. Orthopedics saw Ms. Colalzo during her hospitalization and recommend conservative therapies including ice, elevation, compression, NSAIDs. They are recommending she pursue elective TKA and she follow up with orthopedics to discuss. Per note review she has attempted injections and other conservative measures in the past and has not experienced relief. She is here for work with therapies.     On exam today she reports significant knee pain. She is very weak to left knee and unable to move her leg without significant pain. She is currently on tylenol scheduled, ibuprofen PRN and oxycodone PRN. She also has lidocaine patches. She is also on bowel regimen- bowels are moving well.      Right eye traumatic iritis  Opthalmology saw patient during hospitalization. No globe rupture, She should continue pred forte,  cyclopentolate, erythromycin ointment. Follow up with opthalmology in 2 weeks.     On exam today she denies any pain to the eye or any vision changes.     Essential hypertension, benign  Continues on PTA amlodipine, lisinopril-HCTZ  BP: 114-141/63-69 mmHg  P: 65-69 bpm    Major depressive disorder, recurrent episode, mild (H)  Continues on PTA celexa.    Gastroesophageal reflux disease without esophagitis  Per chart review. Is on omeprazole. Patient tells me she does not have any symptoms. Is ok with tapering off medication.     Hyperlipidemia  Continues on PTA simvastatin.      From hospital discharge note (in italics):  Hospital Course       Juany Collazo is a 86 year old female with a history of hypertension and hyperlipidemia who presents via EMS to the emergency department for evaluation after a fall. She patient reports that her stair case only has a railing part of the way down and she missed the bottom step and was not able to catch herself.  She hit her right eye on a piece of furniture resulting in a laceration above the right eye and pain in the eye. She activated her life alert button and was able to get help. She has had right blurry vision since but is able to see light. She also complains of left knee pain. She arrived with a right eye shield and mild right eye pain. She is not anticoagulated. Her main complaint overnight was left knee pain. Denies chest pain or shortness of breath.     Traumatic Injury  The patient sustained the above injury as a result of fall.  She was seen by the Trauma team and injury prevention education was performed.  The mechanism of injury and factors contributing to the accident were discussed with the patient.  Strategies on how to prevent future accidents were reviewed.  The patient underwent tertiary examination to evaluate for additional injuries.  The systematic review did not find any other injuries.     Left knee contusion/ pain  Junay Collazo was evaluated  by Orthopedics and is recommended for outpatient follow up of her chronic knee arthritis and effusion. She was discharged with the following recommendations:  Bracing/Splinting:  consider compression (tubigrip, ace wrap, or JEFFERSON hose) around knee and lower extremity to decrease effusion   Elevation: Elevate frequently on pillows to maintain toes above nose       Follow-up: Clinic with Dr. Steen at the earliest convenience of patient to discuss elective total knee arthroplasty     Traumatic right eye iritis   Opthalmology was consulted for possible eye injury. Right pupil noted to be more dilated than the left on presentation and and slightly more oval. No globe rupture per Opthalmology. Discharge with the following recommendations  1. Start Pred Forte three times a day  2. Cyclopentolate three times a day  3. Erythromycin ointment for eyelid abrasions  4. Follow up in eye clinic in 2 weeks  Acute traumatic on chronic arthritic pain  Pain was controlled with a multi-modality approach.  The current regimen for Juany Collazo includes the following, scheduled acetaminophen, low dose NSAIDs topical analgesic and PRN short acting opioids.  Adequate pain control was achieved with this regimen.  We anticipate that they will taper off this regimen over the next several weeks.     # Discharge Pain Plan:   - During her hospitalization, Juany experienced pain due to fall.  The pain plan for discharge was discussed with Juany and the plan was created in a collaborative fashion.    - Opioids prescribed on discharge: Oxycodone  - Duration of opioids after discharge: Per CDC opioid prescribing guidelines, a 3 day prescription of opioids was provided.  - Bowel regimen: senna and miralax  - Pharmacologic adjuvants:  NSAIDs, Acetaminophen and Lidocaine patch  - Non-pharmacologic adjuvants: Ice, Physical Therapy and elevation  - Follow-up with Dr. Steen at Orthopedic Clinic    CODE STATUS/ADVANCE DIRECTIVES DISCUSSION:    CPR/Full code   Patient's living condition: lives alone- has several daughters in [place] area that provide support, including managing her medications.    ALLERGIES:No known drug allergies  PAST MEDICAL HISTORY:  has a past medical history of BENIGN HYPERTENSION (10/27/2003); BONE & CARTILAGE DIS NOS (1/18/2006); Carpal tunnel syndrome; Chronic pain; Gastro-oesophageal reflux disease; GERD (gastroesophageal reflux disease) (10/14/2008); History of blood transfusion; Hyperlipidemia LDL goal <130 (10/31/2010); and Mild major depression (H) (4/23/2014). She also has no past medical history of Chronic infection; Malignant hyperthermia; or Sleep apnea.  PAST SURGICAL HISTORY:  has a past surgical history that includes NONSPECIFIC PROCEDURE (1998); NONSPECIFIC PROCEDURE (1970); NONSPECIFIC PROCEDURE (1998); and Decompression lumbar one level (4/19/2013).  FAMILY HISTORY: family history includes Arthritis in her mother; Cardiovascular in her brother and father; Eye Disorder in her mother; Hypertension in her father and mother; Musculoskeletal Disorder in her brother; Osteoporosis in her maternal aunt and mother.  SOCIAL HISTORY:  reports that she quit smoking about 52 years ago. She has never used smokeless tobacco. She reports that she drinks alcohol. She reports that she does not use illicit drugs.    Post Discharge Medication Reconciliation Status: discharge medications reconciled and changed, per note/orders (see AVS).  Current Outpatient Prescriptions   Medication Sig Dispense Refill     Acetaminophen (TYLENOL PO) Take 1,000 mg by mouth 3 times daily       amLODIPine (NORVASC) 5 MG tablet Take 1 tablet (5 mg) by mouth every morning 90 tablet 0     citalopram (CELEXA) 20 MG tablet Take 1 tablet (20 mg) by mouth daily 30 tablet 3     cyclopentolate (CYCLOGYL) 1 % ophthalmic solution Place 1 drop into the right eye 2 times daily 1 Bottle      donepezil (ARICEPT) 10 MG tablet Take 1 tablet (10 mg) by mouth At Bedtime 30  "tablet      ibuprofen (ADVIL/MOTRIN) 200 MG tablet Take 1 tablet (200 mg) by mouth every 6 hours as needed for moderate pain 30 tablet      Lidocaine (LIDOCARE) 4 % Patch Place 2 patches onto the skin every 24 hours 12 patch      lisinopril-hydrochlorothiazide (PRINZIDE/ZESTORETIC) 20-12.5 MG per tablet Take 1 tablet by mouth 2 times daily 30 tablet 3     omeprazole (PRILOSEC) 20 MG CR capsule Take 1 capsule (20 mg) by mouth every morning 30 capsule 3     oxyCODONE IR (ROXICODONE) 5 MG tablet Take 1 tablet (5 mg) by mouth every 4 hours as needed for moderate to severe pain 15 tablet 0     polyethylene glycol (MIRALAX/GLYCOLAX) Packet Take 17 g by mouth daily 7 packet      prednisoLONE acetate (PRED FORTE) 1 % ophthalmic susp Place 1 drop into the right eye 3 times daily 1 Bottle      senna-docusate (SENOKOT-S;PERICOLACE) 8.6-50 MG per tablet Take 2 tablets by mouth At Bedtime 30 tablet      simvastatin (ZOCOR) 20 MG tablet Take 1 tablet (20 mg) by mouth At Bedtime 90 tablet 3       ROS:  10 point ROS of systems including Constitutional, Eyes, Respiratory, Cardiovascular, Gastroenterology, Genitourinary, Integumentary, Musculoskeletal, Neurological, Psychiatric were all negative except for pertinent positives noted in my HPI.    Exam:  /63  Pulse 69  Temp 97.4  F (36.3  C)  Resp 16  Ht 5' 1\" (1.549 m)  Wt 162 lb (73.5 kg)  SpO2 96%  BMI 30.61 kg/m2  GENERAL APPEARANCE:  Alert, pleasant and cooperative, oriented x 4, lying in bed on exam today  EYES:  EOM, lids, pupils and irises normal, sclera clear and conjunctiva normal, no discharge or mattering on lids or lashes noted- scab to left medial eyebrow and right cheek  ENT:  Mouth normal, moist mucous membranes, nose without drainage or crusting, external ears without lesions, hearing acuity:intact  RESP:  respiratory effort normal, no respiratory distress, Lung sounds clear, patient is on RA  CV: auscultation of heart done, rate and rhythm regular. no " murmur, no rub or gallop. No BLE edema, good pedal pulses  ABDOMEN:  normal bowel sounds, soft, nontender, no grimacing or guarding with palpation.  M/S:  Digits and nails normal; left knee with swelling and pain upon palpation and with any movement. Is able to move all extremities (but very weak to left leg due to knee pain).  SKIN:  Inspection and palpation of skin and subcutaneous tissue: skin warm, dry without rashes; note scabs above  NEURO: cranial nerves 2-12 grossly intact, no facial asymmetry, no speech deficits and able to follow directions, moves all extremities symmetrically  PSYCH:  insight and judgement intact, memory baseline, affect and mood normal      Lab/Diagnostic data:     CBC RESULTS:   Recent Labs   Lab Test  11/13/18   0952  08/10/18   1154   WBC  9.3  10.2   RBC  4.48  4.31   HGB  13.0  13.1  12.3   HCT  40.7  39.0   MCV  91  91   MCH  29.0  28.5   MCHC  31.9  31.5   RDW  13.7  13.7   PLT  242  228       Last Basic Metabolic Panel:  Recent Labs   Lab Test  11/13/18   0952  03/26/18   0921   NA  137  138   POTASSIUM  3.6  4.3   CHLORIDE  103  103   MAUREEN  9.0  9.3   CO2  25  28   BUN  12  13   CR  0.72  0.72   GLC  93  96       Liver Function Studies -   Recent Labs   Lab Test  11/13/18   0952  03/26/18   0921   PROTTOTAL  6.7*  7.1   ALBUMIN  3.6  3.9   BILITOTAL  0.5  0.6   ALKPHOS  78  80   AST  17  22   ALT  20  19       ASSESSMENT/PLAN:  (M25.562) Left knee pain, unspecified chronicity  (primary encounter diagnosis)  (M17.12) Osteoarthritis of left knee, unspecified osteoarthritis type  (R53.81) Physical deconditioning  Comment: Acute, reports significant pain- very weak to left leg- bowels moving well  Plan: Continue scheduled tylenol, ibuprofen PRN and oxycodone PRN. Nursing to offer medication when she is in pain. Continue lidocaine patches. Continue current bowel regimen. Follow up with orthopedics as scheduled. Encourage participation in physical therapy/occupational therapy for  strengthening and deconditioning. Discharge planning per their recommendation. Social work to assist with d/c planning.    (H20.9) Right eye traumatic iritis  Comment: Acute, occurred with fall- no symptoms  Plan: Continue pred forte, cyclopentolate, erythromycin ointment. Follow up with opthalmology in 2 weeks.     (I10) Essential hypertension, benign  Comment: Chronic, BP meeting goal <150/90  Plan: Continue medications as above. VS per TCU policy    (F33.0) Major depressive disorder, recurrent episode, mild (H)  Comment: Chronic  Plan: Continue celexa. SW to obtain PHQ9    (K21.9) Gastroesophageal reflux disease without esophagitis  Comment: Chronic, denies any acid reflux- no history of GI bleed per chart review  Plan: Start omeprazole 20 mg every other day 1 week. If tolerating will plan to Discontinue. Start famotidine 10 mg daily PRN for acid. Monitor symptoms- if worsening can consider alternate taper regimen    (E78.5) Hyperlipidemia, unspecified hyperlipidemia type  Comment: Chronic  Plan: Continue PTA statin      Total time spent with patient visit at the [facility] nursing [facility] was 52 minutes including patient visit, review of past records and medication reconciliation, review of admission orders. Greater than 50% of total time spent with counseling and coordinating care due to knee pain, OA, and other problems as above    Electronically signed by:  BETH Rosario CNP                    Sincerely,        BETH Rosario CNP

## 2018-11-15 NOTE — PROGRESS NOTES
[facility]  PRIMARY CARE PROVIDER AND CLINIC:  Hollis Martinez HENDRIX [address] [place] [id number]  Chief Complaint   Patient presents with     Hospital F/U     [facility] Record Number:  [id number]  Place of Service where encounter took place:  [facility] [[id number]]    HPI:    Juany Collazo is a 86 year old  (1/7/1932),admitted to the above facility from  [facility].  Hospital stay 11/13/18 through 11/14/18.  Admitted to this facility for  rehab, medical management and nursing care.  HPI information obtained from: facility chart records, facility staff, patient report and [facility] chart review.  Current issues are:      Left knee pain, unspecified chronicity  Osteoarthritis of left knee, unspecified osteoarthritis type  Physical deconditioning  Patient fell at home while going down steps at home and she missing the bottom step and was not able to catch herself. Patient has chronic knee pain, but it is worse after fall. Orthopedics saw Ms. Collazo during her hospitalization and recommend conservative therapies including ice, elevation, compression, NSAIDs. They are recommending she pursue elective TKA and she follow up with orthopedics to discuss. Per note review she has attempted injections and other conservative measures in the past and has not experienced relief. She is here for work with therapies.     On exam today she reports significant knee pain. She is very weak to left knee and unable to move her leg without significant pain. She is currently on tylenol scheduled, ibuprofen PRN and oxycodone PRN. She also has lidocaine patches. She is also on bowel regimen- bowels are moving well.      Right eye traumatic iritis  Opthalmology saw patient during hospitalization. No globe rupture, She should continue pred forte, cyclopentolate, erythromycin ointment. Follow up with opthalmology in 2 weeks.     On exam today  she denies any pain to the eye or any vision changes.     Essential hypertension, benign  Continues on PTA amlodipine, lisinopril-HCTZ  BP: 114-141/63-69 mmHg  P: 65-69 bpm    Major depressive disorder, recurrent episode, mild (H)  Continues on PTA celexa.    Gastroesophageal reflux disease without esophagitis  Per chart review. Is on omeprazole. Patient tells me she does not have any symptoms. Is ok with tapering off medication.     Hyperlipidemia  Continues on PTA simvastatin.      From hospital discharge note (in italics):  Hospital Course       Juany Collazo is a 86 year old female with a history of hypertension and hyperlipidemia who presents via EMS to the emergency department for evaluation after a fall. She patient reports that her stair case only has a railing part of the way down and she missed the bottom step and was not able to catch herself.  She hit her right eye on a piece of furniture resulting in a laceration above the right eye and pain in the eye. She activated her life alert button and was able to get help. She has had right blurry vision since but is able to see light. She also complains of left knee pain. She arrived with a right eye shield and mild right eye pain. She is not anticoagulated. Her main complaint overnight was left knee pain. Denies chest pain or shortness of breath.     Traumatic Injury  The patient sustained the above injury as a result of fall.  She was seen by the Trauma team and injury prevention education was performed.  The mechanism of injury and factors contributing to the accident were discussed with the patient.  Strategies on how to prevent future accidents were reviewed.  The patient underwent tertiary examination to evaluate for additional injuries.  The systematic review did not find any other injuries.     Left knee contusion/ pain  Juany Collazo was evaluated by Orthopedics and is recommended for outpatient follow up of her chronic knee arthritis and  effusion. She was discharged with the following recommendations:  Bracing/Splinting:  consider compression (tubigrip, ace wrap, or JEFFERSON hose) around knee and lower extremity to decrease effusion   Elevation: Elevate frequently on pillows to maintain toes above nose       Follow-up: Clinic with Dr. Steen at the earliest convenience of patient to discuss elective total knee arthroplasty     Traumatic right eye iritis   Opthalmology was consulted for possible eye injury. Right pupil noted to be more dilated than the left on presentation and and slightly more oval. No globe rupture per Opthalmology. Discharge with the following recommendations  1. Start Pred Forte three times a day  2. Cyclopentolate three times a day  3. Erythromycin ointment for eyelid abrasions  4. Follow up in eye clinic in 2 weeks  Acute traumatic on chronic arthritic pain  Pain was controlled with a multi-modality approach.  The current regimen for Juany Collazo includes the following, scheduled acetaminophen, low dose NSAIDs topical analgesic and PRN short acting opioids.  Adequate pain control was achieved with this regimen.  We anticipate that they will taper off this regimen over the next several weeks.     # Discharge Pain Plan:   - During her hospitalization, Juany experienced pain due to fall.  The pain plan for discharge was discussed with Juany and the plan was created in a collaborative fashion.    - Opioids prescribed on discharge: Oxycodone  - Duration of opioids after discharge: Per CDC opioid prescribing guidelines, a 3 day prescription of opioids was provided.  - Bowel regimen: senna and miralax  - Pharmacologic adjuvants:  NSAIDs, Acetaminophen and Lidocaine patch  - Non-pharmacologic adjuvants: Ice, Physical Therapy and elevation  - Follow-up with Dr. Steen at Orthopedic Clinic    CODE STATUS/ADVANCE DIRECTIVES DISCUSSION:   CPR/Full code   Patient's living condition: lives alone- has several daughters in [facility] area that  provide support, including managing her medications.    ALLERGIES:No known drug allergies  PAST MEDICAL HISTORY:  has a past medical history of BENIGN HYPERTENSION (10/27/2003); BONE & CARTILAGE DIS NOS (1/18/2006); Carpal tunnel syndrome; Chronic pain; Gastro-oesophageal reflux disease; GERD (gastroesophageal reflux disease) (10/14/2008); History of blood transfusion; Hyperlipidemia LDL goal <130 (10/31/2010); and Mild major depression (H) (4/23/2014). She also has no past medical history of Chronic infection; Malignant hyperthermia; or Sleep apnea.  PAST SURGICAL HISTORY:  has a past surgical history that includes NONSPECIFIC PROCEDURE (1998); NONSPECIFIC PROCEDURE (1970); NONSPECIFIC PROCEDURE (1998); and Decompression lumbar one level (4/19/2013).  FAMILY HISTORY: family history includes Arthritis in her mother; Cardiovascular in her brother and father; Eye Disorder in her mother; Hypertension in her father and mother; Musculoskeletal Disorder in her brother; Osteoporosis in her maternal aunt and mother.  SOCIAL HISTORY:  reports that she quit smoking about 52 years ago. She has never used smokeless tobacco. She reports that she drinks alcohol. She reports that she does not use illicit drugs.    Post Discharge Medication Reconciliation Status: discharge medications reconciled and changed, per note/orders (see AVS).  Current Outpatient Prescriptions   Medication Sig Dispense Refill     Acetaminophen (TYLENOL PO) Take 1,000 mg by mouth 3 times daily       amLODIPine (NORVASC) 5 MG tablet Take 1 tablet (5 mg) by mouth every morning 90 tablet 0     citalopram (CELEXA) 20 MG tablet Take 1 tablet (20 mg) by mouth daily 30 tablet 3     cyclopentolate (CYCLOGYL) 1 % ophthalmic solution Place 1 drop into the right eye 2 times daily 1 Bottle      donepezil (ARICEPT) 10 MG tablet Take 1 tablet (10 mg) by mouth At Bedtime 30 tablet      ibuprofen (ADVIL/MOTRIN) 200 MG tablet Take 1 tablet (200 mg) by mouth every 6 hours as  "needed for moderate pain 30 tablet      Lidocaine (LIDOCARE) 4 % Patch Place 2 patches onto the skin every 24 hours 12 patch      lisinopril-hydrochlorothiazide (PRINZIDE/ZESTORETIC) 20-12.5 MG per tablet Take 1 tablet by mouth 2 times daily 30 tablet 3     omeprazole (PRILOSEC) 20 MG CR capsule Take 1 capsule (20 mg) by mouth every morning 30 capsule 3     oxyCODONE IR (ROXICODONE) 5 MG tablet Take 1 tablet (5 mg) by mouth every 4 hours as needed for moderate to severe pain 15 tablet 0     polyethylene glycol (MIRALAX/GLYCOLAX) Packet Take 17 g by mouth daily 7 packet      prednisoLONE acetate (PRED FORTE) 1 % ophthalmic susp Place 1 drop into the right eye 3 times daily 1 Bottle      senna-docusate (SENOKOT-S;PERICOLACE) 8.6-50 MG per tablet Take 2 tablets by mouth At Bedtime 30 tablet      simvastatin (ZOCOR) 20 MG tablet Take 1 tablet (20 mg) by mouth At Bedtime 90 tablet 3       ROS:  10 point ROS of systems including Constitutional, Eyes, Respiratory, Cardiovascular, Gastroenterology, Genitourinary, Integumentary, Musculoskeletal, Neurological, Psychiatric were all negative except for pertinent positives noted in my HPI.    Exam:  /63  Pulse 69  Temp 97.4  F (36.3  C)  Resp 16  Ht 5' 1\" (1.549 m)  Wt 162 lb (73.5 kg)  SpO2 96%  BMI 30.61 kg/m2  GENERAL APPEARANCE:  Alert, pleasant and cooperative, oriented x 4, lying in bed on exam today  EYES:  EOM, lids, pupils and irises normal, sclera clear and conjunctiva normal, no discharge or mattering on lids or lashes noted- scab to left medial eyebrow and right cheek  ENT:  Mouth normal, moist mucous membranes, nose without drainage or crusting, external ears without lesions, hearing acuity:intact  RESP:  respiratory effort normal, no respiratory distress, Lung sounds clear, patient is on RA  CV: auscultation of heart done, rate and rhythm regular. no murmur, no rub or gallop. No BLE edema, good pedal pulses  ABDOMEN:  normal bowel sounds, soft, " nontender, no grimacing or guarding with palpation.  M/S:  Digits and nails normal; left knee with swelling and pain upon palpation and with any movement. Is able to move all extremities (but very weak to left leg due to knee pain).  SKIN:  Inspection and palpation of skin and subcutaneous tissue: skin warm, dry without rashes; note scabs above  NEURO: cranial nerves 2-12 grossly intact, no facial asymmetry, no speech deficits and able to follow directions, moves all extremities symmetrically  PSYCH:  insight and judgement intact, memory baseline, affect and mood normal      Lab/Diagnostic data:     CBC RESULTS:   Recent Labs   Lab Test  11/13/18   0952  08/10/18   1154   WBC  9.3  10.2   RBC  4.48  4.31   HGB  13.0  13.1  12.3   HCT  40.7  39.0   MCV  91  91   MCH  29.0  28.5   MCHC  31.9  31.5   RDW  13.7  13.7   PLT  242  228       Last Basic Metabolic Panel:  Recent Labs   Lab Test  11/13/18   0952  03/26/18   0921   NA  137  138   POTASSIUM  3.6  4.3   CHLORIDE  103  103   MAUREEN  9.0  9.3   CO2  25  28   BUN  12  13   CR  0.72  0.72   GLC  93  96       Liver Function Studies -   Recent Labs   Lab Test  11/13/18   0952  03/26/18   0921   PROTTOTAL  6.7*  7.1   ALBUMIN  3.6  3.9   BILITOTAL  0.5  0.6   ALKPHOS  78  80   AST  17  22   ALT  20  19       ASSESSMENT/PLAN:  (M25.562) Left knee pain, unspecified chronicity  (primary encounter diagnosis)  (M17.12) Osteoarthritis of left knee, unspecified osteoarthritis type  (R53.81) Physical deconditioning  Comment: Acute, reports significant pain- very weak to left leg- bowels moving well  Plan: Continue scheduled tylenol, ibuprofen PRN and oxycodone PRN. Nursing to offer medication when she is in pain. Continue lidocaine patches. Continue current bowel regimen. Follow up with orthopedics as scheduled. Encourage participation in physical therapy/occupational therapy for strengthening and deconditioning. Discharge planning per their recommendation. Social work to assist  with d/c planning.    (H20.9) Right eye traumatic iritis  Comment: Acute, occurred with fall- no symptoms  Plan: Continue pred forte, cyclopentolate, erythromycin ointment. Follow up with opthalmology in 2 weeks.     (I10) Essential hypertension, benign  Comment: Chronic, BP meeting goal <150/90  Plan: Continue medications as above. VS per TCU policy    (F33.0) Major depressive disorder, recurrent episode, mild (H)  Comment: Chronic  Plan: Continue celexa. SW to obtain PHQ9    (K21.9) Gastroesophageal reflux disease without esophagitis  Comment: Chronic, denies any acid reflux- no history of GI bleed per chart review  Plan: Start omeprazole 20 mg every other day 1 week. If tolerating will plan to Discontinue. Start famotidine 10 mg daily PRN for acid. Monitor symptoms- if worsening can consider alternate taper regimen    (E78.5) Hyperlipidemia, unspecified hyperlipidemia type  Comment: Chronic  Plan: Continue PTA statin      Total time spent with patient visit at the skilled nursing facility was 52 minutes including patient visit, review of past records and medication reconciliation, review of admission orders. Greater than 50% of total time spent with counseling and coordinating care due to knee pain, OA, and other problems as above    Electronically signed by:  BETH Rosario CNP

## 2018-11-19 ENCOUNTER — PATIENT OUTREACH (OUTPATIENT)
Dept: CARE COORDINATION | Facility: CLINIC | Age: 83
End: 2018-11-19

## 2018-11-19 ASSESSMENT — ACTIVITIES OF DAILY LIVING (ADL): DEPENDENT_IADLS:: INDEPENDENT

## 2018-11-19 NOTE — LETTER
[facility]   [facility]          Please give to [facility]    From:   William Vargas  [facility]  Care Coordinator   [facility]   P: [phone]  [email or web address]   Patient Name:  Franck Collazo YOB: 1932   Admit date: 11/14/2018      *Information Needed:  Please contact me when the patient will discharge (or if they will move to long term care)- include the discharge date, disposition, and main diagnosis   - If the patient is discharged with home care services, please provide the name of the agency    Also- Please inform me if a care conference is being held.   Phone, Fax or Email with information                              Thank you

## 2018-11-19 NOTE — PROGRESS NOTES
Clinic Care Coordination Contact  Care Coordination Transition Communication    Referral Source: ED Follow-Up    Clinical Data: Patient was hospitalized at [facility] from 11/13/2018 to 11/14/2018 with diagnosis of Fall, contusion of Left knee, Facial Abrasions.     Transition to Facility:              Facility Name: [facility]              Contact name and phone number/fax: [phone]    Plan: RN/SW Care Coordinator will await notification from facility staff informing RN/SW Care Coordinator of patient's discharge plans/needs. RN/SW Care Coordinator will review chart and outreach to facility staff every 4 weeks and as needed.     ODIN Martinez  Clinic Care Coordinator  [facility]  [phone]  [email or web address]

## 2018-11-20 ENCOUNTER — TELEPHONE (OUTPATIENT)
Dept: ORTHOPEDICS | Facility: CLINIC | Age: 83
End: 2018-11-20

## 2018-11-20 ENCOUNTER — NURSING HOME VISIT (OUTPATIENT)
Dept: GERIATRICS | Facility: CLINIC | Age: 83
End: 2018-11-20
Payer: COMMERCIAL

## 2018-11-20 VITALS
WEIGHT: 162.2 LBS | HEART RATE: 58 BPM | TEMPERATURE: 97 F | OXYGEN SATURATION: 96 % | DIASTOLIC BLOOD PRESSURE: 72 MMHG | SYSTOLIC BLOOD PRESSURE: 129 MMHG | HEIGHT: 61 IN | RESPIRATION RATE: 18 BRPM | BODY MASS INDEX: 30.62 KG/M2

## 2018-11-20 DIAGNOSIS — K21.9 GASTROESOPHAGEAL REFLUX DISEASE WITHOUT ESOPHAGITIS: ICD-10-CM
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
## 2018-11-20 DIAGNOSIS — R53.81 PHYSICAL DECONDITIONING: ICD-10-CM

## 2018-11-20 DIAGNOSIS — R51.9 HEADACHE ABOVE THE EYE REGION: ICD-10-CM

## 2018-11-20 DIAGNOSIS — M25.562 LEFT KNEE PAIN, UNSPECIFIED CHRONICITY: ICD-10-CM

## 2018-11-20 DIAGNOSIS — I10 ESSENTIAL HYPERTENSION, BENIGN: ICD-10-CM

## 2018-11-20 DIAGNOSIS — M17.12 OSTEOARTHRITIS OF LEFT KNEE, UNSPECIFIED OSTEOARTHRITIS TYPE: ICD-10-CM

## 2018-11-20 DIAGNOSIS — H20.9 TRAUMATIC IRITIS: ICD-10-CM

## 2018-11-20 PROCEDURE — 99310 SBSQ NF CARE HIGH MDM 45: CPT | Performed by: NURSE PRACTITIONER

## 2018-11-20 RX ORDER — ERYTHROMYCIN 5 MG/G
0.5 OINTMENT OPHTHALMIC 3 TIMES DAILY
COMMUNITY
End: 2019-01-31

## 2018-11-20 RX ORDER — AMLODIPINE BESYLATE 2.5 MG/1
2.5 TABLET ORAL DAILY
COMMUNITY
End: 2019-01-14 | Stop reason: DRUGHIGH

## 2018-11-20 NOTE — TELEPHONE ENCOUNTER
Peyton from [facility] was called back regarding her questions about the patient's pain.  Dr. Steen's RN was consulted and to help with the pain we suggest ice and rest.  If she needs pain medication she would have to see her primary care provider as we do not prescribe pre operative pain medications, only post operative.  Peyton was also told that the patient would not need an MRI.  She seemed agreeable with this plan.

## 2018-11-20 NOTE — LETTER
11/20/2018        RE: Juany Collazo  [address] [place] [address]        [facility]    Chief Complaint   Patient presents with     RECHECK       [facility] Record Number:  [id number]  Place of Service where encounter took place:  [facility] ([facility]) [[id number]]    HPI:    Juany Collazo is a 86 year old  (1/7/1932), who is being seen today for an episodic care visit.  HPI information obtained from: facility chart records, facility staff, patient report and [facility] chart review.    Today's concern is:  Fall, subsequent encounter  Headache above eye region  Right eye traumatic iritis  Patient fell at home on 11/13/18 while going down steps at home and she missing the bottom step and was not able to catch herself.  Opthalmology saw patient during hospitalization. No globe rupture to right eye. She should continue pred forte, cyclopentolate, erythromycin ointment. Follow up with opthalmology as scheduled for 11/29. She continues to report some blurry vision to the right eye that has been present since initial injury     On exam today patient reported headache above left eye that has been ongoing x3 days. She reports that she gets headaches at times, but is not able to characterize them for me. She is not sure if the headache has gotten any better or worse. Nor has she noticed if anything has helped the headache. On exam today it was noted that patient's left pupil was pinpoint and initially not reactive to light, although good light source was initially not available.  She denies any pain to the eye or any vision changes to the left eye. Difficult to examine if patient has equal strength as she has chronic left shoulder pain and left knee pain, so currently left side is weaker than right, but likely is more chronic in nature. Spoke to daughter regarding concerns with pinpoint pupil and ongoing headache and will set patient up for MRI.  Unable to get her in today, so she is scheduled for tomorrow AM. Facility has orders if any changes to neurological status or headache she should be sent to [facility] ER.     Left knee pain, unspecified chronicity  Osteoarthritis of left knee, unspecified osteoarthritis type  Physical deconditioning  Patient fell at home while going down steps at home and she missing the bottom step and was not able to catch herself. Patient has chronic knee pain, but it is worse after fall. Orthopedics saw Ms. Collazo during her hospitalization and recommend conservative therapies including ice, elevation, compression, NSAIDs. They are recommending she pursue elective TKA and she follow up with orthopedics to discuss. Per note review she has attempted injections and other conservative measures in the past and has not experienced relief. She is here for work with therapies.      Discussed knee pain with PHYSICAL THERAPY yesterday and they reported concerns that something more severe may be going on as patient continues to report significant pain and she has 3+ effusion. No MRI completed during hospitalization. She has started pool therapy and is really enjoying that.   On exam today she reports knee pain, she tells me the pain is worse in the medial joint space. She also continues to have swelling to the area. She is currently on tylenol scheduled, ibuprofen PRN and oxycodone PRN (used sparingly). She also has lidocaine patches. She is also on bowel regimen- bowels are moving well.      Gastroesophageal reflux disease without esophagitis  Continues on omeprazole taper and PRN famotidine. Patient reports some acid reflux yesterday- she did not ask for medication and does not think she will remember to, so she would like it scheduled. With plans to continue to taper and if famotidine not helpful with restart PPI at next visit.    Essential hypertension, benign  Continues on PTA amlodipine, lisinopril-HCTZ  BP: 100-129/54-72 mmHg,  outliers of /74, 141/68  P: 52-88 bpm       ALLERGIES: No known drug allergies  Past Medical, Surgical, Family and Social History reviewed and updated in [facility].    Current Outpatient Prescriptions   Medication Sig Dispense Refill     Acetaminophen (TYLENOL PO) Take 1,000 mg by mouth 3 times daily       amLODIPine (NORVASC) 5 MG tablet Take 1 tablet (5 mg) by mouth every morning 90 tablet 0     citalopram (CELEXA) 20 MG tablet Take 1 tablet (20 mg) by mouth daily 30 tablet 3     cyclopentolate (CYCLOGYL) 1 % ophthalmic solution Place 1 drop into the right eye 2 times daily 1 Bottle      donepezil (ARICEPT) 10 MG tablet Take 1 tablet (10 mg) by mouth At Bedtime 30 tablet      erythromycin (ROMYCIN) ophthalmic ointment Place 0.5 inches into the right eye 3 times daily       FAMOTIDINE PO Take 10 mg by mouth daily as needed for heartburn       ibuprofen (ADVIL/MOTRIN) 200 MG tablet Take 1 tablet (200 mg) by mouth every 6 hours as needed for moderate pain 30 tablet      Lidocaine (LIDOCARE) 4 % Patch Place 2 patches onto the skin every 24 hours 12 patch      lisinopril-hydrochlorothiazide (PRINZIDE/ZESTORETIC) 20-12.5 MG per tablet Take 1 tablet by mouth 2 times daily 30 tablet 3     OMEPRAZOLE PO Take 20 mg by mouth every other day       oxyCODONE IR (ROXICODONE) 5 MG tablet Take 1 tablet (5 mg) by mouth every 4 hours as needed for moderate to severe pain 15 tablet 0     polyethylene glycol (MIRALAX/GLYCOLAX) Packet Take 17 g by mouth daily 7 packet      prednisoLONE acetate (PRED FORTE) 1 % ophthalmic susp Place 1 drop into the right eye 3 times daily 1 Bottle      senna-docusate (SENOKOT-S;PERICOLACE) 8.6-50 MG per tablet Take 2 tablets by mouth At Bedtime 30 tablet      simvastatin (ZOCOR) 20 MG tablet Take 1 tablet (20 mg) by mouth At Bedtime 90 tablet 3     Medications reviewed:  Medications reconciled to facility chart and changes were made to reflect current medications as identified as above med list.  "    REVIEW OF SYSTEMS:  10 point ROS of systems including Constitutional, Eyes, Respiratory, Cardiovascular, Gastroenterology, Genitourinary, Integumentary, Musculoskeletal, Neurological, Psychiatric were all negative except for pertinent positives noted in my HPI.    Physical Exam:  /72  Pulse 58  Temp 97  F (36.1  C)  Resp 18  Ht 5' 1\" (1.549 m)  Wt 162 lb 3.2 oz (73.6 kg)  SpO2 96%  BMI 30.65 kg/m2  GENERAL APPEARANCE:  Alert, pleasant and cooperative, oriented x 4, sitting in wheelchair on exam today  EYES: right eye with dilated pupil, left eye pinpoint with some response to light  ENT:  Mouth normal, moist mucous membranes, nose without drainage or crusting, external ears without lesions, hearing acuity:intact  RESP:  respiratory effort normal, no respiratory distress, Lung sounds clear, patient is on RA  CV: auscultation of heart done, rate and rhythm regular. no murmur, no rub or gallop. No BLE edema  ABDOMEN:  normal bowel sounds, soft, nontender, no grimacing or guarding with palpation.  M/S:  Digits and nails normal; left knee with swelling and pain upon palpation and with any movement. Is able to move all extremities, but limited to left leg due to pain. Strength of extremities challenging to assess due to chronic pain- left arm and left leg weaker than right against resistance  SKIN:  Inspection and palpation of skin and subcutaneous tissue: skin warm, dry without rashes, scabs to face healing well  NEURO: cranial nerves 2-12 grossly intact, no facial asymmetry, no speech deficits and able to follow directions, moves all extremities symmetrically  PSYCH:  insight and judgement intact, memory baseline, affect and mood normal    Recent Labs:     CBC RESULTS:   Recent Labs   Lab Test  11/13/18   0952  08/10/18   1154   WBC  9.3  10.2   RBC  4.48  4.31   HGB  13.0  13.1  12.3   HCT  40.7  39.0   MCV  91  91   MCH  29.0  28.5   MCHC  31.9  31.5   RDW  13.7  13.7   PLT  242  228       Last Basic " Metabolic Panel:  Recent Labs   Lab Test  11/13/18   0952  03/26/18   0921   NA  137  138   POTASSIUM  3.6  4.3   CHLORIDE  103  103   MAUREEN  9.0  9.3   CO2  25  28   BUN  12  13   CR  0.72  0.72   GLC  93  96       Liver Function Studies -   Recent Labs   Lab Test  11/13/18   0952  03/26/18   0921   PROTTOTAL  6.7*  7.1   ALBUMIN  3.6  3.9   BILITOTAL  0.5  0.6   ALKPHOS  78  80   AST  17  22   ALT  20  19       Assessment/Plan:  (W19.XXXD) Fall, subsequent encounter  (primary encounter diagnosis)  (R51) Headache above the eye region  (H20.9) Right eye traumatic iritis  Comment: Acute, patient fell 11/13- now with persistent headache with no improvement- concerns that left pupil is pinpoint- with concerns there could be slow bleed- neuro exam hard to decipher with left sided weakness but could be due to chronic pain. Discussed POC with Dr. Lobo and he was agreeable with plan to complete MRI. Discussed with daughter and will plan for MRI.   Plan: MRI not able to be completed today, so is scheduled for tomorrow AM. Patient to take ativan 1 hour prior to test due to claustrophobia. Facility to transport patient. If nursing notes any neurological changes today, headache worsens or any changes to eyes she should be sent to [facility] ER. Follow up when results available.    (M25.562) Left knee pain, unspecified chronicity  (M17.12) Osteoarthritis of left knee, unspecified osteoarthritis type  (R53.81) Physical deconditioning  Comment: Acute,  Continues to report significant pain- very weak to left leg- bowels moving well- with effusion to right knee  Plan: Continue scheduled tylenol, ibuprofen PRN and oxycodone PRN. Nursing to offer medication when she is in pain. Continue lidocaine patches. Continue current bowel regimen. Please try to schedule ortho appointment next week due to continued swelling, pain-MRI may be needed to rule out any other possible pathology? Encourage participation in physical therapy/occupational  therapy for strengthening and deconditioning. Discharge planning per their recommendation. Social work to assist with d/c planning.    (K21.9) Gastroesophageal reflux disease without esophagitis  Comment: Acute on chronic- reports one bout of reflux yesterday  Plan: Continue omeprazole taper to end 11/22. Schedule famotidine per patient request. If not under control with famotidine will plan to restart omeprazole.     (I10) Essential hypertension, benign  Comment: Chronic, with -120s  Plan: BP under goal <150/90. Will reduce amlodipine to 2.5 mg daily. VS per TCU policy.    Total time spent with patient visit at the skilled nursing facility was 44 min including patient visit, review of past records, phone call to patient contact and phone call to Dr. Lobo, and phone call to pharmacist to discuss ativan dose. Greater than 50% of total time spent with counseling and coordinating care due to fall, headache, right eye traumatic iritis, and other problems as above    Electronically signed by  BETH Rosario CNP              Sincerely,        BETH Rosario CNP

## 2018-11-20 NOTE — TELEPHONE ENCOUNTER
[facility] Call Center    Phone Message    May a detailed message be left on voicemail: yes    Reason for Call: Symptoms or Concerns     If patient has red-flag symptoms, warm transfer to triage line    Current symptom or concern: Great deal of pain in L Knee, Peyton from [facility] called on behalf of pt, may need MRI, please call Peyton at [phone]    Symptoms have been present for:  1-2 day(s)    Has patient previously been seen for this? No    By : Dr. Steen    Date: n/a    Are there any new or worsening symptoms? Yes: Pain      Action Taken: Message routed to:  [facility] ([facility]): Ortho

## 2018-11-20 NOTE — PROGRESS NOTES
[facility]    Chief Complaint   Patient presents with     KARSON       [place] Medical Record Number:  [id number]  Place of Service where encounter took place:  [facility] ([facility]) [[id number]]    HPI:    Juany Collazo is a 86 year old  (1/7/1932), who is being seen today for an episodic care visit.  HPI information obtained from: facility chart records, facility staff, patient report and [place] Epic chart review.    Today's concern is:  Fall, subsequent encounter  Headache above eye region  Right eye traumatic iritis  Patient fell at home on 11/13/18 while going down steps at home and she missing the bottom step and was not able to catch herself.  Opthalmology saw patient during hospitalization. No globe rupture to right eye. She should continue pred forte, cyclopentolate, erythromycin ointment. Follow up with opthalmology as scheduled for 11/29. She continues to report some blurry vision to the right eye that has been present since initial injury     On exam today patient reported headache above left eye that has been ongoing x3 days. She reports that she gets headaches at times, but is not able to characterize them for me. She is not sure if the headache has gotten any better or worse. Nor has she noticed if anything has helped the headache. On exam today it was noted that patient's left pupil was pinpoint and initially not reactive to light, although good light source was initially not available.  She denies any pain to the eye or any vision changes to the left eye. Difficult to examine if patient has equal strength as she has chronic left shoulder pain and left knee pain, so currently left side is weaker than right, but likely is more chronic in nature. Spoke to daughter regarding concerns with pinpoint pupil and ongoing headache and will set patient up for MRI. Unable to get her in today, so she is scheduled for tomorrow AM. Facility has orders if any changes  to neurological status or headache she should be sent to [facility] ER.     Left knee pain, unspecified chronicity  Osteoarthritis of left knee, unspecified osteoarthritis type  Physical deconditioning  Patient fell at home while going down steps at home and she missing the bottom step and was not able to catch herself. Patient has chronic knee pain, but it is worse after fall. Orthopedics saw Ms. Collazo during her hospitalization and recommend conservative therapies including ice, elevation, compression, NSAIDs. They are recommending she pursue elective TKA and she follow up with orthopedics to discuss. Per note review she has attempted injections and other conservative measures in the past and has not experienced relief. She is here for work with therapies.      Discussed knee pain with PHYSICAL THERAPY yesterday and they reported concerns that something more severe may be going on as patient continues to report significant pain and she has 3+ effusion. No MRI completed during hospitalization. She has started pool therapy and is really enjoying that.   On exam today she reports knee pain, she tells me the pain is worse in the medial joint space. She also continues to have swelling to the area. She is currently on tylenol scheduled, ibuprofen PRN and oxycodone PRN (used sparingly). She also has lidocaine patches. She is also on bowel regimen- bowels are moving well.      Gastroesophageal reflux disease without esophagitis  Continues on omeprazole taper and PRN famotidine. Patient reports some acid reflux yesterday- she did not ask for medication and does not think she will remember to, so she would like it scheduled. With plans to continue to taper and if famotidine not helpful with restart PPI at next visit.    Essential hypertension, benign  Continues on PTA amlodipine, lisinopril-HCTZ  BP: 100-129/54-72 mmHg, outliers of /74, 141/68  P: 52-88 bpm       ALLERGIES: No known drug allergies  Past Medical,  Surgical, Family and Social History reviewed and updated in [facility].    Current Outpatient Prescriptions   Medication Sig Dispense Refill     Acetaminophen (TYLENOL PO) Take 1,000 mg by mouth 3 times daily       amLODIPine (NORVASC) 5 MG tablet Take 1 tablet (5 mg) by mouth every morning 90 tablet 0     citalopram (CELEXA) 20 MG tablet Take 1 tablet (20 mg) by mouth daily 30 tablet 3     cyclopentolate (CYCLOGYL) 1 % ophthalmic solution Place 1 drop into the right eye 2 times daily 1 Bottle      donepezil (ARICEPT) 10 MG tablet Take 1 tablet (10 mg) by mouth At Bedtime 30 tablet      erythromycin (ROMYCIN) ophthalmic ointment Place 0.5 inches into the right eye 3 times daily       FAMOTIDINE PO Take 10 mg by mouth daily as needed for heartburn       ibuprofen (ADVIL/MOTRIN) 200 MG tablet Take 1 tablet (200 mg) by mouth every 6 hours as needed for moderate pain 30 tablet      Lidocaine (LIDOCARE) 4 % Patch Place 2 patches onto the skin every 24 hours 12 patch      lisinopril-hydrochlorothiazide (PRINZIDE/ZESTORETIC) 20-12.5 MG per tablet Take 1 tablet by mouth 2 times daily 30 tablet 3     OMEPRAZOLE PO Take 20 mg by mouth every other day       oxyCODONE IR (ROXICODONE) 5 MG tablet Take 1 tablet (5 mg) by mouth every 4 hours as needed for moderate to severe pain 15 tablet 0     polyethylene glycol (MIRALAX/GLYCOLAX) Packet Take 17 g by mouth daily 7 packet      prednisoLONE acetate (PRED FORTE) 1 % ophthalmic susp Place 1 drop into the right eye 3 times daily 1 Bottle      senna-docusate (SENOKOT-S;PERICOLACE) 8.6-50 MG per tablet Take 2 tablets by mouth At Bedtime 30 tablet      simvastatin (ZOCOR) 20 MG tablet Take 1 tablet (20 mg) by mouth At Bedtime 90 tablet 3     Medications reviewed:  Medications reconciled to facility chart and changes were made to reflect current medications as identified as above med list.     REVIEW OF SYSTEMS:  10 point ROS of systems including Constitutional, Eyes, Respiratory,  "Cardiovascular, Gastroenterology, Genitourinary, Integumentary, Musculoskeletal, Neurological, Psychiatric were all negative except for pertinent positives noted in my HPI.    Physical Exam:  /72  Pulse 58  Temp 97  F (36.1  C)  Resp 18  Ht 5' 1\" (1.549 m)  Wt 162 lb 3.2 oz (73.6 kg)  SpO2 96%  BMI 30.65 kg/m2  GENERAL APPEARANCE:  Alert, pleasant and cooperative, oriented x 4, sitting in wheelchair on exam today  EYES: right eye with dilated pupil, left eye pinpoint with some response to light  ENT:  Mouth normal, moist mucous membranes, nose without drainage or crusting, external ears without lesions, hearing acuity:intact  RESP:  respiratory effort normal, no respiratory distress, Lung sounds clear, patient is on RA  CV: auscultation of heart done, rate and rhythm regular. no murmur, no rub or gallop. No BLE edema  ABDOMEN:  normal bowel sounds, soft, nontender, no grimacing or guarding with palpation.  M/S:  Digits and nails normal; left knee with swelling and pain upon palpation and with any movement. Is able to move all extremities, but limited to left leg due to pain. Strength of extremities challenging to assess due to chronic pain- left arm and left leg weaker than right against resistance  SKIN:  Inspection and palpation of skin and subcutaneous tissue: skin warm, dry without rashes, scabs to face healing well  NEURO: cranial nerves 2-12 grossly intact, no facial asymmetry, no speech deficits and able to follow directions, moves all extremities symmetrically  PSYCH:  insight and judgement intact, memory baseline, affect and mood normal    Recent Labs:     CBC RESULTS:   Recent Labs   Lab Test  11/13/18 0952  08/10/18   1154   WBC  9.3  10.2   RBC  4.48  4.31   HGB  13.0  13.1  12.3   HCT  40.7  39.0   MCV  91  91   MCH  29.0  28.5   MCHC  31.9  31.5   RDW  13.7  13.7   PLT  242  228       Last Basic Metabolic Panel:  Recent Labs   Lab Test  11/13/18   0952  03/26/18   0921   NA  137  138 "   POTASSIUM  3.6  4.3   CHLORIDE  103  103   MAUREEN  9.0  9.3   CO2  25  28   BUN  12  13   CR  0.72  0.72   GLC  93  96       Liver Function Studies -   Recent Labs   Lab Test  11/13/18   0952  03/26/18   0921   PROTTOTAL  6.7*  7.1   ALBUMIN  3.6  3.9   BILITOTAL  0.5  0.6   ALKPHOS  78  80   AST  17  22   ALT  20  19       Assessment/Plan:  (W19.XXXD) Fall, subsequent encounter  (primary encounter diagnosis)  (R51) Headache above the eye region  (H20.9) Right eye traumatic iritis  Comment: Acute, patient fell 11/13- now with persistent headache with no improvement- concerns that left pupil is pinpoint- with concerns there could be slow bleed- neuro exam hard to decipher with left sided weakness but could be due to chronic pain. Discussed POC with Dr. Lobo and he was agreeable with plan to complete MRI. Discussed with daughter and will plan for MRI.   Plan: MRI not able to be completed today, so is scheduled for tomorrow AM. Patient to take ativan 1 hour prior to test due to claustrophobia. Facility to transport patient. If nursing notes any neurological changes today, headache worsens or any changes to eyes she should be sent to [facility] ER. Follow up when results available.    (M25.562) Left knee pain, unspecified chronicity  (M17.12) Osteoarthritis of left knee, unspecified osteoarthritis type  (R53.81) Physical deconditioning  Comment: Acute,  Continues to report significant pain- very weak to left leg- bowels moving well- with effusion to right knee  Plan: Continue scheduled tylenol, ibuprofen PRN and oxycodone PRN. Nursing to offer medication when she is in pain. Continue lidocaine patches. Continue current bowel regimen. Please try to schedule ortho appointment next week due to continued swelling, pain-MRI may be needed to rule out any other possible pathology? Encourage participation in physical therapy/occupational therapy for strengthening and deconditioning. Discharge planning per their recommendation.  Social work to assist with d/c planning.    (K21.9) Gastroesophageal reflux disease without esophagitis  Comment: Acute on chronic- reports one bout of reflux yesterday  Plan: Continue omeprazole taper to end 11/22. Schedule famotidine per patient request. If not under control with famotidine will plan to restart omeprazole.     (I10) Essential hypertension, benign  Comment: Chronic, with -120s  Plan: BP under goal <150/90. Will reduce amlodipine to 2.5 mg daily. VS per TCU policy.    Total time spent with patient visit at the skilled nursing facility was 44 min including patient visit, review of past records, phone call to patient contact and phone call to Dr. Lobo, and phone call to pharmacist to discuss ativan dose. Greater than 50% of total time spent with counseling and coordinating care due to fall, headache, right eye traumatic iritis, and other problems as above    Electronically signed by  BETH Rosario CNP

## 2018-11-21 ENCOUNTER — NURSING HOME VISIT (OUTPATIENT)
Dept: GERIATRICS | Facility: CLINIC | Age: 83
End: 2018-11-21
Payer: COMMERCIAL

## 2018-11-21 ENCOUNTER — HOSPITAL ENCOUNTER (OUTPATIENT)
Dept: MRI IMAGING | Facility: CLINIC | Age: 83
Discharge: HOME | End: 2018-11-21
Attending: NURSE PRACTITIONER | Admitting: NURSE PRACTITIONER
Payer: COMMERCIAL

## 2018-11-21 VITALS
HEIGHT: 61 IN | TEMPERATURE: 96.9 F | DIASTOLIC BLOOD PRESSURE: 74 MMHG | BODY MASS INDEX: 30.62 KG/M2 | WEIGHT: 162.2 LBS | OXYGEN SATURATION: 95 % | SYSTOLIC BLOOD PRESSURE: 140 MMHG | HEART RATE: 53 BPM | RESPIRATION RATE: 17 BRPM
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
## 2018-11-21 DIAGNOSIS — H20.9 TRAUMATIC IRITIS: ICD-10-CM

## 2018-11-21 DIAGNOSIS — R53.81 PHYSICAL DECONDITIONING: ICD-10-CM

## 2018-11-21 DIAGNOSIS — F03.90 DEMENTIA WITHOUT BEHAVIORAL DISTURBANCE, UNSPECIFIED DEMENTIA TYPE: ICD-10-CM

## 2018-11-21 DIAGNOSIS — H20.9 IRITIS OF RIGHT EYE: ICD-10-CM

## 2018-11-21 DIAGNOSIS — M25.562 ACUTE PAIN OF LEFT KNEE: ICD-10-CM

## 2018-11-21 DIAGNOSIS — R51.9 HEADACHE ABOVE THE EYE REGION: ICD-10-CM

## 2018-11-21 DIAGNOSIS — F32.A DEPRESSION, UNSPECIFIED DEPRESSION TYPE: ICD-10-CM
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
## 2018-11-21 DIAGNOSIS — E78.5 HYPERLIPIDEMIA, UNSPECIFIED HYPERLIPIDEMIA TYPE: ICD-10-CM

## 2018-11-21 DIAGNOSIS — I10 BENIGN ESSENTIAL HYPERTENSION: ICD-10-CM

## 2018-11-21 PROCEDURE — A9585 GADOBUTROL INJECTION: HCPCS | Performed by: NURSE PRACTITIONER

## 2018-11-21 PROCEDURE — 70553 MRI BRAIN STEM W/O & W/DYE: CPT

## 2018-11-21 PROCEDURE — 25500064 ZZH RX 255 OP 636: Performed by: NURSE PRACTITIONER

## 2018-11-21 PROCEDURE — 99305 1ST NF CARE MODERATE MDM 35: CPT | Performed by: INTERNAL MEDICINE

## 2018-11-21 RX ORDER — GADOBUTROL 604.72 MG/ML
7.5 INJECTION INTRAVENOUS ONCE
Status: COMPLETED | OUTPATIENT
Start: 2018-11-21 | End: 2018-11-21

## 2018-11-21 RX ADMIN — GADOBUTROL 7.5 ML: 604.72 INJECTION INTRAVENOUS at 10:34

## 2018-11-21 NOTE — LETTER
11/21/2018        RE: Juany HOLLIDAY Vale  [address] [place] [address]          PRIMARY CARE PROVIDER AND CLINIC RESPONSIBLE:  Martinez Shafer, [address] [place] [address] / [facility] [address]        ADMISSION HISTORY AND PHYSICAL EXAMINATION     Chief Complaint   Patient presents with     Hospital F/U         HISTORY OF PRESENT ILLNESS:  86 year old female, (1/7/1932), admitted to the [facility] TCU for continuation of medical care and rehab.    Pt admitted [facility] 11/13 to 11/14 for fall. Also noted to have R eye iritis.    Pt denies any fevers/chills/chest pain/SOB. + pain in L knee. However, has not been requesting for pain meds.     Please see Micheline Tsai 's CNP admit noted dated 11/15  for details of admission, past medical history, family history, allergies, medication list, social history and other details pertinent with this admission. Hospital admission and dc summary reviewed.      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       Past Surgical History:   Procedure Laterality Date     C NONSPECIFIC PROCEDURE  1998    right hip replacement     C NONSPECIFIC PROCEDURE  1970    tubal ligation     C NONSPECIFIC PROCEDURE  1998    hip replacement     DECOMPRESSION LUMBAR ONE LEVEL  4/19/2013    Procedure: DECOMPRESSION LUMBAR ONE LEVEL;  Decompression Bilateral L4-5  ;  Surgeon: Lang Garcia MD;  Location:  OR       Current Outpatient Prescriptions   Medication Sig     Acetaminophen (TYLENOL PO) Take 1,000 mg by mouth 3 times daily     amLODIPine (NORVASC) 2.5 MG tablet Take 2.5 mg by mouth daily     citalopram (CELEXA) 20 MG tablet Take 1 tablet (20 mg) by  mouth daily     cyclopentolate (CYCLOGYL) 1 % ophthalmic solution Place 1 drop into the right eye 2 times daily     donepezil (ARICEPT) 10 MG tablet Take 1 tablet (10 mg) by mouth At Bedtime     erythromycin (ROMYCIN) ophthalmic ointment Place 0.5 inches into the right eye 3 times daily     FAMOTIDINE PO Take 10 mg by mouth daily      ibuprofen (ADVIL/MOTRIN) 200 MG tablet Take 1 tablet (200 mg) by mouth every 6 hours as needed for moderate pain     Lidocaine (LIDOCARE) 4 % Patch Place 2 patches onto the skin every 24 hours     lisinopril-hydrochlorothiazide (PRINZIDE/ZESTORETIC) 20-12.5 MG per tablet Take 1 tablet by mouth 2 times daily     OMEPRAZOLE PO Take 20 mg by mouth every other day     oxyCODONE IR (ROXICODONE) 5 MG tablet Take 1 tablet (5 mg) by mouth every 4 hours as needed for moderate to severe pain     polyethylene glycol (MIRALAX/GLYCOLAX) Packet Take 17 g by mouth daily     prednisoLONE acetate (PRED FORTE) 1 % ophthalmic susp Place 1 drop into the right eye 3 times daily     simvastatin (ZOCOR) 20 MG tablet Take 1 tablet (20 mg) by mouth At Bedtime     No current facility-administered medications for this visit.        Allergies   Allergen Reactions     No Known Drug Allergies        Social History     Social History     Marital status: Single     Spouse name: N/A     Number of children: N/A     Years of education: N/A     Occupational History     Not on file.     Social History Main Topics     Smoking status: Former Smoker     Quit date: 6/7/1966     Smokeless tobacco: Never Used     Alcohol use Yes      Comment: One beer day     Drug use: No     Sexual activity: Yes     Birth control/ protection: Surgical      Comment: Has had TL     Other Topics Concern     Not on file     Social History Narrative          Information reviewed:  Medications, vital signs, orders, nursing notes, problem list, hospital information.     ROS: All 10 point review of system completed, those pertinent positive, please  "see H&P, the remaining ROS is negative.    /74  Pulse 53  Temp 96.9  F (36.1  C)  Resp 17  Ht 5' 1\" (1.549 m)  Wt 162 lb 3.2 oz (73.6 kg)  SpO2 95%  BMI 30.65 kg/m2    PHYSICAL EXAMINATION:   GENERAL:  No acute distress. Laying in bed.  SKIN:  Dry and warm.  There is no rash, lesions, ulcers or juandice at area of skin examined.  HEENT:  Head without trauma.  Pupils round, reactive. Exam of conjunctiva and lids are normal. Sclera without icterus. There is no oral thrush.  NECK:  Supple.  There is no cervical adenopathy, no thyromegaly. No jugular venous distension.  CHEST: No reproducible chest tenderness.   LUNGS:  Normal respiratory effort. Lungs are Clear on ascultation.  HEART:  Regular rate and rhythm.  No murmur, gallops or rubs auscultated.  ABDOMEN:  Soft, bowel sounds positive.  There is no tenderness or guarding.   EXTREMITIES: + pain with ROM of L knee with some swelling.  NEUROLOGIC:  Alert and oriented x3.      Lab/Diagnostic data:  Reviewed    Lab Results   Component Value Date    WBC 9.3 11/13/2018     Lab Results   Component Value Date    RBC 4.48 11/13/2018     Lab Results   Component Value Date    HGB 13.1 11/13/2018    HGB 13.0 11/13/2018     Lab Results   Component Value Date    HCT 40.7 11/13/2018     Lab Results   Component Value Date    MCV 91 11/13/2018     Lab Results   Component Value Date    MCH 29.0 11/13/2018     Lab Results   Component Value Date    MCHC 31.9 11/13/2018     Lab Results   Component Value Date    RDW 13.7 11/13/2018     Lab Results   Component Value Date     11/13/2018       Last Comprehensive Metabolic Panel:  Sodium   Date Value Ref Range Status   11/13/2018 137 133 - 144 mmol/L Final     Potassium   Date Value Ref Range Status   11/13/2018 3.6 3.4 - 5.3 mmol/L Final     Chloride   Date Value Ref Range Status   11/13/2018 103 94 - 109 mmol/L Final     Carbon Dioxide   Date Value Ref Range Status   11/13/2018 25 20 - 32 mmol/L Final     Anion Gap "   Date Value Ref Range Status   11/13/2018 9 3 - 14 mmol/L Final     Glucose   Date Value Ref Range Status   11/13/2018 93 70 - 99 mg/dL Final     Urea Nitrogen   Date Value Ref Range Status   11/13/2018 12 7 - 30 mg/dL Final     Creatinine   Date Value Ref Range Status   11/13/2018 0.72 0.52 - 1.04 mg/dL Final     GFR Estimate   Date Value Ref Range Status   11/13/2018 77 >60 mL/min/1.7m2 Final     Comment:     Non  GFR Calc     Calcium   Date Value Ref Range Status   11/13/2018 9.0 8.5 - 10.1 mg/dL Final       ASSESSMENT / PLAN:     Fall, subsequent encounter  Acute pain of left knee  - X ray showed severe OA  - Pain control.  - Bowel regimen.  - f/u with ortho for TKA.    Iritis of right eye  - on erythromycin and prednisone eye gtts.  - f/u with opthalmology as scheduled.    Depression, unspecified depression type  - On celexa.    Dementia without behavioral disturbance, unspecified dementia type  - On aricept.    Benign essential hypertension  - On norvasc and lisinopril-HCTZ.    Hyperlipidemia, unspecified hyperlipidemia type  - on zocor.    Physical deconditioning  -Plan: PT/OT, fall precautions. Care conference with patient and family for the progress of rehab and disposition issues will be discussed as planned. Rehab evaluation and other evaluations including CPT are at rehab logs, to be reviewed separately.  Fall risk assessment as well as cognitive evaluation will be formed during rehab stay if indicated.      Other problems with same care. Primary care doctor and other specialists to address those chronic problems in next clinic appointment to be scheduled upon discharge from the TCU.    Total time spent with patient visit was 35 min including patient visit, review of past records, 1/2 time on patients counseling and coordinating care.            Sincerely,        Kimberly Lobo MD

## 2018-11-21 NOTE — MR AVS SNAPSHOT
After Visit Summary   11/21/2018    Juany Collazo    MRN: [id number]           Patient Information     Date Of Birth          1/7/1932        Visit Information        Provider Department      11/21/2018 8:00 AM Kimberly Lobo MD [facility]        Today's Diagnoses     Fall, subsequent encounter    -  1    Acute pain of left knee        Iritis of right eye        Depression, unspecified depression type        Dementia without behavioral disturbance, unspecified dementia type        Benign essential hypertension        Hyperlipidemia, unspecified hyperlipidemia type        Physical deconditioning           Follow-ups after your visit        Your next 10 appointments already scheduled     Nov 29, 2018 12:40 PM CST   (Arrive by 12:25 PM)   NEW GENERAL with Tenisha Chandler OD   [facility] ([email or web address] [place])    [address]  [address] [facility] [id number] [phone]            Dec 01, 2018 10:20 AM CST   (Arrive by 10:05 AM)   NEW GENERAL with Kelechi Beatty MD   [facility] ([email or web address] [place])    [address] [place] [address]  [address] [facility] [id number] [phone]            Dec 12, 2018  1:30 PM CST   (Arrive by 1:15 PM)   NEW KNEE with Darci Steen MD   [facility] ([email or web address] [place])    [address] [place] [address]  [address] [facility] [id number] [phone]            Mar 27, 2019  9:20 AM CDT   [facility] with Martinez Shafer MD   [facility]    [address] [id number] [phone]              Who to contact     If you have questions or need follow up information about today's clinic visit or your schedule please contact [facility] directly at [phone].  Normal or non-critical lab and imaging results will be communicated to you by [place], letter  "or phone within 4 business days after the clinic has received the results. If you do not hear from us within 7 days, please contact the clinic through picoChip or phone. If you have a critical or abnormal lab result, we will notify you by phone as soon as possible.  Submit refill requests through picoChip or call your pharmacy and they will forward the refill request to us. Please allow 3 business days for your refill to be completed.          Additional Information About Your Visit        picoChip Information     picoChip lets you send messages to your doctor, view your test results, renew your prescriptions, schedule appointments and more. To sign up, go to [email or web address] . Click on \"Log in\" on the left side of the screen, which will take you to the Welcome page. Then click on \"Sign up Now\" on the right side of the page.     You will be asked to enter the access code listed below, as well as some personal information. Please follow the directions to create your username and password.     Your access code is: [email or web address]  Expires: 2019  1:21 PM     Your access code will  in 90 days. If you need help or a new code, please call your [facility] clinic or [phone].        Care EveryWhere ID     This is your Care EveryWhere ID. This could be used by other organizations to access your [facility] medical records  [id number]        Your Vitals Were     Pulse Temperature Respirations Height Pulse Oximetry BMI (Body Mass Index)    53 96.9  F (36.1  C) 17 5' 1\" (1.549 m) 95% 30.65 kg/m2       Blood Pressure from Last 3 Encounters:   18 140/74   18 129/72   11/15/18 114/63    Weight from Last 3 Encounters:   18 162 lb 3.2 oz (73.6 kg)   18 162 lb 3.2 oz (73.6 kg)   11/15/18 162 lb (73.5 kg)              Today, you had the following     No orders found for display       Primary Care Provider Office Phone # Fax #    Martinez Shafer [phone]       [address] " [facility] [address]        Equal Access to Services     KJ RUIZ : Hadii aad ku hadhipolitokarolina Chang, waavelinoda tesha, qaандрейnaveen zavalasuzettetyshawn barba, mendez neffpricillajuani albright. So [facility] [place] [facility] [phone].    ATENCIÓN: Si habla español, tiene a troy disposición servicios gratuitos de asistencia lingüística. Llame al [phone].    We comply with applicable federal civil rights laws and [place] laws. We do not discriminate on the basis of race, color, national origin, age, disability, sex, sexual orientation, or gender identity.            Thank you!     Thank you for choosing [facility]  for your care. Our goal is always to provide you with excellent care. Hearing back from our patients is one way we can continue to improve our services. Please take a few minutes to complete the written survey that you may receive in the mail after your visit with us. Thank you!             Your Updated Medication List - Protect others around you: Learn how to safely use, store and throw away your medicines at www.disposemymeds.org.          This list is accurate as of 11/21/18 11:59 PM.  Always use your most recent med list.                   Brand Name Dispense Instructions for use Diagnosis    amLODIPine 2.5 MG tablet    NORVASC     Take 2.5 mg by mouth daily        citalopram 20 MG tablet    celeXA    30 tablet    Take 1 tablet (20 mg) by mouth daily    Major depressive disorder, recurrent episode, mild (H)       cyclopentolate 1 % ophthalmic solution    CYCLOGYL    1 Bottle    Place 1 drop into the right eye 2 times daily    Right eye injury, initial encounter       donepezil 10 MG tablet    ARICEPT    30 tablet    Take 1 tablet (10 mg) by mouth At Bedtime    Major depressive disorder, recurrent episode, mild (H)       erythromycin ophthalmic ointment    ROMYCIN     Place 0.5 inches into the right eye 3 times daily        FAMOTIDINE PO      Take 10 mg by mouth daily        ibuprofen 200 MG  tablet    ADVIL/MOTRIN    30 tablet    Take 1 tablet (200 mg) by mouth every 6 hours as needed for moderate pain    Contusion of left knee, initial encounter       Lidocaine 4 % Patch    LIDOCARE    12 patch    Place 2 patches onto the skin every 24 hours    Contusion of left knee, initial encounter       lisinopril-hydrochlorothiazide 20-12.5 MG per tablet    PRINZIDE/ZESTORETIC    30 tablet    Take 1 tablet by mouth 2 times daily    Essential hypertension, benign       OMEPRAZOLE PO      Take 20 mg by mouth every other day        oxyCODONE 5 MG tablet    ROXICODONE    15 tablet    Take 1 tablet (5 mg) by mouth every 4 hours as needed for moderate to severe pain    Contusion of left knee, initial encounter       polyethylene glycol Packet    MIRALAX/GLYCOLAX    7 packet    Take 17 g by mouth daily    Contusion of left knee, initial encounter       prednisoLONE acetate 1 % ophthalmic susp    PRED FORTE    1 Bottle    Place 1 drop into the right eye 3 times daily    Right eye injury, initial encounter       simvastatin 20 MG tablet    ZOCOR    90 tablet    Take 1 tablet (20 mg) by mouth At Bedtime    Hyperlipidemia LDL goal <130       TYLENOL PO      Take 1,000 mg by mouth 3 times daily

## 2018-11-21 NOTE — PROGRESS NOTES
PRIMARY CARE PROVIDER AND CLINIC RESPONSIBLE:  HollisMartinez RUMA, [address] [place] [address] / [facility] [address]        ADMISSION HISTORY AND PHYSICAL EXAMINATION     Chief Complaint   Patient presents with     Hospital F/U         HISTORY OF PRESENT ILLNESS:  86 year old female, (1/7/1932), admitted to the [facility] TCU for continuation of medical care and rehab.    Pt admitted [facility] 11/13 to 11/14 for fall. Also noted to have R eye iritis.    Pt denies any fevers/chills/chest pain/SOB. + pain in L knee. However, has not been requesting for pain meds.     Please see Micheline Tsai 's CNP admit noted dated 11/15  for details of admission, past medical history, family history, allergies, medication list, social history and other details pertinent with this admission. Hospital admission and dc summary reviewed.      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       Past Surgical History:   Procedure Laterality Date     C NONSPECIFIC PROCEDURE  1998    right hip replacement     C NONSPECIFIC PROCEDURE  1970    tubal ligation     C NONSPECIFIC PROCEDURE  1998    hip replacement     DECOMPRESSION LUMBAR ONE LEVEL  4/19/2013    Procedure: DECOMPRESSION LUMBAR ONE LEVEL;  Decompression Bilateral L4-5  ;  Surgeon: Lang Garcia MD;  Location: [facility] OR       Current Outpatient Prescriptions   Medication Sig     Acetaminophen (TYLENOL PO) Take 1,000 mg by mouth 3 times daily     amLODIPine (NORVASC) 2.5 MG tablet Take 2.5 mg by mouth daily     citalopram (CELEXA) 20 MG tablet Take 1 tablet (20 mg) by mouth daily     cyclopentolate (CYCLOGYL) 1 % ophthalmic solution Place 1 drop into the right eye  2 times daily     donepezil (ARICEPT) 10 MG tablet Take 1 tablet (10 mg) by mouth At Bedtime     erythromycin (ROMYCIN) ophthalmic ointment Place 0.5 inches into the right eye 3 times daily     FAMOTIDINE PO Take 10 mg by mouth daily      ibuprofen (ADVIL/MOTRIN) 200 MG tablet Take 1 tablet (200 mg) by mouth every 6 hours as needed for moderate pain     Lidocaine (LIDOCARE) 4 % Patch Place 2 patches onto the skin every 24 hours     lisinopril-hydrochlorothiazide (PRINZIDE/ZESTORETIC) 20-12.5 MG per tablet Take 1 tablet by mouth 2 times daily     OMEPRAZOLE PO Take 20 mg by mouth every other day     oxyCODONE IR (ROXICODONE) 5 MG tablet Take 1 tablet (5 mg) by mouth every 4 hours as needed for moderate to severe pain     polyethylene glycol (MIRALAX/GLYCOLAX) Packet Take 17 g by mouth daily     prednisoLONE acetate (PRED FORTE) 1 % ophthalmic susp Place 1 drop into the right eye 3 times daily     simvastatin (ZOCOR) 20 MG tablet Take 1 tablet (20 mg) by mouth At Bedtime     No current facility-administered medications for this visit.        Allergies   Allergen Reactions     No Known Drug Allergies        Social History     Social History     Marital status: Single     Spouse name: N/A     Number of children: N/A     Years of education: N/A     Occupational History     Not on file.     Social History Main Topics     Smoking status: Former Smoker     Quit date: 6/7/1966     Smokeless tobacco: Never Used     Alcohol use Yes      Comment: One beer day     Drug use: No     Sexual activity: Yes     Birth control/ protection: Surgical      Comment: Has had TL     Other Topics Concern     Not on file     Social History Narrative          Information reviewed:  Medications, vital signs, orders, nursing notes, problem list, hospital information.     ROS: All 10 point review of system completed, those pertinent positive, please see H&P, the remaining ROS is negative.    /74  Pulse 53  Temp 96.9  F (36.1  C)  Resp 17   "Ht 5' 1\" (1.549 m)  Wt 162 lb 3.2 oz (73.6 kg)  SpO2 95%  BMI 30.65 kg/m2    PHYSICAL EXAMINATION:   GENERAL:  No acute distress. Laying in bed.  SKIN:  Dry and warm.  There is no rash, lesions, ulcers or juandice at area of skin examined.  HEENT:  Head without trauma.  Pupils round, reactive. Exam of conjunctiva and lids are normal. Sclera without icterus. There is no oral thrush.  NECK:  Supple.  There is no cervical adenopathy, no thyromegaly. No jugular venous distension.  CHEST: No reproducible chest tenderness.   LUNGS:  Normal respiratory effort. Lungs are Clear on ascultation.  HEART:  Regular rate and rhythm.  No murmur, gallops or rubs auscultated.  ABDOMEN:  Soft, bowel sounds positive.  There is no tenderness or guarding.   EXTREMITIES: + pain with ROM of L knee with some swelling.  NEUROLOGIC:  Alert and oriented x3.      Lab/Diagnostic data:  Reviewed    Lab Results   Component Value Date    WBC 9.3 11/13/2018     Lab Results   Component Value Date    RBC 4.48 11/13/2018     Lab Results   Component Value Date    HGB 13.1 11/13/2018    HGB 13.0 11/13/2018     Lab Results   Component Value Date    HCT 40.7 11/13/2018     Lab Results   Component Value Date    MCV 91 11/13/2018     Lab Results   Component Value Date    MCH 29.0 11/13/2018     Lab Results   Component Value Date    MCHC 31.9 11/13/2018     Lab Results   Component Value Date    RDW 13.7 11/13/2018     Lab Results   Component Value Date     11/13/2018       Last Comprehensive Metabolic Panel:  Sodium   Date Value Ref Range Status   11/13/2018 137 133 - 144 mmol/L Final     Potassium   Date Value Ref Range Status   11/13/2018 3.6 3.4 - 5.3 mmol/L Final     Chloride   Date Value Ref Range Status   11/13/2018 103 94 - 109 mmol/L Final     Carbon Dioxide   Date Value Ref Range Status   11/13/2018 25 20 - 32 mmol/L Final     Anion Gap   Date Value Ref Range Status   11/13/2018 9 3 - 14 mmol/L Final     Glucose   Date Value Ref Range " Status   11/13/2018 93 70 - 99 mg/dL Final     Urea Nitrogen   Date Value Ref Range Status   11/13/2018 12 7 - 30 mg/dL Final     Creatinine   Date Value Ref Range Status   11/13/2018 0.72 0.52 - 1.04 mg/dL Final     GFR Estimate   Date Value Ref Range Status   11/13/2018 77 >60 mL/min/1.7m2 Final     Comment:     Non  GFR Calc     Calcium   Date Value Ref Range Status   11/13/2018 9.0 8.5 - 10.1 mg/dL Final       ASSESSMENT / PLAN:     Fall, subsequent encounter  Acute pain of left knee  - X ray showed severe OA  - Pain control.  - Bowel regimen.  - f/u with ortho for TKA.    Iritis of right eye  - on erythromycin and prednisone eye gtts.  - f/u with opthalmology as scheduled.    Depression, unspecified depression type  - On celexa.    Dementia without behavioral disturbance, unspecified dementia type  - On aricept.    Benign essential hypertension  - On norvasc and lisinopril-HCTZ.    Hyperlipidemia, unspecified hyperlipidemia type  - on zocor.    Physical deconditioning  -Plan: PT/OT, fall precautions. Care conference with patient and family for the progress of rehab and disposition issues will be discussed as planned. Rehab evaluation and other evaluations including CPT are at rehab logs, to be reviewed separately.  Fall risk assessment as well as cognitive evaluation will be formed during rehab stay if indicated.      Other problems with same care. Primary care doctor and other specialists to address those chronic problems in next clinic appointment to be scheduled upon discharge from the TCU.    Total time spent with patient visit was 35 min including patient visit, review of past records, 1/2 time on patients counseling and coordinating care.

## 2018-11-27 ENCOUNTER — NURSING HOME VISIT (OUTPATIENT)
Dept: GERIATRICS | Facility: CLINIC | Age: 83
End: 2018-11-27
Payer: COMMERCIAL

## 2018-11-27 VITALS
SYSTOLIC BLOOD PRESSURE: 132 MMHG | DIASTOLIC BLOOD PRESSURE: 68 MMHG | OXYGEN SATURATION: 97 % | RESPIRATION RATE: 16 BRPM | TEMPERATURE: 97.4 F | WEIGHT: 163 LBS | BODY MASS INDEX: 30.78 KG/M2 | HEART RATE: 53 BPM | HEIGHT: 61 IN

## 2018-11-27 DIAGNOSIS — R53.81 PHYSICAL DECONDITIONING: ICD-10-CM
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
## 2018-11-27 DIAGNOSIS — M17.12 OSTEOARTHRITIS OF LEFT KNEE, UNSPECIFIED OSTEOARTHRITIS TYPE: ICD-10-CM

## 2018-11-27 DIAGNOSIS — I10 ESSENTIAL HYPERTENSION, BENIGN: ICD-10-CM

## 2018-11-27 DIAGNOSIS — R51.9 HEADACHE ABOVE THE EYE REGION: ICD-10-CM

## 2018-11-27 DIAGNOSIS — H20.9 TRAUMATIC IRITIS: ICD-10-CM

## 2018-11-27 DIAGNOSIS — M25.562 LEFT KNEE PAIN, UNSPECIFIED CHRONICITY: Primary | ICD-10-CM

## 2018-11-27 DIAGNOSIS — K21.9 GASTROESOPHAGEAL REFLUX DISEASE WITHOUT ESOPHAGITIS: ICD-10-CM

## 2018-11-27 PROCEDURE — 99309 SBSQ NF CARE MODERATE MDM 30: CPT | Performed by: NURSE PRACTITIONER

## 2018-11-27 RX ORDER — POLYETHYLENE GLYCOL 3350 17 G/17G
1 POWDER, FOR SOLUTION ORAL DAILY PRN
COMMUNITY
End: 2020-03-07

## 2018-11-27 NOTE — PROGRESS NOTES
[facility]    Chief Complaint   Patient presents with     KARSON       [facility] Medical Record Number:  [id number]  Place of Service where encounter took place:  [facility] ([facility]) [[id number]]    HPI:    Juany Collazo is a 86 year old  (1/7/1932), who is being seen today for an episodic care visit.  HPI information obtained from: facility chart records, facility staff, patient report and [facility] chart review.Today's concern is:    Left knee pain, unspecified chronicity  Osteoarthritis of left knee, unspecified osteoarthritis type  Physical deconditioning  Patient fell at home while going down steps at home and she missing the bottom step and was not able to catch herself. Patient has chronic knee pain, but it is worse after fall. Orthopedics saw Ms. Collazo during her hospitalization and recommend conservative therapies including ice, elevation, compression, NSAIDs. They are recommending she pursue elective TKA and she follow up with orthopedics to discuss. Per note review she has attempted injections and other conservative measures in the past and has not experienced relief. She is here for work with therapies.       PHYSICAL THERAPY is continuing to report concerns that something more severe may be going on as patient continues to report significant pain and has continued effusion. No MRI completed during hospitalization. She has started pool therapy and is really enjoying that.   On exam today she continues to report knee pain and have swelling to the area. She is currently on tylenol scheduled, ibuprofen PRN (not using) and oxycodone PRN (using about once daily), votaren gel. [facility] was asked to schedule appointment last week with [facility] ortho group, as the [place] had said they will not see her earlier for this than already scheduled appointment for mid December. Unfortunately [facility] has not made this appointment yet.    Fall, subsequent  encounter  Right eye traumatic iritis  Patient fell at home on 11/13/18 while going down steps at home and she missing the bottom step and was not able to catch herself.  Opthalmology saw patient during hospitalization. No globe rupture to right eye. She should continue pred forte, cyclopentolate, erythromycin ointment. Follow up with opthalmology as scheduled for 11/29. She continues to report some blurry vision to the right eye that has been present since initial injury      Patient has had headaches on and off above left eye- some lasting longer than others. She tells me that she has had these before admission to TCU.   MRI obtained last week with concern due to headache and concern that left pupil was not initially reactive to light, and challenge completing good neuro exam due to chronic pain of left side (shoulder and knee). MRI from 11/21 showed     IMPRESSION:    1. No evidence of acute infarct, mass, hemorrhage, or herniation.  2. Moderate diffuse parenchymal volume loss and white matter changes  likely due to chronic microvascular ischemic disease.   3. 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        Gastroesophageal reflux disease without esophagitis  Omeprazole taper completed. On daily famotidine per patient preference. Denies any acid reflux symptoms since finishing omeprazole, starting famotidine.      Essential hypertension, benign  Continues on amlodipine (dose reduced at last visit), lisinopril-HCTZ  BP: 121-140/64-78 mmHg  P: 53-78 bpm        ALLERGIES: No known drug allergies  Past Medical, Surgical, Family and Social History reviewed and updated in Psychiatric.    Current Outpatient Prescriptions   Medication Sig Dispense Refill     Acetaminophen (TYLENOL PO) Take 1,000 mg by mouth 3 times daily        "amLODIPine (NORVASC) 2.5 MG tablet Take 2.5 mg by mouth daily       citalopram (CELEXA) 20 MG tablet Take 1 tablet (20 mg) by mouth daily 30 tablet 3     cyclopentolate (CYCLOGYL) 1 % ophthalmic solution Place 1 drop into the right eye 2 times daily 1 Bottle      diclofenac (VOLTAREN) 1 % topical gel Place 2 g onto the skin 4 times daily       donepezil (ARICEPT) 10 MG tablet Take 1 tablet (10 mg) by mouth At Bedtime 30 tablet      erythromycin (ROMYCIN) ophthalmic ointment Place 0.5 inches into the right eye 3 times daily       FAMOTIDINE PO Take 10 mg by mouth daily        ibuprofen (ADVIL/MOTRIN) 200 MG tablet Take 1 tablet (200 mg) by mouth every 6 hours as needed for moderate pain 30 tablet      Lidocaine (LIDOCARE) 4 % Patch Place 2 patches onto the skin every 24 hours 12 patch      lisinopril-hydrochlorothiazide (PRINZIDE/ZESTORETIC) 20-12.5 MG per tablet Take 1 tablet by mouth 2 times daily 30 tablet 3     OMEPRAZOLE PO Take 20 mg by mouth every other day       oxyCODONE IR (ROXICODONE) 5 MG tablet Take 1 tablet (5 mg) by mouth every 4 hours as needed for moderate to severe pain 15 tablet 0     polyethylene glycol (MIRALAX/GLYCOLAX) packet Take 1 packet by mouth daily as needed for constipation       prednisoLONE acetate (PRED FORTE) 1 % ophthalmic susp Place 1 drop into the right eye 3 times daily 1 Bottle      simvastatin (ZOCOR) 20 MG tablet Take 1 tablet (20 mg) by mouth At Bedtime 90 tablet 3     Medications reviewed:  Medications reconciled to facility chart and changes were made to reflect current medications as identified as above med list.     REVIEW OF SYSTEMS:  10 point ROS of systems including Constitutional, Eyes, Respiratory, Cardiovascular, Gastroenterology, Genitourinary, Integumentary, Musculoskeletal, Neurological, Psychiatric were all negative except for pertinent positives noted in my HPI.    Physical Exam:  /68  Pulse 53  Temp 97.4  F (36.3  C)  Resp 16  Ht 5' 1\" (1.549 m)  Wt " 163 lb (73.9 kg)  SpO2 97%  BMI 30.8 kg/m2  GENERAL APPEARANCE:  Alert, pleasant and cooperative, oriented x 4, sitting in wheelchair on exam today  EYES: right eye with dilated pupil, left eye with normal pupil size responsive to light; no drainage present  ENT:  Mouth normal, moist mucous membranes, nose without drainage or crusting, external ears without lesions, hearing acuity:intact  RESP:  respiratory effort normal, no respiratory distress, Lung sounds clear, patient is on RA  CV: auscultation of heart done, rate and rhythm regular. no murmur, no rub or gallop. No BLE edema  ABDOMEN:  normal bowel sounds, soft, nontender, no grimacing or guarding with palpation.  M/S:  Digits and nails normal; left knee with swelling and pain with movement. Is able to move all extremities, but limited to left leg due to pain.   SKIN:  Inspection and palpation of skin and subcutaneous tissue: skin warm, dry without rashes  NEURO: cranial nerves 2-12 grossly intact, no facial asymmetry, no speech deficits and able to follow directions, moves all extremities symmetrically  PSYCH:  insight and judgement intact, memory baseline, affect and mood normal    Recent Labs:     CBC RESULTS:   Recent Labs   Lab Test  11/13/18   0952  08/10/18   1154   WBC  9.3  10.2   RBC  4.48  4.31   HGB  13.0  13.1  12.3   HCT  40.7  39.0   MCV  91  91   MCH  29.0  28.5   MCHC  31.9  31.5   RDW  13.7  13.7   PLT  242  228       Last Basic Metabolic Panel:  Recent Labs   Lab Test  11/13/18   0952  03/26/18   0921   NA  137  138   POTASSIUM  3.6  4.3   CHLORIDE  103  103   MAUREEN  9.0  9.3   CO2  25  28   BUN  12  13   CR  0.72  0.72   GLC  93  96       Liver Function Studies -   Recent Labs   Lab Test  11/13/18   0952  03/26/18   0921   PROTTOTAL  6.7*  7.1   ALBUMIN  3.6  3.9   BILITOTAL  0.5  0.6   ALKPHOS  78  80   AST  17  22   ALT  20  19       Assessment/Plan:  (M25.562) Left knee pain, unspecified chronicity (primary encounter diagnosis)  (M17.12)  Osteoarthritis of left knee, unspecified osteoarthritis type  (R53.81) Physical deconditioning  Comment: Acute, continues to report significant pain- very weak to left leg- bowels moving well- with effusion to right knee  Plan: Continue scheduled tylenol, voltaren gel, ibuprofen PRN and oxycodone PRN. Nursing to offer medication when she is in pain.  to schedule ortho appointment ASAP due to continued swelling, pain-MRI may be needed to rule out any other possible pathology? Change miralax to PRN daily from scheduled. Encourage participation in physical therapy/occupational therapy for strengthening and deconditioning. Discharge planning per their recommendation. Social work to assist with d/c planning.    (W19.XXXD) Fall, subsequent encounter    (R51) Headache above the eye region  (H20.9) Right eye traumatic iritis  Comment: Acute, patient fell 11/13- MRI unremarkable, continues to have occasional left frontal headache above eye  Plan: Continue pred forte, cyclopentolate, erythromycin ointment. Follow up with opthalmology as scheduled for later this week. If nursing notes any neurological changes today, headache worsens or any changes to eyes she should be sent to [facility] ER. Follow up when results available.     (K21.9) Gastroesophageal reflux disease without esophagitis  Comment: Acute on chronic- no signs of acid reflux with change of medications  Plan: Continue famotidine. If not under control with famotidine in the future consider restarting omeprazole.      (I10) Essential hypertension, benign  Comment: Chronic, BP improved with decreased amlodipine dose  Plan: BP goal <150/90. Continue reduced amlodipine dose of 2.5 mg daily, PTA lisinopril-hydrochlorothiazide. VS per TCU policy.      Electronically signed by  BETH Rosario CNP

## 2018-11-27 NOTE — LETTER
11/27/2018        RE: Juany Collazo  [address] [place] [address]        [facility]    Chief Complaint   Patient presents with     RECHECK       [place] Medical Record Number:  [id number]  Place of Service where encounter took place:  [facility] ([facility]) [[id number]]    HPI:    Juany Collazo is a 86 year old  (1/7/1932), who is being seen today for an episodic care visit.  HPI information obtained from: facility chart records, facility staff, patient report and [facility] chart review.Today's concern is:    Left knee pain, unspecified chronicity  Osteoarthritis of left knee, unspecified osteoarthritis type  Physical deconditioning  Patient fell at home while going down steps at home and she missing the bottom step and was not able to catch herself. Patient has chronic knee pain, but it is worse after fall. Orthopedics saw Ms. Collazo during her hospitalization and recommend conservative therapies including ice, elevation, compression, NSAIDs. They are recommending she pursue elective TKA and she follow up with orthopedics to discuss. Per note review she has attempted injections and other conservative measures in the past and has not experienced relief. She is here for work with therapies.       PHYSICAL THERAPY is continuing to report concerns that something more severe may be going on as patient continues to report significant pain and has continued effusion. No MRI completed during hospitalization. She has started pool therapy and is really enjoying that.   On exam today she continues to report knee pain and have swelling to the area. She is currently on tylenol scheduled, ibuprofen PRN (not using) and oxycodone PRN (using about once daily), votaren gel.  was asked to schedule appointment last week with [facility] ortho group, as the [place] had said they will not see her earlier for this than already scheduled appointment  for mid December. Unfortunately  has not made this appointment yet.    Fall, subsequent encounter  Right eye traumatic iritis  Patient fell at home on 11/13/18 while going down steps at home and she missing the bottom step and was not able to catch herself.  Opthalmology saw patient during hospitalization. No globe rupture to right eye. She should continue pred forte, cyclopentolate, erythromycin ointment. Follow up with opthalmology as scheduled for 11/29. She continues to report some blurry vision to the right eye that has been present since initial injury      Patient has had headaches on and off above left eye- some lasting longer than others. She tells me that she has had these before admission to TCU.   MRI obtained last week with concern due to headache and concern that left pupil was not initially reactive to light, and challenge completing good neuro exam due to chronic pain of left side (shoulder and knee). MRI from 11/21 showed     IMPRESSION:    1. No evidence of acute infarct, mass, hemorrhage, or herniation.  2. Moderate diffuse parenchymal volume loss and white matter changes  likely due to chronic microvascular ischemic disease.   3. 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        Gastroesophageal reflux disease without esophagitis  Omeprazole taper completed. On daily famotidine per patient preference. Denies any acid reflux symptoms since finishing omeprazole, starting famotidine.      Essential hypertension, benign  Continues on amlodipine (dose reduced at last visit), lisinopril-HCTZ  BP: 121-140/64-78 mmHg  P: 53-78 bpm        ALLERGIES: No known drug allergies  Past Medical, Surgical, Family and Social History reviewed and updated in [facility].    Current Outpatient Prescriptions   Medication Sig  Dispense Refill     Acetaminophen (TYLENOL PO) Take 1,000 mg by mouth 3 times daily       amLODIPine (NORVASC) 2.5 MG tablet Take 2.5 mg by mouth daily       citalopram (CELEXA) 20 MG tablet Take 1 tablet (20 mg) by mouth daily 30 tablet 3     cyclopentolate (CYCLOGYL) 1 % ophthalmic solution Place 1 drop into the right eye 2 times daily 1 Bottle      diclofenac (VOLTAREN) 1 % topical gel Place 2 g onto the skin 4 times daily       donepezil (ARICEPT) 10 MG tablet Take 1 tablet (10 mg) by mouth At Bedtime 30 tablet      erythromycin (ROMYCIN) ophthalmic ointment Place 0.5 inches into the right eye 3 times daily       FAMOTIDINE PO Take 10 mg by mouth daily        ibuprofen (ADVIL/MOTRIN) 200 MG tablet Take 1 tablet (200 mg) by mouth every 6 hours as needed for moderate pain 30 tablet      Lidocaine (LIDOCARE) 4 % Patch Place 2 patches onto the skin every 24 hours 12 patch      lisinopril-hydrochlorothiazide (PRINZIDE/ZESTORETIC) 20-12.5 MG per tablet Take 1 tablet by mouth 2 times daily 30 tablet 3     OMEPRAZOLE PO Take 20 mg by mouth every other day       oxyCODONE IR (ROXICODONE) 5 MG tablet Take 1 tablet (5 mg) by mouth every 4 hours as needed for moderate to severe pain 15 tablet 0     polyethylene glycol (MIRALAX/GLYCOLAX) packet Take 1 packet by mouth daily as needed for constipation       prednisoLONE acetate (PRED FORTE) 1 % ophthalmic susp Place 1 drop into the right eye 3 times daily 1 Bottle      simvastatin (ZOCOR) 20 MG tablet Take 1 tablet (20 mg) by mouth At Bedtime 90 tablet 3     Medications reviewed:  Medications reconciled to facility chart and changes were made to reflect current medications as identified as above med list.     REVIEW OF SYSTEMS:  10 point ROS of systems including Constitutional, Eyes, Respiratory, Cardiovascular, Gastroenterology, Genitourinary, Integumentary, Musculoskeletal, Neurological, Psychiatric were all negative except for pertinent positives noted in my  "HPI.    Physical Exam:  /68  Pulse 53  Temp 97.4  F (36.3  C)  Resp 16  Ht 5' 1\" (1.549 m)  Wt 163 lb (73.9 kg)  SpO2 97%  BMI 30.8 kg/m2  GENERAL APPEARANCE:  Alert, pleasant and cooperative, oriented x 4, sitting in wheelchair on exam today  EYES: right eye with dilated pupil, left eye with normal pupil size responsive to light; no drainage present  ENT:  Mouth normal, moist mucous membranes, nose without drainage or crusting, external ears without lesions, hearing acuity:intact  RESP:  respiratory effort normal, no respiratory distress, Lung sounds clear, patient is on RA  CV: auscultation of heart done, rate and rhythm regular. no murmur, no rub or gallop. No BLE edema  ABDOMEN:  normal bowel sounds, soft, nontender, no grimacing or guarding with palpation.  M/S:  Digits and nails normal; left knee with swelling and pain with movement. Is able to move all extremities, but limited to left leg due to pain.   SKIN:  Inspection and palpation of skin and subcutaneous tissue: skin warm, dry without rashes  NEURO: cranial nerves 2-12 grossly intact, no facial asymmetry, no speech deficits and able to follow directions, moves all extremities symmetrically  PSYCH:  insight and judgement intact, memory baseline, affect and mood normal    Recent Labs:     CBC RESULTS:   Recent Labs   Lab Test  11/13/18   0952  08/10/18   1154   WBC  9.3  10.2   RBC  4.48  4.31   HGB  13.0  13.1  12.3   HCT  40.7  39.0   MCV  91  91   MCH  29.0  28.5   MCHC  31.9  31.5   RDW  13.7  13.7   PLT  242  228       Last Basic Metabolic Panel:  Recent Labs   Lab Test  11/13/18 0952  03/26/18   0921   NA  137  138   POTASSIUM  3.6  4.3   CHLORIDE  103  103   MAUREEN  9.0  9.3   CO2  25  28   BUN  12  13   CR  0.72  0.72   GLC  93  96       Liver Function Studies -   Recent Labs   Lab Test  11/13/18 0952 03/26/18   0921   PROTTOTAL  6.7*  7.1   ALBUMIN  3.6  3.9   BILITOTAL  0.5  0.6   ALKPHOS  78  80   AST  17  22   ALT  20  19 "       Assessment/Plan:  (M25.562) Left knee pain, unspecified chronicity (primary encounter diagnosis)  (M17.12) Osteoarthritis of left knee, unspecified osteoarthritis type  (R53.81) Physical deconditioning  Comment: Acute, continues to report significant pain- very weak to left leg- bowels moving well- with effusion to right knee  Plan: Continue scheduled tylenol, voltaren gel, ibuprofen PRN and oxycodone PRN. Nursing to offer medication when she is in pain.  to schedule ortho appointment ASAP due to continued swelling, pain-MRI may be needed to rule out any other possible pathology? Change miralax to PRN daily from scheduled. Encourage participation in physical therapy/occupational therapy for strengthening and deconditioning. Discharge planning per their recommendation. Social work to assist with d/c planning.    (W19.XXXD) Fall, subsequent encounter    (R51) Headache above the eye region  (H20.9) Right eye traumatic iritis  Comment: Acute, patient fell 11/13- MRI unremarkable, continues to have occasional left frontal headache above eye  Plan: Continue pred forte, cyclopentolate, erythromycin ointment. Follow up with opthalmology as scheduled for later this week. If nursing notes any neurological changes today, headache worsens or any changes to eyes she should be sent to [facility] ER. Follow up when results available.     (K21.9) Gastroesophageal reflux disease without esophagitis  Comment: Acute on chronic- no signs of acid reflux with change of medications  Plan: Continue famotidine. If not under control with famotidine in the future consider restarting omeprazole.      (I10) Essential hypertension, benign  Comment: Chronic, BP improved with decreased amlodipine dose  Plan: BP goal <150/90. Continue reduced amlodipine dose of 2.5 mg daily, PTA lisinopril-hydrochlorothiazide. VS per TCU policy.      Electronically signed by  BETH Rosario CNP                      Sincerely,        Micheline MACIAS  BETH Tsai CNP

## 2018-11-29 ENCOUNTER — TELEPHONE (OUTPATIENT)
Dept: NURSING | Facility: CLINIC | Age: 83
End: 2018-11-29

## 2018-11-29 ENCOUNTER — OFFICE VISIT (OUTPATIENT)
Dept: ORTHOPEDICS | Facility: CLINIC | Age: 83
End: 2018-11-29
Payer: COMMERCIAL

## 2018-11-29 VITALS
BODY MASS INDEX: 30.78 KG/M2 | HEIGHT: 61 IN | WEIGHT: 163 LBS | SYSTOLIC BLOOD PRESSURE: 110 MMHG | DIASTOLIC BLOOD PRESSURE: 66 MMHG

## 2018-11-29 DIAGNOSIS — M17.12 PRIMARY OSTEOARTHRITIS OF LEFT KNEE: Primary | ICD-10-CM

## 2018-11-29 PROCEDURE — 20611 DRAIN/INJ JOINT/BURSA W/US: CPT | Mod: LT | Performed by: FAMILY MEDICINE

## 2018-11-29 PROCEDURE — 99214 OFFICE O/P EST MOD 30 MIN: CPT | Mod: 25 | Performed by: FAMILY MEDICINE

## 2018-11-29 NOTE — MR AVS SNAPSHOT
After Visit Summary   11/29/2018    Juany Collazo    MRN: [id number]           Patient Information     Date Of Birth          1/7/1932        Visit Information        Provider Department      11/29/2018 9:00 AM Tanika Gaitan DO [facility]        Today's Diagnoses     Primary osteoarthritis of left knee    -  1      Care Instructions    1. Primary osteoarthritis of left knee      Pain is due to known osteoarthritis which has increased because of the recent fall  No need for MRI - discussed with Jeannette (daughter) and Micheline (NP) at [facility]  Would not recommend knee replacement   Synvisc One injection and drainage of the left knee was performed today in clinic  The Synvisc One injection will not start working until approximately 4 weeks from now.  No bath, hot tub or pool for 2 days  Rx for topical Ointment and this will be mailed directly to you  Recommend daily pool therapy x 2 weeks    Follow-up as needed.                Follow-ups after your visit        Additional Services     [facility] PT, HAND, AND CHIROPRACTIC REFERRAL       Physical Therapy, Hand Therapy and Chiropractic Care are available through:  *[facility]  *Hand Therapy (Occupational Therapy or Physical Therapy)  *[facility]      Patient should have pool therapy daily x 2 weeks while at [facility].    Would also not do knee extension exercises in therapy. Limit stairs and no squats.    Physical therapy, Hand therapy and/or Chiropractic care has been recommended by your physician as an excellent treatment option to reduce pain and help people return to normal activities, including sports.  Therapy and/or chiropractic care services are a great complement or alternative to expensive and invasive surgery, injections, or long-term use of prescription medications. The primary goal is to identify the underlying problem and provide you the tools to manage your condition on  your own.     Please be aware that coverage of these services is subject to the terms and limitations of your health insurance plan.  Call member services at your health plan with any benefit or coverage questions.      Please bring the following to your appointment:  *Your personal calendar for scheduling future appointments  *Comfortable clothing                  Your next 10 appointments already scheduled     Dec 01, 2018 10:20 AM CST   (Arrive by 10:05 AM)   NEW GENERAL with Kelechi Beatty MD   [facility] ([facility])    [address]  4th [address]  [facility] [address] [phone]            Mar 27, 2019  9:20 AM CDT   PHYSICAL with Martinez Shafer MD   [facility] ([facility])    [address] [address] [phone]              Future tests that were ordered for you today     Open Future Orders        Priority Expected Expires Ordered    [facility] PT, HAND, AND CHIROPRACTIC REFERRAL Routine  11/29/2019 11/29/2018            Who to contact     If you have questions or need follow up information about today's clinic visit or your schedule please contact [facility] directly at [phone].  Normal or non-critical lab and imaging results will be communicated to you by [employer], letter or phone within 4 business days after the clinic has received the results. If you do not hear from us within 7 days, please contact the clinic through [employer] or phone. If you have a critical or abnormal lab result, we will notify you by phone as soon as possible.  Submit refill requests through [employer] or call your pharmacy and they will forward the refill request to us. Please allow 3 business days for your refill to be completed.          Additional Information About Your Visit        [employer] Information     [employer] gives you secure access to your electronic health record. If you see a primary care  "provider, you can also send messages to your care team and make appointments. If you have questions, please call your primary care clinic.  If you do not have a primary care provider, please call [phone] and they will assist you.        Care EveryWhere ID     This is your Care EveryWhere ID. This could be used by other organizations to access your [facility] medical records  [id number]        Your Vitals Were     Height BMI (Body Mass Index)                5' 1\" (1.549 m) 30.8 kg/m2           Blood Pressure from Last 3 Encounters:   11/29/18 110/66   11/27/18 132/68   11/21/18 140/74    Weight from Last 3 Encounters:   11/29/18 163 lb (73.9 kg)   11/27/18 163 lb (73.9 kg)   11/21/18 162 lb 3.2 oz (73.6 kg)              We Performed the Following     Large Joint Injection/Arthocentesis          Today's Medication Changes          These changes are accurate as of 11/29/18 10:32 AM.  If you have any questions, ask your nurse or doctor.               Start taking these medicines.        Dose/Directions    COMPOUNDED NON-CONTROLLED SUBSTANCE - PHARMACY TO MIX COMPOUNDED MEDICATION   Commonly known as:  CMPD RX   Used for:  Primary osteoarthritis of left knee   Started by:  Tanika Gaitan,         Apply 1-2 grams to the affected area 3-4 times daily. Rub in well for 1-2 minutes.   Quantity:  240 g   Refills:  3            Where to get your medicines      These medications were sent to [facility] - [place], [place] - [address]  [address], [facility] [address]     Phone:  [phone]     COMPOUNDED NON-CONTROLLED SUBSTANCE - PHARMACY TO MIX COMPOUNDED MEDICATION                Primary Care Provider Office Phone # Fax #    Martinez Shafer [phone]       [address] [place] [address]        Equal Access to Services     KJ RUIZ AH: Hadii jeff schneider hadasho Soomaali, waaxda luqadaha, qaybta kaalmatyshawn barba, mendez arreola " larocael albright. So [facility] [phone].    ATENCIÓN: Si roxanala edison, tiene a troy disposición servicios gratuitos de asistencia lingüística. Barrera hinton [phone].    We comply with applicable federal civil rights laws and [place] laws. We do not discriminate on the basis of race, color, national origin, age, disability, sex, sexual orientation, or gender identity.            Thank you!     Thank you for choosing [facility]  for your care. Our goal is always to provide you with excellent care. Hearing back from our patients is one way we can continue to improve our services. Please take a few minutes to complete the written survey that you may receive in the mail after your visit with us. Thank you!             Your Updated Medication List - Protect others around you: Learn how to safely use, store and throw away your medicines at www.disposemymeds.org.          This list is accurate as of 11/29/18 10:32 AM.  Always use your most recent med list.                   Brand Name Dispense Instructions for use Diagnosis    amLODIPine 2.5 MG tablet    NORVASC     Take 2.5 mg by mouth daily        citalopram 20 MG tablet    celeXA    30 tablet    Take 1 tablet (20 mg) by mouth daily    Major depressive disorder, recurrent episode, mild (H)       COMPOUNDED NON-CONTROLLED SUBSTANCE - PHARMACY TO MIX COMPOUNDED MEDICATION    CMPD RX    240 g    Apply 1-2 grams to the affected area 3-4 times daily. Rub in well for 1-2 minutes.    Primary osteoarthritis of left knee       cyclopentolate 1 % ophthalmic solution    CYCLOGYL    1 Bottle    Place 1 drop into the right eye 2 times daily    Right eye injury, initial encounter       diclofenac 1 % topical gel    VOLTAREN     Place 2 g onto the skin 4 times daily        donepezil 10 MG tablet    ARICEPT    30 tablet    Take 1 tablet (10 mg) by mouth At Bedtime    Major depressive disorder, recurrent episode, mild (H)       erythromycin 5 MG/GM ophthalmic ointment     ROMYCIN     Place 0.5 inches into the right eye 3 times daily        FAMOTIDINE PO      Take 10 mg by mouth daily        ibuprofen 200 MG tablet    ADVIL/MOTRIN    30 tablet    Take 1 tablet (200 mg) by mouth every 6 hours as needed for moderate pain    Contusion of left knee, initial encounter       Lidocaine 4 % Patch    LIDOCARE    12 patch    Place 2 patches onto the skin every 24 hours    Contusion of left knee, initial encounter       lisinopril-hydrochlorothiazide 20-12.5 MG per tablet    PRINZIDE/ZESTORETIC    30 tablet    Take 1 tablet by mouth 2 times daily    Essential hypertension, benign       oxyCODONE 5 MG tablet    ROXICODONE    15 tablet    Take 1 tablet (5 mg) by mouth every 4 hours as needed for moderate to severe pain    Contusion of left knee, initial encounter       polyethylene glycol packet    MIRALAX/GLYCOLAX     Take 1 packet by mouth daily as needed for constipation        prednisoLONE acetate 1 % ophthalmic suspension    PRED FORTE    1 Bottle    Place 1 drop into the right eye 3 times daily    Right eye injury, initial encounter       simvastatin 20 MG tablet    ZOCOR    90 tablet    Take 1 tablet (20 mg) by mouth At Bedtime    Hyperlipidemia LDL goal <130       TYLENOL PO      Take 1,000 mg by mouth 3 times daily

## 2018-11-29 NOTE — PATIENT INSTRUCTIONS
1. Primary osteoarthritis of left knee      Pain is due to known osteoarthritis which has increased because of the recent fall  No need for MRI - discussed with Jeannette (daughter) and Micheline (NP) at [facility]  Would not recommend knee replacement   Synvisc One injection and drainage of the left knee was performed today in clinic  The Synvisc One injection will not start working until approximately 4 weeks from now.  No bath, hot tub or pool for 2 days  Rx for topical Ointment and this will be mailed directly to you  Recommend daily pool therapy x 2 weeks    Follow-up as needed.

## 2018-11-29 NOTE — TELEPHONE ENCOUNTER
Triage spoke with daughter.  CTC on file.     She was wondering since patient fell why she was needing to see Opthalmology and Orthopedics.     Chart notes reviewed from ER report and TCU eval.    Daughter expressed understanding. Advised her to contact medical records if she is needing more information.     Fabiola OBREGON RN, BSN, PHN

## 2018-11-29 NOTE — TELEPHONE ENCOUNTER
Patient's daughter calling regarding patient falling a couple weeks ago (per phone center). When she was transferred to RN, patient's daughter said she would call us back because she had a doctor calling her. Awaiting callback from daughter.

## 2018-11-29 NOTE — LETTER
11/29/2018         RE: Juany Collazo  [address] [place] [address]        Dear Colleague,    Thank you for referring your patient, Juany Collazo, to the [facility]. Please see a copy of my visit note below.    ASSESSMENT & PLAN    1. Primary osteoarthritis of left knee      Pain is due to known osteoarthritis with acute exacerbation since her fall   Spoke to her daughter and the nurse practitioner at length about recommendations and plan.  No need for MRI - discussed with Jeannette (daughter) and Micheline (NP) at [facility]  Would not recommend knee replacement at this time . Additionally her daughter is very reluctant to pursue knee replacement as she feels her prior back surgery worsened her her mother's confusion/dementia.  Synvisc One injection and drainage of the left knee was performed today in clinic  The Synvisc One injection will not start working until approximately 4 weeks from now.  No bath, hot tub or pool for 2 days.  Thereafter recommend pool therapy once a day times 2 weeks.  Rx for topical Ointment and this will be mailed directly to you    Follow-up as needed.    -----    SUBJECTIVE:  Juany Collazo is a 86 year old female who is seen in follow-up for left knee pain.They were last seen 8/17/2018 with a left knee intra-articular corticosteroid injection completed at that time. Patient reports minimal relief following the injection. Patient did have a fall and was seen in the emergency department on 11/13/18 with new x-rays taken of her left knee. She has been staying at a nursing home since the fall.  She is unsure what she would like to do regarding her knees today. She has been wheel chair bound since the fall and is currently doing physical therapy at [facility].     Patient is confused about her appointment today. She thought that she was coming for her eyes today. Patient was scheduled for an appointment with Dr. Steen on 12/12 but that appointment  "was canceled by [facility] because she was able to get in sooner here.     The patient is seen by themselves.    Patient's past medical, surgical, social, and family histories were reviewed today and no changes are noted.    REVIEW OF SYSTEMS:  Constitutional: NEGATIVE for fever, chills, change in weight  Skin: NEGATIVE for worrisome rashes, moles or lesions  GI/: NEGATIVE for bowel or bladder changes  Neuro: NEGATIVE for weakness, dizziness or paresthesias    OBJECTIVE:  /66  Ht 5' 1\" (1.549 m)  Wt 163 lb (73.9 kg)  BMI 30.8 kg/m2   General: healthy, alert and in no distress  HEENT: no scleral icterus or conjunctival erythema  Skin: no suspicious lesions or rash. No jaundice.  CV: no pedal edema  Resp: normal respiratory effort without conversational dyspnea   Psych: Confused which is her baseline, normal mood and affect  Gait: In a wheelchair   Neuro: normal light touch sensory exam of the extremities.    MSK:  LEFT KNEE  Palpation:    Tender about the medial patellar facet and mildly over medial joint. Remainder of bony and ligamentous landmarks are nontender.    Moderate effusion is present    Patellofemoral crepitus is Present  Range of Motion:     00 extension to 1200 flexion  Strength:    Extensor mechanism intact    Independent visualization of the below image:  Exam: 2 views of the left knee dated 11/13/2018.     COMPARISON: 8/10/2018.    IMPRESSION:  1. Large left knee joint effusion, stable since the comparison  radiographs of 8/10/2018. No displaced fractures noted.  2. 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  3. Findings were discussed with the ordering ER physician Dr. Tere Miranda at 1040 on 11/13/2018.     KRUNAL LEWIS MD    XR KNEE LT 3 VW 8/10/2018 10:15 AM     HISTORY: Pain.     COMPARISON: None.         IMPRESSION: No evidence of acute fracture or malalignment. Moderate  degenerative change with tricompartmental joint space loss and  osteophytosis. There is a " moderate knee effusion.     VETO MUSTAFA MD    Large Joint Injection/Arthocentesis  Date/Time: 11/29/2018 10:27 AM  Performed by: GREG GAITAN  Authorized by: GREG GAITAN     Indications:  Osteoarthritis and pain  Needle Size:  22 G  Guidance: ultrasound    Approach:  Superolateral  Location:  Knee  Site:  L knee joint  Medications:  48 mg hylan 48 MG/6ML  Aspirate amount (mL):  21  Aspirate:  Serous  Outcome:  Tolerated well, no immediate complications  Procedure discussed: discussed risks, benefits, and alternatives    Consent Given by:  Patient  Timeout: timeout called immediately prior to procedure    Prep: patient was prepped and draped in usual sterile fashion             Patient's conditions were thoroughly discussed during today's visit with greater than 50% of the visit spent counseling the patient with total time spent face-to-face with the patient being 60 minutes.    Greg Gaitan DO [facility]        Again, thank you for allowing me to participate in the care of your patient.        Sincerely,        Greg Gaitan DO

## 2018-11-29 NOTE — PROGRESS NOTES
ASSESSMENT & PLAN    1. Primary osteoarthritis of left knee      Pain is due to known osteoarthritis with acute exacerbation since her fall   Spoke to her daughter and the nurse practitioner at length about recommendations and plan.  No need for MRI - discussed with Jeannette (daughter) and Micheline (NP) at [facility]  Would not recommend knee replacement at this time . Additionally her daughter is very reluctant to pursue knee replacement as she feels her prior back surgery worsened her her mother's confusion/dementia.  Synvisc One injection and drainage of the left knee was performed today in clinic  The Synvisc One injection will not start working until approximately 4 weeks from now.  No bath, hot tub or pool for 2 days.  Thereafter recommend pool therapy once a day times 2 weeks.  Rx for topical Ointment and this will be mailed directly to you    Follow-up as needed.    -----    SUBJECTIVE:  Juany Collazo is a 86 year old female who is seen in follow-up for left knee pain.They were last seen 8/17/2018 with a left knee intra-articular corticosteroid injection completed at that time. Patient reports minimal relief following the injection. Patient did have a fall and was seen in the emergency department on 11/13/18 with new x-rays taken of her left knee. She has been staying at a nursing home since the fall.  She is unsure what she would like to do regarding her knees today. She has been wheel chair bound since the fall and is currently doing physical therapy at [facility].     Patient is confused about her appointment today. She thought that she was coming for her eyes today. Patient was scheduled for an appointment with Dr. Steen on 12/12 but that appointment was canceled by [facility] because she was able to get in sooner here.     The patient is seen by themselves.    Patient's past medical, surgical, social, and family histories were reviewed today and no changes are noted.    REVIEW OF  "SYSTEMS:  Constitutional: NEGATIVE for fever, chills, change in weight  Skin: NEGATIVE for worrisome rashes, moles or lesions  GI/: NEGATIVE for bowel or bladder changes  Neuro: NEGATIVE for weakness, dizziness or paresthesias    OBJECTIVE:  /66  Ht 5' 1\" (1.549 m)  Wt 163 lb (73.9 kg)  BMI 30.8 kg/m2   General: healthy, alert and in no distress  HEENT: no scleral icterus or conjunctival erythema  Skin: no suspicious lesions or rash. No jaundice.  CV: no pedal edema  Resp: normal respiratory effort without conversational dyspnea   Psych: Confused which is her baseline, normal mood and affect  Gait: In a wheelchair   Neuro: normal light touch sensory exam of the extremities.    MSK:  LEFT KNEE  Palpation:    Tender about the medial patellar facet and mildly over medial joint. Remainder of bony and ligamentous landmarks are nontender.    Moderate effusion is present    Patellofemoral crepitus is Present  Range of Motion:     00 extension to 1200 flexion  Strength:    Extensor mechanism intact    Independent visualization of the below image:  Exam: 2 views of the left knee dated 11/13/2018.     COMPARISON: 8/10/2018.    IMPRESSION:  1. Large left knee joint effusion, stable since the comparison  radiographs of 8/10/2018. No displaced fractures noted.  2. 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  3. Findings were discussed with the ordering ER physician Dr. Tere Miranda at 1040 on 11/13/2018.     KRUNAL LEWIS MD    XR KNEE LT 3 VW 8/10/2018 10:15 AM     HISTORY: Pain.     COMPARISON: None.         IMPRESSION: No evidence of acute fracture or malalignment. Moderate  degenerative change with tricompartmental joint space loss and  osteophytosis. There is a moderate knee effusion.     VETO MUSTAFA MD    Large Joint Injection/Arthocentesis  Date/Time: 11/29/2018 10:27 AM  Performed by: GREG FISCHER  Authorized by: GREG FISCHER     Indications:  Osteoarthritis and pain  Needle Size:  22 " G  Guidance: ultrasound    Approach:  Superolateral  Location:  Knee  Site:  L knee joint  Medications:  48 mg hylan 48 MG/6ML  Aspirate amount (mL):  21  Aspirate:  Serous  Outcome:  Tolerated well, no immediate complications  Procedure discussed: discussed risks, benefits, and alternatives    Consent Given by:  Patient  Timeout: timeout called immediately prior to procedure    Prep: patient was prepped and draped in usual sterile fashion             Patient's conditions were thoroughly discussed during today's visit with greater than 50% of the visit spent counseling the patient with total time spent face-to-face with the patient being 60 minutes.    Tanika Gaitan DO [facility]

## 2018-12-01 ENCOUNTER — OFFICE VISIT (OUTPATIENT)
Dept: OPHTHALMOLOGY | Facility: CLINIC | Age: 83
End: 2018-12-01

## 2018-12-01 DIAGNOSIS — H20.9 TRAUMATIC IRITIS: Primary | ICD-10-CM

## 2018-12-01 ASSESSMENT — CUP TO DISC RATIO
OS_RATIO: 0.1
OD_RATIO: 0.1

## 2018-12-01 ASSESSMENT — SLIT LAMP EXAM - LIDS
COMMENTS: NORMAL
COMMENTS: NORMAL

## 2018-12-01 ASSESSMENT — VISUAL ACUITY
OS_SC: 20/40
OD_SC: 20/80
METHOD: SNELLEN - LINEAR
OD_PH_SC: 20/50
OS_SC+: -1

## 2018-12-01 ASSESSMENT — TONOMETRY
OD_IOP_MMHG: 19
OS_IOP_MMHG: 15
IOP_METHOD: ICARE

## 2018-12-01 ASSESSMENT — EXTERNAL EXAM - LEFT EYE: OS_EXAM: NORMAL

## 2018-12-01 ASSESSMENT — CONF VISUAL FIELD
OD_NORMAL: 1
OS_NORMAL: 1
METHOD: COUNTING FINGERS

## 2018-12-01 ASSESSMENT — EXTERNAL EXAM - RIGHT EYE: OD_EXAM: NORMAL

## 2018-12-01 NOTE — PATIENT INSTRUCTIONS
Decrease prednisone forte to twice a day right eye x 1 week, once a day right eye x 1 week then off.     STOP cyclophenolate right eye.

## 2018-12-01 NOTE — PROGRESS NOTES
I have confirmed the patient's and reviewed Past Medical History, Past Surgical History, Social History, Family History, Problem List, Medication List and agree with Tech note.    CC: f/u of eye trauma    HPI: Juany Collazo is a 86 year old female here for follow-up of ocular trauma.    Was recently in the hospital for a right eye blunt trauma on11/13/18. She was walking down the stairs and thought she was done with the last step but there was an additional step. She tripped and fell and her face fell on a door. There were no sharp objects that hit her right eye. CT negative for fractures or open globe. Seen by Dr. Forbes 11/14/18 noted to have traumatic iritis. Started on the following:                            Continue Pred Forte three times a day                         Start cyclopentolate three times a day                         Erythromycin ointment for eyelid abrasions    She currently is taking Pred forte 3 three times a day right eye and cyclopentolate three times a day.     Doing well. No changes to her vision.     ASSESSMENT/PLAN:   1) Traumatic iritis, right eye   - following fall 11/13/18   - doing well, no inflammation   - taper PF twice a day x 1 week, once a day x 1 week then off   - stop cyclopentolate   - return to care with primary eye doctor (discussed possibility of damage to drainage system that could increase pressures in the future)      2) Cataract right eye   - visually significant    Complete documentation of historical and exam elements from today's encounter can be found in the full encounter summary report (not reduplicated in this progress note).  I personally obtained the chief complaint(s) and history of present illness.  I confirmed and edited as necessary the review of systems, past medical/surgical history, family history, social history, and examination findings as documented by others; and I examined the patient myself.  I personally reviewed the relevant tests, images,  and reports as documented above.  I formulated and edited as necessary the assessment and plan and discussed the findings and management plan with the patient and family. - Kelechi Beatty MD

## 2018-12-01 NOTE — MR AVS SNAPSHOT
After Visit Summary   12/1/2018    Juany Collazo    MRN: [id number]           Patient Information     Date Of Birth          1/7/1932        Visit Information        Provider Department      12/1/2018 10:20 AM Kelechi Beatty MD [facility]        Today's Diagnoses     Traumatic iritis - Right Eye    -  1      Care Instructions    Decrease prednisone forte to twice a day right eye x 1 week, once a day right eye x 1 week then off.     STOP cyclophenolate right eye.               Follow-ups after your visit        Your next 10 appointments already scheduled     Mar 27, 2019  9:20 AM CDT   PHYSICAL with Martinez Shafer MD   [facility]    [address] [address] [phone]              Who to contact     Please call your clinic at [phone] to:    Ask questions about your health    Make or cancel appointments    Discuss your medicines    Learn about your test results    Speak to your doctor            Additional Information About Your Visit        [employer] Information     [employer]Easy gives you secure access to your electronic health record. If you see a primary care provider, you can also send messages to your care team and make appointments. If you have questions, please call your primary care clinic.  If you do not have a primary care provider, please call [phone] and they will assist you.      SueEasy is an electronic gateway that provides easy, online access to your medical records. With [employer], you can request a clinic appointment, read your test results, renew a prescription or communicate with your care team.     To access your existing account, please contact your [facility] or call [phone] for assistance.        Care EveryWhere ID     This is your Care EveryWhere ID. This could be used by other organizations to access your [facility]  records  [id number]         Blood Pressure from Last 3 Encounters:   11/29/18 110/66   11/27/18 132/68   11/21/18 140/74    Weight from Last 3 Encounters:   11/29/18 73.9 kg (163 lb)   11/27/18 73.9 kg (163 lb)   11/21/18 73.6 kg (162 lb 3.2 oz)              Today, you had the following     No orders found for display       Primary Care Provider Office Phone # Fax #    Martinez Shafer [phone]       [address] [facility] [address]        Equal Access to Services     [facility]: Hadii aad jennie hadasho Sojag, waaxda luqadaha, qaybta kaalmada adedaríoyada, mendez cooley . So [facility] [phone].    ATENCIÓN: Si habla español, tiene a troy disposición servicios gratuitos de asistencia lingüística. [facility] [phone].    We comply with applicable federal civil rights laws and [place] laws. We do not discriminate on the basis of race, color, national origin, age, disability, sex, sexual orientation, or gender identity.            Thank you!     Thank you for choosing [facility]  for your care. Our goal is always to provide you with excellent care. Hearing back from our patients is one way we can continue to improve our services. Please take a few minutes to complete the written survey that you may receive in the mail after your visit with us. Thank you!             Your Updated Medication List - Protect others around you: Learn how to safely use, store and throw away your medicines at www.disposemymeds.org.          This list is accurate as of 12/1/18 11:21 AM.  Always use your most recent med list.                   Brand Name Dispense Instructions for use Diagnosis    amLODIPine 2.5 MG tablet    NORVASC     Take 2.5 mg by mouth daily        citalopram 20 MG tablet    celeXA    30 tablet    Take 1 tablet (20 mg) by mouth daily    Major depressive disorder, recurrent episode, mild (H)       COMPOUNDED NON-CONTROLLED SUBSTANCE - PHARMACY TO MIX  COMPOUNDED MEDICATION    CMPD RX    240 g    Apply 1-2 grams to the affected area 3-4 times daily. Rub in well for 1-2 minutes.    Primary osteoarthritis of left knee       cyclopentolate 1 % ophthalmic solution    CYCLOGYL    1 Bottle    Place 1 drop into the right eye 2 times daily    Right eye injury, initial encounter       diclofenac 1 % topical gel    VOLTAREN     Place 2 g onto the skin 4 times daily        donepezil 10 MG tablet    ARICEPT    30 tablet    Take 1 tablet (10 mg) by mouth At Bedtime    Major depressive disorder, recurrent episode, mild (H)       erythromycin 5 MG/GM ophthalmic ointment    ROMYCIN     Place 0.5 inches into the right eye 3 times daily        FAMOTIDINE PO      Take 10 mg by mouth daily        ibuprofen 200 MG tablet    ADVIL/MOTRIN    30 tablet    Take 1 tablet (200 mg) by mouth every 6 hours as needed for moderate pain    Contusion of left knee, initial encounter       Lidocaine 4 % Patch    LIDOCARE    12 patch    Place 2 patches onto the skin every 24 hours    Contusion of left knee, initial encounter       lisinopril-hydrochlorothiazide 20-12.5 MG tablet    PRINZIDE/ZESTORETIC    30 tablet    Take 1 tablet by mouth 2 times daily    Essential hypertension, benign       oxyCODONE 5 MG tablet    ROXICODONE    15 tablet    Take 1 tablet (5 mg) by mouth every 4 hours as needed for moderate to severe pain    Contusion of left knee, initial encounter       polyethylene glycol packet    MIRALAX/GLYCOLAX     Take 1 packet by mouth daily as needed for constipation        prednisoLONE acetate 1 % ophthalmic suspension    PRED FORTE    1 Bottle    Place 1 drop into the right eye 3 times daily    Right eye injury, initial encounter       simvastatin 20 MG tablet    ZOCOR    90 tablet    Take 1 tablet (20 mg) by mouth At Bedtime    Hyperlipidemia LDL goal <130       TYLENOL PO      Take 1,000 mg by mouth 3 times daily

## 2018-12-01 NOTE — NURSING NOTE
Chief Complaints and History of Present Illnesses   Patient presents with     Follow Up For     referred by ED for 2wk f/u for traumativ iritis check OD after fall from stairs (11/13/2018)     HPI    Affected eye(s):  Right   Symptoms:     Blurred vision   No decreased vision   No distorted vision   No floaters   No flashes   No redness   No foreign body sensation   No tearing   No Dryness   No eye discharge      Duration:  2 weeks   Frequency:  Intermittent       Do you have eye pain now?:  No      Comments:  Vision is stable since fall, c/o blurriness but attributes this to the dilation gtts.   No additional comments or concerns.     Ocular meds: Predforte tid OD, Cyclogyl bid OD  Estela Marie COT 10:59 AM December 1, 2018

## 2018-12-03 ENCOUNTER — TELEPHONE (OUTPATIENT)
Dept: ORTHOPEDICS | Facility: CLINIC | Age: 83
End: 2018-12-03

## 2018-12-03 DIAGNOSIS — M17.12 PRIMARY OSTEOARTHRITIS OF LEFT KNEE: Primary | ICD-10-CM

## 2018-12-03 NOTE — TELEPHONE ENCOUNTER
Juany Collazo is a 86 year old female whose daughter, Jeannette Shin, left voicemail regarding a compound medication that Dr. Gaitan prescribed.     Consent to communicate on file.   Phone call to Jeannette. She states the pharmacy called her but she isn't sure what the medication is.   Explained that it is a compounded topical medication containing multiple medications to help with pain. Gave her the list of ingredients. She is patient might use it too much or too often and wants to know if that is ok.   Recommended she contact [facility] Pharmacist specifically and ask her concerns. Phone number provided.     Daughter reports she is calling from [place].   Patient is doing better walking. She uses her walker to walk to physical therapy at [facility].   Other family members here are unable to get patient to outside physical therapy appointments and they pay someone to take her if needed. She states it is very difficult to get rides set up and wants to know what pool therapy was ordered. Informed it was ordered to be done at [facility] daily for the next 2 weeks. She states she thinks they might do it every other day but otherwise they don't have staff to provide it daily and usually not on weekends/Sundays.   She asks if [facility] has information from our office for the pool therapy.   She also asked if Dr. Gaitan was going to recommend pool therapy outside of [facility] and had concerns about that due to transportation difficulties. Patient is already having physical therapy twice a day and is not sure how she will fit it into her schedule.   Will discuss with provider and get back with her tomorrow.     Daughter expecting call back: YES      Preferred contact number:  [phone]  Can we leave a detailed message on this number: YES    In reviewing chart, physical therapy order was placed with MARKO.  states order should be for   [facility] for pool therapy.   Please advise.       PATSY  Aldair RN

## 2018-12-04 NOTE — TELEPHONE ENCOUNTER
Spoke with Jeannette regarding the below message from Dr. Gaitan.     She states that herself and the patient had anticipated continuing pool therapy at [facility]. She is being discharged on Thursday.  Jeannette states that she has arranged for home care to take her to therapy at [facility] 2-3 times per week as it is easier to transport the patient to [facility] vs an outside PT location.     I offered to message Dr Gaitan regarding a new referral to [facility] to update the specifics in the order and that we could send the order to [facility] so they had the updated order. Jeannette agreed to this.     Rosetta Cohn M.Ed., ATR, ATC  [facility]

## 2018-12-04 NOTE — TELEPHONE ENCOUNTER
Pool therapy was to be completed at [facility] which is indicated in the body of the referral.    Please notify patient.    Polo Yuan ATC

## 2018-12-04 NOTE — TELEPHONE ENCOUNTER
Agree with Polo's response. My understanding is pateint was only scheduled to be at [facility] for 2 weeks that is why I ordered it daily while there. No need to continue pool therapy once discharged from [facility]. If she is moved to a facility that has a pool and therapy is an option, I'm ok with providing that referral but no need to coordinate transportation for outside pool therapy.    Tanika Gaitan DO, CAQSM  [facility]

## 2018-12-05 RX ORDER — PREDNISOLONE ACETATE 10 MG/ML
1 SUSPENSION/ DROPS OPHTHALMIC DAILY
COMMUNITY
Start: 2018-12-12 | End: 2019-01-31

## 2018-12-05 NOTE — PROGRESS NOTES
[facility] DISCHARGE SUMMARY    PATIENT'S NAME: Juany Collazo  YOB: 1932  MEDICAL RECORD NUMBER:  [id number]  Place of Service where encounter took place:  [facility] [[id number]]    PRIMARY CARE PROVIDER AND CLINIC RESPONSIBLE AFTER TRANSFER: Martinez Shafer [address] [place] [id number]     TRANSFERRING PROVIDERS: BETH Rosario CNP, Dr. Lashell MD  DATE OF SNF ADMISSION:  November / 14 / 2018  DATE OF SNF (anticipated) DISCHARGE: December / 13 / 2018  DISCHARGE DISPOSITION: [facility] Provider   RECENT HOSPITALIZATION/ED:  [facility] stay 11/13/18 to 11/14/18.     CODE STATUS/ADVANCE DIRECTIVES DISCUSSION:   CPR/Full code      Allergies   Allergen Reactions     No Known Drug Allergies      Condition on Discharge:  Improving.  Function:  Supervision for ADLs and walking >250 feet with 2WW  Cognitive Scores: BIMS 14/15 , SLUMS 20/30, CPT 4.8/5.6  Equipment: walker    DISCHARGE DIAGNOSIS:   1. Left knee pain, unspecified chronicity    2. Osteoarthritis of left knee, unspecified osteoarthritis type    3. Physical deconditioning    4. Fall, subsequent encounter    5. Headache above the eye region    6. Right eye traumatic iritis    7. Gastroesophageal reflux disease without esophagitis    8. Essential hypertension, benign    9. Major depressive disorder, recurrent episode, mild (H)    10. Hyperlipidemia, unspecified hyperlipidemia type        HPI Nursing Facility Course:  HPI information obtained from: facility chart records, facility staff, patient report and [facility] chart review.    Left knee pain, unspecified chronicity  Osteoarthritis of left knee, unspecified osteoarthritis type  Physical deconditioning  Patient fell at home while going down steps at home and she missing the bottom step and was not able to catch herself. Patient has chronic knee pain, but it is worse after fall.  Orthopedics saw Ms. Collazo during her hospitalization and recommend conservative therapies including ice, elevation, compression, NSAIDs. She continued to have significant pain, effusion at TCU so she was seen by Dr. Gaitan (orthopedics), who she had previously seen for knee pain. She drained the effusion and injected her joint with synvisc. It is expected that this should help her pain 1 month after injection- so around the end of the month. Juany tells me today that her pain is actually already improving.  She should follow up with Dr. Gaitan PRN for pain.     She worked with PHYSICAL THERAPY and [facility] and had [facility] therapy daily, which has been very helpful. She is taking scheduled tylenol and voltaren gel for pain. She is discharging home to her daughter's home in [place] and at this time they do not have return flight booked. She is also moving into the [facility] and an apartment will be ready for her when she returns. She should establish care with PCP in [place] and they can refer her to either home care PHYSICAL THERAPY or outpatient PHYSICAL THERAPY per patient and family preference.      Fall, subsequent encounter  Headache above eye region  Right eye traumatic iritis  Patient fell at home on 11/13/18 while going down steps at home and she missing the bottom step and was not able to catch herself. She tripped and her face fell on the door. She had traumatic iritis. No globe rupture per imaging. She had follow up with Opthalmology while at [facility] and is tapering off pred forte (last dose 12/15). Cyclopentolate was discontinued. She continues on erythromycin ointment. She should follow up with primary eye doctor for follow up because there could be possibility of damage to drainage system that could increase pressures in the future.       Patient has had headaches on and off above left eye while at [facility]- some lasting longer than others. She tells me that she has had these before  admission to TCU.   MRI was obtained during her stay at TCU with concerns due to headache and concern that left pupil was not initially reactive to light, and challenge completing good neuro exam due to chronic pain of left side (shoulder and knee). MRI from 11/21 showed      IMPRESSION:    1. No evidence of acute infarct, mass, hemorrhage, or herniation.  2. Moderate diffuse parenchymal volume loss and white matter changes  likely due to chronic microvascular ischemic disease.   3. 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      Gastroesophageal reflux disease without esophagitis  Omeprazole taper completed after discussion with patient. Tried daily famotidine, but she had reoccurrence of acid. Now on ranitidine BID. If she develops symptoms on ranitidine could reconsider omeprazole.      Essential hypertension, benign  Continues on reduced amlodipine dose (2.5 mg daily), PTA lisinopril-hydrochlorothiazide.    BP: 107-148/55-79 mmHg  P: 56-77 bpm    Major depressive disorder, recurrent episode, mild (H)  Continues on PTA celexa.    Hyperlipidemia  Continues on PTA simvastatin.      Wt Readings from Last 5 Encounters:   12/05/18 76 kg (167 lb 9.6 oz)   11/29/18 73.9 kg (163 lb)   11/27/18 73.9 kg (163 lb)   11/21/18 73.6 kg (162 lb 3.2 oz)   11/20/18 73.6 kg (162 lb 3.2 oz)       PAST MEDICAL HISTORY:  has a past medical history of BENIGN HYPERTENSION (10/27/2003), BONE & CARTILAGE DIS NOS (1/18/2006), Carpal tunnel syndrome, Chronic pain, Gastro-oesophageal reflux disease, GERD (gastroesophageal reflux disease) (10/14/2008), History of blood transfusion, Hyperlipidemia LDL goal <130 (10/31/2010), and Mild major depression (H) (4/23/2014). She also has no past medical history of Chronic infection, Diabetes (H), Malignant  "hyperthermia, or Sleep apnea.    DISCHARGE MEDICATIONS:  Current Outpatient Medications   Medication Sig Dispense Refill     Acetaminophen (TYLENOL PO) Take 1,000 mg by mouth 3 times daily       amLODIPine (NORVASC) 2.5 MG tablet Take 2.5 mg by mouth daily       citalopram (CELEXA) 20 MG tablet Take 1 tablet (20 mg) by mouth daily 30 tablet 3     diclofenac (VOLTAREN) 1 % topical gel Place 2 g onto the skin 4 times daily       donepezil (ARICEPT) 10 MG tablet Take 1 tablet (10 mg) by mouth At Bedtime 30 tablet      erythromycin (ROMYCIN) ophthalmic ointment Place 0.5 inches into the right eye 3 times daily       lisinopril-hydrochlorothiazide (PRINZIDE/ZESTORETIC) 20-12.5 MG per tablet Take 1 tablet by mouth 2 times daily 30 tablet 3     polyethylene glycol (MIRALAX/GLYCOLAX) packet Take 1 packet by mouth daily as needed for constipation       prednisoLONE acetate (PRED FORTE) 1 % ophthalmic suspension Place 1 drop into the right eye daily        ranitidine (ZANTAC) 150 MG tablet Take 150 mg by mouth 2 times daily       simvastatin (ZOCOR) 20 MG tablet Take 1 tablet (20 mg) by mouth At Bedtime 90 tablet 3       MEDICATION CHANGES/RATIONALE:   Decreased amlodipine to 2.5 mg   Discontinued ibuprofen and oxycodone, as patient was not using.    Controlled medications sent with patient:   not applicable/none     ROS:    10 point ROS of systems including Constitutional, Eyes, Respiratory, Cardiovascular, Gastroenterology, Genitourinary, Integumentary, Musculoskeletal, Neurological, Psychiatric were all negative except for pertinent positives noted in my HPI.    Physical Exam:   Vitals: /76   Pulse 58   Temp 97.6  F (36.4  C)   Resp 19   Ht 1.549 m (5' 1\")   Wt 76 kg (167 lb 9.6 oz)   SpO2 96%   BMI 31.67 kg/m    BMI= Body mass index is 31.67 kg/m .  GENERAL APPEARANCE:  Alert, pleasant and cooperative, oriented x 4, sitting in wheelchair on exam today  EYES: right eye with dilated pupil, left eye with normal " pupil size responsive to light; no drainage present  ENT:  Mouth normal, moist mucous membranes, nose without drainage or crusting, external ears without lesions, hearing acuity:intact  RESP:  respiratory effort normal, no respiratory distress, Lung sounds clear, patient is on RA  CV: auscultation of heart done, rate and rhythm regular. no murmur, no rub or gallop. No BLE edema  ABDOMEN:  normal bowel sounds, soft, nontender, no grimacing or guarding with palpation.  M/S:  Digits and nails normal; left knee with swelling and pain with movement. Is able to move all extremities, but limited to left leg due to pain.   SKIN:  Inspection and palpation of skin and subcutaneous tissue: skin warm, dry without rashes  NEURO: cranial nerves 2-12 grossly intact, no facial asymmetry, no speech deficits and able to follow directions, moves all extremities symmetrically  PSYCH:  insight and judgement intact, memory baseline, affect and mood normal        DISCHARGE PLAN: Patient to discharge to [facility] with the following services:  Occupational Therapy, Physical Therapy and From:  [facility]  Patient instructed to follow-up with:  PCP in 7 days      Current [place] scheduled appointments:  Future Appointments   Date Time Provider Department Center   3/27/2019  9:20 AM Martinez Shafer MD [facility]       MTM referral needed and placed by this provider: No    Pending labs: None  SNF labs     CBC RESULTS:   Recent Labs   Lab Test 11/13/18 0952 08/10/18  1154   WBC 9.3 10.2   RBC 4.48 4.31   HGB 13.0  13.1 12.3   HCT 40.7 39.0   MCV 91 91   MCH 29.0 28.5   MCHC 31.9 31.5   RDW 13.7 13.7    228       Last Basic Metabolic Panel:  Recent Labs   Lab Test 11/13/18 0952 03/26/18  0921    138   POTASSIUM 3.6 4.3   CHLORIDE 103 103   MAUREEN 9.0 9.3   CO2 25 28   BUN 12 13   CR 0.72 0.72   GLC 93 96       Liver Function Studies -   Recent Labs   Lab Test 11/13/18 0952 03/26/18  0921   PROTTOTAL 6.7* 7.1    ALBUMIN 3.6 3.9   BILITOTAL 0.5 0.6   ALKPHOS 78 80   AST 17 22   ALT 20 19       Discharge Treatments:  Establish care with PCP in [place] and have them arrange for continuing therapy.   Follow up with primary ophthalmologist     TOTAL DISCHARGE TIME:   Greater than 30 minutes  Electronically signed by:  BETH Rosario CNP

## 2018-12-05 NOTE — TELEPHONE ENCOUNTER
Pool therapy ordered signed.    Tanika Gaitan DO, CAMICKEYM  [facility]

## 2018-12-06 ENCOUNTER — TELEPHONE (OUTPATIENT)
Dept: INTERNAL MEDICINE | Facility: CLINIC | Age: 83
End: 2018-12-06

## 2018-12-06 NOTE — TELEPHONE ENCOUNTER
I do not think that I can write a letter on behalf of this request if in fact this pet is not considered to be a formal service pet or there is a documentation from a mental health provider that states that this cat is of mandatory need.

## 2018-12-06 NOTE — TELEPHONE ENCOUNTER
Reason for Call:  Other Letter/note Letter to Whom it Concerns: Augustana Estrada [place]    Detailed comments: Stating that pt needs to take her cat with her for emotional support  Mail to Pts' address  [address] [place]  [id number]    Phone Number Patient can be reached at: Other phone number:  Jeannette Shin Daughter tele# [phone]    Best Time: anytime    Can we leave a detailed message on this number? yes    Call taken on 12/6/2018 at 10:37 AM by Tika Avila

## 2018-12-06 NOTE — TELEPHONE ENCOUNTER
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 Alzeheimers . Pt may benefit from cat as support when in transition from one living situation to another . Please reconsider.

## 2018-12-06 NOTE — TELEPHONE ENCOUNTER
Spoke with patient daughter and she will try to obtain a note from [facility]. Pt is currently in a TCU  And cannot get to appt and daughter is in [place].Rolanda Mckinnon RN

## 2018-12-06 NOTE — TELEPHONE ENCOUNTER
I would need a note from [facility] specifying that the cat is a medical necessity or patient needs to see me formally in the clinic to discuss this.

## 2018-12-12 ENCOUNTER — NURSING HOME VISIT (OUTPATIENT)
Dept: GERIATRICS | Facility: CLINIC | Age: 83
End: 2018-12-12
Payer: COMMERCIAL

## 2018-12-12 VITALS
RESPIRATION RATE: 19 BRPM | DIASTOLIC BLOOD PRESSURE: 76 MMHG | BODY MASS INDEX: 31.64 KG/M2 | OXYGEN SATURATION: 96 % | WEIGHT: 167.6 LBS | HEIGHT: 61 IN | TEMPERATURE: 97.6 F | SYSTOLIC BLOOD PRESSURE: 126 MMHG | HEART RATE: 58 BPM

## 2018-12-12 DIAGNOSIS — M25.562 LEFT KNEE PAIN, UNSPECIFIED CHRONICITY: Primary | ICD-10-CM

## 2018-12-12 DIAGNOSIS — R53.81 PHYSICAL DECONDITIONING: ICD-10-CM

## 2018-12-12 DIAGNOSIS — E78.5 HYPERLIPIDEMIA, UNSPECIFIED HYPERLIPIDEMIA TYPE: ICD-10-CM

## 2018-12-12 DIAGNOSIS — K21.9 GASTROESOPHAGEAL REFLUX DISEASE WITHOUT ESOPHAGITIS: ICD-10-CM

## 2018-12-12 DIAGNOSIS — H20.9 TRAUMATIC IRITIS: ICD-10-CM

## 2018-12-12 DIAGNOSIS — R51.9 HEADACHE ABOVE THE EYE REGION: ICD-10-CM

## 2018-12-12 DIAGNOSIS — I10 ESSENTIAL HYPERTENSION, BENIGN: ICD-10-CM

## 2018-12-12 DIAGNOSIS — M17.12 OSTEOARTHRITIS OF LEFT KNEE, UNSPECIFIED OSTEOARTHRITIS TYPE: ICD-10-CM

## 2018-12-12 DIAGNOSIS — F33.0 MAJOR DEPRESSIVE DISORDER, RECURRENT EPISODE, MILD (H): ICD-10-CM
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
## 2018-12-12 PROCEDURE — 99316 NF DSCHRG MGMT 30 MIN+: CPT | Performed by: NURSE PRACTITIONER

## 2018-12-12 NOTE — PATIENT INSTRUCTIONS
[facility] Discharge Orders    Name: Juany Colalzo  : 1932  Planned Discharge Date: 18  Discharged to: with family     MEDICAL FOLLOW UP  Follow up with PCP in 1-2 weeks- Please establish care with primary care provider in [place]. They can arrange home care services to continue therapy or provide referral to continue therapy outpatient or for pool therapy  Follow up with Specialists: Primary care eye doctor- there could be possibility of damage to drainage system that could increase pressures in the future      ORDER CHANGES:  Decreased amlodipine dose  Discontinued omeprazole. Started ranitidine  Started voltaren gel    STOP pred forte eye drop on 12/15  DISCHARGE MEDICATIONS:  The patient s pharmacy is authorized to dispense a 30-day supply of medications. Refill requests should be directed to the primary provider, Martinez Shafer     Current Outpatient Medications   Medication Sig Dispense Refill     Acetaminophen (TYLENOL PO) Take 1,000 mg by mouth 3 times daily       amLODIPine (NORVASC) 2.5 MG tablet Take 2.5 mg by mouth daily       citalopram (CELEXA) 20 MG tablet Take 1 tablet (20 mg) by mouth daily 30 tablet 3     diclofenac (VOLTAREN) 1 % topical gel Place 2 g onto the skin 4 times daily       donepezil (ARICEPT) 10 MG tablet Take 1 tablet (10 mg) by mouth At Bedtime 30 tablet      erythromycin (ROMYCIN) ophthalmic ointment Place 0.5 inches into the right eye 3 times daily       lisinopril-hydrochlorothiazide (PRINZIDE/ZESTORETIC) 20-12.5 MG per tablet Take 1 tablet by mouth 2 times daily 30 tablet 3     polyethylene glycol (MIRALAX/GLYCOLAX) packet Take 1 packet by mouth daily as needed for constipation       prednisoLONE acetate (PRED FORTE) 1 % ophthalmic suspension Place 1 drop into the right eye daily        ranitidine (ZANTAC) 150 MG tablet Take 150 mg by mouth 2 times daily       simvastatin (ZOCOR) 20 MG tablet Take 1 tablet (20 mg) by mouth At Bedtime 90  tablet 3       SERVICES:  Follow up with PCP to arrange ongoing PHYSICAL THERAPY or OCCUPATIONAL THERAPY in [place]      BETH Rosario CNP  This document was electronically signed on December 12, 2018

## 2018-12-12 NOTE — PROGRESS NOTES
[facility] DISCHARGE SUMMARY    PATIENT'S NAME: Juany Collazo  YOB: 1932  MEDICAL RECORD NUMBER:  [id number]  Place of Service where encounter took place:  [facility] [[id number]]    PRIMARY CARE PROVIDER AND CLINIC RESPONSIBLE AFTER TRANSFER: Martinez Shafer [address] [place] [id number]     TRANSFERRING PROVIDERS: BETH Rosario CNP, Dr. Lashell MD  DATE OF SNF ADMISSION:  November / 14 / 2018  DATE OF SNF (anticipated) DISCHARGE: December / 13 / 2018  DISCHARGE DISPOSITION: [facility] Provider   RECENT HOSPITALIZATION/ED:  [facility] stay 11/13/18 to 11/14/18.     CODE STATUS/ADVANCE DIRECTIVES DISCUSSION:   CPR/Full code      Allergies   Allergen Reactions     No Known Drug Allergies      Condition on Discharge:  Improving.  Function:  Supervision for ADLs and walking >250 feet with 2WW  Cognitive Scores: BIMS 14/15 , SLUMS 20/30, CPT 4.8/5.6  Equipment: walker    DISCHARGE DIAGNOSIS:   1. Left knee pain, unspecified chronicity    2. Osteoarthritis of left knee, unspecified osteoarthritis type    3. Physical deconditioning    4. Fall, subsequent encounter    5. Headache above the eye region    6. Right eye traumatic iritis    7. Gastroesophageal reflux disease without esophagitis    8. Essential hypertension, benign    9. Major depressive disorder, recurrent episode, mild (H)    10. Hyperlipidemia, unspecified hyperlipidemia type        HPI Nursing Facility Course:  HPI information obtained from: facility chart records, facility staff, patient report and [facility] chart review.    Left knee pain, unspecified chronicity  Osteoarthritis of left knee, unspecified osteoarthritis type  Physical deconditioning  Patient fell at home while going down steps at home and she missing the bottom step and was not able to catch herself. Patient has chronic knee pain, but it is worse after fall.  Orthopedics saw Ms. Collazo during her hospitalization and recommend conservative therapies including ice, elevation, compression, NSAIDs. She continued to have significant pain, effusion at TCU so she was seen by Dr. Gaitan (orthopedics), who she had previously seen for knee pain. She drained the effusion and injected her joint with synvisc. It is expected that this should help her pain 1 month after injection- so around the end of the month. Juany tells me today that her pain is actually already improving.  She should follow up with Dr. Gaitan PRN for pain.     She worked with PHYSICAL THERAPY and [facility] and had [facility] therapy daily, which has been very helpful. She is taking scheduled tylenol and voltaren gel for pain. She is discharging home to her daughter's home in [place] and at this time they do not have return flight booked. She is also moving into the [facility] and an apartment will be ready for her when she returns. She should establish care with PCP in [place] and they can refer her to either home care PHYSICAL THERAPY or outpatient PHYSICAL THERAPY per patient and family preference.      Fall, subsequent encounter  Headache above eye region  Right eye traumatic iritis  Patient fell at home on 11/13/18 while going down steps at home and she missing the bottom step and was not able to catch herself. She tripped and her face fell on the door. She had traumatic iritis. No globe rupture per imaging. She had follow up with Opthalmology while at [facility] and is tapering off pred forte (last dose 12/15). Cyclopentolate was discontinued. She continues on erythromycin ointment. She should follow up with primary eye doctor for follow up because there could be possibility of damage to drainage system that could increase pressures in the future.       Patient has had headaches on and off above left eye while at [facility]- some lasting longer than others. She tells me that she has had these before  admission to TCU.   MRI was obtained during her stay at TCU with concerns due to headache and concern that left pupil was not initially reactive to light, and challenge completing good neuro exam due to chronic pain of left side (shoulder and knee). MRI from 11/21 showed      IMPRESSION:    1. No evidence of acute infarct, mass, hemorrhage, or herniation.  2. Moderate diffuse parenchymal volume loss and white matter changes  likely due to chronic microvascular ischemic disease.   3. 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      Gastroesophageal reflux disease without esophagitis  Omeprazole taper completed after discussion with patient. Tried daily famotidine, but she had reoccurrence of acid. Now on ranitidine BID. If she develops symptoms on ranitidine could reconsider omeprazole.      Essential hypertension, benign  Continues on reduced amlodipine dose (2.5 mg daily), PTA lisinopril-hydrochlorothiazide.    BP: 107-148/55-79 mmHg  P: 56-77 bpm    Major depressive disorder, recurrent episode, mild (H)  Continues on PTA celexa.    Hyperlipidemia  Continues on PTA simvastatin.      Wt Readings from Last 5 Encounters:   12/05/18 76 kg (167 lb 9.6 oz)   11/29/18 73.9 kg (163 lb)   11/27/18 73.9 kg (163 lb)   11/21/18 73.6 kg (162 lb 3.2 oz)   11/20/18 73.6 kg (162 lb 3.2 oz)       PAST MEDICAL HISTORY:  has a past medical history of BENIGN HYPERTENSION (10/27/2003), BONE & CARTILAGE DIS NOS (1/18/2006), Carpal tunnel syndrome, Chronic pain, Gastro-oesophageal reflux disease, GERD (gastroesophageal reflux disease) (10/14/2008), History of blood transfusion, Hyperlipidemia LDL goal <130 (10/31/2010), and Mild major depression (H) (4/23/2014). She also has no past medical history of Chronic infection, Diabetes (H), Malignant  "hyperthermia, or Sleep apnea.    DISCHARGE MEDICATIONS:  Current Outpatient Medications   Medication Sig Dispense Refill     Acetaminophen (TYLENOL PO) Take 1,000 mg by mouth 3 times daily       amLODIPine (NORVASC) 2.5 MG tablet Take 2.5 mg by mouth daily       citalopram (CELEXA) 20 MG tablet Take 1 tablet (20 mg) by mouth daily 30 tablet 3     diclofenac (VOLTAREN) 1 % topical gel Place 2 g onto the skin 4 times daily       donepezil (ARICEPT) 10 MG tablet Take 1 tablet (10 mg) by mouth At Bedtime 30 tablet      erythromycin (ROMYCIN) ophthalmic ointment Place 0.5 inches into the right eye 3 times daily       lisinopril-hydrochlorothiazide (PRINZIDE/ZESTORETIC) 20-12.5 MG per tablet Take 1 tablet by mouth 2 times daily 30 tablet 3     polyethylene glycol (MIRALAX/GLYCOLAX) packet Take 1 packet by mouth daily as needed for constipation       prednisoLONE acetate (PRED FORTE) 1 % ophthalmic suspension Place 1 drop into the right eye daily        ranitidine (ZANTAC) 150 MG tablet Take 150 mg by mouth 2 times daily       simvastatin (ZOCOR) 20 MG tablet Take 1 tablet (20 mg) by mouth At Bedtime 90 tablet 3       MEDICATION CHANGES/RATIONALE:   Decreased amlodipine to 2.5 mg   Discontinued ibuprofen and oxycodone, as patient was not using.    Controlled medications sent with patient:   not applicable/none     ROS:    10 point ROS of systems including Constitutional, Eyes, Respiratory, Cardiovascular, Gastroenterology, Genitourinary, Integumentary, Musculoskeletal, Neurological, Psychiatric were all negative except for pertinent positives noted in my HPI.    Physical Exam:   Vitals: /76   Pulse 58   Temp 97.6  F (36.4  C)   Resp 19   Ht 1.549 m (5' 1\")   Wt 76 kg (167 lb 9.6 oz)   SpO2 96%   BMI 31.67 kg/m    BMI= Body mass index is 31.67 kg/m .  GENERAL APPEARANCE:  Alert, pleasant and cooperative, oriented x 4, sitting in wheelchair on exam today  EYES: right eye with dilated pupil, left eye with normal " pupil size responsive to light; no drainage present  ENT:  Mouth normal, moist mucous membranes, nose without drainage or crusting, external ears without lesions, hearing acuity:intact  RESP:  respiratory effort normal, no respiratory distress, Lung sounds clear, patient is on RA  CV: auscultation of heart done, rate and rhythm regular. no murmur, no rub or gallop. No BLE edema  ABDOMEN:  normal bowel sounds, soft, nontender, no grimacing or guarding with palpation.  M/S:  Digits and nails normal; left knee with swelling and pain with movement. Is able to move all extremities, but limited to left leg due to pain.   SKIN:  Inspection and palpation of skin and subcutaneous tissue: skin warm, dry without rashes  NEURO: cranial nerves 2-12 grossly intact, no facial asymmetry, no speech deficits and able to follow directions, moves all extremities symmetrically  PSYCH:  insight and judgement intact, memory baseline, affect and mood normal        DISCHARGE PLAN: Patient to discharge to [facility] with the following services:  Occupational Therapy, Physical Therapy and From:  [facility]  Patient instructed to follow-up with:  PCP in 7 days      Current [facility] scheduled appointments:  Future Appointments   Date Time Provider Department Center   3/27/2019  9:20 AM Martinez Shafer MD [facility]       MTM referral needed and placed by this provider: No    Pending labs: None  SNF labs     CBC RESULTS:   Recent Labs   Lab Test 11/13/18 0952 08/10/18  1154   WBC 9.3 10.2   RBC 4.48 4.31   HGB 13.0  13.1 12.3   HCT 40.7 39.0   MCV 91 91   MCH 29.0 28.5   MCHC 31.9 31.5   RDW 13.7 13.7    228       Last Basic Metabolic Panel:  Recent Labs   Lab Test 11/13/18 0952 03/26/18  0921    138   POTASSIUM 3.6 4.3   CHLORIDE 103 103   MAUREEN 9.0 9.3   CO2 25 28   BUN 12 13   CR 0.72 0.72   GLC 93 96       Liver Function Studies -   Recent Labs   Lab Test 11/13/18 0952 03/26/18  0921   PROTTOTAL 6.7* 7.1    ALBUMIN 3.6 3.9   BILITOTAL 0.5 0.6   ALKPHOS 78 80   AST 17 22   ALT 20 19       Discharge Treatments:  Establish care with PCP in [place] and have them arrange for continuing therapy.   Follow up with primary ophthalmologist     TOTAL DISCHARGE TIME:   Greater than 30 minutes  Electronically signed by:  BETH Rosario CNP

## 2018-12-14 ENCOUNTER — TELEPHONE (OUTPATIENT)
Dept: INTERNAL MEDICINE | Facility: CLINIC | Age: 83
End: 2018-12-14

## 2018-12-14 ENCOUNTER — PATIENT OUTREACH (OUTPATIENT)
Dept: CARE COORDINATION | Facility: CLINIC | Age: 83
End: 2018-12-14

## 2018-12-14 ASSESSMENT — ACTIVITIES OF DAILY LIVING (ADL): DEPENDENT_IADLS:: TRANSPORTATION;SHOPPING;LAUNDRY;CLEANING;COOKING

## 2018-12-14 NOTE — PROGRESS NOTES
Clinic Care Coordination Contact    Clinic Care Coordination Contact  OUTREACH    Referral Information:  Referral Source: SNF/TCU    Primary Diagnosis: Orthopedic    Chief Complaint   Patient presents with     Clinic Care Coordination - Follow-up     DC from TCU        [facility] Utilization:   Clinic Utilization  Difficulty keeping appointments:: No  Compliance Concerns: No  No-Show Concerns: No  No PCP office visit in Past Year: No  Utilization    Last refreshed: 12/13/2018 12:15 PM:  Hospital Admissions 1           Last refreshed: 12/13/2018 12:15 PM:  ED Visits 2           Last refreshed: 12/13/2018 12:15 PM:  No Show Count (past year) 0              Current as of: 12/13/2018 12:15 PM            Clinical Concerns:  CHARLES spoke with daughter Jeannette to discuss recent discharge.  Daughter indicates pt is doing well and arrived by plane yesterday to [place] where dtr lives. Jeannette had questions about medications discharged with patient as well as instructions for therapy band.  CHARLES reviewed discharge summary and encouraged establishing with a Primary in [place] as indicated on discharge instructions.     CHARLES provided Jeannette with Nurse manager and Therapy Director's numbers at [facility] to review questions she had about discharge instructions.  Jeannette plans to call them and clarify questions she had. Jeannette indicates she is going to try to get pt in to see a family practice provider while she is here. Pt plans [facility] upon return to [place].     Medication Management:  Daughter assists as needed.      Functional Status:  Dependent ADLs:: Ambulation-walker  Dependent IADLs:: Transportation, Shopping, Laundry, Cleaning, Cooking  Bed or wheelchair confined:: No  Mobility Status: Independent w/Device  Fallen 2 or more times in the past year?: No  Any fall with injury in the past year?: Yes    Living Situation:  Current living arrangement:: I live in a private home with family  Type of residence:: Private home  - stairs    Diet/Exercise/Sleep:  Inadequate nutrition (GOAL):: No  Food Insecurity: No  Tube Feeding: No  Exercise:: Yes  How intense was your typical exercise? : Light (like stretching or slow walking)  Inadequate activity/exercise (GOAL):: No  Significant changes in sleep pattern (GOAL): No    Transportation:  Transportation concerns (GOAL):: No  Transportation means:: Family     Psychosocial:  Mental health DX:: Yes  Mental health DX how managed:: Medication  Mental health management concern (GOAL):: No  Informal Support system:: Family     Financial/Insurance:   Financial/Insurance concerns (GOAL):: No     Resources and Interventions:  Current Resources:    ;   Community Resources: None  Supplies used at home:: None  Equipment Currently Used at Home: walker, rolling    Advance Care Plan/Directive  Advanced Care Plans/Directives on file:: Yes  Type Advanced Care Plans/Directives: Advanced Directive - On File  Advanced Care Plan/Directive Status: Not Applicable    Referrals Placed: None    Patient/Caregiver understanding: Pt reports understanding and denies any additional questions or concerns at this times. CHARLES CC engaged in AIDET communication during encounter.      Outreach Frequency: monthly  Future Appointments              In 3 months Martinez Shafer MD [facility]          Plan: Jeannette to call Therapy Director at [facility] to review instructions for therapy band and discuss and clarify recommendations for home PT/OT. Jeannette to call Nurse Manager at [facility] to review medications discharged and clarify instructions. CHARLES to f/u in 3 weeks upon pt return to [place].      ODIN Martinez  Clinic Care Coordinator  [facility]  [phone]  [email or web address]

## 2018-12-18 ENCOUNTER — TELEPHONE (OUTPATIENT)
Dept: OPHTHALMOLOGY | Facility: CLINIC | Age: 83
End: 2018-12-18

## 2018-12-18 NOTE — TELEPHONE ENCOUNTER
Patient's daughter called about medication clarification. Reviewed with patient's daughter that patient is to complete Pred taper. Daughter reports understanding. Advised to call back for further questions.    Trevon Emerson MD  PGY-3 Ophthalmology Resident  [phone]

## 2018-12-21 ENCOUNTER — PATIENT OUTREACH (OUTPATIENT)
Dept: CARE COORDINATION | Facility: CLINIC | Age: 83
End: 2018-12-21

## 2018-12-21 ASSESSMENT — ACTIVITIES OF DAILY LIVING (ADL): DEPENDENT_IADLS:: TRANSPORTATION;SHOPPING;LAUNDRY;CLEANING;COOKING

## 2018-12-21 NOTE — PROGRESS NOTES
Clinic Care Coordination Contact  Care Team Conversations    [facility] RN received call from patient's daughter Jeannette who updated that the patient will be back in [place] mid-January and has an appointment scheduled with PCP Dr. Shafer on 1/14/19.  Jeannette was inquiring about what steps need to be taken to get the patient set up with home care.  She was discharged from [facility] on 12/13/18 with an order for home care through [facility].  However, the patient then went to stay with Jeannette in [place] while they got an assisted living apartment set up here in [place].  She will be moving into [facility].    Per discussion with the Intake RN with [facility], the patient will need a new home care order placed once she is seen by PCP on 1/14/19.  The RN stated the turn-around is fairly quick as they have a short window of time between receiving the order and setting up the first home care visit.  [facility] RN relayed this information back to Jeannette who stated understanding.  [facility] RN will work with PCP to facilitate home care  on day of patient's appointment.  Jeannette otherwise updated that the patient has been doing well.  She has been doing her PT exercises at home and has been walking a lot.  Jeannette agreed to call with any additional questions or concerns.    William Belle, RN  Clinic Care Coordinator  [facility]  Ph: [phone]

## 2019-01-07 ENCOUNTER — TELEPHONE (OUTPATIENT)
Dept: INTERNAL MEDICINE | Facility: CLINIC | Age: 84
End: 2019-01-07

## 2019-01-07 DIAGNOSIS — H53.9 VISION CHANGES: Primary | ICD-10-CM

## 2019-01-07 NOTE — TELEPHONE ENCOUNTER
Called patients daughter Jeannette and gave her [facility] contact information.   I faxed referral to [facility] at [phone]

## 2019-01-07 NOTE — TELEPHONE ENCOUNTER
I have placed referral to listed ophthalmologist and may give patient number to call for appointment

## 2019-01-07 NOTE — TELEPHONE ENCOUNTER
Jeannette, patients daughter is calling on behalf of Juany to request a referral to an Ophthalmologist.   Juany was hospitalized in November with blunt force trauma to the eye. She eventually ended up receiving care at the [facility] and they recommended she follow up with an Ophthalmologist closer to her home. Her pupils still seem dilated and vision is a little blurry. Juany would like to stay close to home, [facility] in [place] would be a in network option.

## 2019-01-14 ENCOUNTER — OFFICE VISIT (OUTPATIENT)
Dept: INTERNAL MEDICINE | Facility: CLINIC | Age: 84
End: 2019-01-14
Payer: COMMERCIAL

## 2019-01-14 ENCOUNTER — TELEPHONE (OUTPATIENT)
Dept: INTERNAL MEDICINE | Facility: CLINIC | Age: 84
End: 2019-01-14

## 2019-01-14 VITALS
BODY MASS INDEX: 32.61 KG/M2 | OXYGEN SATURATION: 98 % | WEIGHT: 172.6 LBS | SYSTOLIC BLOOD PRESSURE: 122 MMHG | TEMPERATURE: 98.4 F | HEART RATE: 61 BPM | DIASTOLIC BLOOD PRESSURE: 68 MMHG

## 2019-01-14 DIAGNOSIS — M17.12 OSTEOARTHRITIS OF LEFT KNEE, UNSPECIFIED OSTEOARTHRITIS TYPE: ICD-10-CM

## 2019-01-14 DIAGNOSIS — Z09 HOSPITAL DISCHARGE FOLLOW-UP: Primary | ICD-10-CM

## 2019-01-14 DIAGNOSIS — Z23 NEED FOR PROPHYLACTIC VACCINATION AND INOCULATION AGAINST INFLUENZA: ICD-10-CM

## 2019-01-14 DIAGNOSIS — I10 ESSENTIAL HYPERTENSION, BENIGN: ICD-10-CM

## 2019-01-14 DIAGNOSIS — R53.81 PHYSICAL DECONDITIONING: ICD-10-CM

## 2019-01-14 PROCEDURE — G0008 ADMIN INFLUENZA VIRUS VAC: HCPCS | Performed by: INTERNAL MEDICINE

## 2019-01-14 PROCEDURE — 90662 IIV NO PRSV INCREASED AG IM: CPT | Performed by: INTERNAL MEDICINE

## 2019-01-14 PROCEDURE — 99214 OFFICE O/P EST MOD 30 MIN: CPT | Mod: 25 | Performed by: INTERNAL MEDICINE

## 2019-01-14 RX ORDER — AMLODIPINE BESYLATE 2.5 MG/1
2.5 TABLET ORAL DAILY
Qty: 90 TABLET | Refills: 3 | Status: SHIPPED | OUTPATIENT
Start: 2019-01-14 | End: 2020-08-27

## 2019-01-14 NOTE — PROGRESS NOTES
.  SUBJECTIVE:   Juany Collazo is a 87 year old female who presents to clinic today for the following health issues:    Hospital Follow-up Visit:    Hospital/Nursing Home/IP Rehab Facility: [facility]  DATE OF SNF ADMISSION:  November / 14 / 2018  DATE OF SNF (anticipated) DISCHARGE: December / 13 / 2018  Reason(s) for Admission: Fall and injured knee and eye            Problems taking medications regularly:  None       Medication changes since discharge: noted       Problems adhering to non-medication therapy:  None    Summary of hospitalization:  [facility] discharge summary reviewed  Diagnostic Tests/Treatments reviewed.  Follow up needed: opthalmology  Other Healthcare Providers Involved in Patient s Care:         None  Update since discharge: stable.     Post Discharge Medication Reconciliation: discharge medications reconciled, continue medications without change.  Plan of care communicated with patient     Coding guidelines for this visit:  Type of Medical   Decision Making Face-to-Face Visit       within 7 Days of discharge Face-to-Face Visit        within 14 days of discharge   Moderate Complexity [id number]   High Complexity 68410 [id number]          Osteoarthritis of left knee, unspecified osteoarthritis type  Physical deconditioning  Patient fell at home while going down steps at home and she missing the bottom step and was not able to catch herself. Patient has chronic knee pain, but it is worse after fall. Orthopedics saw Ms. Collazo during her hospitalization and recommend conservative therapies including ice, elevation, compression, NSAIDs. She continued to have significant pain, effusion at TCU so she was seen by Dr. Gaitan (orthopedics), who she had previously seen for knee pain. She drained the effusion and injected her joint with synvisc. It is expected that this should help her pain 1 month after injection- so around the end of the month. Juany tells me today that  her pain is actually already improving.  She should follow up with Dr. Arnie QUINTANA for pain.      She worked with [facility] THERAPY and [facility] and [facility] therapy daily, which has been very helpful. She is taking scheduled tylenol and voltaren gel for pain. She is discharging home to her daughter's home in [place] and at this time they do not have return flight booked. She is also moving into the [facility] and an apartment will be ready for her when she returns. She should establish care with PCP in [place] and they can refer her to either home care PHYSICAL THERAPY or outpatient PHYSICAL THERAPY per patient and family preference.      Fall, subsequent encounter  Headache above eye region  Right eye traumatic iritis  Patient fell at home on 11/13/18 while going down steps at home and she missing the bottom step and was not able to catch herself. She tripped and her face fell on the door. She had traumatic iritis. No globe rupture per imaging. She had follow up with [facility]thalmology [facility] at [facility] and is tapering off pred forte (last dose 12/15). Cyclopentolate was discontinued. She continues on erythromycin ointment. She should follow up with primary eye doctor for follow up because there could be possibility of damage to drainage system that could increase pressures in the future.     Problem list and histories reviewed & adjusted, as indicated.  Additional history: as documented    Patient Active Problem List   Diagnosis     Essential hypertension, benign     Disorder of bone and cartilage     HYPERLIPIDEMIA LDL GOAL <130     Lumbar stenosis     Health Care Home     Confusion     Memory loss     Major depressive disorder, recurrent episode, mild (H)     Gastroesophageal reflux disease without esophagitis     Fall     Past Surgical History:   Procedure Laterality Date     C NONSPECIFIC PROCEDURE  1998    right hip replacement     C NONSPECIFIC PROCEDURE  1970    tubal ligation     C  NONSPECIFIC PROCEDURE  1998    hip replacement     DECOMPRESSION LUMBAR ONE LEVEL  2013    Procedure: DECOMPRESSION LUMBAR ONE LEVEL;  Decompression Bilateral L4-5  ;  Surgeon: Lang Garcia MD;  Location: [facility] OR       Social History     Tobacco Use     Smoking status: Former Smoker     Last attempt to quit: 1966     Years since quittin.6     Smokeless tobacco: Never Used   Substance Use Topics     Alcohol use: Yes     Comment: One beer day     Family History   Problem Relation Age of Onset     Cardiovascular Brother      Musculoskeletal Disorder Brother         Muscular Dystrophy     Hypertension Father      Cardiovascular Father      Hypertension Mother      Arthritis Mother      Eye Disorder Mother         Cataract     Osteoporosis Mother      Glaucoma Mother         possible per Pt     Osteoporosis Maternal Aunt      Macular Degeneration No family hx of          Current Outpatient Medications   Medication Sig Dispense Refill     Acetaminophen (TYLENOL PO) Take 1,000 mg by mouth 3 times daily       amLODIPine (NORVASC) 2.5 MG tablet Take 2.5 mg by mouth daily       citalopram (CELEXA) 20 MG tablet Take 1 tablet (20 mg) by mouth daily 30 tablet 3     diclofenac (VOLTAREN) 1 % topical gel Place 2 g onto the skin 4 times daily       donepezil (ARICEPT) 10 MG tablet Take 1 tablet (10 mg) by mouth At Bedtime 30 tablet      erythromycin (ROMYCIN) ophthalmic ointment Place 0.5 inches into the right eye 3 times daily       lisinopril-hydrochlorothiazide (PRINZIDE/ZESTORETIC) 20-12.5 MG per tablet Take 1 tablet by mouth 2 times daily 30 tablet 3     polyethylene glycol (MIRALAX/GLYCOLAX) packet Take 1 packet by mouth daily as needed for constipation       ranitidine (ZANTAC) 150 MG tablet Take 150 mg by mouth 2 times daily       simvastatin (ZOCOR) 20 MG tablet Take 1 tablet (20 mg) by mouth At Bedtime 90 tablet 3     Allergies   Allergen Reactions     No Known Drug Allergies      BP Readings from Last  3 Encounters:   12/05/18 126/76   11/29/18 110/66   11/27/18 132/68    Wt Readings from Last 3 Encounters:   12/05/18 76 kg (167 lb 9.6 oz)   11/29/18 73.9 kg (163 lb)   11/27/18 73.9 kg (163 lb)         Reviewed and updated as needed this visit by clinical staff       Reviewed and updated as needed this visit by Provider       ROS:  CONSTITUTIONAL: NEGATIVE for fever, chills, change in weight  ENT/MOUTH: NEGATIVE for ear, mouth and throat problems  RESP: NEGATIVE for significant cough or SOB  CV: NEGATIVE for chest pain, palpitations or peripheral edema  GI: NEGATIVE for nausea, abdominal pain, heartburn, or change in bowel habits  : NEGATIVE for frequency, dysuria, or hematuria  HEME: NEGATIVE for bleeding problems  PSYCHIATRIC: NEGATIVE for changes in mood or affect per baseline    OBJECTIVE:                                                    /68   Pulse 61   Temp 98.4  F (36.9  C) (Oral)   Wt 78.3 kg (172 lb 9.6 oz)   SpO2 98%   BMI 32.61 kg/m    Body mass index is 32.61 kg/m .  GENERAL: alert and no distress  EYES: + cataract, Eyes grossly normal to inspection, extraocular movements - intact, and PERRL  HENT: ear canals- normal; TMs- normal; Nose- normal; Mouth- no ulcers, no lesions  NECK: no tenderness, no adenopathy, no asymmetry, no masses, no stiffness; thyroid- normal to palpation.  RESP: lungs clear to auscultation - no rales, no rhonchi, no wheezes  CV: regular rates and rhythm, normal S1 S2, no click or rub -  MS: extremities- no gross deformities noted  NEURO: no focal changes  PSYCH: memory changes noted baseline       ASSESSMENT/PLAN:                                                      (Z09) Hospital discharge follow-up  (primary encounter diagnosis)  Comment: overall stable at present  Plan:     (M17.12) Osteoarthritis of left knee, unspecified osteoarthritis type  Comment: f/u Orthopedics as directed that is post recent injection of viscous therapy  Plan: HOME CARE NURSING REFERRAL           (R53.81) Physical deconditioning  Comment: referral placed per request.  Plan: HOME CARE NURSING REFERRAL    (I10) Essential hypertension, benign  Comment: stable on therapy  Plan: amLODIPine (NORVASC) 2.5 MG tablet, daughter is reduced dose as this was recommended in nursing home          (Z23) Need for prophylactic vaccination and inoculation against influenza  Comment: recommended updated  Plan: unsure if has had or not be patient memory issues    See Patient Instructions    Martinez Shafer MD  [facility]    THE MEDICATION LIST HAS BEEN FULLY RECONCILED BY THE M.D. AND THE NURSING STAFF.

## 2019-01-14 NOTE — TELEPHONE ENCOUNTER
Called patient's daughter, Jeannette, on consent to communicate. Informed that Dr. Shafer ordered [facility] for patient today that appears to include homecare PT and RN. Patient's daughter notes that patient had been at [facility] doing PT there. Was going into pool. Thought they could continue to work with her. Lives at [facility] now, can walk through hallway to get to rehab or they could come to her. Daughter wondering if our  could talk to [facility]'s ? Daughter would like call back from our . Unsure if we can refer to [facility] for homecare? Or if they would have all the specialities patient needs? Please advise and call daughter.

## 2019-01-14 NOTE — TELEPHONE ENCOUNTER
Reason for Call:  Other Physical Therapy     Detailed comments: Juany's daughter is calling regarding the need for more physical therapy after a fall. The physical therapist recommended more physical therapy. Would like it to be done at [facility]     Phone Number Patient can be reached at: Other phone number:  [phone]    Best Time: anytime    Can we leave a detailed message on this number? YES    Call taken on 1/14/2019 at 2:07 PM by Olive Friend

## 2019-01-14 NOTE — TELEPHONE ENCOUNTER
[facility] requesting call back to further discuss details/options of patient continuing pool therapy within the [facility] system.  Will await return call.    William Belle, RN  Clinic Care Coordinator  [facility]  Ph: [phone]

## 2019-01-15 ENCOUNTER — PATIENT OUTREACH (OUTPATIENT)
Dept: CARE COORDINATION | Facility: CLINIC | Age: 84
End: 2019-01-15

## 2019-01-15 ASSESSMENT — ACTIVITIES OF DAILY LIVING (ADL): DEPENDENT_IADLS:: TRANSPORTATION;SHOPPING;LAUNDRY;CLEANING;COOKING

## 2019-01-15 NOTE — LETTER
Health Care Home - Access Care Plan    About Me  Patient Name:  Juany Collazo    YOB: 1932  Age:                             87 year old   Daniel MRN:            [id number] Telephone Information:   Home Phone [phone]   Mobile [phone]       Address:    [address]  [facility] [address] Email address:  [email or web address]      Emergency Contact(s)  Name Relationship Lgl Grd Work Phone Home Phone Mobile Phone   1. KO SANCHEZ  Daughter No  [phone]   2. CINDY RUBIN Daughter No  [phone]   3. THERESA HANDY Daughter No  [phone]              My Access Plan  Medical Emergency 911   Questions or concerns during clinic hours Primary Clinic Line, I will call the clinic directly: [facility] - [phone]   24 Hour Appointment Line [phone] or  [phone]) (toll free)   24 Hour Nurse Line [phone] (toll free)   Questions or concerns outside clinic hours 24 Hour Appointment Line, I will call the after-hours on-call line:   [facility] [phone] or [phone] ([phone]) (toll-free)   Preferred Urgent Care [facility], [phone]   Preferred Hospital [facility]  [phone]   Preferred Pharmacy [id number] [address] [facility]      Behavioral Health Crisis Line The National Suicide Prevention Lifeline at 1-795.346.3063 or 915     My Care Team Members  Patient Care Team       Relationship Specialty Notifications Start End    Martinez Shafer MD PCP - General   2/15/02     Phone: [phone] Fax: [phone] [address] [place] [address]    Martinez Shafer MD PCP - Assigned PCP   12/29/08     Phone: [phone] Fax: [phone]         [address] [place] [address]    Roxie Hanson RN Case Manager   9/30/13     [phone]    Phone: [phone] Fax:  [phone]          [facility] [address] [facility] [address]    Darci Steen MD MD Orthopedics  11/14/18     Phone: [phone] Fax: [phone]         [address] [place] [address]    Tenisha Chandler, ELVIRA  Optometry  11/16/18     Phone: [phone] Fax: [phone]         [address] [place] [address]    Kelechi Beatty MD Fellow Ophthalmology  1/3/19     Phone: [phone] Fax: [phone]         [address] [facility] [address]    William Belle, RN Clinic Care Coordinator Primary Care - [facility]  1/15/19     Phone: [phone]                My Medical and Care Information  Problem List   Patient Active Problem List   Diagnosis     Essential hypertension, benign     Disorder of bone and cartilage     HYPERLIPIDEMIA LDL GOAL <130     Lumbar stenosis     Health Care Home     Confusion     Memory loss     Major depressive disorder, recurrent episode, mild (H)     Gastroesophageal reflux disease without esophagitis     Fall      Current Medications:  Current Outpatient Medications   Medication     Acetaminophen (TYLENOL PO)     amLODIPine (NORVASC) 2.5 MG tablet     citalopram (CELEXA) 20 MG tablet     diclofenac (VOLTAREN) 1 % topical gel     donepezil (ARICEPT) 10 MG tablet     erythromycin (ROMYCIN) ophthalmic ointment     lisinopril-hydrochlorothiazide (PRINZIDE/ZESTORETIC) 20-12.5 MG per tablet     polyethylene glycol (MIRALAX/GLYCOLAX) packet     ranitidine (ZANTAC) 150 MG tablet     simvastatin (ZOCOR) 20 MG tablet     Current Facility-Administered Medications   Medication     hylan (SYNVISC ONE) injection 48 mg

## 2019-01-15 NOTE — PROGRESS NOTES
Clinic Care Coordination Contact  Care Team Conversations    Patient/patient's daughter inquiring about getting patient re-established with the pool therapy at her facility ([facility]).    [facility] RN received return voicemail from IZABELA Mills with [facility] stating that the pool is part of the facility next door and is through company [email or web address].  She recommended contacting Julia, Director of Therapy, at ph: [phone] for further details.  [facility] RN left voicemail for Julia; will await return call.    [facility] RN spoke with patient's daughter Jeannette to update her on above actions.  Jeannette stated the patient would still like to proceed with home care services through [facility] for a week or two but then would eventually like to transition back to the therapy at her facility.  [facility] RN will contact Jeannette with updates when available.  In the meantime, Jeannette plans to contact [facility] if they do not receive a scheduling call by this afternoon.    UPDATE at 1351:  Received call back from Julia, Director of Therapy with [facility].  She updated that generally when a patient has home care and then would like to transition to their therapy, home care would just contact them and they would collaborate to get the necessary orders in place.      [facility] RN will provide [facility] with [facility]'s contact information (Physical Therapy ph: [phone] fax: [phone]) once they are established with patient and inform them of the patient's wishes to transition to [facility] once done with home care.    [facility] RN left voicemail for the patient's daughter Jeannette updating her of above.    [facility] RN will review chart in approximately 1 week to assess patient's status with home care and outreach to home care and patient as indicated.      William Belle, RN  Clinic Care Coordinator  [facility]  Ph: [phone]

## 2019-01-15 NOTE — LETTER
[place] CARE COORDINATION  [facility]  [address], [place] [address]    January 15, 2019    Juany Collazo  [address]  [facility] [address]      Dear Juany,    I am a clinic care coordinator who works with Martinez Shafer MD at [facility]. I wanted to introduce myself and provide you with my contact information so that you can call me with questions or concerns about your health care. Below is a description of clinic care coordination and how I can further assist you.     The clinic care coordinator is a registered nurse and/or  who understand the health care system. The goal of clinic care coordination is to help you manage your health and improve access to the [facility] in the most efficient manner. The registered nurse can assist you in meeting your health care goals by providing education, coordinating services, and strengthening the communication among your providers. The  can assist you with financial, behavioral, psychosocial, chemical dependency, counseling, and/or psychiatric resources.    Please feel free to contact me at [phone] with any questions or concerns. We at [facility] are focused on providing you with the highest-quality healthcare experience possible and that all starts with you.     Sincerely,     William Belle RN  Clinic Care Coordinator  [facility]  Ph: [phone]    Enclosed: I have enclosed a copy of a 24 Hour Access Plan. This has helpful phone numbers for you to call when needed. Please keep this in an easy to access place to use as needed.

## 2019-01-16 ENCOUNTER — DOCUMENTATION ONLY (OUTPATIENT)
Dept: CARE COORDINATION | Facility: CLINIC | Age: 84
End: 2019-01-16

## 2019-01-16 NOTE — PROGRESS NOTES
Dear Dr. Shafer,  Thank you for the homecare referral for Juany.  I have been coordinating with her daughter, Keisha, and the patient.  It appears at this time that patient has need for therapy, but not clear for nursing.  The daughter and patient would like to proceed with PT\OT at this time.    I have altered the order that came with your referral to just include PT\OT at this time.    Please respond to this message, just to acknowledge receipt of this information.   The therapist can always add in nursing if they feel it necessary.  Thank you again,    Marietta Hagen RN, Clinical Coordinator  [facility]  [phone]

## 2019-01-18 ENCOUNTER — DOCUMENTATION ONLY (OUTPATIENT)
Dept: INTERNAL MEDICINE | Facility: CLINIC | Age: 84
End: 2019-01-18

## 2019-01-18 NOTE — PROGRESS NOTES
[facility] now requests orders and shares plan of care/discharge summaries for some patients through [facility].  Please REPLY TO THIS MESSAGE in order to give authorization for orders. This is considered a verbal order, you will still receive a faxed copy of orders for signature.  Thank you for your assistance in improving collaboration for our patients.    ORDERS REQUESTED    PT SOC assessment completed 1/18/19.  Continue PT tx 2w2 for strengthening, stretching, gait/transfer/balance training, falls risk assessment and falls prevention plan, monitor and tx pain, to achieve the following goals.  GOALS TO BE MET BY 2/2/19  1.  Pt/caregivers will demo understanding of recs made to decrease pts risk of falling.  2.  Pt will be indep with sit to/from stand from varied sitting surfaces she uses in her home with consistent use of 4WW to get all the way to the chair and for initial standing balance, rating her knee pain no more than 5/10.  3.  Pt will be indep with ambulation 300 ft with 4WW, with improved heel strike/toe off and foot clearance in swing phase, rating her knee pain no more than 5/10, to allow her to go to meals and other activities in her bldg.  4.  Pt will improve her score on the Tinetti Gait and Balance Assessment from 15/28 to at least 19/28 to indicate a decreased risk of falling from high to moderate.  5.  Pt will be indep with home ex program to allow her to continue to make or maintain gains in flexibility, strength and balance once her PT visits have ended.    OT eval and tx, including cardiovascular conditioning and safety with ADLs.    HA visits 1w2 beginning the week of 1/20/19 for assist with bathing and associated personal cares.    Otoniel Murrieta PT  [phone]  [email or web address]

## 2019-01-21 ENCOUNTER — DOCUMENTATION ONLY (OUTPATIENT)
Dept: INTERNAL MEDICINE | Facility: CLINIC | Age: 84
End: 2019-01-21

## 2019-01-22 ENCOUNTER — PATIENT OUTREACH (OUTPATIENT)
Dept: CARE COORDINATION | Facility: CLINIC | Age: 84
End: 2019-01-22

## 2019-01-22 ASSESSMENT — ACTIVITIES OF DAILY LIVING (ADL): DEPENDENT_IADLS:: TRANSPORTATION;SHOPPING;LAUNDRY;CLEANING;COOKING

## 2019-01-22 NOTE — PROGRESS NOTES
[facility] now shares medication concerns for some patients through [employer].  Please REPLY TO THIS MESSAGE if changes in medications are needed. This is considered a verbal order, you will still receive a faxed copy of orders for signature.  Thank you for your assistance in improving collaboration for our patients.    MEDICATION CONCERNS  The following drug interactions came up when pts med list was entered into the documentation system used by [facility].    Level 1 - Contraindicated  Amlodipine and simvastatin  Concurrent use can cause myopathy and rhabdomyolosis    Erythromycin and simvastatin  Concurrent use can cause rhabdomyolosis    Level 2 - Severe  Amlodipine and erythromycin  Concurrent use can cause hypotension, shock and/or kidney failure    Citalopram and donepezil  Concurrent use can cause prolonged QTc waves    Citalopram and erythromycin  Concurrent use can cause prolonged QTc waves    Donepezil and erythromycin  Concurrent use can cause prolonged QTc waves

## 2019-01-22 NOTE — PROGRESS NOTES
Clinic Care Coordination Contact  Care Coordination Communication    Referral Source: Self-patient/Caregiver    Chart review:  [facility] RN reviewed chart to assess for updates regarding patient's status with home care.  Patient is now established with home care: PT/OT, HHA.    Home Care Contact:              Home Care Agency: [facility]              Contact name () and phone number: Maritza Griffin, ph:[phone]              Care Coordination contacted home care: No - per chart review in Homecare Advisor, patient open with home care              Anticipated start of care date: 1/18/19    Plan: RN/SW Care Coordinator will await notification from home care staff informing RN/SW Care Coordinator of patients discharge plans/needs. RN/SW Care Coordinator will review chart and outreach to home care every 4 weeks and as needed.      William Belle RN  Clinic Care Coordinator  [facility]  Ph: [phone]

## 2019-01-28 ENCOUNTER — TELEPHONE (OUTPATIENT)
Dept: INTERNAL MEDICINE | Facility: CLINIC | Age: 84
End: 2019-01-28

## 2019-01-28 NOTE — TELEPHONE ENCOUNTER
Reason for Call:  Other call back    Detailed comments: Pt saw Dr Shafer 1/14/19.  At that appt, it was discussed that the pt doesn't need medication for dental procedures.  The dentist is requesting a letter from Dr Shafer stating that the pt doesn't need the medication.  Please fax letter to:  [facility]  Attn: Dr Marinelli  Fax # [phone].    Phone Number Patient can be reached at: Home number on file [phone] (home) DaughterJeannette  Best Time: anytime    Can we leave a detailed message on this number? YES    Call taken on 1/28/2019 at 10:55 AM by CODIE NICHOLS

## 2019-01-28 NOTE — LETTER
"[address] [address]  [phone]      1/28/2019       Juany Collazo  [address] [facility] [address]        Whom it concerns;    It is my understanding that Juany Collazo needs a letter to address issues of antibiotic prophylaxis prior to dental procedures.  She has had a very distant history of hip replacement.    I would have you refer to the American Academy [employer] Orthoped[employer] Surgeons that states   \"we recommend that antibiotic prophylaxis NOT be administered prior to dental procedures for immunocompetent patients with history of orthopedic hardware implantation.\"     Sincerely,      Martinez Shafer MD  Internal Medicine      "

## 2019-01-29 ENCOUNTER — TELEPHONE (OUTPATIENT)
Dept: INTERNAL MEDICINE | Facility: CLINIC | Age: 84
End: 2019-01-29

## 2019-01-29 DIAGNOSIS — M17.12 OSTEOARTHRITIS OF LEFT KNEE: ICD-10-CM

## 2019-01-29 DIAGNOSIS — R53.81 PHYSICAL DECONDITIONING: Primary | ICD-10-CM

## 2019-01-29 NOTE — TELEPHONE ENCOUNTER
PT, Homecare calling, requesting order placed for pt to do Physical Therapy--outpatient pool therapy, (to help with strengthening, and pain in her knee) at [facility]. Please FAX signed order to: [phone].

## 2019-01-30 ENCOUNTER — TELEPHONE (OUTPATIENT)
Dept: INTERNAL MEDICINE | Facility: CLINIC | Age: 84
End: 2019-01-30

## 2019-01-30 NOTE — TELEPHONE ENCOUNTER
Patients daughter edgard called a signed full order medication list the route the amount   Dose etc of all medications signed by dr zapata and faxed to [phone] to edgard   Any questions about this please call edgard at   [phone]

## 2019-01-31 ENCOUNTER — TELEPHONE (OUTPATIENT)
Dept: INTERNAL MEDICINE | Facility: CLINIC | Age: 84
End: 2019-01-31

## 2019-01-31 NOTE — TELEPHONE ENCOUNTER
Reason for Call:  Other call back    Detailed comments: Daughter, Jeannette, would like a call back to update pt's med list.    Phone Number Patient can be reached at: Home number on file [phone] (home)    Best Time: anytime    Can we leave a detailed message on this number? YES    Call taken on 1/31/2019 at 3:52 PM by CODIE NICHOLS

## 2019-01-31 NOTE — TELEPHONE ENCOUNTER
Spoke to daughter Jeannette. She requests current med list to be faxed to her at number listed below for mother's assisted living. Updated list per Jeannette AND FAXED.  IGNACIO Ocampo LPN

## 2019-02-04 ENCOUNTER — TELEPHONE (OUTPATIENT)
Dept: INTERNAL MEDICINE | Facility: CLINIC | Age: 84
End: 2019-02-04

## 2019-02-04 NOTE — TELEPHONE ENCOUNTER
Homecare OT orders approved per nursing protocol. Homecare reports patient previously had a fall and had changes with cognition- was already seen and evaluated for this, in the hospital in November. Needs one additional visit for cognitive assessment and ADL retraining.

## 2019-02-12 ENCOUNTER — ASSISTED LIVING VISIT (OUTPATIENT)
Dept: GERIATRICS | Facility: CLINIC | Age: 84
End: 2019-02-12
Payer: COMMERCIAL

## 2019-02-12 VITALS
SYSTOLIC BLOOD PRESSURE: 112 MMHG | WEIGHT: 172 LBS | OXYGEN SATURATION: 96 % | HEART RATE: 59 BPM | RESPIRATION RATE: 16 BRPM | DIASTOLIC BLOOD PRESSURE: 64 MMHG | BODY MASS INDEX: 32.5 KG/M2

## 2019-02-12 DIAGNOSIS — F03.90 DEMENTIA WITHOUT BEHAVIORAL DISTURBANCE, UNSPECIFIED DEMENTIA TYPE: ICD-10-CM

## 2019-02-12 DIAGNOSIS — I10 ESSENTIAL HYPERTENSION, BENIGN: ICD-10-CM

## 2019-02-12 DIAGNOSIS — Z71.89 ADVANCED DIRECTIVES, COUNSELING/DISCUSSION: ICD-10-CM

## 2019-02-12 DIAGNOSIS — H20.9 TRAUMATIC IRITIS: ICD-10-CM

## 2019-02-12 DIAGNOSIS — F33.0 MAJOR DEPRESSIVE DISORDER, RECURRENT EPISODE, MILD (H): ICD-10-CM

## 2019-02-12 DIAGNOSIS — M17.12 OSTEOARTHRITIS OF LEFT KNEE, UNSPECIFIED OSTEOARTHRITIS TYPE: Primary | ICD-10-CM

## 2019-02-12 DIAGNOSIS — R53.81 PHYSICAL DECONDITIONING: ICD-10-CM

## 2019-02-12 NOTE — PROGRESS NOTES
[facility]  PRIMARY CARE PROVIDER AND CLINIC:  Gaurang Reeves [address] / [place] [address]  Chief Complaint   Patient presents with     Establish Care     [facility] Medical Record Number:  [id number]  Place of Service where encounter took place:  [facility] [[id number]]    HPI:    Juany Collazo is a 87 year old  (1/7/1932),[facility] Admitted to this facility for  rehab, medical management and nursing care.  HPI information obtained from: facility chart records, facility staff, patient report and [facility] chart review.  Current issues are:      Osteoarthritis of left knee, unspecified osteoarthritis type  S/p fall 11/13/19 while residing at home. Missed last step, feel forward, trauma to L knee R eye. Has h/o OA of knees. Ortho consulted. Decision made for conservative tx. During subsequent stay at [facility] had effusion to L knee joint. Seen by Ortho, knee joint drained, then inj with cortisone. Reports pain improved.    Physical deconditioning  S/p fall, hosp. Stay. Completed PT/OT. Gait steady with walker. No falls since admission to AL    Right eye traumatic iritis  S/p fall with hospitalization. Seen by eye Dr. Had course of steroid gtts. Eye pain, headache resolved. right eye pupl remains fixed, sl ovoid shape.  Per dtr had recent f/u eye exam when admitted to AL. Cont. Thought to be stable. Is noted however to have right eye cataract- dtr with be sched. Cataract surg.     Essential hypertension, benign  BPs stable. Cont. On norvasc, zestoretic. No LE edema. No reports of dizziness. Bmp done 11/18 wnl    Major depressive disorder, recurrent episode, mild (H)  Mood gen. Stable, however had increased s/s depression per dtr with hosp./TCU stay, needing to move out of house.  Appears to be adjusting well now    Dementia without behavioral disturbance, unspecified dementia type  Memory  loss. BIMS 14/15, SLUMS 20/30, CPT 4.8/5.6. Had appt with neurology per dtr. Started on aricept for memory loss. Cont. On celexa for depression    Advanced directives, counseling/discussion  Spoke with dtr, resident. Change code status to  DNR/DNI      CODE STATUS/ADVANCE DIRECTIVES DISCUSSION:   DNR/DNI  Patient's living condition: lives in an assisted living facility    ALLERGIES:No known drug allergies  PAST MEDICAL HISTORY:  has a past medical history of BENIGN HYPERTENSION (10/27/2003), BONE & CARTILAGE DIS NOS (1/18/2006), Carpal tunnel syndrome, Chronic pain, Gastro-oesophageal reflux disease, GERD (gastroesophageal reflux disease) (10/14/2008), History of blood transfusion, Hyperlipidemia LDL goal <130 (10/31/2010), and Mild major depression (H) (4/23/2014). She also has no past medical history of Chronic infection, Diabetes (H), Malignant hyperthermia, or Sleep apnea.  PAST SURGICAL HISTORY:  has a past surgical history that includes NONSPECIFIC PROCEDURE (1998); NONSPECIFIC PROCEDURE (1970); NONSPECIFIC PROCEDURE (1998); and Decompression lumbar one level (4/19/2013).  FAMILY HISTORY: family history includes Arthritis in her mother; Cardiovascular in her brother and father; Eye Disorder in her mother; Glaucoma in her mother; Hypertension in her father and mother; Musculoskeletal Disorder in her brother; Osteoporosis in her maternal aunt and mother.  SOCIAL HISTORY:  reports that she quit smoking about 52 years ago. she has never used smokeless tobacco. She reports that she drinks alcohol. She reports that she does not use drugs.    Post Discharge Medication Reconciliation Status: discharge medications reconciled, continue medications without change.  Current Outpatient Medications   Medication Sig Dispense Refill     Acetaminophen (TYLENOL PO) Take 1,000 mg by mouth 3 times daily as needed       amLODIPine (NORVASC) 2.5 MG tablet Take 1 tablet (2.5 mg) by mouth daily 90 tablet 3     citalopram (CELEXA) 20  MG tablet Take 1 tablet (20 mg) by mouth daily 30 tablet 3     diclofenac (VOLTAREN) 1 % topical gel Place 2 g onto the skin 4 times daily       donepezil (ARICEPT) 10 MG tablet Take 1 tablet (10 mg) by mouth At Bedtime 30 tablet      lisinopril-hydrochlorothiazide (PRINZIDE/ZESTORETIC) 20-12.5 MG per tablet Take 1 tablet by mouth 2 times daily 30 tablet 3     omeprazole (PRILOSEC) 20 MG DR capsule        polyethylene glycol (MIRALAX/GLYCOLAX) packet Take 1 packet by mouth daily as needed for constipation       simvastatin (ZOCOR) 20 MG tablet Take 1 tablet (20 mg) by mouth At Bedtime 90 tablet 3       ROS:  No chest pain, shortness of breath, fevers, chills, headache, nausea, vomiting, dysuria or bowel abnormalities.  Appetite is  normal.  No pain except occ L knee. ROS somewhat limited due to memory loss.    Exam:  /64   Pulse 59   Resp 16   Wt 78 kg (172 lb)   SpO2 96%   BMI 32.50 kg/m    GENERAL APPEARANCE:  Alert, in no distress, cooperative  ENT:  Mouth and posterior oropharynx normal, moist mucous membranes, normal hearing acuity, adequate dentition  EYES:  EOM normal, Conjunctiva and lids normal, OD pupil dilated, fixed, sl. ovoid. no drainage. OS pupil reactive  NECK:  No adenopathy,masses or thyromegaly, no carotid bruit, FROM  RESP:  respiratory effort and palpation of chest normal, lungs clear to auscultation , no respiratory distress  CV:  Palpation and auscultation of heart done , regular rate and rhythm, no murmur, rub, or gallop, no edema  ABDOMEN:  normal bowel sounds, soft, nontender, no hepatosplenomegaly or other masses, no guarding or rebound, no bruits  LYMPHATICS:  No adenopathy in neck , No adenopathy in axillae  M/S:   Gait and station normal  muscle strength 5/5 all 4 ext., normal tone. no pain with ROM LEs  SKIN:  Inspection of skin and subcutaneous tissue baseline, Palpation of skin and subcutaneous tissue baseline  NEURO:   Cranial nerves 2-12 are normal tested and grossly at  patient's baseline, alert, speech clear  PSYCH:  oriented X 3, insight and judgement impaired, memory impaired , affect and mood normal, some difficulty recalling what she had for breakfast    Lab/Diagnostic data:     CBC RESULTS:   Recent Labs   Lab Test 11/13/18 0952 08/10/18  1154   WBC 9.3 10.2   RBC 4.48 4.31   HGB 13.0  13.1 12.3   HCT 40.7 39.0   MCV 91 91   MCH 29.0 28.5   MCHC 31.9 31.5   RDW 13.7 13.7    228       Last Basic Metabolic Panel:  Recent Labs   Lab Test 11/13/18  0952 03/26/18  0921    138   POTASSIUM 3.6 4.3   CHLORIDE 103 103   MAUREEN 9.0 9.3   CO2 25 28   BUN 12 13   CR 0.72 0.72   GLC 93 96       Liver Function Studies -   Recent Labs   Lab Test 11/13/18 0952 03/26/18  0921   PROTTOTAL 6.7* 7.1   ALBUMIN 3.6 3.9   BILITOTAL 0.5 0.6   ALKPHOS 78 80   AST 17 22   ALT 20 19       ASSESSMENT/PLAN:  Osteoarthritis of left knee, unspecified osteoarthritis type  Pain gen. Controlled  1. Cont. voltaren gel  2. Cont. Prn tylenol  3. F/u Knee inj prn  4. Monitor for increased edema L knee    Physical deconditioning  Improved. Therapies complete  1. Cont. Walker at all times  2. Monitor for diff. Standing, changes in gait  3. Monitor for falls    Right eye traumatic iritis  Stable. Recent eye appt., cataract right eye  1. Monitor for blurred vision, eye pain, drainage  2. For above s/s refer to eye drRacheal  3. dtr to sched. Surg. For cataract -will complete pre-op h&p    Essential hypertension, benign  BPs currently stable  1. Cont. norvasc 2.5 mg every day(recently decreased dose due to falls)  2. Cont. zestoretic  3. Follow HRs, BPs  4. Monitor for LE edema  5. Cbc, bmp in next 1-3 mos    Major depressive disorder, recurrent episode, mild (H)  Mood currently stable  1. Cont. celexa  2. Monitor for changes in mood  3. Invite to activities  4. Monitor for adjustment difficulties     Dementia without behavioral disturbance, unspecified dementia type  Memory loss. Started on aricept  1. Cont.  aricept  2. Monitor for GI distress  3. Monitor for further memory loss, changes in mood, behavior  4. F/u with neurology on prn basis-dtr to cancel annual neurology appt in 5/19    Advanced directives, counseling/discussion  Spoke with dtr-poa, resident. Change code status to DNR/DNI. POLST in chart       Electronically signed by:  BETH Hannon CNP

## 2019-02-12 NOTE — LETTER
2/12/2019        RE: Juany Collazo  [facility]  [address]  [facility] [address]        [facility]  PRIMARY CARE PROVIDER AND CLINIC:  Gaurang Reeves [address] / [place] [address]  Chief Complaint   Patient presents with     Establish Care     [facility] Medical Record Number:  [id number]  Place of Service where encounter took place:  [facility] [[id number]]    HPI:    Juany Collazo is a 87 year old  (1/7/1932),[facility] Admitted to this facility for  rehab, medical management and nursing care.  HPI information obtained from: facility chart records, facility staff, patient report and [facility] chart review.  Current issues are:      Osteoarthritis of left knee, unspecified osteoarthritis type  S/p fall 11/13/19 while residing at home. Missed last step, feel forward, trauma to L knee R eye. Has h/o OA of knees. Ortho consulted. Decision made for conservative tx. During subsequent stay at [facility] had effusion to L knee joint. Seen by Ortho, knee joint drained, then inj with cortisone. Reports pain improved.    Physical deconditioning  S/p fall, hosp. Stay. Completed PT/OT. Gait steady with walker. No falls since admission to AL    Right eye traumatic iritis  S/p fall with hospitalization. Seen by eye Dr. Had course of steroid gtts. Eye pain, headache resolved. right eye pupl remains fixed, sl ovoid shape.  Per dtr had recent f/u eye exam when admitted to AL. Cont. Thought to be stable. Is noted however to have right eye cataract- dtr with be sched. Cataract surg.     Essential hypertension, benign  BPs stable. Cont. On norvasc, zestoretic. No LE edema. No reports of dizziness. Bmp done 11/18 wnl    Major depressive disorder, recurrent episode, mild (H)  Mood gen. Stable, however had increased s/s depression per dtr with hosp./TCU stay, needing to move out of house.   Appears to be adjusting well now    Dementia without behavioral disturbance, unspecified dementia type  Memory loss. BIMS 14/15, SLUMS 20/30, CPT 4.8/5.6. Had appt with neurology per dtr. Started on aricept for memory loss. Cont. On celexa for depression    Advanced directives, counseling/discussion  Spoke with dtr, resident. Change code status to  DNR/DNI      CODE STATUS/ADVANCE DIRECTIVES DISCUSSION:   DNR/DNI  Patient's living condition: lives in an assisted living facility    ALLERGIES:No known drug allergies  PAST MEDICAL HISTORY:  has a past medical history of BENIGN HYPERTENSION (10/27/2003), BONE & CARTILAGE DIS NOS (1/18/2006), Carpal tunnel syndrome, Chronic pain, Gastro-oesophageal reflux disease, GERD (gastroesophageal reflux disease) (10/14/2008), History of blood transfusion, Hyperlipidemia LDL goal <130 (10/31/2010), and Mild major depression (H) (4/23/2014). She also has no past medical history of Chronic infection, Diabetes (H), Malignant hyperthermia, or Sleep apnea.  PAST SURGICAL HISTORY:  has a past surgical history that includes NONSPECIFIC PROCEDURE (1998); NONSPECIFIC PROCEDURE (1970); NONSPECIFIC PROCEDURE (1998); and Decompression lumbar one level (4/19/2013).  FAMILY HISTORY: family history includes Arthritis in her mother; Cardiovascular in her brother and father; Eye Disorder in her mother; Glaucoma in her mother; Hypertension in her father and mother; Musculoskeletal Disorder in her brother; Osteoporosis in her maternal aunt and mother.  SOCIAL HISTORY:  reports that she quit smoking about 52 years ago. she has never used smokeless tobacco. She reports that she drinks alcohol. She reports that she does not use drugs.    Post Discharge Medication Reconciliation Status: discharge medications reconciled, continue medications without change.  Current Outpatient Medications   Medication Sig Dispense Refill     Acetaminophen (TYLENOL PO) Take 1,000 mg by mouth 3 times daily as needed        amLODIPine (NORVASC) 2.5 MG tablet Take 1 tablet (2.5 mg) by mouth daily 90 tablet 3     citalopram (CELEXA) 20 MG tablet Take 1 tablet (20 mg) by mouth daily 30 tablet 3     diclofenac (VOLTAREN) 1 % topical gel Place 2 g onto the skin 4 times daily       donepezil (ARICEPT) 10 MG tablet Take 1 tablet (10 mg) by mouth At Bedtime 30 tablet      lisinopril-hydrochlorothiazide (PRINZIDE/ZESTORETIC) 20-12.5 MG per tablet Take 1 tablet by mouth 2 times daily 30 tablet 3     omeprazole (PRILOSEC) 20 MG DR capsule        polyethylene glycol (MIRALAX/GLYCOLAX) packet Take 1 packet by mouth daily as needed for constipation       simvastatin (ZOCOR) 20 MG tablet Take 1 tablet (20 mg) by mouth At Bedtime 90 tablet 3       ROS:  No chest pain, shortness of breath, fevers, chills, headache, nausea, vomiting, dysuria or bowel abnormalities.  Appetite is  normal.  No pain except occ L knee. ROS somewhat limited due to memory loss.    Exam:  /64   Pulse 59   Resp 16   Wt 78 kg (172 lb)   SpO2 96%   BMI 32.50 kg/m     GENERAL APPEARANCE:  Alert, in no distress, cooperative  ENT:  Mouth and posterior oropharynx normal, moist mucous membranes, normal hearing acuity, adequate dentition  EYES:  EOM normal, Conjunctiva and lids normal, OD pupil dilated, fixed, sl. ovoid. no drainage. OS pupil reactive  NECK:  No adenopathy,masses or thyromegaly, no carotid bruit, FROM  RESP:  respiratory effort and palpation of chest normal, lungs clear to auscultation , no respiratory distress  CV:  Palpation and auscultation of heart done , regular rate and rhythm, no murmur, rub, or gallop, no edema  ABDOMEN:  normal bowel sounds, soft, nontender, no hepatosplenomegaly or other masses, no guarding or rebound, no bruits  LYMPHATICS:  No adenopathy in neck , No adenopathy in axillae  M/S:   Gait and station normal  muscle strength 5/5 all 4 ext., normal tone. no pain with ROM LEs  SKIN:  Inspection of skin and subcutaneous tissue baseline,  Palpation of skin and subcutaneous tissue baseline  NEURO:   Cranial nerves 2-12 are normal tested and grossly at patient's baseline, alert, speech clear  PSYCH:  oriented X 3, insight and judgement impaired, memory impaired , affect and mood normal, some difficulty recalling what she had for breakfast    Lab/Diagnostic data:     CBC RESULTS:   Recent Labs   Lab Test 11/13/18  0952 08/10/18  1154   WBC 9.3 10.2   RBC 4.48 4.31   HGB 13.0  13.1 12.3   HCT 40.7 39.0   MCV 91 91   MCH 29.0 28.5   MCHC 31.9 31.5   RDW 13.7 13.7    228       Last Basic Metabolic Panel:  Recent Labs   Lab Test 11/13/18  0952 03/26/18  0921    138   POTASSIUM 3.6 4.3   CHLORIDE 103 103   MAUREEN 9.0 9.3   CO2 25 28   BUN 12 13   CR 0.72 0.72   GLC 93 96       Liver Function Studies -   Recent Labs   Lab Test 11/13/18 0952 03/26/18  0921   PROTTOTAL 6.7* 7.1   ALBUMIN 3.6 3.9   BILITOTAL 0.5 0.6   ALKPHOS 78 80   AST 17 22   ALT 20 19       ASSESSMENT/PLAN:  Osteoarthritis of left knee, unspecified osteoarthritis type  Pain gen. Controlled  1. Cont. voltaren gel  2. Cont. Prn tylenol  3. F/u Knee inj prn  4. Monitor for increased edema L knee    Physical deconditioning  Improved. Therapies complete  1. Cont. Walker at all times  2. Monitor for diff. Standing, changes in gait  3. Monitor for falls    Right eye traumatic iritis  Stable. Recent eye appt., cataract right eye  1. Monitor for blurred vision, eye pain, drainage  2. For above s/s refer to eye drRacheal  3. dtr to sched. Surg. For cataract -will complete pre-op h&p    Essential hypertension, benign  BPs currently stable  1. Cont. norvasc 2.5 mg every day(recently decreased dose due to falls)  2. Cont. zestoretic  3. Follow HRs, BPs  4. Monitor for LE edema  5. Cbc, bmp in next 1-3 mos    Major depressive disorder, recurrent episode, mild (H)  Mood currently stable  1. Cont. celexa  2. Monitor for changes in mood  3. Invite to activities  4. Monitor for adjustment difficulties      Dementia without behavioral disturbance, unspecified dementia type  Memory loss. Started on aricept  1. Cont. aricept  2. Monitor for GI distress  3. Monitor for further memory loss, changes in mood, behavior  4. F/u with neurology on prn basis-dtr to cancel annual neurology appt in 5/19    Advanced directives, counseling/discussion  Spoke with dtr-poa, resident. Change code status to DNR/DNI. POLST in chart       Electronically signed by:  BETH Hannon CNP                    Sincerely,        BETH Hannon CNP

## 2019-02-25 ENCOUNTER — PATIENT OUTREACH (OUTPATIENT)
Dept: INTERNAL MEDICINE | Facility: CLINIC | Age: 84
End: 2019-02-25

## 2019-02-25 ASSESSMENT — ACTIVITIES OF DAILY LIVING (ADL): DEPENDENT_IADLS:: TRANSPORTATION;SHOPPING;LAUNDRY;CLEANING;COOKING

## 2019-02-25 NOTE — PROGRESS NOTES
Clinic Care Coordination Contact    Clinic Care Coordination Contact  OUTREACH    Referral Information:  Referral Source: Self-patient/Caregiver  Primary Diagnosis: Psychosocial  Chief Complaint   Patient presents with     Clinic Care Coordination - Follow-up     discharge from home care     Clinical Concerns:  Follow-up call to the patient to get updates on how she is doing since being discharged from home care.    The patient reported she has been doing well.  She is in an assisted living apartment and stated she is currently receiving all of the support/resources she is needing.  She is being followed by a provider within the assisted living system.    At this time, the patient denied need for additional assistance from Care Coordination but agreed to contact CCC RN should any questions or concerns arise.    Functional Status:  Dependent ADLs:: Ambulation-walker  Dependent IADLs:: Transportation, Shopping, Laundry, Cleaning, Cooking  Bed or wheelchair confined:: No  Mobility Status: Independent w/Device    Living Situation:  Lives at [facility]    Diet/Exercise/Sleep:  Exercise:: Yes (physical therapy)    Transportation:  Transportation concerns (GOAL):: No  Transportation means:: Family     Psychosocial:  Mental health DX:: Yes  Mental health DX how managed:: Medication  Mental health management concern (GOAL):: No  Informal Support system:: Family, Children     Resources and Interventions:  Supplies used at home:: None  Equipment Currently Used at Home: walker, rolling    Patient/Caregiver understanding: Yes    Plan: The patient will continue working with the providers within her assisted living facility and will contact CCC RN with any additional questions or concerns.  CCC RN will make no further scheduled patient outreaches at this time but will remain available should any patient needs arise.    William Belle, RN  Clinic Care Coordinator  [facility]  [facility]  Ph: [phone]

## 2019-04-01 ENCOUNTER — PATIENT OUTREACH (OUTPATIENT)
Dept: GERIATRIC MEDICINE | Facility: CLINIC | Age: 84
End: 2019-04-01

## 2019-04-04 ENCOUNTER — ASSISTED LIVING VISIT (OUTPATIENT)
Dept: GERIATRICS | Facility: CLINIC | Age: 84
End: 2019-04-04
Payer: COMMERCIAL

## 2019-04-04 VITALS
SYSTOLIC BLOOD PRESSURE: 112 MMHG | WEIGHT: 172 LBS | HEART RATE: 59 BPM | OXYGEN SATURATION: 96 % | RESPIRATION RATE: 16 BRPM | DIASTOLIC BLOOD PRESSURE: 64 MMHG | BODY MASS INDEX: 32.5 KG/M2 | TEMPERATURE: 98.4 F

## 2019-04-04 DIAGNOSIS — G30.1 LATE ONSET ALZHEIMER'S DISEASE WITHOUT BEHAVIORAL DISTURBANCE (H): ICD-10-CM

## 2019-04-04 DIAGNOSIS — I10 ESSENTIAL HYPERTENSION, BENIGN: Primary | ICD-10-CM

## 2019-04-04 DIAGNOSIS — M17.12 OSTEOARTHRITIS OF LEFT KNEE, UNSPECIFIED OSTEOARTHRITIS TYPE: ICD-10-CM

## 2019-04-04 DIAGNOSIS — H20.9 TRAUMATIC IRITIS: ICD-10-CM

## 2019-04-04 DIAGNOSIS — F43.21 ADJUSTMENT DISORDER WITH DEPRESSED MOOD: ICD-10-CM

## 2019-04-04 DIAGNOSIS — F02.80 LATE ONSET ALZHEIMER'S DISEASE WITHOUT BEHAVIORAL DISTURBANCE (H): ICD-10-CM

## 2019-04-04 DIAGNOSIS — K21.9 GASTROESOPHAGEAL REFLUX DISEASE WITHOUT ESOPHAGITIS: ICD-10-CM

## 2019-04-04 NOTE — LETTER
"    4/4/2019        RE: Juany Collazo  [facility]  [address]  [place] [address]        Juany Collazo is a 87 year old female seen April 4, 2019 at [facility] where she has resided for 2 months (admit 2/2019)     Patient was hospitalized in November after a fall at home in which she suffered trauma to her left knee and right eye   [facility] then went to her daughter's home in [place] until admission here.     Today patient is seen in her apartment, up to chair.   States she is feeling well, likes her new place  \"It's very nice here\"    Continues to have left knee pain, \"it's bone on bone\"   Ambulates with 4WW, no recent falls.     By chart review, patient has had progressive dementia, with first evaluation for memory loss in 2013.   She has been followed by [facility] clinic, and has been treated with donepezil about 5 years.   She lived in her home with [facility] and family help until fall in November.        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   She previously lived in a 3 story home in [place] with [facility]    She has 5 children.   One daughter lives in [place].       ROS:  Ambulatory with 4WW  SLUMS 20/30   CPT 4.8   Wt Readings from Last 5 Encounters:   04/04/19 78 kg (172 lb)   02/12/19 78 kg (172 lb)   01/14/19 78.3 kg (172 lb 9.6 oz)   12/05/18 76 kg (167 lb 9.6 oz)   11/29/18 73.9 kg (163 lb)          EXAM:  NAD  /64   Pulse 59   Temp 98.4  F (36.9  C)   Resp 16   Wt 78 kg (172 lb)   SpO2 96%   BMI 32.50 kg/m      Right pupil is " fixed and dilated  Neck supple without adenopathy  Lungs clear bilaterally with fair air movement  Heart RRR s1s2 without murmur  Abd soft, NT, no distention, +BS  Ext without edema  Neuro: no focal findings, ambulatory independently  Psych: affect okay.     Labs reviewed              Brain MRI 11/21/18  IMPRESSION:    1. No evidence of acute infarct, mass, hemorrhage, or herniation.  2. Moderate diffuse parenchymal volume loss and white matter changes likely due to chronic microvascular ischemic disease.   3. 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 This  abuts the ventral left spinal cord at the level of C1 and causes at least mild spinal canal narrowing.       IMP/PLAN:   (I10) Essential hypertension, benign    Comment:   BP Readings from Last 3 Encounters:   04/04/19 112/64   02/12/19 112/64   01/14/19 122/68      Plan: continue amlodipine, lisinopril, hydrochlorothiazide and follow bps  Recheck BMP on diuretic       (G30.1,  F02.80) Late onset Alzheimer's disease without behavioral disturbance  Comment: decline in memory and functional status   Plan: AL support for meds, meals, activity   Continue PTA donepezil.    (M17.12) Osteoarthritis of left knee, unspecified osteoarthritis type  Comment: with traumatic effusion after her fall     Plan: prn acetaminophen, topical diclofenac, walker use.   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   Has cataract surgery scheduled next month       (F43.21) Adjustment disorder with depressed mood  Comment: many changes, loss of coping skills  Plan: continue citalopram        (K21.9) Gastroesophageal reflux disease without esophagitis  Comment: ongoing   Plan: continue omeprazole.       Citlalli Saucedo MD       Sincerely,        Citlalli Saucedo MD

## 2019-04-11 NOTE — PROGRESS NOTES
"Juany Collazo is a 87 year old female seen April 4, 2019 at [facility] where she has resided for 2 months (admit 2/2019)     Patient was hospitalized in November after a fall at home in which she suffered trauma to her left knee and right eye   [facility], then went to her daughter's home in [place] until admission here.     Today patient is seen in her apartment, up to chair.   States she is feeling well, likes her new place  \"It's very nice here\"    Continues to have left knee pain, \"it's bone on bone\"   Ambulates with 4WW, no recent falls.     By chart review, patient has had progressive dementia, with first evaluation for memory loss in 2013.   She has been followed by [facility], and has been treated with donepezil about 5 years.   She lived in her home with PCA and family help until fall in November.        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   She previously lived in a 3 story home in [place] with [facility]    She has 5 children.   One daughter lives in [place].       ROS:  Ambulatory with 4WW  SLUMS 20/30   CPT 4.8   Wt Readings from Last 5 Encounters:   04/04/19 78 kg (172 lb)   02/12/19 78 kg (172 lb)   01/14/19 78.3 kg (172 lb 9.6 oz)   12/05/18 76 kg (167 lb 9.6 oz)   11/29/18 73.9 kg (163 lb)          EXAM:  NAD  /64   Pulse 59   Temp 98.4  F (36.9  C)   Resp 16   Wt 78 kg (172 lb)   SpO2 96%   BMI 32.50 kg/m     Right pupil is fixed and dilated  Neck supple without adenopathy  Lungs clear bilaterally with fair air movement  Heart RRR s1s2 " without murmur  Abd soft, NT, no distention, +BS  Ext without edema  Neuro: no focal findings, ambulatory independently  Psych: affect okay.     Labs reviewed              Brain MRI 11/21/18  IMPRESSION:    1. No evidence of acute infarct, mass, hemorrhage, or herniation.  2. Moderate diffuse parenchymal volume loss and white matter changes likely due to chronic microvascular ischemic disease.   3. 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 This  abuts the ventral left spinal cord at the level of C1 and causes at least mild spinal canal narrowing.       IMP/PLAN:   (I10) Essential hypertension, benign    Comment:   BP Readings from Last 3 Encounters:   04/04/19 112/64   02/12/19 112/64   01/14/19 122/68      Plan: continue amlodipine, lisinopril, hydrochlorothiazide and follow bps  Recheck BMP on diuretic       (G30.1,  F02.80) Late onset Alzheimer's disease without behavioral disturbance  Comment: decline in memory and functional status   Plan: AL support for meds, meals, activity   Continue PTA donepezil.    (M17.12) Osteoarthritis of left knee, unspecified osteoarthritis type  Comment: with traumatic effusion after her fall     Plan: prn acetaminophen, topical diclofenac, walker use.   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   Has cataract surgery scheduled next month       (F43.21) Adjustment disorder with depressed mood  Comment: many changes, loss of coping skills  Plan: continue citalopram        (K21.9) Gastroesophageal reflux disease without esophagitis  Comment: ongoing   Plan: continue omeprazole.       Citlalli Saucedo MD

## 2019-05-20 ENCOUNTER — ASSISTED LIVING VISIT (OUTPATIENT)
Dept: GERIATRICS | Facility: CLINIC | Age: 84
End: 2019-05-20
Payer: COMMERCIAL

## 2019-05-20 DIAGNOSIS — I10 ESSENTIAL HYPERTENSION, BENIGN: ICD-10-CM

## 2019-05-20 DIAGNOSIS — L30.9 DERMATITIS: Primary | ICD-10-CM

## 2019-05-20 NOTE — LETTER
5/20/2019        RE: Juany Collazo  [facility]  [address]  [facility] [address]        [place] [facility]  [place] Medical Record Number:  [id number]  Place of Service where encounter took place:  [facility] [[id number]]  Chief Complaint   Patient presents with     Derm Problem       HPI:    Juany Collazo  is a 87 year old (1/7/1932), who is being seen today for an episodic care visit.  HPI information obtained from: facility chart records, facility staff, patient report and [facility] chart review. Today's concern is: dermatitis, HTN. Reports pruritis of UEs today. Rash to L forearm.  No new meds. Not aware of any soap/detergent changes. 11/18 LFTS wnl.  For HTN cont. On norvasc, zestoretic. BPs, HRs stable. No recent c/o dizziness, no falls.       Past Medical and Surgical History reviewed in Epic today.    MEDICATIONS:  Current Outpatient Medications   Medication Sig Dispense Refill     Acetaminophen (TYLENOL PO) Take 1,000 mg by mouth 3 times daily as needed       amLODIPine (NORVASC) 2.5 MG tablet Take 1 tablet (2.5 mg) by mouth daily 90 tablet 3     citalopram (CELEXA) 20 MG tablet Take 1 tablet (20 mg) by mouth daily 30 tablet 3     diclofenac (VOLTAREN) 1 % topical gel Place 2 g onto the skin 4 times daily       donepezil (ARICEPT) 10 MG tablet Take 1 tablet (10 mg) by mouth At Bedtime 30 tablet      lisinopril-hydrochlorothiazide (PRINZIDE/ZESTORETIC) 20-12.5 MG per tablet Take 1 tablet by mouth 2 times daily 30 tablet 3     omeprazole (PRILOSEC) 20 MG DR capsule        polyethylene glycol (MIRALAX/GLYCOLAX) packet Take 1 packet by mouth daily as needed for constipation       simvastatin (ZOCOR) 20 MG tablet Take 1 tablet (20 mg) by mouth At Bedtime 90 tablet 3         REVIEW OF SYSTEMS:  No chest pain, shortness of breath, fevers, chills, headache, nausea, vomiting, dysuria or bowel abnormalities.  Appetite is  normal.  No pain  except occ L knee, pruritis of UEs.    Objective:  /71   Pulse 58   Resp 16   SpO2 97%   Exam:  GENERAL APPEARANCE:  Alert, in no distress  ENT:  Mouth and posterior oropharynx normal, moist mucous membranes  NECK:  No adenopathy,masses or thyromegaly, FROM  RESP:  respiratory effort and palpation of chest normal, lungs clear to auscultation , no respiratory distress  CV:  Palpation and auscultation of heart done , regular rate and rhythm, no murmur, rub, or gallop, no edema  ABDOMEN:  normal bowel sounds, soft, nontender, no hepatosplenomegaly or other masses, no guarding or rebound  M/S:   Gait and station normal  muscle strength 5/5 all 4 ext  SKIN:  pink area with small superficial abraded areas L forearm  NEURO:   Cranial nerves 2-12 are normal tested and grossly at patient's baseline, alert, spech clear  PSYCH:  memory impaired , affect and mood normal    Labs:     Most Recent 3 CBC's:  Recent Labs   Lab Test 11/13/18  0952 08/10/18  1154 03/26/18  0921  09/29/13  1600   WBC 9.3 10.2  --   --  8.7   HGB 13.0  13.1 12.3 12.2   < > 12.5   MCV 91 91  --   --  88    228  --   --  269    < > = values in this interval not displayed.     Most Recent 3 BMP's:  Recent Labs   Lab Test 11/13/18  0952 03/26/18  0921 01/18/17  1029    138 136   POTASSIUM 3.6 4.3 3.9   CHLORIDE 103 103 100   CO2 25 28 27   BUN 12 13 18   CR 0.72 0.72 0.96   ANIONGAP 9 7 9   MAUREEN 9.0 9.3 9.1   GLC 93 96 99       ASSESSMENT/PLAN:  Dermatitis  Newer onset, unclear etiology, pruritis  1. Start HC cream bid, change to prn after 3 days  2. Monitor for further areas of redness  3. For ongoing s/s, may extend sched. HC cream    Essential hypertension, benign  Currently controlled  1. Cont. norvasc  2. Cont. zestoretic  3. Follow BPs, HRs, monitor for dizziness  4. Cbc, bmp tomorrow      Electronically signed by:  BETH Hannon CNP             Sincerely,        BETH Hannon CNP

## 2019-05-20 NOTE — PROGRESS NOTES
[place] [facility]  [place] Medical Record Number:  [id number]  Place of Service where encounter took place:  [facility] ([facility] [[id number]]  Chief Complaint   Patient presents with     Derm Problem       HPI:    Juany Collazo  is a 87 year old (1/7/1932), who is being seen today for an episodic care visit.  HPI information obtained from: facility chart records, facility staff, patient report and [facility] chart review. Today's concern is: dermatitis, HTN. Reports pruritis of UEs today. Rash to L forearm.  No new meds. Not aware of any soap/detergent changes. 11/18 LFTS wnl.  For HTN cont. On norvasc, zestoretic. BPs, HRs stable. No recent c/o dizziness, no falls.       Past Medical and Surgical History reviewed in Epic today.    MEDICATIONS:  Current Outpatient Medications   Medication Sig Dispense Refill     Acetaminophen (TYLENOL PO) Take 1,000 mg by mouth 3 times daily as needed       amLODIPine (NORVASC) 2.5 MG tablet Take 1 tablet (2.5 mg) by mouth daily 90 tablet 3     citalopram (CELEXA) 20 MG tablet Take 1 tablet (20 mg) by mouth daily 30 tablet 3     diclofenac (VOLTAREN) 1 % topical gel Place 2 g onto the skin 4 times daily       donepezil (ARICEPT) 10 MG tablet Take 1 tablet (10 mg) by mouth At Bedtime 30 tablet      lisinopril-hydrochlorothiazide (PRINZIDE/ZESTORETIC) 20-12.5 MG per tablet Take 1 tablet by mouth 2 times daily 30 tablet 3     omeprazole (PRILOSEC) 20 MG DR capsule        polyethylene glycol (MIRALAX/GLYCOLAX) packet Take 1 packet by mouth daily as needed for constipation       simvastatin (ZOCOR) 20 MG tablet Take 1 tablet (20 mg) by mouth At Bedtime 90 tablet 3         REVIEW OF SYSTEMS:  No chest pain, shortness of breath, fevers, chills, headache, nausea, vomiting, dysuria or bowel abnormalities.  Appetite is  normal.  No pain except occ L knee, pruritis of UEs.    Objective:  /71   Pulse 58   Resp 16   SpO2 97%   Exam:  GENERAL  APPEARANCE:  Alert, in no distress  ENT:  Mouth and posterior oropharynx normal, moist mucous membranes  NECK:  No adenopathy,masses or thyromegaly, FROM  RESP:  respiratory effort and palpation of chest normal, lungs clear to auscultation , no respiratory distress  CV:  Palpation and auscultation of heart done , regular rate and rhythm, no murmur, rub, or gallop, no edema  ABDOMEN:  normal bowel sounds, soft, nontender, no hepatosplenomegaly or other masses, no guarding or rebound  M/S:   Gait and station normal  muscle strength 5/5 all 4 ext  SKIN:  pink area with small superficial abraded areas L forearm  NEURO:   Cranial nerves 2-12 are normal tested and grossly at patient's baseline, alert, spech clear  PSYCH:  memory impaired , affect and mood normal    Labs:     Most Recent 3 CBC's:  Recent Labs   Lab Test 11/13/18  0952 08/10/18  1154 03/26/18  0921  09/29/13  1600   WBC 9.3 10.2  --   --  8.7   HGB 13.0  13.1 12.3 12.2   < > 12.5   MCV 91 91  --   --  88    228  --   --  269    < > = values in this interval not displayed.     Most Recent 3 BMP's:  Recent Labs   Lab Test 11/13/18  0952 03/26/18  0921 01/18/17  1029    138 136   POTASSIUM 3.6 4.3 3.9   CHLORIDE 103 103 100   CO2 25 28 27   BUN 12 13 18   CR 0.72 0.72 0.96   ANIONGAP 9 7 9   MARUEEN 9.0 9.3 9.1   GLC 93 96 99       ASSESSMENT/PLAN:  Dermatitis  Newer onset, unclear etiology, pruritis  1. Start HC cream bid, change to prn after 3 days  2. Monitor for further areas of redness  3. For ongoing s/s, may extend sched. HC cream    Essential hypertension, benign  Currently controlled  1. Cont. norvasc  2. Cont. zestoretic  3. Follow BPs, HRs, monitor for dizziness  4. Cbc, bmp tomorrow      Electronically signed by:  BETH Hannon CNP

## 2019-05-21 VITALS
DIASTOLIC BLOOD PRESSURE: 71 MMHG | OXYGEN SATURATION: 97 % | HEART RATE: 58 BPM | RESPIRATION RATE: 16 BRPM | SYSTOLIC BLOOD PRESSURE: 126 MMHG

## 2019-05-22 ENCOUNTER — RECORDS - HEALTHEAST (OUTPATIENT)
Dept: LAB | Facility: CLINIC | Age: 84
End: 2019-05-22

## 2019-05-22 ENCOUNTER — TRANSFERRED RECORDS (OUTPATIENT)
Dept: HEALTH INFORMATION MANAGEMENT | Facility: CLINIC | Age: 84
End: 2019-05-22

## 2019-05-22 LAB
ANION GAP SERPL CALCULATED.3IONS-SCNC: 11 MMOL/L (ref 5–18)
ANION GAP SERPL CALCULATED.3IONS-SCNC: 11 MMOL/L (ref 5–18)
BASOPHILS # BLD AUTO: 0.1 THOU/UL (ref 0–0.2)
BASOPHILS NFR BLD AUTO: 1 % (ref 0–2)
BUN SERPL-MCNC: 13 MG/DL (ref 8–28)
BUN SERPL-MCNC: 13 MG/DL (ref 8–28)
CALCIUM SERPL-MCNC: 9.9 MG/DL (ref 8.5–10.5)
CALCIUM SERPL-MCNC: 9.9 MG/DL (ref 8.5–10.5)
CHLORIDE BLD-SCNC: 98 MMOL/L (ref 98–107)
CHLORIDE SERPLBLD-SCNC: 98 MMOL/L (ref 98–107)
CO2 SERPL-SCNC: 27 MMOL/L (ref 22–31)
CO2 SERPL-SCNC: 27 MMOL/L (ref 22–31)
CREAT SERPL-MCNC: 0.78 MG/DL (ref 0.6–1.1)
CREAT SERPL-MCNC: 0.78 MG/DL (ref 0.6–1.1)
DIFFERENTIAL: ABNORMAL
EOSINOPHIL # BLD AUTO: 0.3 THOU/UL (ref 0–0.4)
EOSINOPHIL NFR BLD AUTO: 2 % (ref 0–6)
ERYTHROCYTE [DISTWIDTH] IN BLOOD BY AUTOMATED COUNT: 13.8 % (ref 11–14.5)
ERYTHROCYTE [DISTWIDTH] IN BLOOD BY AUTOMATED COUNT: 13.8 % (ref 11–14.5)
GFR SERPL CREATININE-BSD FRML MDRD: >60 ML/MIN/1.73M2
GFR SERPL CREATININE-BSD FRML MDRD: >60 ML/MIN/1.73M2
GLUCOSE BLD-MCNC: 94 MG/DL (ref 70–125)
GLUCOSE SERPL-MCNC: 94 MG/DL (ref 70–125)
HCT VFR BLD AUTO: 40.3 % (ref 35–47)
HCT VFR BLD AUTO: 40.3 % (ref 35–47)
HEMOGLOBIN: 12.8 G/DL (ref 12–16)
HGB BLD-MCNC: 12.8 G/DL (ref 12–16)
LYMPHOCYTES # BLD AUTO: 6.2 THOU/UL (ref 0.8–4.4)
LYMPHOCYTES NFR BLD AUTO: 54 % (ref 20–40)
MCH RBC QN AUTO: 28.3 PG (ref 27–34)
MCH RBC QN AUTO: 28.3 PG (ref 27–34)
MCHC RBC AUTO-ENTMCNC: 31.8 G/DL (ref 32–36)
MCHC RBC AUTO-ENTMCNC: 31.8 G/DL (ref 32–38)
MCV RBC AUTO: 89 FL (ref 80–100)
MCV RBC AUTO: 89 FL (ref 80–100)
MONOCYTES # BLD AUTO: 0.8 THOU/UL (ref 0–0.9)
MONOCYTES NFR BLD AUTO: 7 % (ref 2–10)
NEUTROPHILS # BLD AUTO: 4.1 THOU/UL (ref 2–7.7)
NEUTROPHILS NFR BLD AUTO: 36 % (ref 50–70)
PLATELET # BLD AUTO: 272 THOU/UL (ref 140–440)
PLATELET # BLD AUTO: 272 THOU/UL (ref 140–440)
PMV BLD AUTO: 11.4 FL (ref 8.5–12.5)
POTASSIUM BLD-SCNC: 4.3 MMOL/L (ref 3.5–5)
POTASSIUM SERPL-SCNC: 4.3 MMOL/L (ref 3.5–5)
RBC # BLD AUTO: 4.52 MILL/UL (ref 3.8–5.4)
RBC # BLD AUTO: 4.52 MILL/UL (ref 3.8–5.4)
SODIUM SERPL-SCNC: 136 MMOL/L (ref 136–145)
SODIUM SERPL-SCNC: 136 MMOL/L (ref 136–145)
WBC # BLD AUTO: 11.5 THOU/UL (ref 4–11)
WBC: 11.5 THOU/UL (ref 4–11)

## 2019-06-14 ENCOUNTER — PATIENT OUTREACH (OUTPATIENT)
Dept: GERIATRIC MEDICINE | Facility: CLINIC | Age: 84
End: 2019-06-14

## 2019-06-14 NOTE — PROGRESS NOTES
6-14-19   [employer] chart review. No hospital, ED or TCU admissions.    Member resides in Assisted Living and followed by onsite medical team for primary care.    No triggering events noted so CC will follow up as needed     Nany Malcolm MA [facility] Care Coordinator   [phone]

## 2019-06-14 NOTE — PROGRESS NOTES
[employer] enrollee with change of CM as of 4-1-19      Nany Malcolm MA [facility] Coordinator   [phone]

## 2019-08-30 ENCOUNTER — ASSISTED LIVING VISIT (OUTPATIENT)
Dept: GERIATRICS | Facility: CLINIC | Age: 84
End: 2019-08-30
Payer: COMMERCIAL

## 2019-08-30 VITALS
RESPIRATION RATE: 18 BRPM | SYSTOLIC BLOOD PRESSURE: 128 MMHG | OXYGEN SATURATION: 96 % | HEART RATE: 58 BPM | DIASTOLIC BLOOD PRESSURE: 65 MMHG

## 2019-08-30 DIAGNOSIS — H25.9 SENILE CATARACT OF RIGHT EYE, UNSPECIFIED AGE-RELATED CATARACT TYPE: ICD-10-CM

## 2019-08-30 DIAGNOSIS — I10 ESSENTIAL HYPERTENSION, BENIGN: ICD-10-CM

## 2019-08-30 DIAGNOSIS — M15.0 PRIMARY OSTEOARTHRITIS INVOLVING MULTIPLE JOINTS: Primary | ICD-10-CM

## 2019-08-30 NOTE — LETTER
8/30/2019        RE: Juany Collazo  [facility]  [address]  [facility] [address]        [place] [facility]  [place] Medical Record Number:  [id number]  Place of Service where encounter took place:  [facility] ([facility] [[id number]]  Chief Complaint   Patient presents with     Pain       HPI:    Juany Collazo  is a 87 year old (1/7/1932), who is being seen today for an episodic care visit.  HPI information obtained from: facility chart records, facility staff, patient report, [facility] chart review and family/first contact dtr report. Today's concern is: OA, HTN, right eye cataract.  Has h/o L knee pain, L shoulder pain due to OA.  Had knee inj to L knee 11/18. Cont. On prn tylenol and voltaren gel.  Has not recently used voltaren gel.  Amb. Per usual with walker. Decreased ROM L shoulder, some diff. With dressing.  For HTN cont. On zestoretic. BPs stable. No reports of dizziness. No recent falls. Has right eye cataract with decreased visual acuity. Sched. For cataract surg. 9/3/19.       Past Medical and Surgical History reviewed in Epic today.    MEDICATIONS:  Current Outpatient Medications   Medication Sig Dispense Refill     Acetaminophen (TYLENOL PO) Take 1,000 mg by mouth 3 times daily as needed       amLODIPine (NORVASC) 2.5 MG tablet Take 1 tablet (2.5 mg) by mouth daily 90 tablet 3     citalopram (CELEXA) 20 MG tablet Take 1 tablet (20 mg) by mouth daily 30 tablet 3     diclofenac (VOLTAREN) 1 % topical gel Place 2 g onto the skin 4 times daily       donepezil (ARICEPT) 10 MG tablet Take 1 tablet (10 mg) by mouth At Bedtime 30 tablet      lisinopril-hydrochlorothiazide (PRINZIDE/ZESTORETIC) 20-12.5 MG per tablet Take 1 tablet by mouth 2 times daily 30 tablet 3     omeprazole (PRILOSEC) 20 MG DR capsule        polyethylene glycol (MIRALAX/GLYCOLAX) packet Take 1 packet by mouth daily as needed for constipation       simvastatin (ZOCOR) 20 MG  tablet Take 1 tablet (20 mg) by mouth At Bedtime 90 tablet 3         REVIEW OF SYSTEMS:  CONSTITUTIONAL:  body aches and forgetfulness, EYES:  decreased vision and cataracts, ENT:  [place], CV:  neg, RESPIRATORY: neg, GI:  neg, NEURO:  neg, PSYCH: neg, MUSCULOSKELETAL: joint pain and SKIN: neg    Objective:  /65   Pulse 58   Resp 18   SpO2 96%   Exam:  GENERAL APPEARANCE:  Alert, in no distress, cooperative  ENT:  Mouth and posterior oropharynx normal, moist mucous membranes, [place]  EYES:  EOM normal, Conjunctiva and lids normal, OD pupil fixed, dilated  NECK:  No adenopathy,masses or thyromegaly, no carotid bruit  RESP:  respiratory effort and palpation of chest normal, no respiratory distress, faint exp wheezes R lung  CV:  Palpation and auscultation of heart done , regular rate and rhythm, no murmur, rub, or gallop, no edema  ABDOMEN:  normal bowel sounds, soft, nontender, no hepatosplenomegaly or other masses, no guarding or rebound  M/S:   Gait and station normal  muscle strength 5/5 all 4 ext., normal tone. decreased ROM L shoulder  SKIN:  Inspection of skin and subcutaneous tissue baseline, Palpation of skin and subcutaneous tissue baseline  NEURO:   Cranial nerves 2-12 are normal tested and grossly at patient's baseline, alert, speech clear  PSYCH:  memory impaired , affect and mood normal    Labs:     Most Recent 3 CBC's:  Recent Labs   Lab Test 05/22/19 11/13/18  0952 08/10/18  1154   WBC 11.5* 9.3 10.2   HGB 12.8 13.0  13.1 12.3   MCV 89 91 91    242 228     Most Recent 3 BMP's:  Recent Labs   Lab Test 05/22/19 11/13/18  0952 03/26/18  0921    137 138   POTASSIUM 4.3 3.6 4.3   CHLORIDE 98 103 103   CO2 27 25 28   BUN 13 12 13   CR 0.78 0.72 0.72   ANIONGAP 11 9 7   MAUREEN 9.9 9.0 9.3   GLC 94 93 96       ASSESSMENT/PLAN:  Primary osteoarthritis involving multiple joints  Ongoing L knee pain, L shoulder pain  1. Cont. Prn tylenol  2. Instructed to start using voltaren gel bid to L knee, L  shoulder  3. Refer to [facility] for L knee inj  4. Monitor for LE weakness, changes in gait    Essential hypertension, benign  Currently controlled  1. Cont. zestoretic  2. Follow BPs, HRs  3. Cbc, bmp next week  4. AST, ALT, FLP next week  5. Monitor for reports of dizziness    Senile cataract of right eye, unspecified age-related cataract type  Decreased visual acuity. H/o right eye trauma, fixed, dilated pupil  1. Cataract surg 9/3/19  2. Start eye gtts 9/3/19 per preop orders  3. Monitor for eye pain      Electronically signed by:  BETH Hannon CNP             Sincerely,        BETH Hannon CNP

## 2019-08-30 NOTE — PROGRESS NOTES
[place] [facility]  [place] Medical Record Number:  [id number]  Place of Service where encounter took place:  [facility] [[id number]]  Chief Complaint   Patient presents with     Pain       HPI:    Juany Collazo  is a 87 year old (1/7/1932), who is being seen today for an episodic care visit.  HPI information obtained from: facility chart records, facility staff, patient report, [facility] chart review and family/first contact dtr report. Today's concern is: OA, HTN, right eye cataract.  Has h/o L knee pain, L shoulder pain due to OA.  Had knee inj to L knee 11/18. Cont. On prn tylenol and voltaren gel.  Has not recently used voltaren gel.  Amb. Per usual with walker. Decreased ROM L shoulder, some diff. With dressing.  For HTN cont. On zestoretic. BPs stable. No reports of dizziness. No recent falls. Has right eye cataract with decreased visual acuity. Sched. For cataract surg. 9/3/19.       Past Medical and Surgical History reviewed in Epic today.    MEDICATIONS:  Current Outpatient Medications   Medication Sig Dispense Refill     Acetaminophen (TYLENOL PO) Take 1,000 mg by mouth 3 times daily as needed       amLODIPine (NORVASC) 2.5 MG tablet Take 1 tablet (2.5 mg) by mouth daily 90 tablet 3     citalopram (CELEXA) 20 MG tablet Take 1 tablet (20 mg) by mouth daily 30 tablet 3     diclofenac (VOLTAREN) 1 % topical gel Place 2 g onto the skin 4 times daily       donepezil (ARICEPT) 10 MG tablet Take 1 tablet (10 mg) by mouth At Bedtime 30 tablet      lisinopril-hydrochlorothiazide (PRINZIDE/ZESTORETIC) 20-12.5 MG per tablet Take 1 tablet by mouth 2 times daily 30 tablet 3     omeprazole (PRILOSEC) 20 MG DR capsule        polyethylene glycol (MIRALAX/GLYCOLAX) packet Take 1 packet by mouth daily as needed for constipation       simvastatin (ZOCOR) 20 MG tablet Take 1 tablet (20 mg) by mouth At Bedtime 90 tablet 3         REVIEW OF SYSTEMS:  CONSTITUTIONAL:  body  aches and forgetfulness, EYES:  decreased vision and cataracts, ENT:  [place], CV:  neg, RESPIRATORY: neg, GI:  neg, NEURO:  neg, PSYCH: neg, MUSCULOSKELETAL: joint pain and SKIN: neg    Objective:  /65   Pulse 58   Resp 18   SpO2 96%   Exam:  GENERAL APPEARANCE:  Alert, in no distress, cooperative  ENT:  Mouth and posterior oropharynx normal, moist mucous membranes, [place]  EYES:  EOM normal, Conjunctiva and lids normal, OD pupil fixed, dilated  NECK:  No adenopathy,masses or thyromegaly, no carotid bruit  RESP:  respiratory effort and palpation of chest normal, no respiratory distress, faint exp wheezes R lung  CV:  Palpation and auscultation of heart done , regular rate and rhythm, no murmur, rub, or gallop, no edema  ABDOMEN:  normal bowel sounds, soft, nontender, no hepatosplenomegaly or other masses, no guarding or rebound  M/S:   Gait and station normal  muscle strength 5/5 all 4 ext., normal tone. decreased ROM L shoulder  SKIN:  Inspection of skin and subcutaneous tissue baseline, Palpation of skin and subcutaneous tissue baseline  NEURO:   Cranial nerves 2-12 are normal tested and grossly at patient's baseline, alert, speech clear  PSYCH:  memory impaired , affect and mood normal    Labs:     Most Recent 3 CBC's:  Recent Labs   Lab Test 05/22/19 11/13/18  0952 08/10/18  1154   WBC 11.5* 9.3 10.2   HGB 12.8 13.0  13.1 12.3   MCV 89 91 91    242 228     Most Recent 3 BMP's:  Recent Labs   Lab Test 05/22/19 11/13/18  0952 03/26/18  0921    137 138   POTASSIUM 4.3 3.6 4.3   CHLORIDE 98 103 103   CO2 27 25 28   BUN 13 12 13   CR 0.78 0.72 0.72   ANIONGAP 11 9 7   MAUREEN 9.9 9.0 9.3   GLC 94 93 96       ASSESSMENT/PLAN:  Primary osteoarthritis involving multiple joints  Ongoing L knee pain, L shoulder pain  1. Cont. Prn tylenol  2. Instructed to start using voltaren gel bid to L knee, L shoulder  3. Refer to [facility] for L knee inj  4. Monitor for LE weakness, changes in gait    Essential  hypertension, benign  Currently controlled  1. Cont. zestoretic  2. Follow BPs, HRs  3. Cbc, bmp next week  4. AST, ALT, FLP next week  5. Monitor for reports of dizziness    Senile cataract of right eye, unspecified age-related cataract type  Decreased visual acuity. H/o right eye trauma, fixed, dilated pupil  1. Cataract surg 9/3/19  2. Start eye gtts 9/3/19 per preop orders  3. Monitor for eye pain      Electronically signed by:  BETH Hannon CNP

## 2019-09-02 ENCOUNTER — ASSISTED LIVING VISIT (OUTPATIENT)
Dept: GERIATRICS | Facility: CLINIC | Age: 84
End: 2019-09-02
Payer: COMMERCIAL

## 2019-09-02 DIAGNOSIS — M15.0 PRIMARY OSTEOARTHRITIS INVOLVING MULTIPLE JOINTS: Primary | ICD-10-CM

## 2019-09-02 NOTE — PROGRESS NOTES
Ortho Nursing home visit    Juany Collazo is a 87 year old female who resides at [facility]    Patient is seen today for progressive left knee pain, has long hs of OA both knees, severe, grade 4, end stage OA< however, Left knee is chief complaint today, and family is requesting eval for steroid injection, as she has had these in the past from [employer] [facility] jones.      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      Past Surgical History:   Procedure Laterality Date     C NONSPECIFIC PROCEDURE  1998    right hip replacement     C NONSPECIFIC PROCEDURE  1970    tubal ligation     C NONSPECIFIC PROCEDURE  1998    hip replacement     DECOMPRESSION LUMBAR ONE LEVEL  4/19/2013    Procedure: DECOMPRESSION LUMBAR ONE LEVEL;  Decompression Bilateral L4-5  ;  Surgeon: Lang Garcia MD;  Location:  OR        Allergies   Allergen Reactions     No Known Drug Allergies       There were no vitals taken for this visit.     Exam: Seated needs asst to get up from couch, uses walker, for mobility in APT: and to dining room, alert, allows, PROM -10/110 lig intact, moderate crepitus, neg, for swelling, or effusion today, neg drawer, no locking, pain medial, lateral , and PF joints. PMH sig for Spine surgery,       X-rays show MICHAEL KNEE OA; right knee severe valgus deformity, Both knees, show tri-compartmental arthritis;      After R&B discussed and consent for injection; Left knee prepped, injected with 1 ml depo medrol 5 ml 1% lidocaine, into medial joint space.      ASSESSMENT / PLAN : 30 minutes spent today, with discussion regarding treatment option, TKA< vs, limitations on future  activity and W/B. Patient and family feel patient has limited medical issues, is scheduled for eye surgery tomorrow, and would like to discuss MICHAEL< TKA optoins with surgeon. I have provided them with a surgeons name at [facility], patients preferred hospital, and the will call to schedule a visit, to discuss surgery,    I do not feel injections at this stage of OA< are of any future long term benefit;      Cristobal OPA-C  [phone] Cell,

## 2019-09-07 ENCOUNTER — RECORDS - HEALTHEAST (OUTPATIENT)
Dept: LAB | Facility: CLINIC | Age: 84
End: 2019-09-07

## 2019-09-09 LAB
ALT SERPL W P-5'-P-CCNC: 15 U/L (ref 0–45)
ANION GAP SERPL CALCULATED.3IONS-SCNC: 7 MMOL/L (ref 5–18)
AST SERPL W P-5'-P-CCNC: 19 U/L (ref 0–40)
BASOPHILS # BLD AUTO: 0.1 THOU/UL (ref 0–0.2)
BASOPHILS NFR BLD AUTO: 1 % (ref 0–2)
BUN SERPL-MCNC: 13 MG/DL (ref 8–28)
CALCIUM SERPL-MCNC: 9.5 MG/DL (ref 8.5–10.5)
CHLORIDE BLD-SCNC: 101 MMOL/L (ref 98–107)
CHOLEST SERPL-MCNC: 183 MG/DL
CO2 SERPL-SCNC: 30 MMOL/L (ref 22–31)
CREAT SERPL-MCNC: 0.76 MG/DL (ref 0.6–1.1)
EOSINOPHIL # BLD AUTO: 0.2 THOU/UL (ref 0–0.4)
EOSINOPHIL NFR BLD AUTO: 2 % (ref 0–6)
ERYTHROCYTE [DISTWIDTH] IN BLOOD BY AUTOMATED COUNT: 14 % (ref 11–14.5)
FASTING STATUS PATIENT QL REPORTED: NORMAL
GFR SERPL CREATININE-BSD FRML MDRD: >60 ML/MIN/1.73M2
GLUCOSE BLD-MCNC: 91 MG/DL (ref 70–125)
HCT VFR BLD AUTO: 40.4 % (ref 35–47)
HDLC SERPL-MCNC: 74 MG/DL
HGB BLD-MCNC: 12.4 G/DL (ref 12–16)
LDLC SERPL CALC-MCNC: 81 MG/DL
LYMPHOCYTES # BLD AUTO: 6.3 THOU/UL (ref 0.8–4.4)
LYMPHOCYTES NFR BLD AUTO: 55 % (ref 20–40)
MCH RBC QN AUTO: 28.1 PG (ref 27–34)
MCHC RBC AUTO-ENTMCNC: 30.7 G/DL (ref 32–36)
MCV RBC AUTO: 91 FL (ref 80–100)
MONOCYTES # BLD AUTO: 0.7 THOU/UL (ref 0–0.9)
MONOCYTES NFR BLD AUTO: 6 % (ref 2–10)
NEUTROPHILS # BLD AUTO: 4.2 THOU/UL (ref 2–7.7)
NEUTROPHILS NFR BLD AUTO: 36 % (ref 50–70)
PLATELET # BLD AUTO: 243 THOU/UL (ref 140–440)
PMV BLD AUTO: 12 FL (ref 8.5–12.5)
POTASSIUM BLD-SCNC: 4.2 MMOL/L (ref 3.5–5)
RBC # BLD AUTO: 4.42 MILL/UL (ref 3.8–5.4)
SODIUM SERPL-SCNC: 138 MMOL/L (ref 136–145)
TRIGL SERPL-MCNC: 139 MG/DL
WBC: 11.5 THOU/UL (ref 4–11)

## 2019-09-29 ENCOUNTER — HEALTH MAINTENANCE LETTER (OUTPATIENT)
Age: 84
End: 2019-09-29

## 2020-01-10 ENCOUNTER — ASSISTED LIVING VISIT (OUTPATIENT)
Dept: GERIATRICS | Facility: CLINIC | Age: 85
End: 2020-01-10

## 2020-01-10 ENCOUNTER — ASSISTED LIVING VISIT (OUTPATIENT)
Dept: GERIATRICS | Facility: CLINIC | Age: 85
End: 2020-01-10
Payer: COMMERCIAL

## 2020-01-10 VITALS
OXYGEN SATURATION: 95 % | SYSTOLIC BLOOD PRESSURE: 108 MMHG | DIASTOLIC BLOOD PRESSURE: 63 MMHG | HEART RATE: 63 BPM | RESPIRATION RATE: 18 BRPM

## 2020-01-10 DIAGNOSIS — M15.0 PRIMARY OSTEOARTHRITIS INVOLVING MULTIPLE JOINTS: Primary | ICD-10-CM

## 2020-01-10 DIAGNOSIS — I10 ESSENTIAL HYPERTENSION, BENIGN: ICD-10-CM

## 2020-01-10 DIAGNOSIS — K21.9 GASTROESOPHAGEAL REFLUX DISEASE WITHOUT ESOPHAGITIS: ICD-10-CM

## 2020-01-10 NOTE — LETTER
1/10/2020        RE: Juany Collazo  [facility]  [address]  [facility] [address]        [place] [facility]  [place] [facility] Record Number:  [id number]  Place of Service where encounter took place:  [facility] [[id number]]  Chief Complaint   Patient presents with     Knee Pain       HPI:    Juany Collazo  is a 88 year old (1/7/1932), who is being seen today for an episodic care visit.  HPI information obtained from: facility chart records, facility staff, patient report, [facility] chart review and family/first contact dtr report. Today's concern is: pain, HTN, GERD.  Has OA, chronic L knee pain, L shoulder pain. dtr reports resident has more sig. Pain of these areas than what she reports to staff.  Currently has prn tylenol, voltaren gel-not currently taking either.  Gait steady with walker. Last L knee inj 9/19 per [facility]. For HTN cont. On norvasc, lisinopril/hctz.  BPs, HRs stable. No reports of dizziness.  For GERD taking prilosec.  Po  Intake stable.      Past Medical and Surgical History reviewed in Epic today.    MEDICATIONS:  Current Outpatient Medications   Medication Sig Dispense Refill     Acetaminophen (TYLENOL PO) Take 1,000 mg by mouth 3 times daily as needed       amLODIPine (NORVASC) 2.5 MG tablet Take 1 tablet (2.5 mg) by mouth daily 90 tablet 3     citalopram (CELEXA) 20 MG tablet Take 1 tablet (20 mg) by mouth daily 30 tablet 3     diclofenac (VOLTAREN) 1 % topical gel Apply 2 g topically 2 times daily as needed        donepezil (ARICEPT) 10 MG tablet Take 1 tablet (10 mg) by mouth At Bedtime 30 tablet      lisinopril-hydrochlorothiazide (PRINZIDE/ZESTORETIC) 20-12.5 MG per tablet Take 1 tablet by mouth 2 times daily 30 tablet 3     omeprazole (PRILOSEC) 20 MG DR capsule        polyethylene glycol (MIRALAX/GLYCOLAX) packet Take 1 packet by mouth daily as needed for constipation       simvastatin (ZOCOR) 20 MG  tablet Take 1 tablet (20 mg) by mouth At Bedtime 90 tablet 3         REVIEW OF SYSTEMS:  No chest pain, shortness of breath, fevers, chills, headache, nausea, vomiting, dysuria or bowel abnormalities.  Appetite is  normal.  No pain except occ L knee m L shoulder.    Objective:  /63   Pulse 63   Resp 18   SpO2 95%   Exam:  GENERAL APPEARANCE:  Alert, in no distress, cooperative  ENT:  Mouth and posterior oropharynx normal, moist mucous membranes, [place]  EYES:  EOM, conjunctivae, lids, pupils and irises normal, PERRL  NECK:  No adenopathy,masses or thyromegaly, no carotid bruit  RESP:  respiratory effort and palpation of chest normal, lungs clear to auscultation , no respiratory distress  CV:  Palpation and auscultation of heart done , no edema, rate-normal, grade 2/6 murmur  ABDOMEN:  normal bowel sounds, soft, nontender, no hepatosplenomegaly or other masses, no guarding or rebound  M/S:   Gait and station normal  muscle strength 5/5 all 4 ext.. no pain with PROM LEs  NEURO:   Cranial nerves 2-12 are normal tested and grossly at patient's baseline, alert, speech clear  PSYCH:  insight and judgement impaired, memory impaired , affect and mood normal    Labs:     Most Recent 3 CBC's:  Recent Labs   Lab Test 05/22/19 11/13/18  0952 08/10/18  1154   WBC 11.5* 9.3 10.2   HGB 12.8 13.0  13.1 12.3   MCV 89 91 91    242 228     Most Recent 3 BMP's:  Recent Labs   Lab Test 05/22/19 11/13/18  0952 03/26/18  0921    137 138   POTASSIUM 4.3 3.6 4.3   CHLORIDE 98 103 103   CO2 27 25 28   BUN 13 12 13   CR 0.78 0.72 0.72   ANIONGAP 11 9 7   MAUREEN 9.9 9.0 9.3   GLC 94 93 96       ASSESSMENT/PLAN:  Primary osteoarthritis involving multiple joints  Ongoing L knee pain, L shoulder pain  1. Cont. Prn tylenol,voltaren gel  2. [facility] to inj L knee today  3. For ongoing shoulder, knee pain, may sched. Voltaren, tylenol  4. dtr for further discuss TKA with MG Patterson    Essential hypertension, benign  BPs  stable  1. Cont. norvasc  2. Cont. Lisinopril/hctz  3. Follow BPs, HRs  4. Bmp later this month    Gastroesophageal reflux disease without esophagitis  Currently controlled  1. Cont. prilosec  2. Monitor for changes in po intake  3. Follow wt.s      Electronically signed by:  BETH Hannon CNP             Sincerely,        BETH Hannon CNP

## 2020-01-10 NOTE — PROGRESS NOTES
[place] [facility]  [place] [facility] Record Number:  [id number]  Place of Service where encounter took place:  [facility] [[id number]]  Chief Complaint   Patient presents with     Knee Pain       HPI:    Juany Collazo  is a 88 year old (1/7/1932), who is being seen today for an episodic care visit.  HPI information obtained from: facility chart records, facility staff, patient report, [facility] chart review and family/first contact dtr report. Today's concern is: pain, HTN, GERD.  Has OA, chronic L knee pain, L shoulder pain. dtr reports resident has more sig. Pain of these areas than what she reports to staff.  Currently has prn tylenol, voltaren gel-not currently taking either.  Gait steady with walker. Last L knee inj 9/19 per [facility]. For HTN cont. On norvasc, lisinopril/hctz.  BPs, HRs stable. No reports of dizziness.  For GERD taking prilosec.  Po  Intake stable.      Past Medical and Surgical History reviewed in Epic today.    MEDICATIONS:  Current Outpatient Medications   Medication Sig Dispense Refill     Acetaminophen (TYLENOL PO) Take 1,000 mg by mouth 3 times daily as needed       amLODIPine (NORVASC) 2.5 MG tablet Take 1 tablet (2.5 mg) by mouth daily 90 tablet 3     citalopram (CELEXA) 20 MG tablet Take 1 tablet (20 mg) by mouth daily 30 tablet 3     diclofenac (VOLTAREN) 1 % topical gel Apply 2 g topically 2 times daily as needed        donepezil (ARICEPT) 10 MG tablet Take 1 tablet (10 mg) by mouth At Bedtime 30 tablet      lisinopril-hydrochlorothiazide (PRINZIDE/ZESTORETIC) 20-12.5 MG per tablet Take 1 tablet by mouth 2 times daily 30 tablet 3     omeprazole (PRILOSEC) 20 MG DR capsule        polyethylene glycol (MIRALAX/GLYCOLAX) packet Take 1 packet by mouth daily as needed for constipation       simvastatin (ZOCOR) 20 MG tablet Take 1 tablet (20 mg) by mouth At Bedtime 90 tablet 3         REVIEW OF SYSTEMS:  No chest pain, shortness of  breath, fevers, chills, headache, nausea, vomiting, dysuria or bowel abnormalities.  Appetite is  normal.  No pain except occ L knee m L shoulder.    Objective:  /63   Pulse 63   Resp 18   SpO2 95%   Exam:  GENERAL APPEARANCE:  Alert, in no distress, cooperative  ENT:  Mouth and posterior oropharynx normal, moist mucous membranes, [place]  EYES:  EOM, conjunctivae, lids, pupils and irises normal, PERRL  NECK:  No adenopathy,masses or thyromegaly, no carotid bruit  RESP:  respiratory effort and palpation of chest normal, lungs clear to auscultation , no respiratory distress  CV:  Palpation and auscultation of heart done , no edema, rate-normal, grade 2/6 murmur  ABDOMEN:  normal bowel sounds, soft, nontender, no hepatosplenomegaly or other masses, no guarding or rebound  M/S:   Gait and station normal  muscle strength 5/5 all 4 ext.. no pain with PROM LEs  NEURO:   Cranial nerves 2-12 are normal tested and grossly at patient's baseline, alert, speech clear  PSYCH:  insight and judgement impaired, memory impaired , affect and mood normal    Labs:     Most Recent 3 CBC's:  Recent Labs   Lab Test 05/22/19 11/13/18  0952 08/10/18  1154   WBC 11.5* 9.3 10.2   HGB 12.8 13.0  13.1 12.3   MCV 89 91 91    242 228     Most Recent 3 BMP's:  Recent Labs   Lab Test 05/22/19 11/13/18  0952 03/26/18  0921    137 138   POTASSIUM 4.3 3.6 4.3   CHLORIDE 98 103 103   CO2 27 25 28   BUN 13 12 13   CR 0.78 0.72 0.72   ANIONGAP 11 9 7   AMUREEN 9.9 9.0 9.3   GLC 94 93 96       ASSESSMENT/PLAN:  Primary osteoarthritis involving multiple joints  Ongoing L knee pain, L shoulder pain  1. Cont. Prn tylenol,voltaren gel  2. [facility] to inj L knee today  3. For ongoing shoulder, knee pain, may sched. Voltaren, tylenol  4. dtr for further discuss TKA with MG Patterson    Essential hypertension, benign  BPs stable  1. Cont. norvasc  2. Cont. Lisinopril/hctz  3. Follow BPs, HRs  4. Bmp later this month    Gastroesophageal reflux  disease without esophagitis  Currently controlled  1. Cont. prilosec  2. Monitor for changes in po intake  3. Follow wt.s      Electronically signed by:  BETH Hannon CNP

## 2020-01-10 NOTE — PROGRESS NOTES
Ortho Nursing home visit    Juany Collazo is a 88 year old female who resides at [facility]    Patient is seen today for C/O Left knee pain and left shoulder pain, has hs of multiple joint arthritis, now difficulty walking, likes to travel, very active, planning a trip to [place] tomorrow, and just returned from a visit to daughter in [facility].    Today, Chief Complaint left knee and shoulder. 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     Exam: Ambulates with antalgic gait, seated, Left knee painful with PROM, 0/100. / lig intact, some crepitus noted, no swelling or effusion, no history of locking, some PF joint pain;      X-rays show : severe DJD both knees, right knee is more advanced then left, however left knee is painful today,    Left shoulder exam, pos; for RTC pathology, difficult today to raise arm above shoulder height, pain with abduction, FE/ER/IR/ no swelling pain over AC joint; with abduction.    ASSESSMENT / PLAN:  After R&B and consent, both left knee and shoulder are injected with 1 ml depo medrol, and 4 ml 1% lidocaine,  tolerated well, no complaints,     Today also discussed with patient and daughter, surgical eval for TKA  I have given them a surgeons name for ref;      [id number]          J.Leonardo OPA-C  [phone] Cell

## 2020-01-23 ENCOUNTER — TRANSFERRED RECORDS (OUTPATIENT)
Dept: MULTI SPECIALTY CLINIC | Facility: CLINIC | Age: 85
End: 2020-01-23

## 2020-01-24 ENCOUNTER — RECORDS - HEALTHEAST (OUTPATIENT)
Dept: LAB | Facility: CLINIC | Age: 85
End: 2020-01-24

## 2020-01-24 ENCOUNTER — TELEPHONE (OUTPATIENT)
Dept: ORTHOPEDICS | Facility: CLINIC | Age: 85
End: 2020-01-24

## 2020-01-24 ENCOUNTER — PREP FOR PROCEDURE (OUTPATIENT)
Dept: ORTHOPEDICS | Facility: CLINIC | Age: 85
End: 2020-01-24

## 2020-01-24 ENCOUNTER — OFFICE VISIT (OUTPATIENT)
Dept: ORTHOPEDICS | Facility: CLINIC | Age: 85
End: 2020-01-24
Payer: COMMERCIAL

## 2020-01-24 VITALS
WEIGHT: 174 LBS | SYSTOLIC BLOOD PRESSURE: 108 MMHG | BODY MASS INDEX: 30.83 KG/M2 | DIASTOLIC BLOOD PRESSURE: 64 MMHG | HEIGHT: 63 IN

## 2020-01-24 DIAGNOSIS — M17.12 PRIMARY OSTEOARTHRITIS OF LEFT KNEE: Primary | ICD-10-CM

## 2020-01-24 PROCEDURE — 99214 OFFICE O/P EST MOD 30 MIN: CPT | Performed by: ORTHOPAEDIC SURGERY

## 2020-01-24 ASSESSMENT — MIFFLIN-ST. JEOR: SCORE: 1184.42

## 2020-01-24 NOTE — LETTER
1/24/2020         RE: Juany Collazo  [facility]  [address]  [facility] [address]        Dear Colleague,    Thank you for referring your patient, Juany Collazo, to the [facility]. Please see a copy of my visit note below.    HISTORY OF PRESENT ILLNESS:    Juany Collazo is a 88 year old female who is seen in consultation at the request of MG Patterson PA-C  for left knee pain.     Present symptoms: left knee pain.  Pain located in the medial aspect of the knee, pain radiates down the shin. Pain is constant and aching.   Patient reports the knee will buckle and feel unstable. She uses a walker for ambulation.  Denies swelling in the knee.  Patient states that her ROM feels normal, or slightly improved from previous.   According to her daughter who is with her today, when they went to how I recently, she struggled getting around.  She pre-much stayed in a wheelchair whole time.  There is a 1 step at her daughter's house and she has significant difficulty of managing the stair as well.  She does not wake up from pain.  Minimal pain when she is at rest but she has significant difficulty of changing her positions.  The pain and difficulty has gotten worse in the recent past.  And as a result, her quality of life has deteriorated significantly.  Current pain level: 7/10, Worst pain level: 10/10  Treatments tried to this point: corticosteroid injection: 1/10/20 with mild relief, water aerobics 3x weekly.   Orthopedic PMH: Knee Osteoarthritis.      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       Past Surgical History:   Procedure  Laterality Date     C NONSPECIFIC PROCEDURE      right hip replacement     C NONSPECIFIC PROCEDURE      tubal ligation     C NONSPECIFIC PROCEDURE      hip replacement     DECOMPRESSION LUMBAR ONE LEVEL  2013    Procedure: DECOMPRESSION LUMBAR ONE LEVEL;  Decompression Bilateral L4-5  ;  Surgeon: Lang Garcia MD;  Location: [facility] OR       Family History   Problem Relation Age of Onset     Cardiovascular Brother      Musculoskeletal Disorder Brother         Muscular Dystrophy     Hypertension Father      Cardiovascular Father      Hypertension Mother      Arthritis Mother      Eye Disorder Mother         Cataract     Osteoporosis Mother      Glaucoma Mother         possible per Pt     Osteoporosis Maternal Aunt      Macular Degeneration No family hx of        Social History     Socioeconomic History     Marital status: Single     Spouse name: Not on file     Number of children: Not on file     Years of education: Not on file     Highest education level: Not on file   Occupational History     Not on file   Social Needs     Financial resource strain: Not on file     Food insecurity:     Worry: Not on file     Inability: Not on file     Transportation needs:     Medical: Not on file     Non-medical: Not on file   Tobacco Use     Smoking status: Former Smoker     Last attempt to quit: 1966     Years since quittin.6     Smokeless tobacco: Never Used   Substance and Sexual Activity     Alcohol use: Yes     Comment: One beer day     Drug use: No     Sexual activity: Yes     Birth control/protection: Surgical     Comment: Has had TL   Lifestyle     Physical activity:     Days per week: Not on file     Minutes per session: Not on file     Stress: Not on file   Relationships     Social connections:     Talks on phone: Not on file     Gets together: Not on file     Attends Church service: Not on file     Active member of club or organization: Not on file     Attends meetings of clubs or  organizations: Not on file     Relationship status: Not on file     Intimate partner violence:     Fear of current or ex partner: Not on file     Emotionally abused: Not on file     Physically abused: Not on file     Forced sexual activity: Not on file   Other Topics Concern     Parent/sibling w/ CABG, MI or angioplasty before 65F 55M? Not Asked   Social History Narrative     Not on file       Current Outpatient Medications   Medication Sig Dispense Refill     citalopram (CELEXA) 20 MG tablet Take 1 tablet (20 mg) by mouth daily 30 tablet 3     diclofenac (VOLTAREN) 1 % topical gel Apply 2 g topically 2 times daily as needed        donepezil (ARICEPT) 10 MG tablet Take 1 tablet (10 mg) by mouth At Bedtime 30 tablet      lisinopril-hydrochlorothiazide (PRINZIDE/ZESTORETIC) 20-12.5 MG per tablet Take 1 tablet by mouth 2 times daily 30 tablet 3     omeprazole (PRILOSEC) 20 MG DR capsule        polyethylene glycol (MIRALAX/GLYCOLAX) packet Take 1 packet by mouth daily as needed for constipation       simvastatin (ZOCOR) 20 MG tablet Take 1 tablet (20 mg) by mouth At Bedtime 90 tablet 3     Acetaminophen (TYLENOL PO) Take 1,000 mg by mouth 3 times daily as needed       amLODIPine (NORVASC) 2.5 MG tablet Take 1 tablet (2.5 mg) by mouth daily 90 tablet 3       Allergies   Allergen Reactions     No Known Drug Allergies        REVIEW OF SYSTEMS:  CONSTITUTIONAL:  NEGATIVE for fever, chills, change in weight  INTEGUMENTARY/SKIN:  NEGATIVE for worrisome rashes, moles or lesions  EYES:  NEGATIVE for vision changes or irritation  ENT/MOUTH:  NEGATIVE for ear, mouth and throat problems  RESP:  NEGATIVE for significant cough or SOB  BREAST:  NEGATIVE for masses, tenderness or discharge  CV:  Hypertension  GI:  Reflux/ heartburn  :  Negative   MUSCULOSKELETAL:  See HPI above  NEURO:  NEGATIVE for weakness, dizziness or paresthesias  ENDOCRINE:  NEGATIVE for temperature intolerance, skin/hair changes  HEME/ALLERGY/IMMUNE:   "NEGATIVE for bleeding problems  PSYCHIATRIC:  depression      PHYSICAL EXAM:  /64 (BP Location: Right arm, Patient Position: Chair, Cuff Size: Adult Regular)   Ht 1.594 m (5' 2.75\")   Wt 78.9 kg (174 lb)   BMI 31.07 kg/m     Body mass index is 31.07 kg/m .   GENERAL APPEARANCE: healthy, alert and no distress   HEENT: No apparent thyroid megaly. Clear sclera with normal ocular movement  RESPIRATORY: No labored breathing  SKIN: no suspicious lesions or rashes  NEURO: Normal strength and tone, mentation intact and speech normal  VASCULAR: Good pulses, and capillary refill   LYMPH: no lymphadenopathy   PSYCH:  mentation appears normal and affect normal/bright    MUSCULOSKELETAL:  Not in acute distress at rest  She walks very slow  Mild difficulty getting up from sitting  Flexion contracture of 5 degrees on the right and 7 degrees on the left  Mild valgus alignment, bilateral  Flexion is limited to less than 100  Painful patellofemoral joint as well as medial and lateral joint line  Hip range of motion is well-maintained and without pain with internal and external rotation, bilateral  Extensor mechanism is intact  Circulation is intact  No skin lesion around the knee  Sensation is intact     ASSESSMENT:  Severe chronic knee DJD, bilateral, worse on the left symptom wise        PLAN:  Previous x-rays of the knee from 2019 and 2018 were visualized.  Severe degenerative changes are present in all compartments.  She has mild valgus alignment along with a flexion contracture.  Details of total knee arthroplasty were explained in terms of the nature of the surgery, hospitalization course and recovery time.  Potential risks related to surgery including infection, DVT, ongoing pain, stiffness etc. were thoroughly explained.  Informational materials provided as well.  After our lengthy discussion, she and her daughter decided to go ahead with the surgery.  She does have some concern about general anesthesia.  She feels " more comfortable of doing the surgery under spinal block.  Left total knee arthroplasty will be scheduled sometime in near future.  All the questions were answered.    Imaging Interpretation:   None today      Isreal Galaviz MD  [facility] surgery department      Again, thank you for allowing me to participate in the care of your patient.        Sincerely,        Isreal Galaviz MD

## 2020-01-24 NOTE — TELEPHONE ENCOUNTER
Scheduled surgery      Type of surgery: left tka  Location of surgery: [facility]  Date and time of surgery:   3/9/20 @ 10:30am   Surgeon: Rivka  Pre-Op Appt Date: 2/17/20  Post-Op Appt Date:  3/19/20  Packet sent out: Yes  Pre-cert/Authorization completed:  No  Date: 1/24/20      Tegan Tellez, Surgery Scheduler

## 2020-01-24 NOTE — PROGRESS NOTES
HISTORY OF PRESENT ILLNESS:    Juany Collazo is a 88 year old female who is seen in consultation at the request of MG Patterson PA-C  for left knee pain.     Present symptoms: left knee pain.  Pain located in the medial aspect of the knee, pain radiates down the shin. Pain is constant and aching.   Patient reports the knee will buckle and feel unstable. She uses a walker for ambulation.  Denies swelling in the knee.  Patient states that her ROM feels normal, or slightly improved from previous.   According to her daughter who is with her today, when they went to how I recently, she struggled getting around.  She pre-much stayed in a wheelchair whole time.  There is a 1 step at her daughter's house and she has significant difficulty of managing the stair as well.  She does not wake up from pain.  Minimal pain when she is at rest but she has significant difficulty of changing her positions.  The pain and difficulty has gotten worse in the recent past.  And as a result, her quality of life has deteriorated significantly.  Current pain level: 7/10, Worst pain level: 10/10  Treatments tried to this point: corticosteroid injection: 1/10/20 with mild relief, water aerobics 3x weekly.   Orthopedic PMH: Knee Osteoarthritis.      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       Past Surgical History:   Procedure Laterality Date     C NONSPECIFIC PROCEDURE  1998    right hip replacement     C NONSPECIFIC PROCEDURE  1970    tubal ligation     C NONSPECIFIC PROCEDURE  1998    hip replacement     DECOMPRESSION LUMBAR ONE LEVEL  4/19/2013    Procedure: DECOMPRESSION LUMBAR ONE LEVEL;  Decompression  Bilateral L4-5  ;  Surgeon: Lang Garcia MD;  Location: [facility] OR       Family History   Problem Relation Age of Onset     Cardiovascular Brother      Musculoskeletal Disorder Brother         Muscular Dystrophy     Hypertension Father      Cardiovascular Father      Hypertension Mother      Arthritis Mother      Eye Disorder Mother         Cataract     Osteoporosis Mother      Glaucoma Mother         possible per Pt     Osteoporosis Maternal Aunt      Macular Degeneration No family hx of        Social History     Socioeconomic History     Marital status: Single     Spouse name: Not on file     Number of children: Not on file     Years of education: Not on file     Highest education level: Not on file   Occupational History     Not on file   Social Needs     Financial resource strain: Not on file     Food insecurity:     Worry: Not on file     Inability: Not on file     Transportation needs:     Medical: Not on file     Non-medical: Not on file   Tobacco Use     Smoking status: Former Smoker     Last attempt to quit: 1966     Years since quittin.6     Smokeless tobacco: Never Used   Substance and Sexual Activity     Alcohol use: Yes     Comment: One beer day     Drug use: No     Sexual activity: Yes     Birth control/protection: Surgical     Comment: Has had TL   Lifestyle     Physical activity:     Days per week: Not on file     Minutes per session: Not on file     Stress: Not on file   Relationships     Social connections:     Talks on phone: Not on file     Gets together: Not on file     Attends Gnosticist service: Not on file     Active member of club or organization: Not on file     Attends meetings of clubs or organizations: Not on file     Relationship status: Not on file     Intimate partner violence:     Fear of current or ex partner: Not on file     Emotionally abused: Not on file     Physically abused: Not on file     Forced sexual activity: Not on file   Other Topics Concern     Parent/sibling  "w/ CABG, MI or angioplasty before 65F 55M? Not Asked   Social History Narrative     Not on file       Current Outpatient Medications   Medication Sig Dispense Refill     citalopram (CELEXA) 20 MG tablet Take 1 tablet (20 mg) by mouth daily 30 tablet 3     diclofenac (VOLTAREN) 1 % topical gel Apply 2 g topically 2 times daily as needed        donepezil (ARICEPT) 10 MG tablet Take 1 tablet (10 mg) by mouth At Bedtime 30 tablet      lisinopril-hydrochlorothiazide (PRINZIDE/ZESTORETIC) 20-12.5 MG per tablet Take 1 tablet by mouth 2 times daily 30 tablet 3     omeprazole (PRILOSEC) 20 MG DR capsule        polyethylene glycol (MIRALAX/GLYCOLAX) packet Take 1 packet by mouth daily as needed for constipation       simvastatin (ZOCOR) 20 MG tablet Take 1 tablet (20 mg) by mouth At Bedtime 90 tablet 3     Acetaminophen (TYLENOL PO) Take 1,000 mg by mouth 3 times daily as needed       amLODIPine (NORVASC) 2.5 MG tablet Take 1 tablet (2.5 mg) by mouth daily 90 tablet 3       Allergies   Allergen Reactions     No Known Drug Allergies        REVIEW OF SYSTEMS:  CONSTITUTIONAL:  NEGATIVE for fever, chills, change in weight  INTEGUMENTARY/SKIN:  NEGATIVE for worrisome rashes, moles or lesions  EYES:  NEGATIVE for vision changes or irritation  ENT/MOUTH:  NEGATIVE for ear, mouth and throat problems  RESP:  NEGATIVE for significant cough or SOB  BREAST:  NEGATIVE for masses, tenderness or discharge  CV:  Hypertension  GI:  Reflux/ heartburn  :  Negative   MUSCULOSKELETAL:  See HPI above  NEURO:  NEGATIVE for weakness, dizziness or paresthesias  ENDOCRINE:  NEGATIVE for temperature intolerance, skin/hair changes  HEME/ALLERGY/IMMUNE:  NEGATIVE for bleeding problems  PSYCHIATRIC:  depression      PHYSICAL EXAM:  /64 (BP Location: Right arm, Patient Position: Chair, Cuff Size: Adult Regular)   Ht 1.594 m (5' 2.75\")   Wt 78.9 kg (174 lb)   BMI 31.07 kg/m    Body mass index is 31.07 kg/m .   GENERAL APPEARANCE: healthy, alert " and no distress   HEENT: No apparent thyroid megaly. Clear sclera with normal ocular movement  RESPIRATORY: No labored breathing  SKIN: no suspicious lesions or rashes  NEURO: Normal strength and tone, mentation intact and speech normal  VASCULAR: Good pulses, and capillary refill   LYMPH: no lymphadenopathy   PSYCH:  mentation appears normal and affect normal/bright    MUSCULOSKELETAL:  Not in acute distress at rest  She walks very slow  Mild difficulty getting up from sitting  Flexion contracture of 5 degrees on the right and 7 degrees on the left  Mild valgus alignment, bilateral  Flexion is limited to less than 100  Painful patellofemoral joint as well as medial and lateral joint line  Hip range of motion is well-maintained and without pain with internal and external rotation, bilateral  Extensor mechanism is intact  Circulation is intact  No skin lesion around the knee  Sensation is intact     ASSESSMENT:  Severe chronic knee DJD, bilateral, worse on the left symptom wise        PLAN:  Previous x-rays of the knee from 2019 and 2018 were visualized.  Severe degenerative changes are present in all compartments.  She has mild valgus alignment along with a flexion contracture.  Details of total knee arthroplasty were explained in terms of the nature of the surgery, hospitalization course and recovery time.  Potential risks related to surgery including infection, DVT, ongoing pain, stiffness etc. were thoroughly explained.  Informational materials provided as well.  After our lengthy discussion, she and her daughter decided to go ahead with the surgery.  She does have some concern about general anesthesia.  She feels more comfortable of doing the surgery under spinal block.  Left total knee arthroplasty will be scheduled sometime in near future.  All the questions were answered.    Imaging Interpretation:   None today      Isreal Galaviz MD  [place] [facility]

## 2020-01-27 ENCOUNTER — TRANSFERRED RECORDS (OUTPATIENT)
Dept: HEALTH INFORMATION MANAGEMENT | Facility: CLINIC | Age: 85
End: 2020-01-27

## 2020-01-27 LAB
ALT SERPL W P-5'-P-CCNC: 15 U/L (ref 0–45)
ALT SERPL-CCNC: 15 U/L (ref 0–45)
ANION GAP SERPL CALCULATED.3IONS-SCNC: 7 MMOL/L (ref 5–18)
ANION GAP SERPL CALCULATED.3IONS-SCNC: 7 MMOL/L (ref 5–18)
AST SERPL W P-5'-P-CCNC: 17 U/L (ref 0–40)
AST SERPL-CCNC: 17 U/L (ref 0–40)
BASOPHILS # BLD AUTO: 0.1 THOU/UL (ref 0–0.2)
BASOPHILS NFR BLD AUTO: 1 % (ref 0–2)
BUN SERPL-MCNC: 14 MG/DL (ref 8–28)
BUN SERPL-MCNC: 14 MG/DL (ref 8–28)
CALCIUM SERPL-MCNC: 9.4 MG/DL (ref 8.5–10.5)
CALCIUM SERPL-MCNC: 9.4 MG/DL (ref 8.5–10.5)
CHLORIDE BLD-SCNC: 102 MMOL/L (ref 98–107)
CHLORIDE SERPLBLD-SCNC: 102 MMOL/L (ref 98–107)
CHOLEST SERPL-MCNC: 173 MG/DL
CHOLEST SERPL-MCNC: 174 MG/DL
CO2 SERPL-SCNC: 27 MMOL/L (ref 22–31)
CO2 SERPL-SCNC: 27 MMOL/L (ref 22–31)
CREAT SERPL-MCNC: 0.75 MG/DL (ref 0.6–1.1)
CREAT SERPL-MCNC: 0.75 MG/DL (ref 0.6–1.1)
DIFFERENTIAL: ABNORMAL
EOSINOPHIL # BLD AUTO: 0.3 THOU/UL (ref 0–0.4)
EOSINOPHIL NFR BLD AUTO: 2 % (ref 0–6)
ERYTHROCYTE [DISTWIDTH] IN BLOOD BY AUTOMATED COUNT: 14 % (ref 11–14.5)
ERYTHROCYTE [DISTWIDTH] IN BLOOD BY AUTOMATED COUNT: 14 % (ref 11–14.5)
FASTING STATUS PATIENT QL REPORTED: ABNORMAL
GFR SERPL CREATININE-BSD FRML MDRD: >60 ML/MIN/1.73M2
GFR SERPL CREATININE-BSD FRML MDRD: >60 ML/MIN/1.73M2
GLUCOSE BLD-MCNC: 107 MG/DL (ref 70–125)
GLUCOSE SERPL-MCNC: 107 MG/DL (ref 70–125)
HCT VFR BLD AUTO: 38.3 % (ref 35–47)
HCT VFR BLD AUTO: 38.3 % (ref 35–47)
HDLC SERPL-MCNC: 65 MG/DL
HDLC SERPL-MCNC: 65 MG/DL
HEMOGLOBIN: 12.1 G/DL (ref 12–16)
HGB BLD-MCNC: 12.1 G/DL (ref 12–16)
LDLC SERPL CALC-MCNC: 74 MG/DL
LDLC SERPL CALC-MCNC: 74 MG/DL
LYMPHOCYTES # BLD AUTO: 6.4 THOU/UL (ref 0.8–4.4)
LYMPHOCYTES NFR BLD AUTO: 51 % (ref 20–40)
MCH RBC QN AUTO: 28.3 PG (ref 27–34)
MCH RBC QN AUTO: 28.3 PG (ref 27–34)
MCHC RBC AUTO-ENTMCNC: 31.6 G/DL (ref 32–36)
MCHC RBC AUTO-ENTMCNC: 31.6 G/DL (ref 32–36)
MCV RBC AUTO: 90 FL (ref 80–100)
MCV RBC AUTO: 90 FL (ref 80–100)
MONOCYTES # BLD AUTO: 0.8 THOU/UL (ref 0–0.9)
MONOCYTES NFR BLD AUTO: 6 % (ref 2–10)
NEUTROPHILS # BLD AUTO: 5.1 THOU/UL (ref 2–7.7)
NEUTROPHILS NFR BLD AUTO: 40 % (ref 50–70)
PLATELET # BLD AUTO: 251 THOU/UL (ref 140–440)
PLATELET # BLD AUTO: 251 THOU/UL (ref 140–440)
PMV BLD AUTO: 11.8 FL (ref 8.5–12.5)
POTASSIUM BLD-SCNC: 4.3 MMOL/L (ref 3.5–5)
POTASSIUM SERPL-SCNC: 4.3 MMOL/L (ref 3.5–5)
RBC # BLD AUTO: 4.27 MILL/UL (ref 3.8–5.4)
RBC # BLD AUTO: 4.27 MILL/UL (ref 3.8–5.4)
SODIUM SERPL-SCNC: 136 MMOL/L (ref 136–145)
SODIUM SERPL-SCNC: 136 MMOL/L (ref 136–145)
TRIGL SERPL-MCNC: 173 MG/DL
TRIGL SERPL-MCNC: 174 MG/DL
WBC # BLD AUTO: 12.7 THOU/UL (ref 4–11)
WBC: 12.7 THOU/UL (ref 4–11)

## 2020-01-30 NOTE — TELEPHONE ENCOUNTER
Called the [facility] to determine fax number to send Physical Therapy orders to. Was transferred to [facility] attached-- [facility]. They provided me with the fax number: [phone].     External Physical Therapy orders signed and faxed to [facility].     Kalie Avalos, ATC

## 2020-02-06 ENCOUNTER — TRANSFERRED RECORDS (OUTPATIENT)
Dept: HEALTH INFORMATION MANAGEMENT | Facility: CLINIC | Age: 85
End: 2020-02-06

## 2020-02-06 ENCOUNTER — RECORDS - HEALTHEAST (OUTPATIENT)
Dept: LAB | Facility: CLINIC | Age: 85
End: 2020-02-06

## 2020-02-06 LAB
BASOPHILS # BLD AUTO: 0.1 THOU/UL (ref 0–0.2)
BASOPHILS NFR BLD AUTO: 1 % (ref 0–2)
DIFFERENTIAL: ABNORMAL
EOSINOPHIL COUNT (ABSOLUTE): 0.2 THOU/UL (ref 0–0.4)
EOSINOPHIL NFR BLD AUTO: 2 % (ref 0–6)
ERYTHROCYTE [DISTWIDTH] IN BLOOD BY AUTOMATED COUNT: 14.2 % (ref 11–14.5)
ERYTHROCYTE [DISTWIDTH] IN BLOOD BY AUTOMATED COUNT: 14.2 % (ref 11–14.5)
HCT VFR BLD AUTO: 40.1 % (ref 35–47)
HCT VFR BLD AUTO: 40.1 % (ref 35–47)
HEMOGLOBIN: 12.5 G/DL (ref 12–16)
HGB BLD-MCNC: 12.5 G/DL (ref 12–16)
LAB AP CHARGES (HE HISTORICAL CONVERSION): ABNORMAL
LYMPHOCYTES # BLD AUTO: 6.7 THOU/UL (ref 0.8–4.4)
LYMPHOCYTES NFR BLD AUTO: 56 % (ref 20–40)
MCH RBC QN AUTO: 28 PG (ref 27–34)
MCH RBC QN AUTO: 28 PG (ref 27–34)
MCHC RBC AUTO-ENTMCNC: 31.2 G/DL (ref 32–36)
MCHC RBC AUTO-ENTMCNC: 31.2 G/DL (ref 32–36)
MCV RBC AUTO: 90 FL (ref 80–100)
MCV RBC AUTO: 90 FL (ref 80–100)
MONOCYTES # BLD AUTO: 0.5 THOU/UL (ref 0–0.9)
MONOCYTES NFR BLD AUTO: 5 % (ref 2–10)
PATH REPORT.COMMENTS IMP SPEC: ABNORMAL
PATH REPORT.COMMENTS IMP SPEC: ABNORMAL
PATH REPORT.FINAL DX SPEC: ABNORMAL
PATH REPORT.MICROSCOPIC SPEC OTHER STN: ABNORMAL
PATH REPORT.MICROSCOPIC SPEC OTHER STN: ABNORMAL
PATH REPORT.RELEVANT HX SPEC: ABNORMAL
PLAT MORPH BLD: NORMAL
PLATELET # BLD AUTO: 276 THOU/UL (ref 140–440)
PLATELET # BLD AUTO: 276 THOU/UL (ref 140–440)
PMV BLD AUTO: 11.5 FL (ref 8.5–12.5)
RBC # BLD AUTO: 4.47 MILL/UL (ref 3.8–5.4)
RBC # BLD AUTO: 4.47 MILL/UL (ref 3.8–5.4)
REACTIVE LYMPHS: ABNORMAL
RESULT FLAG (HE HISTORICAL CONVERSION): ABNORMAL
TOTAL NEUTROPHILS-ABS(DIFF): 4.4 THOU/UL (ref 2–7.7)
TOTAL NEUTROPHILS-REL(DIFF): 37 % (ref 50–70)
WBC # BLD AUTO: 12 THOU/UL (ref 4–11)
WBC: 12 THOU/UL (ref 4–11)

## 2020-02-14 NOTE — PROGRESS NOTES
Pre-Visit Planning     Future Appointments   Date Time Provider Department Center   2/17/2020 10:40 AM Kenny Ahn PA-C [facility]   3/19/2020 10:20 AM Riky Salazar PA-C [facility]     Arrival Time for this Appointment: 10:20 AM   Appointment Notes for this encounter:   pre-op physical 03/09/2020 [facility] OR, ARTHROPLASTY, KNEE, TOTAL, LEFT, will be coming with her daughter.    Questionnaires Reviewed/Assigned  Additional questionnaires assigned      Patient preferred phone number: [phone]    Spoke to patient via phone. Patient does not have additional questions or concerns.        Visit is not preventive.    Health Maintenance Due   Topic Date Due     ANNUAL REVIEW OF HM ORDERS  01/07/1932     ZOSTER IMMUNIZATION (2 of 3) 04/17/2008     PHQ-9  09/26/2018     MEDICARE ANNUAL WELLNESS VISIT  03/26/2019     FALL RISK ASSESSMENT  12/14/2019       [employer]  Patient is active on [employer].    Questionnaire Review   Offered information on completing questionnaires via [employer].  Advised patient to arrive early in order to complete questionnaires.    Tato Neri, EMT/Clinic Health Guide

## 2020-02-17 ENCOUNTER — OFFICE VISIT (OUTPATIENT)
Dept: FAMILY MEDICINE | Facility: CLINIC | Age: 85
End: 2020-02-17
Payer: COMMERCIAL

## 2020-02-17 VITALS
DIASTOLIC BLOOD PRESSURE: 82 MMHG | WEIGHT: 175.6 LBS | TEMPERATURE: 97.6 F | BODY MASS INDEX: 33.15 KG/M2 | SYSTOLIC BLOOD PRESSURE: 136 MMHG | RESPIRATION RATE: 14 BRPM | HEART RATE: 60 BPM | HEIGHT: 61 IN

## 2020-02-17 DIAGNOSIS — Z01.818 PREOP GENERAL PHYSICAL EXAM: Primary | ICD-10-CM

## 2020-02-17 DIAGNOSIS — F33.0 MAJOR DEPRESSIVE DISORDER, RECURRENT EPISODE, MILD (H): ICD-10-CM

## 2020-02-17 DIAGNOSIS — M17.12 ARTHRITIS OF LEFT KNEE: ICD-10-CM

## 2020-02-17 DIAGNOSIS — R41.3 MEMORY LOSS: ICD-10-CM

## 2020-02-17 LAB
BASOPHILS # BLD AUTO: 0.1 10E9/L (ref 0–0.2)
BASOPHILS NFR BLD AUTO: 0.6 %
DIFFERENTIAL METHOD BLD: ABNORMAL
EOSINOPHIL # BLD AUTO: 0.2 10E9/L (ref 0–0.7)
EOSINOPHIL NFR BLD AUTO: 1.7 %
ERYTHROCYTE [DISTWIDTH] IN BLOOD BY AUTOMATED COUNT: 14.7 % (ref 10–15)
HCT VFR BLD AUTO: 38.4 % (ref 35–47)
HGB BLD-MCNC: 12.1 G/DL (ref 11.7–15.7)
LYMPHOCYTES # BLD AUTO: 6.1 10E9/L (ref 0.8–5.3)
LYMPHOCYTES NFR BLD AUTO: 48.7 %
MCH RBC QN AUTO: 28.3 PG (ref 26.5–33)
MCHC RBC AUTO-ENTMCNC: 31.5 G/DL (ref 31.5–36.5)
MCV RBC AUTO: 90 FL (ref 78–100)
MONOCYTES # BLD AUTO: 1 10E9/L (ref 0–1.3)
MONOCYTES NFR BLD AUTO: 8 %
NEUTROPHILS # BLD AUTO: 5.2 10E9/L (ref 1.6–8.3)
NEUTROPHILS NFR BLD AUTO: 41 %
PLATELET # BLD AUTO: 283 10E9/L (ref 150–450)
RBC # BLD AUTO: 4.28 10E12/L (ref 3.8–5.2)
WBC # BLD AUTO: 12.6 10E9/L (ref 4–11)

## 2020-02-17 PROCEDURE — 99214 OFFICE O/P EST MOD 30 MIN: CPT | Performed by: PHYSICIAN ASSISTANT

## 2020-02-17 PROCEDURE — 85025 COMPLETE CBC W/AUTO DIFF WBC: CPT | Performed by: PHYSICIAN ASSISTANT

## 2020-02-17 PROCEDURE — 93000 ELECTROCARDIOGRAM COMPLETE: CPT | Performed by: PHYSICIAN ASSISTANT

## 2020-02-17 PROCEDURE — 36415 COLL VENOUS BLD VENIPUNCTURE: CPT | Performed by: PHYSICIAN ASSISTANT

## 2020-02-17 SDOH — SOCIAL STABILITY: SOCIAL NETWORK: HOW OFTEN DO YOU ATTENT MEETINGS OF THE CLUB OR ORGANIZATION YOU BELONG TO?: MORE THAN 4 TIMES PER YEAR

## 2020-02-17 SDOH — SOCIAL STABILITY: SOCIAL NETWORK
DO YOU BELONG TO ANY CLUBS OR ORGANIZATIONS SUCH AS CHURCH GROUPS UNIONS, FRATERNAL OR ATHLETIC GROUPS, OR SCHOOL GROUPS?: YES

## 2020-02-17 SDOH — HEALTH STABILITY: MENTAL HEALTH: HOW OFTEN DO YOU HAVE A DRINK CONTAINING ALCOHOL?: MONTHLY OR LESS

## 2020-02-17 SDOH — ECONOMIC STABILITY: TRANSPORTATION INSECURITY
IN THE PAST 12 MONTHS, HAS THE LACK OF TRANSPORTATION KEPT YOU FROM MEDICAL APPOINTMENTS OR FROM GETTING MEDICATIONS?: NO

## 2020-02-17 SDOH — ECONOMIC STABILITY: FOOD INSECURITY: WITHIN THE PAST 12 MONTHS, THE FOOD YOU BOUGHT JUST DIDN'T LAST AND YOU DIDN'T HAVE MONEY TO GET MORE.: NEVER TRUE

## 2020-02-17 SDOH — SOCIAL STABILITY: SOCIAL NETWORK: HOW OFTEN DO YOU ATTEND CHURCH OR RELIGIOUS SERVICES?: MORE THAN 4 TIMES PER YEAR

## 2020-02-17 SDOH — ECONOMIC STABILITY: FOOD INSECURITY: WITHIN THE PAST 12 MONTHS, YOU WORRIED THAT YOUR FOOD WOULD RUN OUT BEFORE YOU GOT MONEY TO BUY MORE.: NEVER TRUE

## 2020-02-17 SDOH — HEALTH STABILITY: PHYSICAL HEALTH: ON AVERAGE, HOW MANY DAYS PER WEEK DO YOU ENGAGE IN MODERATE TO STRENUOUS EXERCISE (LIKE A BRISK WALK)?: 3 DAYS

## 2020-02-17 SDOH — SOCIAL STABILITY: SOCIAL NETWORK: ARE YOU MARRIED, WIDOWED, DIVORCED, SEPARATED, NEVER MARRIED, OR LIVING WITH A PARTNER?: DIVORCED

## 2020-02-17 SDOH — HEALTH STABILITY: MENTAL HEALTH: HOW MANY STANDARD DRINKS CONTAINING ALCOHOL DO YOU HAVE ON A TYPICAL DAY?: 1 OR 2

## 2020-02-17 SDOH — ECONOMIC STABILITY: TRANSPORTATION INSECURITY
IN THE PAST 12 MONTHS, HAS LACK OF TRANSPORTATION KEPT YOU FROM MEETINGS, WORK, OR FROM GETTING THINGS NEEDED FOR DAILY LIVING?: NO

## 2020-02-17 SDOH — SOCIAL STABILITY: SOCIAL NETWORK: HOW OFTEN DO YOU GET TOGETHER WITH FRIENDS OR RELATIVES?: MORE THAN THREE TIMES A WEEK

## 2020-02-17 SDOH — ECONOMIC STABILITY: INCOME INSECURITY: HOW HARD IS IT FOR YOU TO PAY FOR THE VERY BASICS LIKE FOOD, HOUSING, MEDICAL CARE, AND HEATING?: NOT VERY HARD

## 2020-02-17 SDOH — SOCIAL STABILITY: SOCIAL NETWORK
IN A TYPICAL WEEK, HOW MANY TIMES DO YOU TALK ON THE PHONE WITH FAMILY, FRIENDS, OR NEIGHBORS?: MORE THAN THREE TIMES A WEEK

## 2020-02-17 SDOH — HEALTH STABILITY: PHYSICAL HEALTH: ON AVERAGE, HOW MANY MINUTES DO YOU ENGAGE IN EXERCISE AT THIS LEVEL?: 30 MIN

## 2020-02-17 SDOH — HEALTH STABILITY: MENTAL HEALTH: HOW OFTEN DO YOU HAVE 6 OR MORE DRINKS ON ONE OCCASION?: NEVER

## 2020-02-17 SDOH — HEALTH STABILITY: MENTAL HEALTH
STRESS IS WHEN SOMEONE FEELS TENSE, NERVOUS, ANXIOUS, OR CAN'T SLEEP AT NIGHT BECAUSE THEIR MIND IS TROUBLED. HOW STRESSED ARE YOU?: NOT AT ALL

## 2020-02-17 ASSESSMENT — ANXIETY QUESTIONNAIRES
2. NOT BEING ABLE TO STOP OR CONTROL WORRYING: NOT AT ALL
GAD7 TOTAL SCORE: 0
3. WORRYING TOO MUCH ABOUT DIFFERENT THINGS: NOT AT ALL
4. TROUBLE RELAXING: NOT AT ALL
GAD7 TOTAL SCORE: 0
7. FEELING AFRAID AS IF SOMETHING AWFUL MIGHT HAPPEN: NOT AT ALL
7. FEELING AFRAID AS IF SOMETHING AWFUL MIGHT HAPPEN: NOT AT ALL
1. FEELING NERVOUS, ANXIOUS, OR ON EDGE: NOT AT ALL
5. BEING SO RESTLESS THAT IT IS HARD TO SIT STILL: NOT AT ALL
6. BECOMING EASILY ANNOYED OR IRRITABLE: NOT AT ALL
GAD7 TOTAL SCORE: 0

## 2020-02-17 ASSESSMENT — PATIENT HEALTH QUESTIONNAIRE - PHQ9
10. IF YOU CHECKED OFF ANY PROBLEMS, HOW DIFFICULT HAVE THESE PROBLEMS MADE IT FOR YOU TO DO YOUR WORK, TAKE CARE OF THINGS AT HOME, OR GET ALONG WITH OTHER PEOPLE: NOT DIFFICULT AT ALL
SUM OF ALL RESPONSES TO PHQ QUESTIONS 1-9: 0
SUM OF ALL RESPONSES TO PHQ QUESTIONS 1-9: 0

## 2020-02-17 ASSESSMENT — PAIN SCALES - GENERAL: PAINLEVEL: EXTREME PAIN (8)

## 2020-02-17 ASSESSMENT — MIFFLIN-ST. JEOR: SCORE: 1163.9

## 2020-02-17 NOTE — PROGRESS NOTES
Answers for HPI/ROS submitted by the patient on 2020   If you checked off any problems, how difficult have these problems made it for you to do your work, take care of things at home, or get along with other people?: Not difficult at all  PHQ9 TOTAL SCORE: 0  FABIEN 7 TOTAL SCORE: 0  [facility]  [id number] [address] [id number] [phone]  Dept: [phone]    PRE-OP EVALUATION:  Today's date: 2020    Juany Collazo (: 1932) presents for pre-operative evaluation assessment as requested by Dr. Aleman.  She requires evaluation and anesthesia risk assessment prior to undergoing surgery/procedure for treatment of Left Total Knee Arthroplasty .     Date of Surgery/ Procedure: 2020  Time of Surgery/ Procedure: 930  Hospital/Surgical Facility: [facility] OR  Fax number for surgical facility: [phone]  Primary Physician: Gaurang Reeves  Type of Anesthesia Anticipated: block. Possible general      Patient has a Health Care Directive or Living Will:  YES     Preop Questions 2020   Who is doing your surgery? dr chi aleman   What are you having done? [facility]   Date of Surgery/Procedure: 2020   Facility or Hospital where procedure/surgery will be performed: [facility]   1.  Do you have a history of Heart attack, stroke, stent, coronary bypass surgery, or other heart surgery? No   2.  Do you ever have any pain or discomfort in your chest? No   3.  Do you have a history of  Heart Failure? No   4.   Are you troubled by shortness of breath when:  walking on a level surface, or up a slight hill, or at night? No   5.  Do you currently have a cold, bronchitis or other respiratory infection? No   6.  Do you have a cough, shortness of breath, or wheezing? No   7.  Do you sometimes get pains in the calves of your legs when you walk? YES -    8. Do you or anyone in your family have previous history of blood clots? No   9.  Do you or  does anyone in your family have a serious bleeding problem such as prolonged bleeding following surgeries or cuts? No   10. Have you ever had problems with anemia or been told to take iron pills? No   11. Have you had any abnormal blood loss such as black, tarry or bloody stools, or abnormal vaginal bleeding? No   12. Have you ever had a blood transfusion? No   13. Have you or any of your relatives ever had problems with anesthesia? YES -    14. Do you have sleep apnea, excessive snoring or daytime drowsiness? No   15. Do you have any prosthetic heart valves? No   16. Do you have prosthetic joints? YES -    17. Is there any chance that you may be pregnant? No           HPI:     HPI related to upcoming procedure: history of oa of left knee. The above procedure was deemed the next best step in management.       See problem list for active medical problems.  Problems all longstanding and stable, except as noted/documented.  See ROS for pertinent symptoms related to these conditions.    DEPRESSION - Patient has a long history of Depression of moderate severity requiring medication for control with recent symptoms being stable..Current symptoms of depression include none.     HYPERTENSION - Patient has longstanding history of HTN , currently denies any symptoms referable to elevated blood pressure. Specifically denies chest pain, palpitations, dyspnea, orthopnea, PND or peripheral edema. Blood pressure readings have been in normal range. Current medication regimen is as listed below. Patient denies any side effects of medication.       Recently history of lymphocytosis of unknown origin. Obtained through [employer]. Further workup appears to be pending. No recent illnesses. No fever or chills.     MEDICAL HISTORY:     Patient Active Problem List    Diagnosis Date Noted     Primary osteoarthritis of left knee 01/24/2020     Priority: Medium     Added automatically from request for surgery [id number]       Fall 11/13/2018      Priority: Medium     Gastroesophageal reflux disease without esophagitis 03/26/2018     Priority: Medium     Major depressive disorder, recurrent episode, mild (H) 01/18/2017     Priority: Medium     Memory loss 11/28/2014     Priority: Medium     Confusion 09/29/2013     Priority: Medium     Lumbar stenosis 04/19/2013     Priority: Medium     Advanced directives, counseling/discussion 03/14/2012     Priority: Medium     Advance Directive Problem List Overview:   Name Relationship Phone    Primary Health Care Agent            Alternative Health Care Agent          Discussed advance care planning with patient; however, patient declined at this time. 3/14/2012          HYPERLIPIDEMIA LDL GOAL <130 10/31/2010     Priority: Medium     Disorder of bone and cartilage 01/18/2006     Priority: Medium     Problem list name updated by automated process. Provider to review       Essential hypertension, benign 10/27/2003     Priority: Medium      Past Medical History:   Diagnosis Date     Arthritis      BENIGN HYPERTENSION 10/27/2003     BONE & CARTILAGE DIS NOS 1/18/2006     Carpal tunnel syndrome      Chronic pain     right leg     Gastro-oesophageal reflux disease      GERD (gastroesophageal reflux disease) 10/14/2008     History of blood transfusion      Hyperlipidemia LDL goal <130 10/31/2010     Mild major depression (H) 4/23/2014     Past Surgical History:   Procedure Laterality Date     C NONSPECIFIC PROCEDURE  1998    right hip replacement     C NONSPECIFIC PROCEDURE  1970    tubal ligation     C NONSPECIFIC PROCEDURE  1998    hip replacement     DECOMPRESSION LUMBAR ONE LEVEL  4/19/2013    Procedure: DECOMPRESSION LUMBAR ONE LEVEL;  Decompression Bilateral L4-5  ;  Surgeon: Lang Garica MD;  Location: [facility] OR     EYE SURGERY       ORTHOPEDIC SURGERY Right     hip replacement     Current Outpatient Medications   Medication Sig Dispense Refill     Acetaminophen (TYLENOL PO) Take 1,000 mg by mouth 3 times daily as  needed       amLODIPine (NORVASC) 2.5 MG tablet Take 1 tablet (2.5 mg) by mouth daily 90 tablet 3     citalopram (CELEXA) 20 MG tablet Take 1 tablet (20 mg) by mouth daily 30 tablet 3     diclofenac (VOLTAREN) 1 % topical gel Apply 2 g topically 2 times daily as needed        donepezil (ARICEPT) 10 MG tablet Take 1 tablet (10 mg) by mouth At Bedtime 30 tablet      lisinopril-hydrochlorothiazide (PRINZIDE/ZESTORETIC) 20-12.5 MG per tablet Take 1 tablet by mouth 2 times daily 30 tablet 3     omeprazole (PRILOSEC) 20 MG DR capsule        polyethylene glycol (MIRALAX/GLYCOLAX) packet Take 1 packet by mouth daily as needed for constipation       simvastatin (ZOCOR) 20 MG tablet Take 1 tablet (20 mg) by mouth At Bedtime 90 tablet 3     OTC products: no recent use of OTC ASA, NSAIDS or Steroids    Allergies   Allergen Reactions     No Known Drug Allergies       Latex Allergy: NO    Social History     Tobacco Use     Smoking status: Former Smoker     Last attempt to quit: 1966     Years since quittin.7     Smokeless tobacco: Never Used   Substance Use Topics     Alcohol use: Not Currently     Frequency: Monthly or less     Drinks per session: 1 or 2     Binge frequency: Never     Comment: One beer day     History   Drug Use No       REVIEW OF SYSTEMS:   CONSTITUTIONAL: NEGATIVE for fever, chills, change in weight  INTEGUMENTARY/SKIN: NEGATIVE for worrisome rashes, moles or lesions  EYES: NEGATIVE for vision changes or irritation  ENT/MOUTH: NEGATIVE for ear, mouth and throat problems  RESP: NEGATIVE for significant cough or SOB  BREAST: NEGATIVE for masses, tenderness or discharge  CV: NEGATIVE for chest pain, palpitations or peripheral edema  GI: NEGATIVE for nausea, abdominal pain, heartburn, or change in bowel habits  : NEGATIVE for frequency, dysuria, or hematuria  MUSCULOSKELETAL: baseline knee pain.   NEURO: NEGATIVE for weakness, dizziness or paresthesias  ENDOCRINE: NEGATIVE for temperature intolerance,  "skin/hair changes  HEME: NEGATIVE for bleeding problems  PSYCHIATRIC: NEGATIVE for changes in mood or affect    EXAM:   /82 (BP Location: Right arm, Patient Position: Sitting, Cuff Size: Adult Regular)   Pulse 60   Temp 97.6  F (36.4  C) (Oral)   Resp 14   Ht 1.549 m (5' 1\")   Wt 79.7 kg (175 lb 9.6 oz)   BMI 33.18 kg/m      GENERAL APPEARANCE: alert, active and no distress     EYES: EOMI, PERRL     HENT: ear canals and TM's normal and nose and mouth without ulcers or lesions     NECK: no adenopathy, no asymmetry, masses, or scars and thyroid normal to palpation     RESP: lungs clear to auscultation - no rales, rhonchi or wheezes     CV: regular rates and rhythm, normal S1 S2, no S3 or S4 and no murmur, click or rub     ABDOMEN:  soft, nontender, no HSM or masses and bowel sounds normal     MS: extremities normal- no gross deformities noted, no evidence of inflammation in joints, FROM in all extremities.     SKIN: no suspicious lesions or rashes     NEURO: Normal strength and tone, sensory exam grossly normal, mentation intact and speech normal     PSYCH: mentation appears normal. and affect normal/bright     LYMPHATICS: No cervical adenopathy    DIAGNOSTICS:   EKG: sinus bradycardia, normal axis, normal intervals, no acute ST/T changes c/w ischemia, unchanged from previous tracings  Labs Resulted Today:   Results for orders placed or performed in visit on 02/17/20   CBC with platelets and differential     Status: Abnormal   Result Value Ref Range    WBC 12.6 (H) 4.0 - 11.0 10e9/L    RBC Count 4.28 3.8 - 5.2 10e12/L    Hemoglobin 12.1 11.7 - 15.7 g/dL    Hematocrit 38.4 35.0 - 47.0 %    MCV 90 78 - 100 fl    MCH 28.3 26.5 - 33.0 pg    MCHC 31.5 31.5 - 36.5 g/dL    RDW 14.7 10.0 - 15.0 %    Platelet Count 283 150 - 450 10e9/L    % Neutrophils 41.0 %    % Lymphocytes 48.7 %    % Monocytes 8.0 %    % Eosinophils 1.7 %    % Basophils 0.6 %    Absolute Neutrophil 5.2 1.6 - 8.3 10e9/L    Absolute Lymphocytes " 6.1 (H) 0.8 - 5.3 10e9/L    Absolute Monocytes 1.0 0.0 - 1.3 10e9/L    Absolute Eosinophils 0.2 0.0 - 0.7 10e9/L    Absolute Basophils 0.1 0.0 - 0.2 10e9/L    Diff Method Automated Method        Recent Labs   Lab Test 02/06/20 01/27/20 05/22/19 09/29/13  1600   HGB 12.5 12.1 12.8   < > 12.5    251 272   < > 269   INR  --   --   --   --  0.92   NA  --  136 136   < > 134   POTASSIUM  --  4.3 4.3   < > 3.8   CR  --  0.75 0.78   < > 0.63    < > = values in this interval not displayed.        IMPRESSION:   Reason for surgery/procedure: left knee arthritis.   Diagnosis/reason for consult: preoperative exam for the above procedure.     The proposed surgical procedure is considered INTERMEDIATE risk.    REVISED CARDIAC RISK INDEX  The patient has the following serious cardiovascular risks for perioperative complications such as (MI, PE, VFib and 3  AV Block):  No serious cardiac risks  INTERPRETATION: 0 risks: Class I (very low risk - 0.4% complication rate)    The patient has the following additional risks for perioperative complications:  Delirium or Dementia      ICD-10-CM    1. Preop general physical exam Z01.818 EKG 12-lead complete w/read - Clinics     CBC with platelets and differential   2. Arthritis of left knee M17.12    3. Major depressive disorder, recurrent episode, mild (H) F33.0    4. Memory loss R41.3      Of note, persistent lymphocytosis of unknown origin. Remains stable for 1 month. Recommending discussing with pcp after surgery on possible hematology referral.     RECOMMENDATIONS:     --Consult hospital rounder / IM to assist post-op medical management    -NPO after midnight. No nsaids for 1 week prior. No asa 1 week prior.     --Patient is to take all scheduled medications on the day of surgery EXCEPT for modifications listed below.    ACE Inhibitor or Angiotensin Receptor Blocker (ARB) Use  Ace inhibitor or Angiotensin Receptor Blocker (ARB) and should HOLD this medication for the 24 hours  prior to surgery.      APPROVAL GIVEN to proceed with proposed procedure, without further diagnostic evaluation       Signed Electronically by: Kenny Ahn PA-C    Copy of this evaluation report is provided to requesting physician.    [facility] Preop Guidelines    Revised Cardiac Risk Index

## 2020-02-17 NOTE — PATIENT INSTRUCTIONS
Please hold your morning prinzide the day of surgery    Please also hold your voltaren gel for 7 days prior to your surgery.        Before Your Surgery      Call your surgeon if there is any change in your health. This includes signs of a cold or flu (such as a sore throat, runny nose, cough, rash or fever).    Do not smoke, drink alcohol or take over the counter medicine (unless your surgeon or primary care doctor tells you to) for the 24 hours before and after surgery.    If you take prescribed drugs: Follow your doctor s orders about which medicines to take and which to stop until after surgery.    Eating and drinking prior to surgery: follow the instructions from your surgeon    Take a shower or bath the night before surgery. Use the soap your surgeon gave you to gently clean your skin. If you do not have soap from your surgeon, use your regular soap. Do not shave or scrub the surgery site.  Wear clean pajamas and have clean sheets on your bed.   Before Your Surgery    Call your surgeon if there is any change in your health. This includes signs of a cold or flu (such as a sore throat, runny nose, cough, rash or fever).  Do not smoke, drink alcohol or take over the counter medicine (unless your surgeon or primary care doctor tells you to) for the 24 hours before and after surgery.  If you take prescribed drugs: Follow your doctor s orders about which medicines to take and which to stop until after surgery.  Eating and drinking prior to surgery: follow the instructions from your surgeon  Take a shower or bath the night before surgery. Use the soap your surgeon gave you to gently clean your skin. If you do not have soap from your surgeon, use your regular soap. Do not shave or scrub the surgery site.  Wear clean pajamas and have clean sheets on your bed.

## 2020-02-18 ASSESSMENT — ANXIETY QUESTIONNAIRES: GAD7 TOTAL SCORE: 0

## 2020-03-05 NOTE — OR NURSING
[phone] aLkia Pettit attempted to call her back no answer  left her message to call me back WBC on 2/17/20 12.6.  Dr. Tineo's office notified.

## 2020-03-08 NOTE — PHARMACY-ADMISSION MEDICATION HISTORY
Admission medication history interview completed by pre-admitting RN, Nadya Piña. See EPIC admission navigator for allergy information, prior to admission medications and immunization status.       Prior to Admission medications    Medication Sig Last Dose Taking? Auth Provider   Acetaminophen (TYLENOL PO) Take 1,000 mg by mouth 3 times daily as needed   Reported, Patient   amLODIPine (NORVASC) 2.5 MG tablet Take 1 tablet (2.5 mg) by mouth daily   Martinez Shafer MD   citalopram (CELEXA) 20 MG tablet Take 1 tablet (20 mg) by mouth daily   Aurora Wong APRN CNP   diclofenac (VOLTAREN) 1 % topical gel Apply 2 g topically 2 times daily as needed    Reported, Patient   donepezil (ARICEPT) 10 MG tablet Take 1 tablet (10 mg) by mouth At Bedtime   Aurora Wong APRN CNP   lisinopril-hydrochlorothiazide (PRINZIDE/ZESTORETIC) 20-12.5 MG per tablet Take 1 tablet by mouth 2 times daily   Aurora Wong APRN CNP   omeprazole (PRILOSEC) 20 MG DR capsule Take 20 mg by mouth daily    Reported, Patient   simvastatin (ZOCOR) 20 MG tablet Take 1 tablet (20 mg) by mouth At Bedtime   Aurora Wong APRN CNP

## 2020-03-09 ENCOUNTER — ANESTHESIA (OUTPATIENT)
Dept: SURGERY | Facility: CLINIC | Age: 85
End: 2020-03-09
Payer: COMMERCIAL

## 2020-03-09 ENCOUNTER — HOSPITAL ENCOUNTER (OUTPATIENT)
Facility: CLINIC | Age: 85
Discharge: HOME | End: 2020-03-10
Attending: ORTHOPAEDIC SURGERY | Admitting: ORTHOPAEDIC SURGERY
Payer: COMMERCIAL

## 2020-03-09 ENCOUNTER — APPOINTMENT (OUTPATIENT)
Dept: PHYSICAL THERAPY | Facility: CLINIC | Age: 85
End: 2020-03-09
Attending: ORTHOPAEDIC SURGERY
Payer: COMMERCIAL

## 2020-03-09 ENCOUNTER — ANESTHESIA EVENT (OUTPATIENT)
Dept: SURGERY | Facility: CLINIC | Age: 85
End: 2020-03-09
Payer: COMMERCIAL

## 2020-03-09 DIAGNOSIS — T40.2X5A CONSTIPATION DUE TO OPIOID THERAPY: Primary | ICD-10-CM

## 2020-03-09 DIAGNOSIS — K59.03 CONSTIPATION DUE TO OPIOID THERAPY: Primary | ICD-10-CM

## 2020-03-09 DIAGNOSIS — M17.12 PRIMARY OSTEOARTHRITIS OF LEFT KNEE: ICD-10-CM

## 2020-03-09 DIAGNOSIS — Z78.9 DEEP VEIN THROMBOSIS (DVT) PROPHYLAXIS PRESCRIBED AT DISCHARGE: ICD-10-CM

## 2020-03-09 DIAGNOSIS — Z96.652 STATUS POST TOTAL LEFT KNEE REPLACEMENT: ICD-10-CM

## 2020-03-09 PROBLEM — Z96.659 STATUS POST TOTAL KNEE REPLACEMENT: Status: ACTIVE | Noted: 2020-03-09

## 2020-03-09 LAB
CREAT SERPL-MCNC: 0.7 MG/DL (ref 0.52–1.04)
CREAT SERPL-MCNC: 0.73 MG/DL (ref 0.52–1.04)
GFR SERPL CREATININE-BSD FRML MDRD: 74 ML/MIN/{1.73_M2}
GFR SERPL CREATININE-BSD FRML MDRD: 77 ML/MIN/{1.73_M2}
PLATELET # BLD AUTO: 236 10E9/L (ref 150–450)
POTASSIUM SERPL-SCNC: 3.5 MMOL/L (ref 3.4–5.3)

## 2020-03-09 PROCEDURE — 25800030 ZZH RX IP 258 OP 636: Performed by: NURSE ANESTHETIST, CERTIFIED REGISTERED

## 2020-03-09 PROCEDURE — 27210794 ZZH OR GENERAL SUPPLY STERILE: Performed by: ORTHOPAEDIC SURGERY

## 2020-03-09 PROCEDURE — 25800025 ZZH RX 258: Performed by: ORTHOPAEDIC SURGERY

## 2020-03-09 PROCEDURE — 25000125 ZZHC RX 250: Performed by: NURSE ANESTHETIST, CERTIFIED REGISTERED

## 2020-03-09 PROCEDURE — 82565 ASSAY OF CREATININE: CPT | Performed by: ORTHOPAEDIC SURGERY

## 2020-03-09 PROCEDURE — C1776 JOINT DEVICE (IMPLANTABLE): HCPCS | Performed by: ORTHOPAEDIC SURGERY

## 2020-03-09 PROCEDURE — 25000128 H RX IP 250 OP 636: Performed by: ORTHOPAEDIC SURGERY

## 2020-03-09 PROCEDURE — 97161 PT EVAL LOW COMPLEX 20 MIN: CPT | Mod: GP,59 | Performed by: PHYSICAL THERAPIST

## 2020-03-09 PROCEDURE — 36000063 ZZH SURGERY LEVEL 4 EA 15 ADDTL MIN: Performed by: ORTHOPAEDIC SURGERY

## 2020-03-09 PROCEDURE — 25000125 ZZHC RX 250: Performed by: ANESTHESIOLOGY

## 2020-03-09 PROCEDURE — 25000125 ZZHC RX 250: Performed by: ORTHOPAEDIC SURGERY

## 2020-03-09 PROCEDURE — 37000009 ZZH ANESTHESIA TECHNICAL FEE, EACH ADDTL 15 MIN: Performed by: ORTHOPAEDIC SURGERY

## 2020-03-09 PROCEDURE — 82565 ASSAY OF CREATININE: CPT | Performed by: ANESTHESIOLOGY

## 2020-03-09 PROCEDURE — 40000306 ZZH STATISTIC PRE PROC ASSESS II: Performed by: ORTHOPAEDIC SURGERY

## 2020-03-09 PROCEDURE — 12000000 ZZH R&B MED SURG/OB

## 2020-03-09 PROCEDURE — 27130 TOTAL HIP ARTHROPLASTY: CPT | Mod: AS | Performed by: PHYSICIAN ASSISTANT

## 2020-03-09 PROCEDURE — 84132 ASSAY OF SERUM POTASSIUM: CPT | Performed by: ANESTHESIOLOGY

## 2020-03-09 PROCEDURE — 36000093 ZZH SURGERY LEVEL 4 1ST 30 MIN: Performed by: ORTHOPAEDIC SURGERY

## 2020-03-09 PROCEDURE — 25000128 H RX IP 250 OP 636: Performed by: NURSE ANESTHETIST, CERTIFIED REGISTERED

## 2020-03-09 PROCEDURE — 71000012 ZZH RECOVERY PHASE 1 LEVEL 1 FIRST HR: Performed by: ORTHOPAEDIC SURGERY

## 2020-03-09 PROCEDURE — 97116 GAIT TRAINING THERAPY: CPT | Mod: GP,59 | Performed by: PHYSICAL THERAPIST

## 2020-03-09 PROCEDURE — 37000008 ZZH ANESTHESIA TECHNICAL FEE, 1ST 30 MIN: Performed by: ORTHOPAEDIC SURGERY

## 2020-03-09 PROCEDURE — 85049 AUTOMATED PLATELET COUNT: CPT | Performed by: ORTHOPAEDIC SURGERY

## 2020-03-09 PROCEDURE — 71000013 ZZH RECOVERY PHASE 1 LEVEL 1 EA ADDTL HR: Performed by: ORTHOPAEDIC SURGERY

## 2020-03-09 PROCEDURE — 36415 COLL VENOUS BLD VENIPUNCTURE: CPT | Performed by: ORTHOPAEDIC SURGERY

## 2020-03-09 PROCEDURE — 27110028 ZZH OR GENERAL SUPPLY NON-STERILE: Performed by: ORTHOPAEDIC SURGERY

## 2020-03-09 PROCEDURE — 27810169 ZZH OR IMPLANT GENERAL: Performed by: ORTHOPAEDIC SURGERY

## 2020-03-09 PROCEDURE — 25000132 ZZH RX MED GY IP 250 OP 250 PS 637: Performed by: ORTHOPAEDIC SURGERY

## 2020-03-09 PROCEDURE — 97530 THERAPEUTIC ACTIVITIES: CPT | Mod: GP | Performed by: PHYSICAL THERAPIST

## 2020-03-09 PROCEDURE — 97110 THERAPEUTIC EXERCISES: CPT | Mod: GP | Performed by: PHYSICAL THERAPIST

## 2020-03-09 PROCEDURE — 27130 TOTAL HIP ARTHROPLASTY: CPT | Mod: LT | Performed by: ORTHOPAEDIC SURGERY

## 2020-03-09 PROCEDURE — 36415 COLL VENOUS BLD VENIPUNCTURE: CPT | Performed by: ANESTHESIOLOGY

## 2020-03-09 DEVICE — IMPLANTABLE DEVICE: Type: IMPLANTABLE DEVICE | Site: KNEE | Status: FUNCTIONAL

## 2020-03-09 DEVICE — IMP BASEPLATE TIBIAL GENESIS II SZ 2 LT TI 71420162: Type: IMPLANTABLE DEVICE | Site: KNEE | Status: FUNCTIONAL

## 2020-03-09 DEVICE — BONE CEMENT SIMPLEX FULL DOSE 6191-1-001: Type: IMPLANTABLE DEVICE | Site: KNEE | Status: FUNCTIONAL

## 2020-03-09 RX ORDER — GLYCOPYRROLATE 0.2 MG/ML
INJECTION, SOLUTION INTRAMUSCULAR; INTRAVENOUS PRN
Status: DISCONTINUED | OUTPATIENT
Start: 2020-03-09 | End: 2020-03-09

## 2020-03-09 RX ORDER — HYDRALAZINE HYDROCHLORIDE 20 MG/ML
2.5-5 INJECTION INTRAMUSCULAR; INTRAVENOUS EVERY 10 MIN PRN
Status: DISCONTINUED | OUTPATIENT
Start: 2020-03-09 | End: 2020-03-09 | Stop reason: HOSPADM

## 2020-03-09 RX ORDER — NALOXONE HYDROCHLORIDE 0.4 MG/ML
.1-.4 INJECTION, SOLUTION INTRAMUSCULAR; INTRAVENOUS; SUBCUTANEOUS
Status: DISCONTINUED | OUTPATIENT
Start: 2020-03-09 | End: 2020-03-09

## 2020-03-09 RX ORDER — NALOXONE HYDROCHLORIDE 0.4 MG/ML
.1-.4 INJECTION, SOLUTION INTRAMUSCULAR; INTRAVENOUS; SUBCUTANEOUS
Status: DISCONTINUED | OUTPATIENT
Start: 2020-03-09 | End: 2020-03-10 | Stop reason: HOSPADM

## 2020-03-09 RX ORDER — LIDOCAINE HYDROCHLORIDE ANHYDROUS AND EPINEPHRINE 10; 10 MG/ML; UG/ML
30 INJECTION, SOLUTION INFILTRATION ONCE
Status: COMPLETED | OUTPATIENT
Start: 2020-03-09 | End: 2020-03-09

## 2020-03-09 RX ORDER — NALOXONE HYDROCHLORIDE 0.4 MG/ML
.1-.4 INJECTION, SOLUTION INTRAMUSCULAR; INTRAVENOUS; SUBCUTANEOUS
Status: DISCONTINUED | OUTPATIENT
Start: 2020-03-09 | End: 2020-03-09 | Stop reason: HOSPADM

## 2020-03-09 RX ORDER — CEFAZOLIN SODIUM 2 G/100ML
2 INJECTION, SOLUTION INTRAVENOUS
Status: COMPLETED | OUTPATIENT
Start: 2020-03-09 | End: 2020-03-09

## 2020-03-09 RX ORDER — ONDANSETRON 2 MG/ML
4 INJECTION INTRAMUSCULAR; INTRAVENOUS EVERY 30 MIN PRN
Status: DISCONTINUED | OUTPATIENT
Start: 2020-03-09 | End: 2020-03-09 | Stop reason: HOSPADM

## 2020-03-09 RX ORDER — KETAMINE HYDROCHLORIDE 10 MG/ML
INJECTION, SOLUTION INTRAMUSCULAR; INTRAVENOUS PRN
Status: DISCONTINUED | OUTPATIENT
Start: 2020-03-09 | End: 2020-03-09

## 2020-03-09 RX ORDER — ONDANSETRON 4 MG/1
4 TABLET, ORALLY DISINTEGRATING ORAL EVERY 30 MIN PRN
Status: DISCONTINUED | OUTPATIENT
Start: 2020-03-09 | End: 2020-03-09 | Stop reason: HOSPADM

## 2020-03-09 RX ORDER — AMLODIPINE BESYLATE 2.5 MG/1
2.5 TABLET ORAL DAILY
Status: DISCONTINUED | OUTPATIENT
Start: 2020-03-09 | End: 2020-03-10 | Stop reason: HOSPADM

## 2020-03-09 RX ORDER — PHENYLEPHRINE HYDROCHLORIDE 10 MG/ML
INJECTION INTRAVENOUS PRN
Status: DISCONTINUED | OUTPATIENT
Start: 2020-03-09 | End: 2020-03-09

## 2020-03-09 RX ORDER — GABAPENTIN 100 MG/1
100 CAPSULE ORAL 3 TIMES DAILY
Status: DISCONTINUED | OUTPATIENT
Start: 2020-03-09 | End: 2020-03-10 | Stop reason: HOSPADM

## 2020-03-09 RX ORDER — SODIUM CHLORIDE, SODIUM LACTATE, POTASSIUM CHLORIDE, CALCIUM CHLORIDE 600; 310; 30; 20 MG/100ML; MG/100ML; MG/100ML; MG/100ML
INJECTION, SOLUTION INTRAVENOUS CONTINUOUS
Status: DISCONTINUED | OUTPATIENT
Start: 2020-03-09 | End: 2020-03-09 | Stop reason: HOSPADM

## 2020-03-09 RX ORDER — HYDROMORPHONE HYDROCHLORIDE 1 MG/ML
0.2 INJECTION, SOLUTION INTRAMUSCULAR; INTRAVENOUS; SUBCUTANEOUS
Status: DISCONTINUED | OUTPATIENT
Start: 2020-03-09 | End: 2020-03-10 | Stop reason: HOSPADM

## 2020-03-09 RX ORDER — FENTANYL CITRATE 50 UG/ML
25-50 INJECTION, SOLUTION INTRAMUSCULAR; INTRAVENOUS
Status: DISCONTINUED | OUTPATIENT
Start: 2020-03-09 | End: 2020-03-09 | Stop reason: HOSPADM

## 2020-03-09 RX ORDER — ACETAMINOPHEN 500 MG
1000 TABLET ORAL ONCE
Status: COMPLETED | OUTPATIENT
Start: 2020-03-09 | End: 2020-03-09

## 2020-03-09 RX ORDER — ACETAMINOPHEN 325 MG/1
975 TABLET ORAL EVERY 8 HOURS
Status: DISCONTINUED | OUTPATIENT
Start: 2020-03-09 | End: 2020-03-10 | Stop reason: HOSPADM

## 2020-03-09 RX ORDER — ACETAMINOPHEN 325 MG/1
650 TABLET ORAL EVERY 4 HOURS PRN
Status: DISCONTINUED | OUTPATIENT
Start: 2020-03-12 | End: 2020-03-10 | Stop reason: HOSPADM

## 2020-03-09 RX ORDER — LIDOCAINE 40 MG/G
CREAM TOPICAL
Status: DISCONTINUED | OUTPATIENT
Start: 2020-03-09 | End: 2020-03-09 | Stop reason: HOSPADM

## 2020-03-09 RX ORDER — SIMVASTATIN 20 MG
20 TABLET ORAL AT BEDTIME
Status: DISCONTINUED | OUTPATIENT
Start: 2020-03-09 | End: 2020-03-10 | Stop reason: HOSPADM

## 2020-03-09 RX ORDER — SODIUM CHLORIDE, SODIUM LACTATE, POTASSIUM CHLORIDE, CALCIUM CHLORIDE 600; 310; 30; 20 MG/100ML; MG/100ML; MG/100ML; MG/100ML
INJECTION, SOLUTION INTRAVENOUS CONTINUOUS PRN
Status: DISCONTINUED | OUTPATIENT
Start: 2020-03-09 | End: 2020-03-09

## 2020-03-09 RX ORDER — OXYCODONE HYDROCHLORIDE 5 MG/1
5-10 TABLET ORAL
Status: DISCONTINUED | OUTPATIENT
Start: 2020-03-09 | End: 2020-03-10 | Stop reason: HOSPADM

## 2020-03-09 RX ORDER — METHOCARBAMOL 500 MG/1
500 TABLET, FILM COATED ORAL 4 TIMES DAILY PRN
Status: DISCONTINUED | OUTPATIENT
Start: 2020-03-09 | End: 2020-03-10 | Stop reason: HOSPADM

## 2020-03-09 RX ORDER — LIDOCAINE 40 MG/G
CREAM TOPICAL
Status: DISCONTINUED | OUTPATIENT
Start: 2020-03-09 | End: 2020-03-10 | Stop reason: HOSPADM

## 2020-03-09 RX ORDER — LABETALOL 20 MG/4 ML (5 MG/ML) INTRAVENOUS SYRINGE
10
Status: DISCONTINUED | OUTPATIENT
Start: 2020-03-09 | End: 2020-03-09 | Stop reason: HOSPADM

## 2020-03-09 RX ORDER — DEXAMETHASONE SODIUM PHOSPHATE 4 MG/ML
INJECTION, SOLUTION INTRA-ARTICULAR; INTRALESIONAL; INTRAMUSCULAR; INTRAVENOUS; SOFT TISSUE PRN
Status: DISCONTINUED | OUTPATIENT
Start: 2020-03-09 | End: 2020-03-09

## 2020-03-09 RX ORDER — HYDROXYZINE HYDROCHLORIDE 10 MG/1
10 TABLET, FILM COATED ORAL EVERY 6 HOURS PRN
Status: DISCONTINUED | OUTPATIENT
Start: 2020-03-09 | End: 2020-03-10 | Stop reason: HOSPADM

## 2020-03-09 RX ORDER — EPHEDRINE SULFATE 50 MG/ML
INJECTION, SOLUTION INTRAVENOUS PRN
Status: DISCONTINUED | OUTPATIENT
Start: 2020-03-09 | End: 2020-03-09

## 2020-03-09 RX ORDER — PROPOFOL 10 MG/ML
INJECTION, EMULSION INTRAVENOUS CONTINUOUS PRN
Status: DISCONTINUED | OUTPATIENT
Start: 2020-03-09 | End: 2020-03-09

## 2020-03-09 RX ORDER — ONDANSETRON 4 MG/1
4 TABLET, ORALLY DISINTEGRATING ORAL EVERY 6 HOURS PRN
Status: DISCONTINUED | OUTPATIENT
Start: 2020-03-09 | End: 2020-03-10 | Stop reason: HOSPADM

## 2020-03-09 RX ORDER — MEPERIDINE HYDROCHLORIDE 25 MG/ML
12.5 INJECTION INTRAMUSCULAR; INTRAVENOUS; SUBCUTANEOUS
Status: DISCONTINUED | OUTPATIENT
Start: 2020-03-09 | End: 2020-03-09 | Stop reason: HOSPADM

## 2020-03-09 RX ORDER — CELECOXIB 100 MG/1
100 CAPSULE ORAL 2 TIMES DAILY
Status: DISCONTINUED | OUTPATIENT
Start: 2020-03-09 | End: 2020-03-10 | Stop reason: HOSPADM

## 2020-03-09 RX ORDER — CITALOPRAM HYDROBROMIDE 20 MG/1
20 TABLET ORAL DAILY
Status: DISCONTINUED | OUTPATIENT
Start: 2020-03-09 | End: 2020-03-10 | Stop reason: HOSPADM

## 2020-03-09 RX ORDER — LISINOPRIL AND HYDROCHLOROTHIAZIDE 12.5; 2 MG/1; MG/1
1 TABLET ORAL 2 TIMES DAILY
Status: DISCONTINUED | OUTPATIENT
Start: 2020-03-09 | End: 2020-03-10 | Stop reason: HOSPADM

## 2020-03-09 RX ORDER — CEFAZOLIN SODIUM 1 G/3ML
1 INJECTION, POWDER, FOR SOLUTION INTRAMUSCULAR; INTRAVENOUS SEE ADMIN INSTRUCTIONS
Status: DISCONTINUED | OUTPATIENT
Start: 2020-03-09 | End: 2020-03-09 | Stop reason: HOSPADM

## 2020-03-09 RX ORDER — FENTANYL CITRATE 50 UG/ML
INJECTION, SOLUTION INTRAMUSCULAR; INTRAVENOUS PRN
Status: DISCONTINUED | OUTPATIENT
Start: 2020-03-09 | End: 2020-03-09

## 2020-03-09 RX ORDER — AMOXICILLIN 250 MG
2 CAPSULE ORAL 2 TIMES DAILY
Status: DISCONTINUED | OUTPATIENT
Start: 2020-03-09 | End: 2020-03-10 | Stop reason: HOSPADM

## 2020-03-09 RX ORDER — ACETAMINOPHEN 325 MG/1
975 TABLET ORAL EVERY 8 HOURS PRN
Status: DISCONTINUED | OUTPATIENT
Start: 2020-03-09 | End: 2020-03-09

## 2020-03-09 RX ORDER — BISACODYL 10 MG
10 SUPPOSITORY, RECTAL RECTAL DAILY PRN
Status: DISCONTINUED | OUTPATIENT
Start: 2020-03-09 | End: 2020-03-10 | Stop reason: HOSPADM

## 2020-03-09 RX ORDER — ONDANSETRON 2 MG/ML
4 INJECTION INTRAMUSCULAR; INTRAVENOUS EVERY 6 HOURS PRN
Status: DISCONTINUED | OUTPATIENT
Start: 2020-03-09 | End: 2020-03-10 | Stop reason: HOSPADM

## 2020-03-09 RX ORDER — DIMENHYDRINATE 50 MG/ML
25 INJECTION, SOLUTION INTRAMUSCULAR; INTRAVENOUS
Status: DISCONTINUED | OUTPATIENT
Start: 2020-03-09 | End: 2020-03-09 | Stop reason: HOSPADM

## 2020-03-09 RX ORDER — GABAPENTIN 100 MG/1
100 CAPSULE ORAL
Status: DISCONTINUED | OUTPATIENT
Start: 2020-03-09 | End: 2020-03-09 | Stop reason: HOSPADM

## 2020-03-09 RX ORDER — CEFAZOLIN SODIUM 1 G/50ML
1 INJECTION, SOLUTION INTRAVENOUS EVERY 8 HOURS
Status: COMPLETED | OUTPATIENT
Start: 2020-03-09 | End: 2020-03-10

## 2020-03-09 RX ORDER — PROPOFOL 10 MG/ML
INJECTION, EMULSION INTRAVENOUS PRN
Status: DISCONTINUED | OUTPATIENT
Start: 2020-03-09 | End: 2020-03-09

## 2020-03-09 RX ORDER — DONEPEZIL HYDROCHLORIDE 10 MG/1
10 TABLET, FILM COATED ORAL AT BEDTIME
Status: DISCONTINUED | OUTPATIENT
Start: 2020-03-09 | End: 2020-03-10 | Stop reason: HOSPADM

## 2020-03-09 RX ORDER — ONDANSETRON 2 MG/ML
INJECTION INTRAMUSCULAR; INTRAVENOUS PRN
Status: DISCONTINUED | OUTPATIENT
Start: 2020-03-09 | End: 2020-03-09

## 2020-03-09 RX ORDER — POLYETHYLENE GLYCOL 3350 17 G/17G
17 POWDER, FOR SOLUTION ORAL DAILY
Status: DISCONTINUED | OUTPATIENT
Start: 2020-03-09 | End: 2020-03-10 | Stop reason: HOSPADM

## 2020-03-09 RX ORDER — NEOSTIGMINE METHYLSULFATE 1 MG/ML
VIAL (ML) INJECTION PRN
Status: DISCONTINUED | OUTPATIENT
Start: 2020-03-09 | End: 2020-03-09

## 2020-03-09 RX ORDER — LABETALOL HYDROCHLORIDE 5 MG/ML
INJECTION, SOLUTION INTRAVENOUS PRN
Status: DISCONTINUED | OUTPATIENT
Start: 2020-03-09 | End: 2020-03-09

## 2020-03-09 RX ORDER — PROCHLORPERAZINE MALEATE 5 MG
5 TABLET ORAL EVERY 6 HOURS PRN
Status: DISCONTINUED | OUTPATIENT
Start: 2020-03-09 | End: 2020-03-10 | Stop reason: HOSPADM

## 2020-03-09 RX ADMIN — LABETALOL HYDROCHLORIDE 5 MG: 5 INJECTION INTRAVENOUS at 11:14

## 2020-03-09 RX ADMIN — PROPOFOL 40 MCG/KG/MIN: 10 INJECTION, EMULSION INTRAVENOUS at 10:59

## 2020-03-09 RX ADMIN — ROCURONIUM BROMIDE 35 MG: 10 INJECTION INTRAVENOUS at 10:53

## 2020-03-09 RX ADMIN — DONEPEZIL HYDROCHLORIDE 10 MG: 10 TABLET ORAL at 21:51

## 2020-03-09 RX ADMIN — CELECOXIB 100 MG: 100 CAPSULE ORAL at 19:58

## 2020-03-09 RX ADMIN — PROPOFOL 50 MG: 10 INJECTION, EMULSION INTRAVENOUS at 11:14

## 2020-03-09 RX ADMIN — Medication 1 G: at 12:01

## 2020-03-09 RX ADMIN — LIDOCAINE HYDROCHLORIDE 50 MG: 10 INJECTION, SOLUTION EPIDURAL; INFILTRATION; INTRACAUDAL; PERINEURAL at 10:53

## 2020-03-09 RX ADMIN — ONDANSETRON HYDROCHLORIDE 4 MG: 2 INJECTION, SOLUTION INTRAVENOUS at 10:53

## 2020-03-09 RX ADMIN — Medication 1 G: at 11:06

## 2020-03-09 RX ADMIN — FENTANYL CITRATE 50 MCG: 50 INJECTION, SOLUTION INTRAMUSCULAR; INTRAVENOUS at 11:14

## 2020-03-09 RX ADMIN — Medication 1 LOZENGE: at 18:10

## 2020-03-09 RX ADMIN — Medication 1 LOZENGE: at 21:54

## 2020-03-09 RX ADMIN — CEFAZOLIN SODIUM 2 G: 2 INJECTION, SOLUTION INTRAVENOUS at 10:52

## 2020-03-09 RX ADMIN — PHENYLEPHRINE HYDROCHLORIDE 100 MCG: 10 INJECTION INTRAVENOUS at 11:03

## 2020-03-09 RX ADMIN — DEXAMETHASONE SODIUM PHOSPHATE 4 MG: 4 INJECTION, SOLUTION INTRA-ARTICULAR; INTRALESIONAL; INTRAMUSCULAR; INTRAVENOUS; SOFT TISSUE at 10:53

## 2020-03-09 RX ADMIN — GABAPENTIN 100 MG: 100 CAPSULE ORAL at 15:41

## 2020-03-09 RX ADMIN — KETAMINE HYDROCHLORIDE 50 MG: 10 INJECTION, SOLUTION INTRAMUSCULAR; INTRAVENOUS at 10:59

## 2020-03-09 RX ADMIN — FENTANYL CITRATE 50 MCG: 50 INJECTION, SOLUTION INTRAMUSCULAR; INTRAVENOUS at 11:09

## 2020-03-09 RX ADMIN — OMEPRAZOLE 20 MG: 20 CAPSULE, DELAYED RELEASE ORAL at 15:41

## 2020-03-09 RX ADMIN — Medication 2 MG: at 12:04

## 2020-03-09 RX ADMIN — GLYCOPYRROLATE 0.3 MG: 0.2 INJECTION, SOLUTION INTRAMUSCULAR; INTRAVENOUS at 12:04

## 2020-03-09 RX ADMIN — CEFAZOLIN SODIUM 1 G: 1 INJECTION, SOLUTION INTRAVENOUS at 18:07

## 2020-03-09 RX ADMIN — LISINOPRIL AND HYDROCHLOROTHIAZIDE 1 TABLET: 12.5; 2 TABLET ORAL at 19:58

## 2020-03-09 RX ADMIN — DEXTROSE AND SODIUM CHLORIDE: 5; 450 INJECTION, SOLUTION INTRAVENOUS at 15:37

## 2020-03-09 RX ADMIN — CITALOPRAM HYDROBROMIDE 20 MG: 20 TABLET ORAL at 15:41

## 2020-03-09 RX ADMIN — AMLODIPINE BESYLATE 2.5 MG: 2.5 TABLET ORAL at 15:41

## 2020-03-09 RX ADMIN — PROPOFOL 120 MG: 10 INJECTION, EMULSION INTRAVENOUS at 10:53

## 2020-03-09 RX ADMIN — ACETAMINOPHEN 975 MG: 325 TABLET, FILM COATED ORAL at 18:07

## 2020-03-09 RX ADMIN — EPHEDRINE SULFATE 10 MG: 50 INJECTION, SOLUTION INTRAVENOUS at 11:03

## 2020-03-09 RX ADMIN — FENTANYL CITRATE 100 MCG: 50 INJECTION, SOLUTION INTRAMUSCULAR; INTRAVENOUS at 10:50

## 2020-03-09 RX ADMIN — GLYCOPYRROLATE 0.2 MG: 0.2 INJECTION, SOLUTION INTRAMUSCULAR; INTRAVENOUS at 10:53

## 2020-03-09 RX ADMIN — ACETAMINOPHEN 1000 MG: 500 TABLET, FILM COATED ORAL at 09:47

## 2020-03-09 RX ADMIN — GABAPENTIN 100 MG: 100 CAPSULE ORAL at 19:57

## 2020-03-09 RX ADMIN — SODIUM CHLORIDE, POTASSIUM CHLORIDE, SODIUM LACTATE AND CALCIUM CHLORIDE: 600; 310; 30; 20 INJECTION, SOLUTION INTRAVENOUS at 10:12

## 2020-03-09 RX ADMIN — SIMVASTATIN 20 MG: 20 TABLET, FILM COATED ORAL at 21:51

## 2020-03-09 ASSESSMENT — ACTIVITIES OF DAILY LIVING (ADL)
ADLS_ACUITY_SCORE: 17
ADLS_ACUITY_SCORE: 16

## 2020-03-09 ASSESSMENT — ENCOUNTER SYMPTOMS
SEIZURES: 0
STRIDOR: 0
DYSRHYTHMIAS: 0

## 2020-03-09 ASSESSMENT — COPD QUESTIONNAIRES: COPD: 0

## 2020-03-09 ASSESSMENT — LIFESTYLE VARIABLES: TOBACCO_USE: 1

## 2020-03-09 ASSESSMENT — MIFFLIN-ST. JEOR: SCORE: 1161.63

## 2020-03-09 NOTE — BRIEF OP NOTE
[facility]    Brief Operative Note    Pre-operative diagnosis: Primary osteoarthritis of left knee [M17.12]  Post-operative diagnosis left knee djd    Procedure: Procedure(s):  Left total knee arthroplasty  Surgeon: Surgeon(s) and Role:     * Isreal Tineo MD - Primary     * Riky Salazar PA-C - Assisting  Anesthesia: Choice   Estimated blood loss: Minimal  Drains: Hemovac  Specimens: * No specimens in log *  Findings:   None.  Complications: None.  Implants:   Implant Name Type Inv. Item Serial No.  Lot No. LRB No. Used Action   BONE CEMENT SIMPLEX FULL DOSE 6191-1-001 Cement, Bone BONE CEMENT SIMPLEX FULL DOSE 6191-1-001  [employer] [id number] Left 1 Implanted   Vic II resurfacing patellar component, 35mm, 7.5mm thick Total Joint Component/Insert   [employer] [id number] Left 1 Implanted   IMP COMP FEMORAL S&amp;N VIC II NP CR SZ 3 LT [id number] Total Joint Component/Insert IMP COMP FEMORAL S&amp;N VIC II NP CR SZ 3 LT [id number]  [employer]  [id number] Left 1 Implanted   IMP BASEPLATE TIBIAL VIC II SZ 2 LT TI [id number] Total Joint Component/Insert IMP BASEPLATE TIBIAL VIC II SZ 2 LT TI [id number]  [employer]  [id number] Left 1 Implanted   Legion CR XLPE high flexion articular insert, size 1-2, 9mm Total Joint Component/Insert   [employer]W [id number] Left 1 Implanted

## 2020-03-09 NOTE — ANESTHESIA CARE TRANSFER NOTE
Patient: Juany Collazo    Procedure(s):  Left total knee arthroplasty    Diagnosis: Primary osteoarthritis of left knee [M17.12]  Diagnosis Additional Information: No value filed.    Anesthesia Type:   General     Note:    Patient transferred to:PACU  Handoff Report: Identifed the Patient, Identified the Reponsible Provider, Reviewed the pertinent medical history, Discussed the surgical course, Reviewed Intra-OP anesthesia mangement and issues during anesthesia, Set expectations for post-procedure period and Allowed opportunity for questions and acknowledgement of understanding      Vitals: (Last set prior to Anesthesia Care Transfer)    CRNA VITALS  3/9/2020 1144 - 3/9/2020 1220      3/9/2020             EKG:  Sinus rhythm                Electronically Signed By: Dean Dennis Severson, APRN CRNA  March 9, 2020  12:20 PM

## 2020-03-09 NOTE — PROGRESS NOTES
03/09/20 1745   Quick Adds   Type of Visit Initial PT Evaluation   Living Environment   Lives With alone   Living Arrangements independent living facility   Home Accessibility no concerns   Transportation Anticipated family or friend will provide   Living Environment Comment Will be staying with daughter in rambler style home with 1 step to enter   Self-Care   Usual Activity Tolerance moderate   Current Activity Tolerance moderate   Regular Exercise No   Equipment Currently Used at Home walker, rolling   Functional Level Prior   Ambulation 1-->assistive equipment   Transferring 1-->assistive equipment   Toileting 0-->independent   Bathing 0-->independent   Communication 0-->understands/communicates without difficulty   Fall history within last six months no   Which of the above functional risks had a recent onset or change? ambulation;transferring;toileting;bathing;dressing   General Information   Onset of Illness/Injury or Date of Surgery - Date 03/09/20   Referring Physician  Isreal Tineo MD     Patient/Family Goals Statement Discharge to daughter's home   Pertinent History of Current Problem (include personal factors and/or comorbidities that impact the POC) Pt is POD0 LTKA.    Precautions/Limitations fall precautions   Weight-Bearing Status - LLE weight-bearing as tolerated   Weight-Bearing Status - RLE full weight-bearing   General Observations Pt supine upon initiation, agreeable to session   Cognitive Status Examination   Orientation orientation to person, place and time   Level of Consciousness alert   Follows Commands and Answers Questions 100% of the time   Personal Safety and Judgment intact   Memory intact   Pain Assessment   Patient Currently in Pain Yes, see Vital Sign flowsheet   Integumentary/Edema   Integumentary/Edema Comments Ace wrap intact to L LE   Posture    Posture Forward head position;Protracted shoulders   Range of Motion (ROM)   ROM Comment L knee flexion limited to ~45 degrees  "  Strength   Strength Comments Pt able to SLR   Bed Mobility   Bed Mobility Comments sit<>supine with SBA   Transfer Skills   Transfer Comments sit<>stand with CGA   Gait   Gait Comments CGA with FWW   Balance   Balance Comments Requires B UE support for safe dynamic mobiltiy   Sensory Examination   Sensory Perception no deficits were identified   Coordination   Coordination no deficits were identified   Muscle Tone   Muscle Tone no deficits were identified   Modality Interventions   Planned Modality Interventions Cryotherapy   Planned Modality Interventions Comments Ice PRN   General Therapy Interventions   Planned Therapy Interventions bed mobility training;gait training;balance training;ROM;strengthening;stretching;transfer training;home program guidelines;progressive activity/exercise   Clinical Impression   Criteria for Skilled Therapeutic Intervention yes, treatment indicated   PT Diagnosis Impaired functional mobiltiy   Influenced by the following impairments Pain, weakness, deconditioning, impaired L knee ROM   Functional limitations due to impairments Difficulty with bed mobility, transfers, ambulation, stairs   Clinical Presentation Stable/Uncomplicated   Clinical Presentation Rationale medically stable, clear POC   Clinical Decision Making (Complexity) Low complexity   Therapy Frequency 2x/day   Predicted Duration of Therapy Intervention (days/wks) 3 days   Anticipated Discharge Disposition Other (see comments)  (per ortho)   Risk & Benefits of therapy have been explained Yes   Patient, Family & other staff in agreement with plan of care Yes   [facility] [employer] TM \"6 Clicks\"   2016, Trustees of [facility], under license to [employer].  All rights reserved.   6 Clicks Short Forms Basic Mobility Inpatient Short Form   [facility] [employer]  \"6 Clicks\" V.2 Basic Mobility Inpatient Short Form   1. Turning from your back to your side while in a flat bed without using bedrails? 4 - None   2. " Moving from lying on your back to sitting on the side of a flat bed without using bedrails? 3 - A Little   3. Moving to and from a bed to a chair (including a wheelchair)? 3 - A Little   4. Standing up from a chair using your arms (e.g., wheelchair, or bedside chair)? 3 - A Little   5. To walk in hospital room? 3 - A Little   6. Climbing 3-5 steps with a railing? 2 - A Lot   Basic Mobility Raw Score (Score out of 24.Lower scores equate to lower levels of function) 18   Total Evaluation Time   Total Evaluation Time (Minutes) 8

## 2020-03-09 NOTE — OP NOTE
"March 9, 2020    Pre Operative Diagnosis: Left Knee DJD  Post operative Diagnosis: Left  Knee DJD  Title of the Procedure: Left Total Knee Arthroplasty ( Smith and Nephew #3 PCR femur; #2 tibia; 35 mm thin patella; 9 mm tibial insert  )  Anesthesia: general  Surgeon: Isreal Tineo M.D.  Assistant:CASSIE Friedman    Assistance from the PA was necessary for this case due to the complexity of the procedure. PA helped with satisfactory exposure of the bones, protecting vital neurovascular and ligamentous structures. He also helped with maintaining the instruments for bone osteotomy and subsequent implantation of the components. He also performed wound closure and placement of postoperative dressing.    Drain: Hemovac    Indication of the Procedure:  This patient is a 88 y.o. female who has had chronic knee pain with progressively worsening due to advanced osteoarthritis. All reasonable and appropriate non-operative treatments have been tried. The knee pain is severe to the point of affecting this patient's day-to-day activities and There has been disturbances of sleeping. With understanding of the available options including further observation, nature of the surgery and potential complications of the surgery, total knee arthroplasty is chosen to be carried out today. As far as potential complications are concerned, emphasis has been placed on infection, deep vein thrombosis, arthrofibrosis, radha-prosthetic fractures, loosening of the components, unexpected anesthetic complications as well as persisting pain that cannot be explained.    Description of the procedure:  After satisfactory anesthesia was administered, the correct lower extremity was then prepped and draped in a routine sterile fashion in a supine position. The \"time out\" was then carried out per protocol. At this time administration of preoperative prophylactic antibiotic was confirmed.    Following exsanguination, the  tourniquet was then inflated to 300 mm " Hg. A midline longitudinal incision was made from just above the patella  down to the tibial tubercle. A sharp dissection was carried through the subcutaneous tissue and subsequently,  a medial parapatellar arthrotomy was carried out entering the knee joint. The patella was then displaced laterally and knee was flexed. All osteophytes were removed from the femur,  tibia and patella.  After placement of the intramedullary johann into the femur distal femoral cut was made using 5  valgus angle. After measurement of the size, the rest of the femoral cuts were made;anterior and posterior as well as chamfer.The trial femoral component was then applied and noted that fitting was satisfactory.  Using the extra medullary alignment guide, a perpendicular osteotomy was made on the proximal tibia. Using the appropriately sized tibial trial components, alignment was assessed using the alignment johann. It was felt that a normal medical axis from the hip joint to the ankle joint was reestablished when all trial components were placed. Valgus and varus stress were applied and soft tissue tension was evaluated at this time. It was also confirmed there is a full extension and full flexion with gravity alone. The space for the tibial keel was then punched into the proximal tibia.  Subsequently, the patella was osteotomized after measurement of the thickness. The size was measured and the pegs were drilled. Patellofemoral tracking was then evaluated and noted to be satisfactory.  All osteotomized bone surfaces were thoroughly irrigated with the jet lavage system. The bone cement was mixed and components were placed while the bone cement was doughy.Excess cement was removed from components. When the bonecement became completely hard, another trial reduction was carried out to determine the thickness of the tibial polyethylene component. The final polyethylene tibial component was then inserted.  With further irrigation of the knee joint a  medium Hemovac drain was placed. The wound was closed In layers in a routine fashion.The skin layer was closed with staples.A routine compression soft dressing was applied and the knee immobilizer was applied. Tourniquet was deflated at the end of the procedure.  The needle and sponge counts were correct at the end of the case. Blood loss was minimum. No intraoperative complications were identified at the time of finishing the operation.    Isreal Tineo M.D.

## 2020-03-09 NOTE — OR NURSING
MARLENY Mc notified of this RNs assessment.  Patient's pupils are not Equal, other assessment findings normal, VS WDL.  MARLENY instructs RN to continue to monitor, no orders received.

## 2020-03-09 NOTE — ANESTHESIA PREPROCEDURE EVALUATION
Anesthesia Pre-Procedure Evaluation    Patient: Juany Collazo   MRN: [id number] : 1932          Preoperative Diagnosis: Primary osteoarthritis of left knee [M17.12]    Procedure(s):  Left total knee arthroplasty    Past Medical History:   Diagnosis Date     Arthritis      BENIGN HYPERTENSION 10/27/2003     BONE & CARTILAGE DIS NOS 2006     Carpal tunnel syndrome      Chronic pain     right leg     Gastro-oesophageal reflux disease      GERD (gastroesophageal reflux disease) 10/14/2008     History of blood transfusion      Hyperlipidemia LDL goal <130 10/31/2010     Mild major depression (H) 2014     Past Surgical History:   Procedure Laterality Date     C NONSPECIFIC PROCEDURE      right hip replacement     C NONSPECIFIC PROCEDURE      tubal ligation     C NONSPECIFIC PROCEDURE      hip replacement     DECOMPRESSION LUMBAR ONE LEVEL  2013    Procedure: DECOMPRESSION LUMBAR ONE LEVEL;  Decompression Bilateral L4-5  ;  Surgeon: Lang Garcia MD;  Location: [facility] OR     EYE SURGERY       ORTHOPEDIC SURGERY Right     hip replacement     Anesthesia Evaluation     . Pt has had prior anesthetic. Type: General    No history of anesthetic complications          ROS/MED HX    ENT/Pulmonary:     (+)DELL risk factors hypertension, tobacco use, Past use , . .   (-) asthma, COPD and recent URI   Neurologic:  - neg neurologic ROS    (-) seizures and CVA   Cardiovascular:     (+) Dyslipidemia, hypertension----. : . . . :. . Previous cardiac testing date:results:date: results:ECG reviewed date: results:sinus date: results:         (-) CAD, arrhythmias and valvular problems/murmurs   METS/Exercise Tolerance:     Hematologic: Comments: Lab Test        20      --          13                       1203                                               --           1600           WBC          12.6*        12.0*        12.7*          < >        8.7            HGB          12.1         12.5         12.1           < >        12.5          MCV          90           90           90             < >        88            PLT          283          276          251            < >        269           INR           --           --           --           --          0.92           < > = values in this interval not displayed.                  Lab Test        03/09/20 01/27/20 05/22/19 11/13/18                       0938                                              0952          NA            --          136          136          137           POTASSIUM    3.5          4.3          4.3          3.6           CHLORIDE      --          102          98           103           CO2           --          27           27           25            BUN           --          14           13           12            CR           0.70         0.75         0.78         0.72          ANIONGAP      --          7            11           9             MAUREEN           --          9.4          9.9          9.0           GLC           --          107          94           93           - neg hematologic  ROS       Musculoskeletal:   (+) arthritis,  other musculoskeletal- DDD      GI/Hepatic:     (+) GERD Asymptomatic on medication,       Renal/Genitourinary:  - ROS Renal section negative       Endo:     (+) Obesity, .   (-) Type I DM, Type II DM, thyroid disease and chronic steroid usage   Psychiatric:     (+) psychiatric history depression      Infectious Disease:  - neg infectious disease ROS       Malignancy:      - no malignancy   Other:    (+) H/O Chronic Pain,                        Physical Exam  Normal systems: cardiovascular, pulmonary and dental    Airway   Mallampati: III  TM distance: >3 FB  Neck ROM: full    Dental     Cardiovascular   Rhythm and rate: regular and normal  (-) no friction rub, no systolic click and no murmur    Pulmonary    breath sounds clear to auscultation(-) no  "rhonchi, no decreased breath sounds, no wheezes, no rales and no stridor            Lab Results   Component Value Date    WBC 12.6 (H) 02/17/2020    HGB 12.1 02/17/2020    HCT 38.4 02/17/2020     02/17/2020     01/27/2020    POTASSIUM 4.3 01/27/2020    CHLORIDE 102 01/27/2020    CO2 27 01/27/2020    BUN 14 01/27/2020    CR 0.75 01/27/2020     01/27/2020    MAUREEN 9.4 01/27/2020    MAG 2.2 11/13/2018    ALBUMIN 3.6 11/13/2018    PROTTOTAL 6.7 (L) 11/13/2018    ALT 15 01/27/2020    AST 17 01/27/2020    ALKPHOS 78 11/13/2018    BILITOTAL 0.5 11/13/2018    PTT 35 09/29/2013    INR 0.92 09/29/2013    TSH 2.14 09/29/2013       Preop Vitals  BP Readings from Last 3 Encounters:   02/17/20 136/82   01/24/20 108/64   01/10/20 108/63    Pulse Readings from Last 3 Encounters:   02/17/20 60   01/10/20 63   08/30/19 58      Resp Readings from Last 3 Encounters:   02/17/20 14   01/10/20 18   08/30/19 18    SpO2 Readings from Last 3 Encounters:   01/10/20 95%   08/30/19 96%   05/20/19 97%      Temp Readings from Last 1 Encounters:   02/17/20 97.6  F (36.4  C) (Oral)    Ht Readings from Last 1 Encounters:   02/17/20 1.549 m (5' 1\")      Wt Readings from Last 1 Encounters:   02/17/20 79.7 kg (175 lb 9.6 oz)    Estimated body mass index is 33.18 kg/m  as calculated from the following:    Height as of 2/17/20: 1.549 m (5' 1\").    Weight as of 2/17/20: 79.7 kg (175 lb 9.6 oz).       Anesthesia Plan      History & Physical Review  History and physical reviewed and following examination; no interval change.    ASA Status:  2 .    NPO Status:  > 8 hours    Plan for General with Intravenous induction. Maintenance will be Balanced.    PONV prophylaxis:  Ondansetron (or other 5HT-3) and Dexamethasone or Solumedrol  Additional equipment: Videolaryngoscope        Postoperative Care  Postoperative pain management:  IV analgesics.      Consents  Anesthetic plan, risks, benefits and alternatives discussed with:  Patient or " representative, Patient and Daughter/Son..                 Lenny Mc MD                    .

## 2020-03-09 NOTE — ANESTHESIA POSTPROCEDURE EVALUATION
Patient: Juany Collazo    Procedure(s):  Left total knee arthroplasty    Diagnosis:Primary osteoarthritis of left knee [M17.12]  Diagnosis Additional Information: No value filed.    Anesthesia Type:  General    Note:  Anesthesia Post Evaluation    Patient location during evaluation: PACU  Patient participation: Able to participate in evaluation but full recovery from regional anesthesia has not yet ocurrred but is anticipated to occur within 48 hours  Level of consciousness: awake  Pain management: adequate  Cardiovascular status: acceptable  Respiratory status: acceptable  Hydration status: acceptable  PONV: controlled     Anesthetic complications: None          Last vitals:  Vitals:    03/09/20 1330 03/09/20 1345 03/09/20 1400   BP: 121/61 116/61 124/64   Pulse:      Resp: 20 10 17   Temp: 97.9  F (36.6  C)  97.9  F (36.6  C)   SpO2: 96% 94% 98%         Electronically Signed By: Lenny Mc MD  March 9, 2020  2:48 PM

## 2020-03-10 ENCOUNTER — APPOINTMENT (OUTPATIENT)
Dept: PHYSICAL THERAPY | Facility: CLINIC | Age: 85
End: 2020-03-10
Attending: ORTHOPAEDIC SURGERY
Payer: COMMERCIAL

## 2020-03-10 ENCOUNTER — NURSE TRIAGE (OUTPATIENT)
Dept: NURSING | Facility: CLINIC | Age: 85
End: 2020-03-10

## 2020-03-10 ENCOUNTER — APPOINTMENT (OUTPATIENT)
Dept: OCCUPATIONAL THERAPY | Facility: CLINIC | Age: 85
End: 2020-03-10
Attending: ORTHOPAEDIC SURGERY
Payer: COMMERCIAL

## 2020-03-10 VITALS
SYSTOLIC BLOOD PRESSURE: 125 MMHG | DIASTOLIC BLOOD PRESSURE: 50 MMHG | BODY MASS INDEX: 33.06 KG/M2 | OXYGEN SATURATION: 97 % | HEART RATE: 69 BPM | TEMPERATURE: 97.8 F | HEIGHT: 61 IN | WEIGHT: 175.1 LBS | RESPIRATION RATE: 16 BRPM

## 2020-03-10 PROBLEM — Z96.652 S/P TKR (TOTAL KNEE REPLACEMENT) USING CEMENT, LEFT: Status: ACTIVE | Noted: 2020-03-10

## 2020-03-10 LAB
GLUCOSE SERPL-MCNC: 179 MG/DL (ref 70–99)
HGB BLD-MCNC: 9.7 G/DL (ref 11.7–15.7)

## 2020-03-10 PROCEDURE — 82947 ASSAY GLUCOSE BLOOD QUANT: CPT | Mod: GZ | Performed by: ORTHOPAEDIC SURGERY

## 2020-03-10 PROCEDURE — 97530 THERAPEUTIC ACTIVITIES: CPT | Mod: GP | Performed by: PHYSICAL THERAPIST

## 2020-03-10 PROCEDURE — 25000128 H RX IP 250 OP 636: Performed by: ORTHOPAEDIC SURGERY

## 2020-03-10 PROCEDURE — 97116 GAIT TRAINING THERAPY: CPT | Mod: GP,59

## 2020-03-10 PROCEDURE — 36415 COLL VENOUS BLD VENIPUNCTURE: CPT | Performed by: ORTHOPAEDIC SURGERY

## 2020-03-10 PROCEDURE — 97165 OT EVAL LOW COMPLEX 30 MIN: CPT | Mod: GO,59

## 2020-03-10 PROCEDURE — 97116 GAIT TRAINING THERAPY: CPT | Mod: GP,59 | Performed by: PHYSICAL THERAPIST

## 2020-03-10 PROCEDURE — 97530 THERAPEUTIC ACTIVITIES: CPT | Mod: GO

## 2020-03-10 PROCEDURE — 25000132 ZZH RX MED GY IP 250 OP 250 PS 637: Performed by: ORTHOPAEDIC SURGERY

## 2020-03-10 PROCEDURE — 85018 HEMOGLOBIN: CPT | Performed by: ORTHOPAEDIC SURGERY

## 2020-03-10 PROCEDURE — 97535 SELF CARE MNGMENT TRAINING: CPT | Mod: GO

## 2020-03-10 PROCEDURE — 25800025 ZZH RX 258: Performed by: ORTHOPAEDIC SURGERY

## 2020-03-10 PROCEDURE — 97110 THERAPEUTIC EXERCISES: CPT | Mod: GP | Performed by: PHYSICAL THERAPIST

## 2020-03-10 PROCEDURE — 97530 THERAPEUTIC ACTIVITIES: CPT | Mod: GP

## 2020-03-10 RX ORDER — AMOXICILLIN 250 MG
2 CAPSULE ORAL 2 TIMES DAILY PRN
Qty: 20 TABLET | Refills: 0 | Status: ON HOLD | OUTPATIENT
Start: 2020-03-10 | End: 2020-08-07

## 2020-03-10 RX ORDER — CELECOXIB 100 MG/1
100 CAPSULE ORAL 2 TIMES DAILY
Qty: 10 CAPSULE | Refills: 0 | Status: ON HOLD | OUTPATIENT
Start: 2020-03-10 | End: 2020-08-07

## 2020-03-10 RX ADMIN — SENNOSIDES AND DOCUSATE SODIUM 2 TABLET: 8.6; 5 TABLET ORAL at 09:00

## 2020-03-10 RX ADMIN — DEXTROSE AND SODIUM CHLORIDE: 5; 450 INJECTION, SOLUTION INTRAVENOUS at 05:01

## 2020-03-10 RX ADMIN — OMEPRAZOLE 20 MG: 20 CAPSULE, DELAYED RELEASE ORAL at 08:59

## 2020-03-10 RX ADMIN — ENOXAPARIN SODIUM 40 MG: 40 INJECTION SUBCUTANEOUS at 06:24

## 2020-03-10 RX ADMIN — Medication 1 LOZENGE: at 18:21

## 2020-03-10 RX ADMIN — ACETAMINOPHEN 975 MG: 325 TABLET, FILM COATED ORAL at 02:06

## 2020-03-10 RX ADMIN — CITALOPRAM HYDROBROMIDE 20 MG: 20 TABLET ORAL at 09:00

## 2020-03-10 RX ADMIN — GABAPENTIN 100 MG: 100 CAPSULE ORAL at 15:58

## 2020-03-10 RX ADMIN — CELECOXIB 100 MG: 100 CAPSULE ORAL at 09:00

## 2020-03-10 RX ADMIN — CEFAZOLIN SODIUM 1 G: 1 INJECTION, SOLUTION INTRAVENOUS at 02:06

## 2020-03-10 RX ADMIN — ACETAMINOPHEN 975 MG: 325 TABLET, FILM COATED ORAL at 10:23

## 2020-03-10 RX ADMIN — ACETAMINOPHEN 975 MG: 325 TABLET, FILM COATED ORAL at 17:57

## 2020-03-10 RX ADMIN — GABAPENTIN 100 MG: 100 CAPSULE ORAL at 08:59

## 2020-03-10 RX ADMIN — Medication 2 LOZENGE: at 10:24

## 2020-03-10 RX ADMIN — POLYETHYLENE GLYCOL 3350 17 G: 17 POWDER, FOR SOLUTION ORAL at 09:01

## 2020-03-10 ASSESSMENT — ACTIVITIES OF DAILY LIVING (ADL)
ADLS_ACUITY_SCORE: 18
ADLS_ACUITY_SCORE: 17
ADLS_ACUITY_SCORE: 18
ADLS_ACUITY_SCORE: 18

## 2020-03-10 NOTE — PLAN OF CARE
Patient plan: To daughter's home  Current status: Pt sitting up in chair upon therapist's arrival.  Sit <> stand to FWW with CGA and VCs for safe hand placement.  Sit <> supine with CGA and L LE.  Pt educated on how to step up backwards onto a step stool with her FWW to get into her elevated bed at home.  Pt amb ~115' x 2 with FWW and CGA with continuous gait and good L knee flexion.  Slow but steady gait.  Pt also amb up/down curb step with FWW and CGA with VCs for sequencing.  Pt staying in low 90s on room air at rest and with activity. Pt performed TKA exercises with CGA/SBA.  Pt up in chair at end of session with seat alarm on and call light in reach.    Anticipated status at discharge: Anticipate pt to be SBA for all bed mobility, transfers, ambulation with CGA required for stairs.

## 2020-03-10 NOTE — PLAN OF CARE
A&OX4,forgetful at times. [place], hearing aid at the bedside. VSS: on Capno RA. CMS: intact. Drsg: new dressing applied. Up with AxMomo, malik and walker to bedside commode. Hemovac patent and put out 100 ml. Pain managed with scheduled Tylenol. Voiding good amounts. Passing flatus. Nursing continue to monitor.

## 2020-03-10 NOTE — PROGRESS NOTES
03/10/20 0930   Quick Adds   Type of Visit Initial Occupational Therapy Evaluation   Living Environment   Lives With alone   Living Arrangements independent living facility   Home Accessibility no concerns   Transportation Anticipated family or friend will provide   Living Environment Comment Pt lives in ILF however will be staying with daughter during recovery. Daughter's home is all on one level with one step to enter. BR- high toilet, walkin shower   Self-Care   Usual Activity Tolerance moderate   Current Activity Tolerance moderate   Regular Exercise Yes   Activity/Exercise Type swimming   Equipment Currently Used at Home walker, rolling   Activity/Exercise/Self-Care Comment Pt enjoys spending time playing cards, bingo, and water areobics.    Functional Level   Ambulation 1-->assistive equipment   Transferring 1-->assistive equipment   Toileting 0-->independent   Bathing 0-->independent   Dressing 0-->independent   Eating 0-->independent   Communication 0-->understands/communicates without difficulty   Swallowing 0-->swallows foods/liquids without difficulty   Cognition 1 - attention or memory deficits   Fall history within last six months no   Which of the above functional risks had a recent onset or change? transferring;ambulation;bathing;toileting;dressing   Prior Functional Level Comment Pt is mod I at baseline with use of walker. Pt endorsed help from [facility] for med managment and stated that they can help with whatever she may need.        Present no   General Information   Onset of Illness/Injury or Date of Surgery - Date 03/09/20   Referring Physician Isreal Tineo MD   Patient/Family Goals Statement Home   Additional Occupational Profile Info/Pertinent History of Current Problem Pt is a 88 year old female s/p day 1 L TKA.   Precautions/Limitations fall precautions   Weight-Bearing Status - LLE weight-bearing as tolerated   Cognitive Status Examination   Orientation orientation  to person, place and time   Cognitive Comment Pt noted to be forgetfull during session needing multiple cues to carry out tasks    Visual Perception   Visual Perception Wears glasses   Visual Perception Comments cheaters   Sensory Examination   Sensory Quick Adds No deficits were identified   Pain Assessment   Patient Currently in Pain Yes, see Vital Sign flowsheet  (10/10)   Integumentary/Edema   Integumentary/Edema no deficits were identifed   Posture   Posture not impaired   Range of Motion (ROM)   ROM Comment BUEs WFL   Strength   Strength Comments BUEs WFL   Hand Strength   Hand Strength Comments BUEs WFL   Coordination   Upper Extremity Coordination No deficits were identified   Mobility   Bed Mobility Bed mobility skill: Sit to supine   Bed Mobility Skill: Sit to Supine   Level of Mobile: Sit/Supine minimum assist (75% patients effort)   Physical Assist/Nonphysical Assist: Sit/Supine 1 person assist;verbal cues   Assistive Device: Sit/Supine bedrail   Transfer Skill: Sit to Stand   Level of Mobile: Sit/Stand contact guard   Physical Assist/Nonphysical Assist: Sit/Stand verbal cues;1 person assist   Transfer Skill: Sit to Stand weight-bearing as tolerated   Assistive Device for Transfer: Sit/Stand rolling walker   Transfer Skill: Toilet Transfer   Level of Mobile: Toilet contact guard   Physical Assist/Nonphysical Assist: Toilet 1 person assist;verbal cues   Weight-Bearing Restrictions: Toilet weight-bearing as tolerated   Assistive Device rolling walker   Tub/Shower Transfer   Tub/Shower Transfer Comments Pt performed walkin shower transfer with SBA   Balance   Balance Quick Add No deficits identified   Bathing   Level of Mobile stand-by assist   Physical Assist/Nonphysical Assist verbal cues;1 person assist   Upper Body Dressing   Level of Mobile: Dress Upper Body stand-by assist   Physical Assist/Nonphysical Assist: Dress Upper Body 1 person assist;verbal cues   Lower Body  "Dressing   Level of [place]: Dress Lower Body minimum assist (75% patients effort)   Physical Assist/Nonphysical Assist: Dress Lower Body 1 person assist;verbal cues   Assistive Device reacher   Toileting   Level of [place]: Toilet stand-by assist   Physical Assist/Nonphysical Assist: Toilet 1 person assist;verbal cues   Assistive Device rolling walker   Grooming   Level of [place]: Grooming stand-by assist   Physical Assist/Nonphysical Assist: Grooming 1 person assist;verbal cues   Eating/Self Feeding   Level of [place]: Eating independent   Instrumental Activities of Daily Living (IADL)   Previous Responsibilities housekeeping;meal prep;laundry;finances   IADL Comments Pt below baseline for IADLs due to pain    Activities of Daily Living Analysis   Impairments Contributing to Impaired Activities of Daily Living pain   General Therapy Interventions   Planned Therapy Interventions ADL retraining;IADL retraining;transfer training   Clinical Impression   Criteria for Skilled Therapeutic Interventions Met yes, treatment indicated   OT Diagnosis Pt below baseline for ADLs, IADLs functional mobility, and transfers    Influenced by the following impairments Pain    Assessment of Occupational Performance 5 or more Performance Deficits   Identified Performance Deficits g/h, toileting, showering, dressing, functional mobility    Clinical Decision Making (Complexity) Low complexity   Predicted Duration of Therapy Intervention (days/wks) Eval and 1x treat   Anticipated Discharge Disposition Other (see comments)  (Defer to ortho)   Risks and Benefits of Treatment have been explained. Yes   Patient, Family & other staff in agreement with plan of care Yes   Clinical Impression Comments Pt would benefit from skilled OT services while IP due to impaired ADLs, IADLs, functional mobility, and transfers    [facility] AM-PAC  \"6 Clicks\" Daily Activity Inpatient Short Form   1. Putting on and taking off " regular lower body clothing? 3 - A Little   2. Bathing (including washing, rinsing, drying)? 3 - A Little   3. Toileting, which includes using toilet, bedpan or urinal? 3 - A Little   4. Putting on and taking off regular upper body clothing? 4 - None   5. Taking care of personal grooming such as brushing teeth? 3 - A Little   6. Eating meals? 4 - None   Daily Activity Raw Score (Score out of 24.Lower scores equate to lower levels of function) 20   Total Evaluation Time   Total Evaluation Time (Minutes) 10

## 2020-03-10 NOTE — PLAN OF CARE
Patient plan: To daughter's home  Current status: Patient is supine in bed upon arrival and is agreeable to session. Performs supine > wit EOB with SBA. Performs sit <> stand x3 with FWW with CGA and cues for hand placement.  Patient ambulates ~220' with FWW and CGA. Note good L knee flexion, no episodes of LOB, and good gait speed. Noted that patient was bleeding through dressing and dripping down leg, so notified nurse who changed dressing. Also noted patient's pupils are different sizes. Nurse states they have been this way throughout patient admission. Patient reports feeling lightheaded throughout session, but O2 sats, HR, and BP all within normal limits in supine and sitting. Review with patient how to step up backwards onto a step stool with her FWW to get into her elevated bed at home. Patient does not recall this from AM session. Performs backwards step and step down with CGA, FWW, and cues for correct sequencing. Transfers sit EOB > supine with Miguel for LE management. Patient able to scoot up independently in bed. Patient in bed with bed alarm on and all needs in reach at end of session.   Anticipated status at discharge: Anticipate pt to be SBA for all bed mobility, transfers, ambulation with CGA required for stairs.     Physical Therapy Discharge Summary    Reason for therapy discharge:    All goals and outcomes met, no further needs identified.    Progress towards therapy goal(s). See goals on Care Plan in [facility] electronic health record for goal details.  Goals met    Therapy recommendation(s):    No further therapy is recommended.

## 2020-03-10 NOTE — PLAN OF CARE
OT- orders received, eval completed, and treatment initiated.  Pt is a 88 year old female s/p day 1 L TKA. Pt lives in ILF however will be staying with daughter during recovery. Daughter's home is all on one level with one step to enter. BR- high toilet, walkin shower. Pt enjoys spending time playing cards, bingo, and water areobics. Pt is mod I at baseline with use of walker. Pt endorsed help from ILF for med managment and stated that they can help with whatever she may need.     Patient plan: Home to daughters house   Current status: Pt seated in bedside chair upon OT arrival. OT role and POC explained. Pt educated on LB dressing and use of AE (reacher). Pt performed LB dressing with min A for threading feet and verbal cues on technique. Pt stated that she wears sandals all the time and never wears socks, pt declined education on sock aid. Pt performed sit > stand to FWW with CGA. Pt ambulated to BR with CGA/FWW. Pt educated on toilet transfer, pt performed stand <> sit to toilet with SBA/FWW, pt I with pericares. Pt performed oral cares while standing at sink with SBA/FWW. Pt educated on walkin shower transfer. Pt performed walkin shower transfer with SBA. Pt ambulated to bedside chair with CGA/FWW. Pt performed stand > sit to chair with SBA/FWW. Pt educated on home safety modifications/recommendations, and safe care transfers. Pt requesting to return to bed. Pt performed bed mobility with min A for managing Le's. Pt supine in bed at end of session with all needs in reach. Noted throughout session that pt required multiple cues to continue task and redirect to next task.   Anticipated status at discharge: Anticipate pt to be min A for LB dressing. SBA for all other ADLs. Assist for IADLs (laundry, home maintenance). AE (reacher) for LB dressing.     Occupational Therapy Discharge Summary    Reason for therapy discharge:    All goals and outcomes met, no further needs identified.    Progress towards therapy  goal(s). See goals on Care Plan in [facility] electronic health record for goal details.  Goals met    Therapy recommendation(s):    Defer to ortho

## 2020-03-10 NOTE — DISCHARGE SUMMARY
[facility]  Discharge Summary            Interval History:     88 year old female admitted postoperatively for elective Left total knee arthroplasty  with Dr. Isreal aleman due to DJD unresponsive to non operative treatment.  There were no notable intraoperative complications.  Patient being discharged post op Day #1.  Juany Collazo received skilled nursing, PT, OT, SW inquiry.  There was no Hospitalist care during the stay.     Juany Collazo has progressed satisfactorily with ambulation and pain management and without medical complication.  Patient is confident in their discharge and has  met goals with PT and OT. Pt lives at [facility] care unit at [facility] and will be discharged to daughters home based on home living conditions and level of support.  Juany Collazo leaves the hospital in stable condition with good chance for recovery.  Today: Doing well.  Minimal pain at times.  Eating, ambulating , voiding, resting.  No new complaints.     Pain: tolerable.   Nausea: none.  Light headedness : none  Chest pain: none  Shortness of breath: none              Assessment and Plan:   S/P Left TKA Day #1..  Final Diagnosis: same.  VSS, Hemodynamically asymptomatic, pain management adequate.    PLAN:  DVT prophylaxis with aspirin, as patient has no history of VTE.  Pain management with tylenol and celebrex.  Bowel regimen.  Discussed discharge with melodie Mendoza due to pts memory issues.  Patient instructions attached to discharge.      Discharge to [facility] home.  Follow up in clinic as previously scheduled, approx 10-14 days post op.    [facility]  [facility] [address]  [facility]  [address]  [phone]                    Physical Exam:     Patient Vitals for the past 12 hrs:   BP Temp Temp src Heart Rate Resp SpO2   03/10/20 1127 111/51 98.3  F (36.8  C) -- 60 16 95 %   03/10/20 0832 118/55 97.7  F (36.5  C) -- 69 16 93 %   03/10/20 0300 131/51 96  F (35.6  C) Oral 87 16 95 %    03/10/20 0251 -- -- -- -- 16 --     Hemoglobin   Date Value Ref Range Status   03/10/2020 9.7 (L) 11.7 - 15.7 g/dL Final   02/17/2020 12.1 11.7 - 15.7 g/dL Final       Patient is alert and oriented.  Vitals: stable  Neurovascular status: intact  Dressing: clean and dry  Calves: soft and non tender    SLR able.      Riky Salazar PA-C  [facility] - Surgery    3/10/2020

## 2020-03-10 NOTE — DISCHARGE INSTRUCTIONS
POST OP TOTAL KNEE INSTRUCTIONS:    Medication: You will be discharged from the hospital with pain medication(s). In most cases, consistent use  of the prescription opioid pain medication can be limited to a few days.  After this period, the medication should be weaned off as soon as possible to avoid potential complications of constipation, nausea, or rash, etc. Until you can be completely off the pain pills, using the prescription medications only at nighttime along with controlling the pain with over-the-counter medication(s) such as Ibuprofen or Naproxen (but not both) alternating with Tylenol during the day would be a good way of managing the pain, unless you have been told previously by a physician to avoid these medications.    A Daily medication for blood clot prevention will be specified when you are discharged, such as aspirin or another medication as appropriate. If you were taking Coumadin before the surgery, you will resume it.    Increase water and fiber intake while on pain medication.  You may also be sent home with stool softener, it is recommended you take this until your bowel movements become normal for you.  You may also need to add a laxative to the routine if you have not had a bowel movement for several days following surgery.    Activity: You will be using a walker or crutches until you feel confident. You also need to use the knee immobilizer until you are able to straight leg raise 10-15 times. Gradual rather than sudden increase of walking distance is recommended as the comfort level dictates. A cane instead of walker or crutches can be used when your strength and balance are improved.    Dressing: Typically, the first dressing change will be done on postop day #2. Showers can be taken with plastic covering over the original post surgical dressing for the first 4 days from the surgery. At that point, the incision site can be wet for showering but should not be soaked. A light gauze  dressing along with paper tape should be placed over the wound after each shower. The staples will be removed 10-14 days from the operation. During that time it is best to cover the incision site to protect the staples from being pulled or snagged.    If you were discharged with an aquacell dressing, (flesh colored rubber like bandage), you may leave this dressing in place until your follow up appointment in the clinic, unless the dressing; becomes completely saturated, is leaking, gets water inside as evident by moisture appearing on the inside, or you have a skin reaction.  In this case you should remove it and use dressings like what is mentioned in the paragraph above.    Exercises: It is recommended that you do the flexibility exercises (range of motion) and strengthening exercises in a small amounts frequently throughout the day rather than infrequent long sessions. Being overly aggressive with the exercises can hinder rather than help. We are looking for a gradual steady improvement even though the general goal for range of motion post-operatively is 0-90 degrees of bending within the first 2 weeks. During this time, gaining full Extension (straightening) is far more important than gaining flexion. Generally, you be working with a physical therapist 2-3 times per week for the first 2-4 weeks.    5 keys to the knee,- to be done throughout the day:  1. Knee Bending: Dangle- in a seated position where foot can swing, let the leg from the knee down dangle off the edge of bed or table. You may use the other leg/foot to gently push the affected leg into more bending.  2. Knee straightening: Heel blocking - While sitting or laying, place pillow, ottoman or some other support under the heel with toes pointing up but ankle relaxed.  Rest there, working up to 10 minutes relaxing the leg to induce knee straightening.  Once relaxed, push/squeeze the knee towards the floor, hold for 2 seconds, and relax. Repeat x 10.     NOTE:  Alternate 1 and 2 every other hour.  3. Straight leg raises 3x daily: from a laying or sitting position, while keeping the knee locked straight, slowly lift the foot up 12-16 inches and hold for 2 seconds and relax.  Repeat working up to 20 repetitions.    4. Ice and elevate: whenever you are not up and about.  No standing around or sitting with the knee bend for more than very short periods.  5. Walk:  5 minutes of short laps every couple hours.  Use assistive devices as needed such as walker or cane to promote safety.  Walk focusing more on good form than getting somewhere.  Lead with the knee and then straighten the knee trying to make the surgical leg move like normal knee.    Soft tissue: It is not unusual to have swelling in the leg; thigh down to through the toes for several months from the surgery.  Frequent ankle pumping, elevation of the leg above the heart level and gentle compression support with ace wrap should help with swelling. If you wrap the leg, start at the toes and end in the thigh.  Icing regularly, for 20 minutes every hour, will also help with swelling and pain.  Flushing of the tissue is also often noted due to excess blood flow for healing.   You may also experience bruising.  This may occur anywhere from your toes, through the leg and even onto your torso.  It may show up even 1 week after surgery.     Progressively worsening redness along with increasing pain or increasing drainage from the wound should be a reason to alert us immediately.     Follow up in clinic within 14 days after surgery.  May call [phone] to schedule.    Riky Salazar PA-C  [facility] - Surgery  [facility]  [address] [place]  [address]  [phone]

## 2020-03-10 NOTE — PLAN OF CARE
Afeb, vss, cms intact, dressing changed but draining too much for aquacel dressing so replaced with guaze dressing. Drain removed this pm. Voiding well, tolerated regular diet. Hgb 9.7. Up with min assist of 1 and walker, no immobilizer needed. Pain moderate but only using scheduled meds, no prn narcotics. Pt is mixed up today, needs reorientation. Daughter Kelly here and is aware. Pt ok'd to go home this evening with family who will stay with her and help her.

## 2020-03-10 NOTE — PLAN OF CARE
A/O x 4.  VSS, afebrile.  Continues on 2L oxygen via NC.  CMS intact.  +4 pupil on right,brisk.  Acewrap CDI.  Hemovac drain in place.  Up with A1 and WW ambulating in room and [place].  Pt is due to void.  Plan is to discharge home with daughter when medically stable.  Will continue to monitor.

## 2020-03-11 ENCOUNTER — TELEPHONE (OUTPATIENT)
Dept: ORTHOPEDICS | Facility: CLINIC | Age: 85
End: 2020-03-11

## 2020-03-11 NOTE — TELEPHONE ENCOUNTER
Spoke with Jeannette, lots of drainage, soaking dressing.  Recommend she remove and start over.  Noted drainage from puncture wound at lateral knee.    - recommended direct pressure at spot and keep clean dressings in non saturated state over incision.  - do not do flexion exercises until resolved 48 hours.    Otherwise doing well.  Will check in again tomorrow.    Riky Salazar PA-C  [facility] - Surgery

## 2020-03-11 NOTE — TELEPHONE ENCOUNTER
Daughter calling to report dressing saturation this evening, more drainage than she expected.  Drainage saturated through pt's ace bandage and gauze pad below.  Surgical tape in place over knee incision.  Pt had a knee replacement yesterday by .      No fever, increased pain.     Disposition:  Page provider on call per protocol     10:58PM:  Smart Web used to page on-call Dr. Isreal Tineo to call daughter Jeannette at [phone]    Reason for Disposition    Dressing soaked with blood or body fluid (e.g., drainage)    Additional Information    Negative: [1] Major abdominal surgical incision AND [2] wound gaping open AND [3] visible internal organs    Negative: Sounds like a life-threatening emergency to the triager    Negative: [1] Bleeding from incision AND [2] won't stop after 10 minutes of direct pressure    Negative: [1] Widespread rash AND [2] bright red, sunburn-like    Negative: Severe pain in the incision    Negative: [1] Suture came out early AND [2] wound gaping AND [3] < 48 hours since sutures placed    Negative: [1] Incision gaping open AND [2] length of opening > 2 inches (5 cm)    Negative: Patient sounds very sick or weak to the triager    Negative: Sounds like a serious complication to the triager    Negative: Fever > 100.5 F (38.1 C)    Negative: [1] Incision looks infected (spreading redness, pain) AND [2] fever > 99.5 F (37.5 C)    Negative: [1] Incision looks infected (spreading redness, pain) AND [2] large red area (> 2 in. or 5 cm)    Negative: [1] Incision looks infected (spreading redness, pain) AND [2] face wound    Negative: [1] Red streak runs from the incision AND [2] longer than 1 inch (2.5 cm)    Negative: [1] Pus or bad-smelling fluid draining from incision AND [2] no fever    Negative: [1] Post-op pain AND [2] not controlled with pain medications    Protocols used: POST-OP INCISION SYMPTOMS-A-AH

## 2020-03-11 NOTE — TELEPHONE ENCOUNTER
Jeannette returned call stating that she has concerns that she needs the brace that was mentioned by Cecil to apply compression to patient's knee.   She also noted that she is running out of supplies due to the frequent bandage changes.     Returned call to Jeannette.  Scheduled a follow-up appointment with Cecil Salazar PA-C tomorrow 3/12/20 at 10 am.  Patient then can be fitted with knee immobilizer and incision can be evaluated.   I informed Jeannette that she should continue using the ACE bandages to apply compression to the knee, and that the brace will help keep the knee from flexing as Cecil mentioned previously.  Jeannette expressed understanding and stated that during the previous bandage changes blood appeared to be flowing from the incision. Reminded Jeannette that saturated dressings should be removed and replaced as needed, it may be helpful to keep a diary of how frequent the dressings are needing to be changed.     Jeannette had no further questions, and was encouraged to return call to Triage.     Kalie Avalos, ATC

## 2020-03-11 NOTE — PROGRESS NOTES
Pt discharged home with daughter and family transporting and assisting at home. New dressing applied before discharge as she has some bleeding at the site. Daughter educated and supplies provided for a possible dressing change need at home. Instructions provided, belongings returned. Dinner provided and ate 100%. Pt and family did not have concerns upon discharge.

## 2020-03-15 ENCOUNTER — HEALTH MAINTENANCE LETTER (OUTPATIENT)
Age: 85
End: 2020-03-15

## 2020-03-16 ENCOUNTER — TELEPHONE (OUTPATIENT)
Dept: ORTHOPEDICS | Facility: CLINIC | Age: 85
End: 2020-03-16

## 2020-03-16 NOTE — TELEPHONE ENCOUNTER
Juany Collazo is a 88 year old female whose daughter, Yulissa, calls for patient. Patient has appointment on 3/19/20 with Cecil Salazar PA-C. She wants him to know she took patient's bandage off and there was a small amount of yellow at the top of the staples. Reports no redness, warmth, pus, or fever.     Consent to communicate with Yulissa on file.   Phone call to Yulissa, daughter. She is changing a 3x3 gauze dressing 2-3x/day where there was a previous drain. Drainage is gradually decreasing. Today there was some new drainage that appeared to be from the incision in a small amount that was yellow. Informed to continue to monitor for symptoms of infection, otherwise it is not uncommon.   She also mentions that the family decided for patient to not go out to be fitted for the knee brace. They had her hold off on exercises until today. She is now able to do 10 straight leg raises and is doing well. Discussed that we usually have them discontinue the brace once patient is able to do 10-15 straight leg raises at a time.     She also mentions that for the past 2-3 days patient has had some slight swelling of the top of her foot, ankle and most of calf. There is some slight swelling around the drain area also. Patient is elevating as much as she can but gets uncomfortable at times. Recommended patient continue to elevate as much as she can and to use ACE bandage from toes to above knee to help reduce swelling. She verbalized understanding.   They  plan for patient to follow up at appointment on 3/19/20 with Cecil Salazar PA-C. Yulissa is leaving town now and patient's other daughter, Kelly Downing, is caring for patient and will attend the appointment with her.   Asked that they call back with further questions.     PATSY Quinteros RN

## 2020-03-19 ENCOUNTER — OFFICE VISIT (OUTPATIENT)
Dept: ORTHOPEDICS | Facility: CLINIC | Age: 85
End: 2020-03-19
Payer: COMMERCIAL

## 2020-03-19 DIAGNOSIS — M62.838 MUSCLE SPASM: ICD-10-CM

## 2020-03-19 DIAGNOSIS — Z47.89 ORTHOPEDIC AFTERCARE: Primary | ICD-10-CM

## 2020-03-19 PROCEDURE — 99024 POSTOP FOLLOW-UP VISIT: CPT | Performed by: PHYSICIAN ASSISTANT

## 2020-03-19 RX ORDER — TIZANIDINE 2 MG/1
2 TABLET ORAL 2 TIMES DAILY PRN
Qty: 30 TABLET | Refills: 0 | Status: ON HOLD | OUTPATIENT
Start: 2020-03-19 | End: 2020-08-07

## 2020-03-19 NOTE — PROGRESS NOTES
/HISTORY OF PRESENT ILLNESS:    Juany Collazo is a 88 year old female who is seen in follow up for Left TKA, DOS 03/09/20, Dr. Tineo.    Presents with daughter Deshawn, who aid history.  Present symptoms: Pt reports slow improvement, deshawn living with her .  Treatments include RICE, tylenol 1000 mg TID, aspirin daily.  Using walker for ambulation.  Still mild serous drainage at wound lateral to knee.  Daughter states she is up every couple hours, and works with her to perform home exercises.  No new complaints.  Denies Chest pain, Calve pain, Fever, Chills.    PHYSICAL EXAM:  There were no vitals taken for this visit.  There is no height or weight on file to calculate BMI.   GENERAL APPEARANCE: healthy, alert and no distress   PSYCH:  mentation appears normal and affect normal/bright    MSK:  Left:  Knee.  Ambulates: slowly with walker, lack of F/Ext..  Incision clean and dry, wound lateral to patella present approx 3 mm with serosanguinous drainage approx size of nickel in 20 hours, otherwise healing.  Appropriate incisional erythema.   Mild Ecchymosis at lower leg, resolving..  No calve pain on palpation.  Edema moderate at knee , min Bk.  CMS: radha incisional numbness, otherwise grossly intact.  AROM Flexion 0-90.    IMAGING INTERPRETATION:  None today.     ASSESSMENT:  Juany Collazo is a 88 year old female S/P Left TKA, DOS 03/09/20, Dr. Tineo.    Progressing.  No over infection at lateral knee wound, appears to be healing.    PLAN:  - Surgery discussed, images reviewed if applicable, and all questions were answered at this time.  - staples removed with sterile technique, steri-strips applied in usual fashion, care instructions given and verbally acknowledged.  - Medications: as current.  - Physical Therapy: Home exercise program as instructed  - AAT.    Return to clinic 4 weeks for x ray.    Riky Salazar PA-C    Dept. Orthopedic Surgery  [facility]   3/19/2020

## 2020-03-19 NOTE — PATIENT INSTRUCTIONS
Incision care instructions:  Keep dry 24 hours.  Showering is ok after that time, however no soaking or scrubbing of incision for 2 weeks.  Tape-strips will most likely fall off on their own, however they may be removed after 2 weeks with rubbing alcohol if they have not.    If draining or bleeding stops the tape-strips are enough coverage unless you were instructed otherwise or you would like to cover for comfort.  If drainage or bleeding continues please cover with clean dressings.     It is recommended that you avoid invasive procedures such as dental work, colonoscopy or other surgery for 4 months after surgery unless it is an emergency.  In the case of an emergency, please notify the provider, and an antibiotic may be prescribed for you.    You may discontinue the aspirin or return to a dose recommended by your Primary care provider 6 weeks after surgery.    You may increase your activities as you can tolerate them.  It is recommended that you do not do prolonged kneeling.    It is also recommended that you continue your exercises on your own for several additional months to avoid regressing.    Please follow up in 4 weeks for an X ray of your knee.

## 2020-03-24 ENCOUNTER — AMBULATORY - HEALTHEAST (OUTPATIENT)
Dept: OTHER | Facility: CLINIC | Age: 85
End: 2020-03-24

## 2020-03-24 ENCOUNTER — DOCUMENTATION ONLY (OUTPATIENT)
Dept: OTHER | Facility: CLINIC | Age: 85
End: 2020-03-24

## 2020-04-16 ENCOUNTER — NURSE TRIAGE (OUTPATIENT)
Dept: NURSING | Facility: CLINIC | Age: 85
End: 2020-04-16

## 2020-04-16 ENCOUNTER — VIRTUAL VISIT (OUTPATIENT)
Dept: ORTHOPEDICS | Facility: CLINIC | Age: 85
End: 2020-04-16
Payer: COMMERCIAL

## 2020-04-16 VITALS — HEIGHT: 61 IN | BODY MASS INDEX: 32.85 KG/M2 | WEIGHT: 174 LBS

## 2020-04-16 DIAGNOSIS — Z09 POSTOP CHECK: Primary | ICD-10-CM

## 2020-04-16 PROCEDURE — 99024 POSTOP FOLLOW-UP VISIT: CPT | Performed by: ORTHOPAEDIC SURGERY

## 2020-04-16 ASSESSMENT — MIFFLIN-ST. JEOR: SCORE: 1156.64

## 2020-04-16 NOTE — PROGRESS NOTES
"Juany Collazo is a 88 year old female who is being evaluated via a billable phone visit.      The patient has been notified of following:     \"This video visit will be conducted via a call between you and your physician/provider. We have found that certain health care needs can be provided without the need for an in-person physical exam.  This service lets us provide the care you need with a video conversation.  If a prescription is necessary we can send it directly to your pharmacy.  If lab work is needed we can place an order for that and you can then stop by our lab to have the test done at a later time.    Video visits are billed at different rates depending on your insurance coverage.  Please reach out to your insurance provider with any questions.    If during the course of the call the physician/provider feels a video visit is not appropriate, you will not be charged for this service.\"    Patient has given verbal consent for Video visit? Yes    How would you like to obtain your AVS? Mail a copy    Patient would like the video invitation sent by: Send to e-mail at: [email or web address]       phone Start Time: 10:04    Subjective:  Juany Collazo is a 88 year old female who is seen in f/u up for Left TKA, DOS 03/09/20, Dr. Tineo. Patient accompanied on Video visit by daughter Kelly, and grandson.     Sleeping: improved, wakes to use the restroom.   Continues to work on ROM and strengthening HEP, patient states exercises have become easier. Uses Tizanidine prn for pain.   Current pain level: 3/10     Objective: none      Imaging studies: none    Assessment:  Doing well fron left TKA, about 5 weeks post op    Plan: continue with home exercises  Start scar tissue mobilization  Follow up in 6 weeks       Isreal Tineo MD  Dept. Orthopedic Surgery  [facility]       Disclaimer: This note consists of symbols derived from keyboarding, dictation and/or voice recognition software. As a result, there may be " errors in the script that have gone undetected. Please consider this when interpreting information found in this chart.        phone-Visit Details    Type of service:  phone Visit    phone End Time (time video stopped): 10: 17  Originating Location (pt. Location): Home    Distant Location (provider location):  [facility] ORTHOPEDIC SURGERY     Mode of Communication:  Video Conference via [employer]

## 2020-04-16 NOTE — TELEPHONE ENCOUNTER
Daughter calling requesting to set up 6 week follow up appointment with Dr Tineo.    Connected to Central Scheduling.    Karina Arellano, RN  [facility]        Reason for Disposition    Requesting regular office appointment    Additional Information    Negative: [1] Caller is not with the adult (patient) AND [2] reporting urgent symptoms    Negative: Lab result questions    Negative: Medication questions    Negative: Caller can't be reached by phone    Negative: Caller has already spoken to PCP or another triager    Negative: RN needs further essential information from caller in order to complete triage    Protocols used: INFORMATION ONLY CALL-A-

## 2020-05-14 ENCOUNTER — NURSE TRIAGE (OUTPATIENT)
Dept: FAMILY MEDICINE | Facility: CLINIC | Age: 85
End: 2020-05-14

## 2020-05-28 ENCOUNTER — OFFICE VISIT (OUTPATIENT)
Dept: ORTHOPEDICS | Facility: CLINIC | Age: 85
End: 2020-05-28
Payer: COMMERCIAL

## 2020-05-28 ENCOUNTER — ANCILLARY PROCEDURE (OUTPATIENT)
Dept: GENERAL RADIOLOGY | Facility: CLINIC | Age: 85
End: 2020-05-28
Attending: PHYSICIAN ASSISTANT
Payer: COMMERCIAL

## 2020-05-28 DIAGNOSIS — M17.11 PRIMARY OSTEOARTHRITIS OF RIGHT KNEE: ICD-10-CM

## 2020-05-28 DIAGNOSIS — Z96.659 HISTORY OF KNEE JOINT REPLACEMENT: ICD-10-CM

## 2020-05-28 DIAGNOSIS — M19.012 ARTHRITIS OF LEFT SHOULDER REGION: ICD-10-CM

## 2020-05-28 DIAGNOSIS — M25.512 CHRONIC PAIN IN LEFT SHOULDER: ICD-10-CM

## 2020-05-28 DIAGNOSIS — Z47.89 ORTHOPEDIC AFTERCARE: ICD-10-CM

## 2020-05-28 DIAGNOSIS — Z96.659 HISTORY OF KNEE JOINT REPLACEMENT: Primary | ICD-10-CM

## 2020-05-28 DIAGNOSIS — G89.29 CHRONIC PAIN IN LEFT SHOULDER: ICD-10-CM

## 2020-05-28 PROCEDURE — 99213 OFFICE O/P EST LOW 20 MIN: CPT | Mod: 24 | Performed by: PHYSICIAN ASSISTANT

## 2020-05-28 PROCEDURE — 99024 POSTOP FOLLOW-UP VISIT: CPT | Performed by: PHYSICIAN ASSISTANT

## 2020-05-28 PROCEDURE — 73030 X-RAY EXAM OF SHOULDER: CPT | Mod: LT | Performed by: ORTHOPAEDIC SURGERY

## 2020-05-28 PROCEDURE — 73562 X-RAY EXAM OF KNEE 3: CPT | Mod: LT | Performed by: ORTHOPAEDIC SURGERY

## 2020-05-28 NOTE — PATIENT INSTRUCTIONS
LEft Knee:  Please avoid invasive procedure for 4 months following your surgery.  After the 4 months, based on recommendations by the orthopedic and dental associations, there is no need to use an antibiotic prior to any invasive procedure such as dental work, colonoscopy or surgery unless you are at risk for an infection.  Risks include cancer, immune system diseases, prior joint or chronic infections, and any medication that suppresses or weakens your immune system.  Please notify any providers of your implant prior to invasive procedures and if you have any risk factors or your health has changed since the last procedure. An antibiotic may be prescribed for you.    You may increase your activities as you can tolerate them.  It is recommended that you do not do prolonged kneeling.    It is also recommended that you continue your exercises on your own for several additional months to avoid regressing.    Please follow up with your surgeon 1 year from your surgery date for evaluation.    LEft shoulder:  You have been ordered a cortisone injection for the left shoulder.  You will be contacted for scheduling.  Please allow 2 weeks after injection for benefits.

## 2020-05-28 NOTE — LETTER
5/28/2020         RE: Juany Collazo  [place]  [address]  [place] [address]        Dear Colleague,    Thank you for referring your patient, Juany Collazo, to the [facility] ORTHOPEDIC SURGERY. Please see a copy of my visit note below.    HISTORY OF PRESENT ILLNESS:    Juany Collazo is a 88 year old female who is seen in follow up for Left TKA, DOS 3/09/20, Dr. Tineo.  Present symptoms: Pt presents with daughter who aids hx.  States has progressed with left knee, no longer using assistive devices.  Minimal pain or complaints.  States now notices right knee more and feels like it may give way, has not nor has fallen recently.  States sharp pains with certain motions and tendency to roll in.  No new injuries.  Left shoulder:  Chronic ache, worse with movement.  States she is not using the arm for much anymore.  Right hand dominant.  Did fall several months ago and has not been the same since.  Describes as deep ache at rest and sharp pain in shoulder with flexion or abduction.  No redness or skin changes.    Denies Chest pain, Calve pain, Fever, Chills.    Current Treatment: Left knee, HEP.    PHYSICAL EXAM:  There were no vitals taken for this visit.  There is no height or weight on file to calculate BMI.   GENERAL APPEARANCE: healthy, alert and no distress   PSYCH:  mentation appears normal and affect normal/bright    MSK:  Left: KNee .  Ambulates: left knee WNG, right knee with noted valgus and tibial/foot ER and lack of full ext..  Incision clean and dry, well healed.   NOincisional erythema.   No Ecchymosis.  No calve pain on palpation.  Edema min at ankles bilaterally.  CMS: radha incisional numbness, otherwise grossly intact.  AROM Flexion 0-100.    Right knee:  Gross: valgus, mild effusion. otherwise no skin or gross deformities.  Pain on palpation at lateral joint and PF joint.  ROM flexion 10-90.  Strength:  F/E 4-/5.  Ligamentous: laxity with varus, otherwise  intact.  Sensation and circulation grossly intact.    LEft shoulder:  No gross deformities, possible atrophy.  Pain on palpation at AC joint.  ROM active, flexion 60 with pain, ABD 45 with pain.  PROM flexion 80-90.  Strength: flexion 5-/5 and abd, limited by pain.  Ligamentous, no apprehension sign, intact.  Sensation and circulation grossly intact.      IMAGING INTERPRETATION:      Recent Results (from the past 744 hour(s))   XR Knee Bilateral 3 vw    Narrative    History: Status post left total knee arthroplasty, March 9, 2020 chronic   progressive worsening knee pain, right  Impression: 3 views of bilateral knees demonstrate well-positioned total   knee arthroplasty components with no evidence of perioperative   complications on the left knee.  Patellofemoral tracking is noted to be   satisfactory.  No other acute findings are noted.  Right knee x-rays demonstrate advanced degenerative changes mostly at the   lateral compartment and patellofemoral compartment with basically   bone-on-bone situation in the lateral compartment.  Valgus alignment is   noted.  Otherwise no acute fractures or other osseous pathology are   noted..    XR Shoulder Left G/E 3 Views    Narrative    History: Chronic shoulder pain with a progress worsening.  She had a   recent injury of falling.  Impression: 3 views of the left shoulder demonstrate advanced glenohumeral   joint degenerative changes with joint space narrowing, bone spur and   subchondral irregularities.  Advanced acromioclavicular joint degenerative   changes also noted with near complete obliteration of the joint space and   large bone spurs.  Otherwise, no acute fractures or other osseous   pathology are noted.  No significant proximal humeral migration is noted.          ASSESSMENT/PLAN:  Juany Collazo is a 88 year old female:    1.   S/P  Left TKA, DOS 3/09/20, Dr. Tineo.  doing well, continue HEP, follow up at 1 year postop.  2.  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  3.  Left shoulder:  Significant DJD at GH and AC joints, no sign of cuff arthropathy.  Diclofenac topical.    We had a long discussion about how to proceed.  Noted that both right knee and left shoulder are at end stage DJD and may benefit from arthroplasty.  Right knee may be primary as fall risk, however left shoulder is more painful and debilitating on ADL currently.    Decided will attempt walker practice at home with right arm as would be difficult to perform TKA recovery without good upper body support and ordered Cortisone injection for left GH joint shoulder.  Pt and family will discuss after these items and let us know how they would like to proceed.        Return to clinic PRN.    Riky Salazar PA-C    Dept. Orthopedic Surgery  [facility]   5/29/2020        Again, thank you for allowing me to participate in the care of your patient.        Sincerely,        Riky Salazar PA-C

## 2020-05-29 NOTE — PROGRESS NOTES
HISTORY OF PRESENT ILLNESS:    Juany Collazo is a 88 year old female who is seen in follow up for Left TKA, DOS 3/09/20, Dr. Tineo.  Present symptoms: Pt presents with daughter who aids hx.  States has progressed with left knee, no longer using assistive devices.  Minimal pain or complaints.  States now notices right knee more and feels like it may give way, has not nor has fallen recently.  States sharp pains with certain motions and tendency to roll in.  No new injuries.  Left shoulder:  Chronic ache, worse with movement.  States she is not using the arm for much anymore.  Right hand dominant.  Did fall several months ago and has not been the same since.  Describes as deep ache at rest and sharp pain in shoulder with flexion or abduction.  No redness or skin changes.    Denies Chest pain, Calve pain, Fever, Chills.    Current Treatment: Left knee, HEP.    PHYSICAL EXAM:  There were no vitals taken for this visit.  There is no height or weight on file to calculate BMI.   GENERAL APPEARANCE: healthy, alert and no distress   PSYCH:  mentation appears normal and affect normal/bright    MSK:  Left: KNee .  Ambulates: left knee WNG, right knee with noted valgus and tibial/foot ER and lack of full ext..  Incision clean and dry, well healed.   NOincisional erythema.   No Ecchymosis.  No calve pain on palpation.  Edema min at ankles bilaterally.  CMS: radha incisional numbness, otherwise grossly intact.  AROM Flexion 0-100.    Right knee:  Gross: valgus, mild effusion. otherwise no skin or gross deformities.  Pain on palpation at lateral joint and PF joint.  ROM flexion 10-90.  Strength:  F/E 4-/5.  Ligamentous: laxity with varus, otherwise intact.  Sensation and circulation grossly intact.    LEft shoulder:  No gross deformities, possible atrophy.  Pain on palpation at AC joint.  ROM active, flexion 60 with pain, ABD 45 with pain.  PROM flexion 80-90.  Strength: flexion 5-/5 and abd, limited by pain.  Ligamentous, no  apprehension sign, intact.  Sensation and circulation grossly intact.      IMAGING INTERPRETATION:      Recent Results (from the past 744 hour(s))   XR Knee Bilateral 3 vw    Narrative    History: Status post left total knee arthroplasty, March 9, 2020 chronic   progressive worsening knee pain, right  Impression: 3 views of bilateral knees demonstrate well-positioned total   knee arthroplasty components with no evidence of perioperative   complications on the left knee.  Patellofemoral tracking is noted to be   satisfactory.  No other acute findings are noted.  Right knee x-rays demonstrate advanced degenerative changes mostly at the   lateral compartment and patellofemoral compartment with basically   bone-on-bone situation in the lateral compartment.  Valgus alignment is   noted.  Otherwise no acute fractures or other osseous pathology are   noted..    XR Shoulder Left G/E 3 Views    Narrative    History: Chronic shoulder pain with a progress worsening.  She had a   recent injury of falling.  Impression: 3 views of the left shoulder demonstrate advanced glenohumeral   joint degenerative changes with joint space narrowing, bone spur and   subchondral irregularities.  Advanced acromioclavicular joint degenerative   changes also noted with near complete obliteration of the joint space and   large bone spurs.  Otherwise, no acute fractures or other osseous   pathology are noted.  No significant proximal humeral migration is noted.          ASSESSMENT/PLAN:  Juany Collazo is a 88 year old female:    1.   S/P  Left TKA, DOS 3/09/20, Dr. Tineo.  doing well, continue HEP, follow up at 1 year postop.  2.  Right knee:  End stage DJD mainly at lateral and PF compartment with valgus and flexion contracture deformities.  3.  Left shoulder:  Significant DJD at GH and AC joints, no sign of cuff arthropathy.  Diclofenac topical.    We had a long discussion about how to proceed.  Noted that both right knee and left shoulder are  at end stage DJD and may benefit from arthroplasty.  Right knee may be primary as fall risk, however left shoulder is more painful and debilitating on ADL currently.    Decided will attempt walker practice at home with right arm as would be difficult to perform TKA recovery without good upper body support and ordered Cortisone injection for left GH joint shoulder.  Pt and family will discuss after these items and let us know how they would like to proceed.        Return to clinic PRN.    Riky Salazar PA-C    Dept. Orthopedic Surgery  [facility]   5/29/2020

## 2020-06-01 NOTE — TELEPHONE ENCOUNTER
Patient Quality Outreach Summary      Summary:    Patient is due/failing the following:   Annual wellness, date due: due now    Type of outreach:    Sent Weft message.    Questions for provider review:    None                                                                                                                    ELYSIA Rodriguez       Chart routed to Care Team.

## 2020-06-08 ENCOUNTER — OFFICE VISIT (OUTPATIENT)
Dept: ORTHOPEDICS | Facility: CLINIC | Age: 85
End: 2020-06-08
Attending: PHYSICIAN ASSISTANT
Payer: COMMERCIAL

## 2020-06-08 DIAGNOSIS — M19.012 ARTHRITIS OF LEFT SHOULDER REGION: ICD-10-CM

## 2020-06-08 DIAGNOSIS — M19.012 PRIMARY OSTEOARTHRITIS OF LEFT SHOULDER: Primary | ICD-10-CM

## 2020-06-08 PROCEDURE — 20611 DRAIN/INJ JOINT/BURSA W/US: CPT | Mod: LT | Performed by: FAMILY MEDICINE

## 2020-06-08 RX ORDER — METHYLPREDNISOLONE ACETATE 40 MG/ML
40 INJECTION, SUSPENSION INTRA-ARTICULAR; INTRALESIONAL; INTRAMUSCULAR; SOFT TISSUE
Status: DISCONTINUED | OUTPATIENT
Start: 2020-06-08 | End: 2020-08-07

## 2020-06-08 RX ADMIN — METHYLPREDNISOLONE ACETATE 40 MG: 40 INJECTION, SUSPENSION INTRA-ARTICULAR; INTRALESIONAL; INTRAMUSCULAR; SOFT TISSUE at 10:40

## 2020-06-08 NOTE — PROGRESS NOTES
ASSESSMENT & PLAN    1. Primary osteoarthritis of left shoulder    2. Arthritis of left shoulder region      Seen for procedure only per direction of MARY JANE Friedman  Steroid injection of the left shoulder: intra-articular  was performed today in clinic    -----      SUBJECTIVE:  Juany Collazo is a 88 year old female who is seen for a cortisone injection of the left glenohumeral join at the request of Cecil Salazar PA-C.    They indicate that their current pain level is 8/10.    The patient is seen with their daughter.    Large Joint Injection/Arthocentesis: L glenohumeral joint    Date/Time: 6/8/2020 10:40 AM  Performed by: Tanika Gaitan DO  Authorized by: Riky Salazar PA-C     Indications:  Pain and osteoarthritis  Needle Size:  22 G  Guidance: ultrasound    Approach:  Posterior  Location:  Shoulder      Site:  L glenohumeral joint  Medications:  40 mg methylPREDNISolone 40 MG/ML  Outcome:  Tolerated well, no immediate complications  Procedure discussed: discussed risks, benefits, and alternatives    Consent Given by:  Patient  Timeout: timeout called immediately prior to procedure    Prep: patient was prepped and draped in usual sterile fashion             Tanika Gaitan DO [facility] [place]

## 2020-06-08 NOTE — LETTER
6/8/2020         RE: Juany Collazo  [facility] [place]  [address]  [place] [address]        Dear Colleague,    Thank you for referring your patient, Juany Collazo, to the [facility] SPORTS MEDICINE. Please see a copy of my visit note below.    ASSESSMENT & PLAN    1. Primary osteoarthritis of left shoulder    2. Arthritis of left shoulder region      Seen for procedure only per direction of MARY JANE Friedman  Steroid injection of the left shoulder: intra-articular  was performed today in clinic    -----      SUBJECTIVE:  Juany Collazo is a 88 year old female who is seen for a cortisone injection of the left glenohumeral join at the request of Cecil Salazar PA-C.    They indicate that their current pain level is 8/10.    The patient is seen with their daughter.    Large Joint Injection/Arthocentesis: L glenohumeral joint    Date/Time: 6/8/2020 10:40 AM  Performed by: Tanika Gaitan DO  Authorized by: Riky Salazar PA-C     Indications:  Pain and osteoarthritis  Needle Size:  22 G  Guidance: ultrasound    Approach:  Posterior  Location:  Shoulder      Site:  L glenohumeral joint  Medications:  40 mg methylPREDNISolone 40 MG/ML  Outcome:  Tolerated well, no immediate complications  Procedure discussed: discussed risks, benefits, and alternatives    Consent Given by:  Patient  Timeout: timeout called immediately prior to procedure    Prep: patient was prepped and draped in usual sterile fashion             Tanika Gaitan DO [facility]        Again, thank you for allowing me to participate in the care of your patient.        Sincerely,        Tanika Gaitan DO

## 2020-06-08 NOTE — PATIENT INSTRUCTIONS
1. Primary osteoarthritis of left shoulder    2. Arthritis of left shoulder region      Steroid injection of the left shoulder: intra-articular  was performed today in clinic    - Would not soak in a hot tub, bath or swimming pool for 48 hours  - Ok to shower  - Ice today and only do your normal amounts of activity  - The lidocaine (what is giving you pain relief right now) will likely stop working in 1-2 hours.  You will then have pain again, similar to before you received the injection. The corticosteroid will not start working until approximately 1-2 weeks from now.  In a small percentage of people, cortisone can cause flushing/redness in the face. This usually lasts for 1-3 days and resolves. Cool compress and Ibuprofen/Tylenol can help if this happens.    Follow up for repeat injection when pain returns.

## 2020-07-02 ENCOUNTER — TELEPHONE (OUTPATIENT)
Dept: FAMILY MEDICINE | Facility: CLINIC | Age: 85
End: 2020-07-02

## 2020-07-02 NOTE — TELEPHONE ENCOUNTER
07/02/2020        General Call:   Who is calling:  Daughter Kelly  Reason for Call:  Infected ingrown toenail  What are your questions or concerns:  Wants to know if she should be worried or not about it? Looking for advice.  Date of last appointment with provider: 02/17/2020  Okay to leave a detailed message:Yes at Cell number on file:    Telephone Information:   Mobile [phone]

## 2020-07-02 NOTE — TELEPHONE ENCOUNTER
Message left for patient to return call to clinic and ask to speak to available triage nurse     Maddie Pacheco, Registered Nurse   [facility]

## 2020-07-03 NOTE — TELEPHONE ENCOUNTER
Called and spoke to daughter.  States toe currently infected.  States has recurrent issue with this.  Has not seen podiatry.  Discussed podiatry visit for recurrent infections.  Daughter going to be leaving town and worried about infection.  Kelly had to get back on work phone and gave phone to Juany.  Spoke to Juany and discussed video visit.  Put a different daughter on phone.  She did not know how to do video visit.  Scheduled telephone visit with Dr. Durant at 2:30 pm.  Kayla Bose RN

## 2020-08-06 ENCOUNTER — HOSPITAL ENCOUNTER (INPATIENT)
Facility: CLINIC | Age: 85
LOS: 4 days | Discharge: HOME HEALTH | DRG: 493 | End: 2020-08-11
Attending: EMERGENCY MEDICINE | Admitting: INTERNAL MEDICINE
Payer: COMMERCIAL

## 2020-08-06 DIAGNOSIS — E87.1 HYPONATREMIA: ICD-10-CM

## 2020-08-06 DIAGNOSIS — S93.04XA ANKLE DISLOCATION, RIGHT, INITIAL ENCOUNTER: ICD-10-CM

## 2020-08-06 DIAGNOSIS — S82.851A CLOSED TRIMALLEOLAR FRACTURE OF RIGHT ANKLE, INITIAL ENCOUNTER: ICD-10-CM

## 2020-08-06 DIAGNOSIS — R55 SYNCOPE, UNSPECIFIED SYNCOPE TYPE: ICD-10-CM

## 2020-08-06 DIAGNOSIS — S82.899A CLOSED FRACTURE OF ANKLE, UNSPECIFIED LATERALITY, INITIAL ENCOUNTER: Primary | ICD-10-CM

## 2020-08-06 LAB
ANION GAP SERPL CALCULATED.3IONS-SCNC: 9 MMOL/L (ref 3–14)
BUN SERPL-MCNC: 12 MG/DL (ref 7–30)
CALCIUM SERPL-MCNC: 8.7 MG/DL (ref 8.5–10.1)
CHLORIDE SERPL-SCNC: 96 MMOL/L (ref 94–109)
CO2 SERPL-SCNC: 25 MMOL/L (ref 20–32)
CREAT SERPL-MCNC: 0.78 MG/DL (ref 0.52–1.04)
GFR SERPL CREATININE-BSD FRML MDRD: 67 ML/MIN/{1.73_M2}
GLUCOSE SERPL-MCNC: 133 MG/DL (ref 70–99)
POTASSIUM SERPL-SCNC: 3.2 MMOL/L (ref 3.4–5.3)
SODIUM SERPL-SCNC: 130 MMOL/L (ref 133–144)

## 2020-08-06 PROCEDURE — 80048 BASIC METABOLIC PNL TOTAL CA: CPT | Performed by: EMERGENCY MEDICINE

## 2020-08-06 PROCEDURE — 85610 PROTHROMBIN TIME: CPT | Performed by: EMERGENCY MEDICINE

## 2020-08-06 PROCEDURE — 84484 ASSAY OF TROPONIN QUANT: CPT | Performed by: EMERGENCY MEDICINE

## 2020-08-06 PROCEDURE — 99285 EMERGENCY DEPT VISIT HI MDM: CPT | Mod: 25

## 2020-08-06 PROCEDURE — 96375 TX/PRO/DX INJ NEW DRUG ADDON: CPT

## 2020-08-06 PROCEDURE — 96366 THER/PROPH/DIAG IV INF ADDON: CPT

## 2020-08-06 PROCEDURE — 96365 THER/PROPH/DIAG IV INF INIT: CPT | Mod: 59

## 2020-08-06 PROCEDURE — C9803 HOPD COVID-19 SPEC COLLECT: HCPCS

## 2020-08-06 PROCEDURE — 29515 APPLICATION SHORT LEG SPLINT: CPT | Mod: RT

## 2020-08-06 PROCEDURE — 25800030 ZZH RX IP 258 OP 636: Performed by: EMERGENCY MEDICINE

## 2020-08-06 PROCEDURE — 85730 THROMBOPLASTIN TIME PARTIAL: CPT | Performed by: EMERGENCY MEDICINE

## 2020-08-06 PROCEDURE — 85025 COMPLETE CBC W/AUTO DIFF WBC: CPT | Performed by: EMERGENCY MEDICINE

## 2020-08-06 PROCEDURE — 93005 ELECTROCARDIOGRAM TRACING: CPT

## 2020-08-06 PROCEDURE — 25000128 H RX IP 250 OP 636: Performed by: EMERGENCY MEDICINE

## 2020-08-06 PROCEDURE — 27818 TREATMENT OF ANKLE FRACTURE: CPT | Mod: RT

## 2020-08-06 PROCEDURE — 99223 1ST HOSP IP/OBS HIGH 75: CPT | Mod: AI | Performed by: INTERNAL MEDICINE

## 2020-08-06 RX ORDER — FENTANYL CITRATE 50 UG/ML
50 INJECTION, SOLUTION INTRAMUSCULAR; INTRAVENOUS
Status: DISCONTINUED | OUTPATIENT
Start: 2020-08-06 | End: 2020-08-07

## 2020-08-06 RX ORDER — ONDANSETRON 2 MG/ML
INJECTION INTRAMUSCULAR; INTRAVENOUS
Status: DISCONTINUED
Start: 2020-08-06 | End: 2020-08-07 | Stop reason: HOSPADM

## 2020-08-06 RX ORDER — ONDANSETRON 2 MG/ML
4 INJECTION INTRAMUSCULAR; INTRAVENOUS ONCE
Status: COMPLETED | OUTPATIENT
Start: 2020-08-06 | End: 2020-08-06

## 2020-08-06 RX ORDER — PROPOFOL 10 MG/ML
200 INJECTION, EMULSION INTRAVENOUS ONCE
Status: COMPLETED | OUTPATIENT
Start: 2020-08-06 | End: 2020-08-06

## 2020-08-06 RX ORDER — ONDANSETRON HYDROCHLORIDE 4 MG/5ML
4 SOLUTION ORAL ONCE
Status: DISCONTINUED | OUTPATIENT
Start: 2020-08-06 | End: 2020-08-06 | Stop reason: CLARIF

## 2020-08-06 RX ADMIN — PROPOFOL 20 MG: 10 INJECTION, EMULSION INTRAVENOUS at 23:28

## 2020-08-06 RX ADMIN — ONDANSETRON 4 MG: 2 INJECTION INTRAMUSCULAR; INTRAVENOUS at 23:21

## 2020-08-06 RX ADMIN — PROPOFOL 40 MG: 10 INJECTION, EMULSION INTRAVENOUS at 23:27

## 2020-08-06 RX ADMIN — PROPOFOL 20 MG: 10 INJECTION, EMULSION INTRAVENOUS at 23:29

## 2020-08-06 RX ADMIN — PROPOFOL 10 MG: 10 INJECTION, EMULSION INTRAVENOUS at 23:33

## 2020-08-06 RX ADMIN — SODIUM CHLORIDE 1000 ML: 9 INJECTION, SOLUTION INTRAVENOUS at 23:19

## 2020-08-06 RX ADMIN — FENTANYL CITRATE 50 MCG: 50 INJECTION, SOLUTION INTRAMUSCULAR; INTRAVENOUS at 23:21

## 2020-08-07 ENCOUNTER — APPOINTMENT (OUTPATIENT)
Dept: GENERAL RADIOLOGY | Facility: CLINIC | Age: 85
DRG: 493 | End: 2020-08-07
Attending: EMERGENCY MEDICINE
Payer: COMMERCIAL

## 2020-08-07 ENCOUNTER — APPOINTMENT (OUTPATIENT)
Dept: CT IMAGING | Facility: CLINIC | Age: 85
DRG: 493 | End: 2020-08-07
Attending: EMERGENCY MEDICINE
Payer: COMMERCIAL

## 2020-08-07 ENCOUNTER — APPOINTMENT (OUTPATIENT)
Dept: CARDIOLOGY | Facility: CLINIC | Age: 85
DRG: 493 | End: 2020-08-07
Attending: INTERNAL MEDICINE
Payer: COMMERCIAL

## 2020-08-07 PROBLEM — S82.899A ANKLE FRACTURE: Status: ACTIVE | Noted: 2020-08-07

## 2020-08-07 LAB
APTT PPP: 31 SEC (ref 22–37)
BASOPHILS # BLD AUTO: 0 10E9/L (ref 0–0.2)
BASOPHILS NFR BLD AUTO: 0 %
DIFFERENTIAL METHOD BLD: ABNORMAL
EOSINOPHIL # BLD AUTO: 0 10E9/L (ref 0–0.7)
EOSINOPHIL NFR BLD AUTO: 0 %
ERYTHROCYTE [DISTWIDTH] IN BLOOD BY AUTOMATED COUNT: 14.4 % (ref 10–15)
HCT VFR BLD AUTO: 39.1 % (ref 35–47)
HGB BLD-MCNC: 11.8 G/DL (ref 11.7–15.7)
INR PPP: 0.94 (ref 0.86–1.14)
INTERPRETATION ECG - MUSE: NORMAL
LABORATORY COMMENT REPORT: NORMAL
LYMPHOCYTES # BLD AUTO: 6.6 10E9/L (ref 0.8–5.3)
LYMPHOCYTES NFR BLD AUTO: 38 %
MCH RBC QN AUTO: 26.9 PG (ref 26.5–33)
MCHC RBC AUTO-ENTMCNC: 30.2 G/DL (ref 31.5–36.5)
MCV RBC AUTO: 89 FL (ref 78–100)
MONOCYTES # BLD AUTO: 1.4 10E9/L (ref 0–1.3)
MONOCYTES NFR BLD AUTO: 8 %
NEUTROPHILS # BLD AUTO: 9.4 10E9/L (ref 1.6–8.3)
NEUTROPHILS NFR BLD AUTO: 54 %
PLATELET # BLD AUTO: 313 10E9/L (ref 150–450)
PLATELET # BLD EST: ABNORMAL 10*3/UL
POTASSIUM SERPL-SCNC: 3.9 MMOL/L (ref 3.4–5.3)
RBC # BLD AUTO: 4.38 10E12/L (ref 3.8–5.2)
RBC MORPH BLD: ABNORMAL
SARS-COV-2 RNA SPEC QL NAA+PROBE: NEGATIVE
SARS-COV-2 RNA SPEC QL NAA+PROBE: NORMAL
SPECIMEN SOURCE: NORMAL
SPECIMEN SOURCE: NORMAL
TROPONIN I SERPL-MCNC: <0.015 UG/L (ref 0–0.04)
WBC # BLD AUTO: 17.4 10E9/L (ref 4–11)

## 2020-08-07 PROCEDURE — 0SSFXZZ REPOSITION RIGHT ANKLE JOINT, EXTERNAL APPROACH: ICD-10-PCS | Performed by: EMERGENCY MEDICINE

## 2020-08-07 PROCEDURE — 25000128 H RX IP 250 OP 636: Performed by: INTERNAL MEDICINE

## 2020-08-07 PROCEDURE — 25000132 ZZH RX MED GY IP 250 OP 250 PS 637: Performed by: PHYSICIAN ASSISTANT

## 2020-08-07 PROCEDURE — 84132 ASSAY OF SERUM POTASSIUM: CPT | Performed by: ORTHOPAEDIC SURGERY

## 2020-08-07 PROCEDURE — 40000986 XR ANKLE RT G/E 3 VW: Mod: RT

## 2020-08-07 PROCEDURE — 25000128 H RX IP 250 OP 636: Performed by: EMERGENCY MEDICINE

## 2020-08-07 PROCEDURE — 12000000 ZZH R&B MED SURG/OB

## 2020-08-07 PROCEDURE — 70498 CT ANGIOGRAPHY NECK: CPT

## 2020-08-07 PROCEDURE — 93306 TTE W/DOPPLER COMPLETE: CPT

## 2020-08-07 PROCEDURE — 36415 COLL VENOUS BLD VENIPUNCTURE: CPT | Performed by: ORTHOPAEDIC SURGERY

## 2020-08-07 PROCEDURE — U0003 INFECTIOUS AGENT DETECTION BY NUCLEIC ACID (DNA OR RNA); SEVERE ACUTE RESPIRATORY SYNDROME CORONAVIRUS 2 (SARS-COV-2) (CORONAVIRUS DISEASE [COVID-19]), AMPLIFIED PROBE TECHNIQUE, MAKING USE OF HIGH THROUGHPUT TECHNOLOGIES AS DESCRIBED BY CMS-2020-01-R: HCPCS | Performed by: EMERGENCY MEDICINE

## 2020-08-07 PROCEDURE — 40000275 ZZH STATISTIC RCP TIME EA 10 MIN

## 2020-08-07 PROCEDURE — 25800030 ZZH RX IP 258 OP 636: Performed by: INTERNAL MEDICINE

## 2020-08-07 PROCEDURE — 93306 TTE W/DOPPLER COMPLETE: CPT | Mod: 26 | Performed by: INTERNAL MEDICINE

## 2020-08-07 PROCEDURE — 25000132 ZZH RX MED GY IP 250 OP 250 PS 637: Performed by: INTERNAL MEDICINE

## 2020-08-07 PROCEDURE — 25000125 ZZHC RX 250: Performed by: EMERGENCY MEDICINE

## 2020-08-07 PROCEDURE — 70450 CT HEAD/BRAIN W/O DYE: CPT

## 2020-08-07 RX ORDER — TIZANIDINE 2 MG/1
2 TABLET ORAL 2 TIMES DAILY PRN
Status: DISCONTINUED | OUTPATIENT
Start: 2020-08-07 | End: 2020-08-11 | Stop reason: HOSPADM

## 2020-08-07 RX ORDER — OXYCODONE HYDROCHLORIDE 5 MG/1
5-10 TABLET ORAL
Status: DISCONTINUED | OUTPATIENT
Start: 2020-08-07 | End: 2020-08-08 | Stop reason: CLARIF

## 2020-08-07 RX ORDER — ONDANSETRON 2 MG/ML
4 INJECTION INTRAMUSCULAR; INTRAVENOUS EVERY 6 HOURS PRN
Status: DISCONTINUED | OUTPATIENT
Start: 2020-08-07 | End: 2020-08-08 | Stop reason: CLARIF

## 2020-08-07 RX ORDER — AMLODIPINE BESYLATE 2.5 MG/1
2.5 TABLET ORAL DAILY
Status: DISCONTINUED | OUTPATIENT
Start: 2020-08-07 | End: 2020-08-11 | Stop reason: HOSPADM

## 2020-08-07 RX ORDER — SIMVASTATIN 20 MG
20 TABLET ORAL AT BEDTIME
Status: DISCONTINUED | OUTPATIENT
Start: 2020-08-07 | End: 2020-08-11 | Stop reason: HOSPADM

## 2020-08-07 RX ORDER — MAGNESIUM SULFATE HEPTAHYDRATE 40 MG/ML
4 INJECTION, SOLUTION INTRAVENOUS EVERY 4 HOURS PRN
Status: DISCONTINUED | OUTPATIENT
Start: 2020-08-07 | End: 2020-08-11 | Stop reason: HOSPADM

## 2020-08-07 RX ORDER — LIDOCAINE 40 MG/G
CREAM TOPICAL
Status: DISCONTINUED | OUTPATIENT
Start: 2020-08-07 | End: 2020-08-08 | Stop reason: CLARIF

## 2020-08-07 RX ORDER — NALOXONE HYDROCHLORIDE 0.4 MG/ML
.1-.4 INJECTION, SOLUTION INTRAMUSCULAR; INTRAVENOUS; SUBCUTANEOUS
Status: DISCONTINUED | OUTPATIENT
Start: 2020-08-07 | End: 2020-08-08 | Stop reason: CLARIF

## 2020-08-07 RX ORDER — IOPAMIDOL 755 MG/ML
500 INJECTION, SOLUTION INTRAVASCULAR ONCE
Status: COMPLETED | OUTPATIENT
Start: 2020-08-07 | End: 2020-08-07

## 2020-08-07 RX ORDER — PROCHLORPERAZINE 25 MG
12.5 SUPPOSITORY, RECTAL RECTAL EVERY 12 HOURS PRN
Status: DISCONTINUED | OUTPATIENT
Start: 2020-08-07 | End: 2020-08-08 | Stop reason: CLARIF

## 2020-08-07 RX ORDER — POTASSIUM CHLORIDE 1.5 G/1.58G
20-40 POWDER, FOR SOLUTION ORAL
Status: DISCONTINUED | OUTPATIENT
Start: 2020-08-07 | End: 2020-08-11 | Stop reason: HOSPADM

## 2020-08-07 RX ORDER — AMOXICILLIN 500 MG/1
500 CAPSULE ORAL 3 TIMES DAILY
Status: ON HOLD | COMMUNITY
Start: 2020-08-04 | End: 2020-08-10

## 2020-08-07 RX ORDER — DONEPEZIL HYDROCHLORIDE 10 MG/1
10 TABLET, FILM COATED ORAL AT BEDTIME
Status: DISCONTINUED | OUTPATIENT
Start: 2020-08-07 | End: 2020-08-11 | Stop reason: HOSPADM

## 2020-08-07 RX ORDER — TRAMADOL HYDROCHLORIDE 50 MG/1
50 TABLET ORAL EVERY 6 HOURS PRN
Status: DISCONTINUED | OUTPATIENT
Start: 2020-08-07 | End: 2020-08-08 | Stop reason: CLARIF

## 2020-08-07 RX ORDER — POTASSIUM CL/LIDO/0.9 % NACL 10MEQ/0.1L
10 INTRAVENOUS SOLUTION, PIGGYBACK (ML) INTRAVENOUS
Status: DISCONTINUED | OUTPATIENT
Start: 2020-08-07 | End: 2020-08-11 | Stop reason: HOSPADM

## 2020-08-07 RX ORDER — POTASSIUM CHLORIDE 29.8 MG/ML
20 INJECTION INTRAVENOUS
Status: DISCONTINUED | OUTPATIENT
Start: 2020-08-07 | End: 2020-08-11 | Stop reason: HOSPADM

## 2020-08-07 RX ORDER — HYDROMORPHONE HYDROCHLORIDE 1 MG/ML
.3-.5 INJECTION, SOLUTION INTRAMUSCULAR; INTRAVENOUS; SUBCUTANEOUS
Status: DISCONTINUED | OUTPATIENT
Start: 2020-08-07 | End: 2020-08-08 | Stop reason: CLARIF

## 2020-08-07 RX ORDER — PROCHLORPERAZINE MALEATE 5 MG
5 TABLET ORAL EVERY 6 HOURS PRN
Status: DISCONTINUED | OUTPATIENT
Start: 2020-08-07 | End: 2020-08-08 | Stop reason: CLARIF

## 2020-08-07 RX ORDER — POTASSIUM CHLORIDE 1500 MG/1
20-40 TABLET, EXTENDED RELEASE ORAL
Status: DISCONTINUED | OUTPATIENT
Start: 2020-08-07 | End: 2020-08-11 | Stop reason: HOSPADM

## 2020-08-07 RX ORDER — CITALOPRAM HYDROBROMIDE 20 MG/1
20 TABLET ORAL DAILY
Status: DISCONTINUED | OUTPATIENT
Start: 2020-08-07 | End: 2020-08-11 | Stop reason: HOSPADM

## 2020-08-07 RX ORDER — METOCLOPRAMIDE 5 MG/1
5 TABLET ORAL EVERY 6 HOURS PRN
Status: DISCONTINUED | OUTPATIENT
Start: 2020-08-07 | End: 2020-08-11 | Stop reason: HOSPADM

## 2020-08-07 RX ORDER — METOCLOPRAMIDE HYDROCHLORIDE 5 MG/ML
5 INJECTION INTRAMUSCULAR; INTRAVENOUS EVERY 6 HOURS PRN
Status: DISCONTINUED | OUTPATIENT
Start: 2020-08-07 | End: 2020-08-11 | Stop reason: HOSPADM

## 2020-08-07 RX ORDER — POTASSIUM CHLORIDE 7.45 MG/ML
10 INJECTION INTRAVENOUS
Status: DISCONTINUED | OUTPATIENT
Start: 2020-08-07 | End: 2020-08-11 | Stop reason: HOSPADM

## 2020-08-07 RX ORDER — BIOTIN 10000 MCG
1 CAPSULE ORAL DAILY
COMMUNITY
End: 2020-08-20

## 2020-08-07 RX ORDER — ONDANSETRON 4 MG/1
4 TABLET, ORALLY DISINTEGRATING ORAL EVERY 6 HOURS PRN
Status: DISCONTINUED | OUTPATIENT
Start: 2020-08-07 | End: 2020-08-08 | Stop reason: CLARIF

## 2020-08-07 RX ORDER — DEXTROSE MONOHYDRATE, SODIUM CHLORIDE, AND POTASSIUM CHLORIDE 50; 1.49; 4.5 G/1000ML; G/1000ML; G/1000ML
INJECTION, SOLUTION INTRAVENOUS CONTINUOUS
Status: DISCONTINUED | OUTPATIENT
Start: 2020-08-07 | End: 2020-08-10

## 2020-08-07 RX ADMIN — IOPAMIDOL 70 ML: 755 INJECTION, SOLUTION INTRAVENOUS at 00:23

## 2020-08-07 RX ADMIN — HYDROMORPHONE HYDROCHLORIDE 0.3 MG: 1 INJECTION, SOLUTION INTRAMUSCULAR; INTRAVENOUS; SUBCUTANEOUS at 05:44

## 2020-08-07 RX ADMIN — Medication 10 MEQ: at 12:38

## 2020-08-07 RX ADMIN — DONEPEZIL HYDROCHLORIDE 10 MG: 10 TABLET ORAL at 21:21

## 2020-08-07 RX ADMIN — SIMVASTATIN 20 MG: 20 TABLET, FILM COATED ORAL at 21:22

## 2020-08-07 RX ADMIN — Medication 10 MEQ: at 10:26

## 2020-08-07 RX ADMIN — Medication 10 MEQ: at 11:24

## 2020-08-07 RX ADMIN — OXYCODONE HYDROCHLORIDE 5 MG: 5 TABLET ORAL at 04:55

## 2020-08-07 RX ADMIN — OMEPRAZOLE 20 MG: 20 CAPSULE, DELAYED RELEASE ORAL at 10:31

## 2020-08-07 RX ADMIN — SODIUM CHLORIDE 80 ML: 9 INJECTION, SOLUTION INTRAVENOUS at 00:23

## 2020-08-07 RX ADMIN — TRAMADOL HYDROCHLORIDE 50 MG: 50 TABLET, FILM COATED ORAL at 14:13

## 2020-08-07 RX ADMIN — TRAMADOL HYDROCHLORIDE 50 MG: 50 TABLET, FILM COATED ORAL at 21:29

## 2020-08-07 RX ADMIN — Medication 1 MG: at 23:37

## 2020-08-07 RX ADMIN — POTASSIUM CHLORIDE, DEXTROSE MONOHYDRATE AND SODIUM CHLORIDE: 150; 5; 450 INJECTION, SOLUTION INTRAVENOUS at 03:45

## 2020-08-07 RX ADMIN — CITALOPRAM HYDROBROMIDE 20 MG: 20 TABLET, FILM COATED ORAL at 10:31

## 2020-08-07 RX ADMIN — Medication 10 MEQ: at 14:13

## 2020-08-07 RX ADMIN — Medication 1 LOZENGE: at 05:44

## 2020-08-07 RX ADMIN — AMLODIPINE BESYLATE 2.5 MG: 2.5 TABLET ORAL at 10:31

## 2020-08-07 RX ADMIN — OXYCODONE HYDROCHLORIDE 5 MG: 5 TABLET ORAL at 03:43

## 2020-08-07 RX ADMIN — OXYCODONE HYDROCHLORIDE 5 MG: 5 TABLET ORAL at 10:31

## 2020-08-07 ASSESSMENT — ACTIVITIES OF DAILY LIVING (ADL)
ADLS_ACUITY_SCORE: 20

## 2020-08-07 ASSESSMENT — ENCOUNTER SYMPTOMS
NUMBNESS: 0
SHORTNESS OF BREATH: 0

## 2020-08-07 NOTE — PROGRESS NOTES
SPIRITUAL HEALTH SERVICES Progress Note  [facility]    Saw pt Radha per a routine admission request.  Her daughter Kelly was present.  Offered reflective conversation to facilitate storytelling and the processing of thoughts and feelings.  Radha struggles to remember recent events but is able to narrate stories from her past.  Radha smiled and laughed periodically throughout our conversation.  She denied having any current concerns and expressed a pleasant willingness to do whatever is needed.  Kelly explained that Radha broke both sides of her ankle when falling from her chair after a hair cut.  Radha will have surgery tomorrow.  Radha shared that her five children are core to her support network.  In addition, she has 17 grandchildren and 17 great grandchildren.  Radha lives at the [facility], where she enjoys their social activities.  She is [employer] and affiliated with [facility].  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  Offered reflective listening, validation of feelings, and affirmation of Radha's support system and life experiences.  Kelly reported that she is coping with Radha's hospitalization with the help of her family, friends, and sara.  Informed them how they can request further  support during Radha's admission.      Weekend on-call chaplains remain available per pt/family request.    Shahbaz Carter M.Div., [facility]  Staff   Phone [phone]

## 2020-08-07 NOTE — ED NOTES
[facility]  ED Nurse Handoff Report    Juany Collazo is a 88 year old female   ED Chief complaint: Ankle Pain  . ED Diagnosis:   Final diagnoses:   Syncope, unspecified syncope type   Ankle dislocation, right, initial encounter   Closed trimalleolar fracture of right ankle, initial encounter     Allergies:   Allergies   Allergen Reactions     No Known Drug Allergies        Code Status: not on file  Activity level - Baseline/Home:  Independent. Activity Level - Current:   Unable to Assess. Lift room needed: No. Bariatric: No   Needed: No   Isolation: No. Infection: Not Applicable.     Vital Signs:   Vitals:    08/06/20 2340 08/06/20 2345 08/06/20 2350 08/07/20 0000   BP: 126/62 126/62 117/75 122/63   Pulse: 60  61 66   Resp: 21 21 16 14   Temp:       TempSrc:       SpO2: 99% 99% 98% 100%       Cardiac Rhythm:  ,   Cardiac  Cardiac Rhythm: Normal sinus rhythm  Pain level:    Patient confused: No. Patient Falls Risk: Yes.   Elimination Status: has not yet voided   Patient Report - Initial Complaint: Fall/ankle pain. Focused Assessment:  Juany Collazo is a 88 year old female who presents with ankle pain. EMS states that the patient is living with her daughter and twisted her ankle when she got up from her walker. The patient collapsed due to her ankle and EMS was called to transfer the patient to the ED for further evaluation. The patient received 50 mcg intranasal fentanyl en route. The patient was noted to have an obvious deformity to the right ankle. She denies any chest pain, shortness of breath, or numbness.      Of note, after speaking with the patient's daughter, she states that the patient was sitting on the seat on her walker when the patient started complaining of neck pain. The daughter states that the patient will get intermittent pain in her neck which is not new. The daughter used a massager to massage the patient's neck when the patient started becoming sweaty and had a  syncopal event. The patient fell forward and hit her head on the wall. The daughter went to help the patient and that was when the daughter noticed the patient's twisted right ankle and called EMS.   *Patient's pupils are unequal, MD is aware  *Pain improved once in splint  *Obvious deformity upon arrival without breaking the skin  *CMS intact  Tests Performed:   Labs Ordered and Resulted from Time of ED Arrival Up to the Time of Departure from the ED   CBC WITH PLATELETS DIFFERENTIAL - Abnormal; Notable for the following components:       Result Value    WBC 17.4 (*)     MCHC 30.2 (*)     Absolute Neutrophil 9.4 (*)     Absolute Lymphocytes 6.6 (*)     Absolute Monocytes 1.4 (*)     All other components within normal limits   BASIC METABOLIC PANEL - Abnormal; Notable for the following components:    Sodium 130 (*)     Potassium 3.2 (*)     Glucose 133 (*)     All other components within normal limits   INR   PARTIAL THROMBOPLASTIN TIME   TROPONIN I   COVID-19 VIRUS (CORONAVIRUS) BY PCR   IV ACCESS     Ankle XR, G/E 3 views, right   Final Result   IMPRESSION: Oblique mildly displaced fracture distal right fibula at and above the level of the ankle mortise. There is mild lateral displacement of the distal fracture fragment. Transverse displaced medial malleolar fracture. Vertically oriented    minimally displaced posterior malleolar fracture. There also appears to be a displaced fracture fragment along the distal right tibia anteriorly best seen on the lateral view. Ankle mortise is disrupted with lateral subluxation of the talus relative to    the tibia. Overlapping cast material present.      CTA Head Neck with Contrast   Final Result   IMPRESSION:    HEAD CTA:    1.  No large vessel occlusion or significant stenosis identified.   2.  Mild to moderate intracranial stenoses, as above.      NECK CTA:   1. No significant stenosis, occlusion or dissection.   2. Hyperdense nodule within the right posterior aspect of the  thyroid gland measuring 1.5 cm. Recommend non-emergent thyroid function testing and thyroid ultrasound for further assessment.   3. 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            CT Head w/o Contrast   Final Result   IMPRESSION:   1.  No CT evidence for acute intracranial process.   2.  Brain atrophy and presumed chronic microvascular ischemic changes as above.        Treatments provided: See MAR, sedation for reduction, splint  Family Comments: daughter, Kelly, went home for the [facility] brochure/video discussed/provided to patient:  N/A  ED Medications:   Medications   fentaNYL (PF) (SUBLIMAZE) injection 50 mcg (50 mcg Intravenous Given 8/6/20 2321)   ondansetron (ZOFRAN) 2 MG/ML injection (has no administration in time range)   0.9% sodium chloride BOLUS (1,000 mLs Intravenous New Bag 8/6/20 2319)   propofol (DIPRIVAN) injection 10 mg/mL vial (10 mg Intravenous Given 8/6/20 2333)   ondansetron (ZOFRAN) injection 4 mg (4 mg Intravenous Given 8/6/20 2321)   CT Scan Flush (80 mLs Intravenous Given 8/7/20 0023)   iopamidol (ISOVUE-370) solution 500 mL (70 mLs Intravenous Given 8/7/20 0023)     Drips infusing:  No  For the majority of the shift, the patient's behavior Green. Interventions performed were roudning.    Sepsis treatment initiated: No       ED Nurse Name/Phone Number: Nadeen Taylor RN,  1:46 AM    RECEIVING UNIT ED HANDOFF REVIEW    Above ED Nurse Handoff Report was reviewed: Yes  Reviewed by: Marlin Fenton RN on August 7, 2020 at 2:31 AM

## 2020-08-07 NOTE — H&P
Hospitalist Admission Note    Name: Juany Collazo    MRN: [id number]          YOB: 1932    Age: 88 year old  Date of admission: 8/6/2020  Primary care provider: Gaurang Reeves              Assessment:       Brief summary of admission assessment:Juany Collazo is a 88 year old  female with a significant past medical history of hypertension, hyperlipidemia, degenerative joint disease status post left total knee arthroplasty last January who presents with ankle pain and concern for syncope.  Patient is not a good historian but she was having  haircut by her daughter when she got up and twisted her ankle sustaining injury to the right ankle and unable to bear weight.    In the emergency department, patient was found to have deformity of the right ankle and further evaluation by x-ray showed oblique mildly displaced fracture of distal right fibula.  The fracture was reduced and splinted by ER physician and patient was admitted to our service for further care.      Admission diagnoses:      #1.  Concern for syncope: Head CTA unremarkable for acute pathology, normal sinus rhythm    #2.  Fracture dislocation of distal right fibula: Right ankle reduced and splinted in the ED    #3.  Right ankle pain and deformity after a fall leading to #2    #4.  Hypokalemia    #5.  Hyponatremia    #6.  Leukocytosis  #7.  Asymptomatic COVID 19 testing pending        Comorbid medical conditions:    Hypertension on lisinopril/hydrochlorothiazide, amlodipine    Hyperlipidemia on simvastatin    Osteoporosis    GERD    Chronic pain     Plan/MDM:       > Admission Status: Will admit patient to hospitalist service as inpatient as patient likely need over two mid night stays in the hospital.     >Care plan:    --Admit to orthopedic floor, pain medication, orthopedic surgery consult, replace potassium, telemetry monitoring, echocardiogram, will keep her n.p.o.,    >Supportive care:Pain management:  acetominophen, anxiolytics, oral narcotics and IV narcotics  Nausea and vomiting control measures    >Diet:Keep NPO for now    >Activity:Advance activity as tolerated    >Education/Counseling :Discussed treatment plan with the patient    >Consults:Inpatient consult with orthopedics    >VTE prophylactic measures:prophylaxis against venous thromboembolism with PCD     >Therapies:none for now       >Additional orders:    --Care plan discussed with the patient/family and agreed to care plan   --Patient will be transferred to care of hospitalist attending for further evaluation and management as appropriate   --Old medical orders reviewed   --imaging result independently reviewed by me     (See orders placed for this visit by me )     - Home medication reviewed and will be continued as appropriate once pharmacy reconciliation is completed         Code Status/Disposition:     >Code Status:Full Code      >Disposition:anticipate discharge to skilled care facility and Anticipate discharge in 3 days        Disclaimer: This note consists of symbols derived from keyboarding, dictation and/or voice recognition software. As a result, there may be errors in the script that have gone undetected. Please consider this when interpreting information found in this chart.             Chief Complaint:       Ankle injury and pain     History is obtained from the patient          History of Present Illness:      This patient is a 88 year old  female with a significant past medical history of left knee arthroplasty as January who presents with the following condition requiring a hospital admission:    Right ankle fracture    Patient is 88-year-old female who was at her daughter's place recuperating following surgical intervention to her left knee last January.  She was getting some hair by her daughter when she got up and twisted her ankle.  She developed deformity and inability to use right foot.  Patient is not a good historian due to  memory difficulties and hard of hearing.  There was report that patient collapsed due to her ankle and was brought to the emergency department.  In route to the emergency department she was given 50 mcg of intranasal fentanyl.  Evaluation in the emergency department revealed ankle fracture which was urgently reduced and splinted.  Patient was then admitted to our service for further care.       Past Medical History:     Past Medical History:   Diagnosis Date     Arthritis      Carpal tunnel syndrome      Chronic pain     right leg     Gastro-oesophageal reflux disease      Hyperlipidemia 10/31/2010     Hypertension 10/27/2003     Mild major depression 2014     Osteoporosis             Past Surgical History:     Past Surgical History:   Procedure Laterality Date     ARTHROPLASTY HIP Right      ARTHROPLASTY KNEE Left 3/9/2020    Procedure: Left total knee arthroplasty ([employer] #3 PCR femur; #2 tibia; 35 mm thin patella; 9 mm tibial insert);  Surgeon: Isreal Tineo MD;  Location: [facility] OR     DECOMPRESSION LUMBAR ONE LEVEL  2013    Procedure: DECOMPRESSION LUMBAR ONE LEVEL;  Decompression Bilateral L4-5  ;  Surgeon: Lang Garcia MD;  Location: [facility] OR     EYE SURGERY       Tubal ligation NOS  1970             Social History:     Social History     Tobacco Use     Smoking status: Former Smoker     Last attempt to quit: 1966     Years since quittin.2     Smokeless tobacco: Never Used   Substance Use Topics     Alcohol use: Not Currently     Frequency: Monthly or less     Drinks per session: 1 or 2     Binge frequency: Never     Comment: One beer day             Family History:     Family History   Problem Relation Age of Onset     Cardiovascular Brother      Musculoskeletal Disorder Brother         Muscular Dystrophy     Hypertension Father      Cardiovascular Father      Hypertension Mother      Arthritis Mother      Eye Disorder Mother         Cataract     Osteoporosis Mother       Glaucoma Mother         possible per Pt     Osteoporosis Maternal Aunt      Macular Degeneration No family hx of             Allergies:     Allergies   Allergen Reactions     No Known Drug Allergies              Medications:        Prior to Admission medications    Medication Sig Last Dose Taking? Auth Provider   amLODIPine (NORVASC) 2.5 MG tablet Take 1 tablet (2.5 mg) by mouth daily   Martinez Shafer MD   aspirin (ASA) 81 MG EC tablet Take 1 tablet (81 mg) by mouth daily   Riky Salazar PA-C   celecoxib (CELEBREX) 100 MG capsule Take 1 capsule (100 mg) by mouth 2 times daily   Riky Salazar PA-C   citalopram (CELEXA) 20 MG tablet Take 1 tablet (20 mg) by mouth daily   Aurora Wong APRN CNP   diclofenac (VOLTAREN) 1 % topical gel Apply 2 g topically 2 times daily as needed for moderate pain   Riky Salazar PA-C   donepezil (ARICEPT) 10 MG tablet Take 1 tablet (10 mg) by mouth At Bedtime   Aurora Wong APRN CNP   lisinopril-hydrochlorothiazide (PRINZIDE/ZESTORETIC) 20-12.5 MG per tablet Take 1 tablet by mouth 2 times daily   Aurora Wong APRN CNP   omeprazole (PRILOSEC) 20 MG DR capsule Take 20 mg by mouth daily    Reported, Patient   senna-docusate (SENOKOT-S/PERICOLACE) 8.6-50 MG tablet Take 2 tablets by mouth 2 times daily as needed for constipation   Riky Salazar PA-C   simvastatin (ZOCOR) 20 MG tablet Take 1 tablet (20 mg) by mouth At Bedtime   Aurora Wong APRN CNP   tiZANidine (ZANAFLEX) 2 MG tablet Take 1 tablet (2 mg) by mouth 2 times daily as needed for muscle spasms   Riky Salazar PA-C          Review of Systems:     A Comprehensive greater than 10 system review of systems was carried out.  Pertinent positives and negatives are noted above in HPI.  Otherwise negative for contributory information.           Physical Exam:     Vital signs were reviewed    Temp:  [98.5  F (36.9  C)] 98.5  F (36.9  C)  Pulse:  [60-68]  66  Heart Rate:  [60-68] 63  Resp:  [12-26] 14  BP: (114-133)/(62-93) 122/63  SpO2:  [92 %-100 %] 100 %        GEN: awake, alert, cooperative, no apparent distress, oriented x 3    NECK:Supple ,no mass or thyromegaly     HEENT:  Normocephalic/atraumatic, no scleral icterus, no nasal discharge, mouth moist.    CV:  Regular rate and rhythm, no murmur or JVD.  S1 + S2 noted, no S3 or S4.    LUNGS:  Clear to auscultation bilaterally without rales/rhonchi/wheezing/retractions.  Symmetric chest rise on inhalation noted.    ABD:  Active bowel sounds, soft, non-tender/non-distended.  No rebound/guarding/rigidity.    EXT: Right lower extremity in splint and Ace wrap.  She is able to wiggle her toes and neurovascular exam is normal    LGS: No cervical or axillary lymphadenopathy     SKIN:  Dry to touch, warm ,no exanthems noted in the visualized areas.    Neurologic:Grossly intact,non focal . No acute focal neurologic deficit     Psychaitric exam:Mood and affect normal              Data:       All laboratory and imaging data in the past 24 hours reviewed     Results for orders placed or performed during the hospital encounter of 08/06/20   -Dislocation - Lower Extremity     Status: None    Narrative    Ruddy Chilel MD     8/7/2020  3:00 AM  [facility]    -Dislocation - Lower Extremity    Date/Time: 8/7/2020 12:34 AM  Performed by: Ruddy Chilel MD  Authorized by: Ruddy Chilel MD     ED EVALUATION:      I have performed an Emergency Department Evaluation including taking a   history and physical examination, this evaluation will be documented in   the electronic medical record for this ED encounter.      ASA Class: Class 2- mild systemic disease, no acute problems, no   functional limitations  UNIVERSAL PROTOCOL   Site Marked: Yes  Prior Images Obtained and Reviewed:  NA  Required items: Required blood products, implants, devices and special   equipment available    Patient identity confirmed:  Verbally with  patient and arm band  Patient was reevaluated immediately before administering moderate or deep   sedation or anesthesia  Confirmation Checklist:  Patient's identity using two indicators,   procedure was appropriate and matched the consent or emergent situation,   relevant allergies and correct equipment/implants were available  Time out: Immediately prior to the procedure a time out was called    Universal Protocol: the Joint Commission Universal Protocol was followed          LOCATION     Location:  Ankle    Ankle injury location:  R ankle    Ankle dislocation type: lateral      PRE PROCEDURE DETAILS:     Distal perfusion: normal      Range of motion: reduced      SEDATION    Patient Sedated: Yes    Sedation:  Propofol  Vital signs: Vital signs monitored during sedation    ANESTHESIA (see MAR for exact dosages)      Anesthesia method:  None    PROCEDURE DETAILS      Manipulation performed: yes      Ankle reduction method:  Traction and counter traction    Reduction successful: yes      Reduction confirmed with imaging: yes      Immobilization:  Splint    Splint type:  Short leg    Supplies used:  Ortho-Glass    POST PROCEDURE DETAILS      Neurological function: normal      Distal perfusion: normal      PROCEDURE   Patient Tolerance:  Patient tolerated the procedure well with no immediate   complications    Length of time physician/provider present for 1:1 monitoring during   sedation: 10   Ankle XR, G/E 3 views, right     Status: None    Narrative    EXAM: XR ANKLE RT G/E 3 VW  LOCATION: [facility]  DATE/TIME: 8/7/2020 12:29 AM    INDICATION: Trauma.    COMPARISON: None.      Impression    IMPRESSION: Oblique mildly displaced fracture distal right fibula at and above the level of the ankle mortise. There is mild lateral displacement of the distal fracture fragment. Transverse displaced medial malleolar fracture. Vertically oriented   minimally displaced posterior malleolar fracture. There also  appears to be a displaced fracture fragment along the distal right tibia anteriorly best seen on the lateral view. Ankle mortise is disrupted with lateral subluxation of the talus relative to   the tibia. Overlapping cast material present.   CT Head w/o Contrast     Status: None    Narrative    EXAM: CT HEAD W/O CONTRAST  LOCATION: [facility]  DATE/TIME: 8/7/2020 12:18 AM    INDICATION: Syncope. Loss of consciousness.  COMPARISON: CT head dated 11/13/2018.  TECHNIQUE: Routine without IV contrast. Multiplanar reformats. Dose reduction techniques were used.    FINDINGS:  INTRACRANIAL CONTENTS: No intracranial hemorrhage, extraaxial collection, or mass effect.  No CT evidence of acute infarct. Moderate presumed chronic small vessel ischemic changes. Mild generalized volume loss. No hydrocephalus.     VISUALIZED ORBITS/SINUSES/MASTOIDS: Prior bilateral cataract surgery. Visualized portions of the orbits are otherwise unremarkable. No paranasal sinus mucosal disease. No middle ear or mastoid effusion.    BONES/SOFT TISSUES: No acute abnormality. Incompletely imaged prominent degenerative pannus located at C1-C2.      Impression    IMPRESSION:  1.  No CT evidence for acute intracranial process.  2.  Brain atrophy and presumed chronic microvascular ischemic changes as above.   CTA Head Neck with Contrast     Status: None    Narrative    EXAM: CTA  HEAD NECK WITH CONTRAST  LOCATION: [facility]  DATE/TIME: 8/7/2020 12:18 AM    INDICATION: Syncope. Left neck pain. Loss of consciousness.  COMPARISON: MRI brain dated 11/21/2018. CT head dated 11/13/2018.  CONTRAST: 70mL Isovue-370  TECHNIQUE: Head and neck CT angiogram with IV contrast. Axial helical CT images of the head and neck vessels obtained during the arterial phase of intravenous contrast administration. Axial 2D reconstructed images and multiplanar 3D MIP reconstructed   images of the head and neck vessels were performed by the technologist.  Dose reduction techniques were used. All stenosis measurements made according to NASCET criteria unless otherwise specified.    FINDINGS:       HEAD CTA:  ANTERIOR CIRCULATION: Mild narrowing of the proximal right M2 posterior division middle cerebral artery (image 376 through 371 series 6). Additionally there is mild to moderate narrowing of a right M2 ascending division middle cerebral artery. No large   vessel occlusion. No aneurysm. No high flow vascular malformation. Standard [place] of [place] anatomy.    POSTERIOR CIRCULATION: No stenosis/occlusion, aneurysm, or high flow vascular malformation. Infundibular origins of the bilateral posterior communicating arteries. Dominant left and smaller right vertebral artery contribute to a normal basilar artery.   The right intradural vertebral artery is hypoplastic.    DURAL VENOUS SINUSES: Expected enhancement of the major dural venous sinuses.    NECK CTA:  RIGHT CAROTID: Minimal plaque at the bifurcation. No measurable stenosis or dissection.    LEFT CAROTID: Minimal plaque at the bifurcation. No measurable stenosis or dissection.    VERTEBRAL ARTERIES: No focal stenosis or dissection. Dominant left and smaller right vertebral arteries. The right vertebral artery is hypoplastic.    AORTIC ARCH: Classic aortic arch anatomy with no significant stenosis at the origin of the great vessels.    NONVASCULAR STRUCTURES: Heterogeneous appearance of the thyroid gland. There is asymmetric enlargement of the right lobe of the thyroid. A hyperdense nodule is identified within the right posterior thyroid gland measuring 1.5 cm. Presumed left apical   scarring. Multilevel degenerative changes of the cervical spine. There is approximately 4 mm of anterolisthesis at the level of C4 on C5. At C5 on C6 is approximately 4 mm of retrolisthesis. There is a prominent degenerative pannus located at C1-C2   measuring approximately 9 mm in AP dimensions and causing severe central spinal canal  stenosis.      Impression    IMPRESSION:   HEAD CTA:   1.  No large vessel occlusion or significant stenosis identified.  2.  Mild to moderate intracranial stenoses, as above.    NECK CTA:  1. No significant stenosis, occlusion or dissection.  2. Hyperdense nodule within the right posterior aspect of the thyroid gland measuring 1.5 cm. Recommend non-emergent thyroid function testing and thyroid ultrasound for further assessment.  3. 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       CBC + differential     Status: Abnormal   Result Value Ref Range    WBC 17.4 (H) 4.0 - 11.0 10e9/L    RBC Count 4.38 3.8 - 5.2 10e12/L    Hemoglobin 11.8 11.7 - 15.7 g/dL    Hematocrit 39.1 35.0 - 47.0 %    MCV 89 78 - 100 fl    MCH 26.9 26.5 - 33.0 pg    MCHC 30.2 (L) 31.5 - 36.5 g/dL    RDW 14.4 10.0 - 15.0 %    Platelet Count 313 150 - 450 10e9/L    Diff Method Manual Differential     % Neutrophils 54.0 %    % Lymphocytes 38.0 %    % Monocytes 8.0 %    % Eosinophils 0.0 %    % Basophils 0.0 %    Absolute Neutrophil 9.4 (H) 1.6 - 8.3 10e9/L    Absolute Lymphocytes 6.6 (H) 0.8 - 5.3 10e9/L    Absolute Monocytes 1.4 (H) 0.0 - 1.3 10e9/L    Absolute Eosinophils 0.0 0.0 - 0.7 10e9/L    Absolute Basophils 0.0 0.0 - 0.2 10e9/L    RBC Morphology Consistent with reported results     Platelet Estimate       Automated count confirmed.  Platelet morphology is normal.   Basic metabolic panel     Status: Abnormal   Result Value Ref Range    Sodium 130 (L) 133 - 144 mmol/L    Potassium 3.2 (L) 3.4 - 5.3 mmol/L    Chloride 96 94 - 109 mmol/L    Carbon Dioxide 25 20 - 32 mmol/L    Anion Gap 9 3 - 14 mmol/L    Glucose 133 (H) 70 - 99 mg/dL    Urea Nitrogen 12 7 - 30 mg/dL    Creatinine 0.78 0.52 - 1.04 mg/dL    GFR Estimate 67 >60 mL/min/[1.73_m2]    GFR Estimate If Black 78 >60 mL/min/[1.73_m2]    Calcium 8.7 8.5 - 10.1 mg/dL   INR     Status: None    Result Value Ref Range    INR 0.94 0.86 - 1.14   Partial thromboplastin time     Status: None   Result Value Ref Range    PTT 31 22 - 37 sec   Troponin I     Status: None   Result Value Ref Range    Troponin I ES <0.015 0.000 - 0.045 ug/L   Asymptomatic COVID-19 Virus (Coronavirus) by PCR     Status: None    Specimen: Nasopharyngeal   Result Value Ref Range    COVID-19 Virus PCR to [facility] - Source Nasopharyngeal     COVID-19 Virus PCR to [facility] - Result       Test received-See reflex to IDDL test SARS CoV2 (COVID-19) Virus RT-PCR   SARS-CoV-2 COVID-19 Virus (Coronavirus) RT-PCR Nasopharyngeal     Status: None    Specimen: Nasopharyngeal   Result Value Ref Range    SARS-CoV-2 Virus Specimen Source Nasopharyngeal     SARS-CoV-2 PCR Result NEGATIVE     SARS-CoV-2 PCR Comment       Testing was performed using the Xpert Xpress SARS-CoV-2 Assay on the Cepheid Gene-Xpert   Instrument Systems. Additional information about this Emergency Use Authorization (EUA)   assay can be found via the Lab Guide.     EKG 12-lead, tracing only     Status: None (Preliminary result)   Result Value Ref Range    Interpretation ECG Click View Image link to view waveform and result             Recent Results (from the past 48 hour(s))   CT Head w/o Contrast    Narrative    EXAM: CT HEAD W/O CONTRAST  LOCATION: [facility]  DATE/TIME: 8/7/2020 12:18 AM    INDICATION: Syncope. Loss of consciousness.  COMPARISON: CT head dated 11/13/2018.  TECHNIQUE: Routine without IV contrast. Multiplanar reformats. Dose reduction techniques were used.    FINDINGS:  INTRACRANIAL CONTENTS: No intracranial hemorrhage, extraaxial collection, or mass effect.  No CT evidence of acute infarct. Moderate presumed chronic small vessel ischemic changes. Mild generalized volume loss. No hydrocephalus.     VISUALIZED ORBITS/SINUSES/MASTOIDS: Prior bilateral cataract surgery. Visualized portions of the orbits are otherwise unremarkable. No paranasal sinus  mucosal disease. No middle ear or mastoid effusion.    BONES/SOFT TISSUES: No acute abnormality. Incompletely imaged prominent degenerative pannus located at C1-C2.      Impression    IMPRESSION:  1.  No CT evidence for acute intracranial process.  2.  Brain atrophy and presumed chronic microvascular ischemic changes as above.   CTA Head Neck with Contrast    Narrative    EXAM: CTA  HEAD NECK WITH CONTRAST  LOCATION: [facility]  DATE/TIME: 8/7/2020 12:18 AM    INDICATION: Syncope. Left neck pain. Loss of consciousness.  COMPARISON: MRI brain dated 11/21/2018. CT head dated 11/13/2018.  CONTRAST: 70mL Isovue-370  TECHNIQUE: Head and neck CT angiogram with IV contrast. Axial helical CT images of the head and neck vessels obtained during the arterial phase of intravenous contrast administration. Axial 2D reconstructed images and multiplanar 3D MIP reconstructed   images of the head and neck vessels were performed by the technologist. Dose reduction techniques were used. All stenosis measurements made according to NASCET criteria unless otherwise specified.    FINDINGS:       HEAD CTA:  ANTERIOR CIRCULATION: Mild narrowing of the proximal right M2 posterior division middle cerebral artery (image 376 through 371 series 6). Additionally there is mild to moderate narrowing of a right M2 ascending division middle cerebral artery. No large   vessel occlusion. No aneurysm. No high flow vascular malformation. Standard [place] of [place] anatomy.    POSTERIOR CIRCULATION: No stenosis/occlusion, aneurysm, or high flow vascular malformation. Infundibular origins of the bilateral posterior communicating arteries. Dominant left and smaller right vertebral artery contribute to a normal basilar artery.   The right intradural vertebral artery is hypoplastic.    DURAL VENOUS SINUSES: Expected enhancement of the major dural venous sinuses.    NECK CTA:  RIGHT CAROTID: Minimal plaque at the bifurcation. No measurable stenosis  or dissection.    LEFT CAROTID: Minimal plaque at the bifurcation. No measurable stenosis or dissection.    VERTEBRAL ARTERIES: No focal stenosis or dissection. Dominant left and smaller right vertebral arteries. The right vertebral artery is hypoplastic.    AORTIC ARCH: Classic aortic arch anatomy with no significant stenosis at the origin of the great vessels.    NONVASCULAR STRUCTURES: Heterogeneous appearance of the thyroid gland. There is asymmetric enlargement of the right lobe of the thyroid. A hyperdense nodule is identified within the right posterior thyroid gland measuring 1.5 cm. Presumed left apical   scarring. Multilevel degenerative changes of the cervical spine. There is approximately 4 mm of anterolisthesis at the level of C4 on C5. At C5 on C6 is approximately 4 mm of retrolisthesis. There is a prominent degenerative pannus located at C1-C2   measuring approximately 9 mm in AP dimensions and causing severe central spinal canal stenosis.      Impression    IMPRESSION:   HEAD CTA:   1.  No large vessel occlusion or significant stenosis identified.  2.  Mild to moderate intracranial stenoses, as above.    NECK CTA:  1. No significant stenosis, occlusion or dissection.  2. Hyperdense nodule within the right posterior aspect of the thyroid gland measuring 1.5 cm. Recommend non-emergent thyroid function testing and thyroid ultrasound for further assessment.  3. 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       Ankle XR, G/E 3 views, right    Narrative    EXAM: XR ANKLE RT G/E 3 VW  LOCATION: [facility]  DATE/TIME: 8/7/2020 12:29 AM    INDICATION: Trauma.    COMPARISON: None.      Impression    IMPRESSION: Oblique mildly displaced fracture distal right fibula at and above the level of the ankle mortise. There is mild lateral displacement of the distal fracture fragment. Transverse displaced  medial malleolar fracture. Vertically oriented   minimally displaced posterior malleolar fracture. There also appears to be a displaced fracture fragment along the distal right tibia anteriorly best seen on the lateral view. Ankle mortise is disrupted with lateral subluxation of the talus relative to   the tibia. Overlapping cast material present.       EKG results: Normal sinus rhythm         All imaging studies reviewed by me.         Patient`s old medical records reviewed and case discussed with the ED physician.    ED course-Reviewed  and care plan discussed with Ruddy Chilel MD

## 2020-08-07 NOTE — PROGRESS NOTES
HOSPITALIST FOLLOW UP NOTE:    The patient was seen and examined, I agree with the history, exam, assessment and plan of Dr Deshpande.    Ruddy Ty DO MPH  [facility] Hospitalist  [address] [place] [address]  Pager: [phone]  08/07/2020

## 2020-08-07 NOTE — PHARMACY-ADMISSION MEDICATION HISTORY
Admission medication history interview status for this patient is complete. See [facility] admission navigator for allergy information, prior to admission medications and immunization status.     Medication history interview done via telephone during Covid-19 pandemic, indicate source(s): Patient's family and pharmacy   Medication history resources (including written lists, pill bottles, clinic record): [facility]     Changes made to PTA medication list:  Added: Amoxil, biotin   Deleted: Celebrex, Depo-medrol, Senna-docusate, Aspirin, Zanaflex   Changed: none     Actions taken by pharmacist (provider contacted, etc): Left sticky note to provider     Additional medication history information: Patient started amoxil 10 day course for dental infection. Started on Tuesday Aug, 4th  - 2 doses and last dose PTA Thursday evening. Friday 7 th August would be day 4 of 10.   Medication reconciliation/reorder completed by provider prior to medication history?  Yes (Y/N)           Prior to Admission medications    Medication Sig Last Dose Taking? Auth Provider   amLODIPine (NORVASC) 2.5 MG tablet Take 1 tablet (2.5 mg) by mouth daily 8/6/2020 at am Yes Martinez Shafer MD   amoxicillin (AMOXIL) 500 MG capsule Take 500 mg by mouth 3 times daily 8/6/2020 at 0900 Yes Unknown, Entered By History   Biotin 10 MG CAPS Take 1 capsule by mouth daily 8/6/2020 at 0900 Yes Unknown, Entered By History   citalopram (CELEXA) 20 MG tablet Take 1 tablet (20 mg) by mouth daily 8/6/2020 at am Yes Aurora Wong APRN CNP   diclofenac (VOLTAREN) 1 % topical gel Apply 2 g topically 2 times daily as needed for moderate pain  Yes Riky Salazar, PAPATRICIA   donepezil (ARICEPT) 10 MG tablet Take 1 tablet (10 mg) by mouth At Bedtime 8/6/2020 at am Yes Aurora Wong APRN CNP   lisinopril-hydrochlorothiazide (PRINZIDE/ZESTORETIC) 20-12.5 MG per tablet Take 1 tablet by mouth 2 times daily 8/6/2020 at am Yes Aurora Wong APRN CNP    omeprazole (PRILOSEC) 20 MG DR capsule Take 20 mg by mouth daily  8/6/2020 at am Yes Reported, Patient   simvastatin (ZOCOR) 20 MG tablet Take 1 tablet (20 mg) by mouth At Bedtime 8/5/2020 at 2100 Yes Aurora Wong, APRN CNP   aspirin (ASA) 81 MG EC tablet Take 1 tablet (81 mg) by mouth daily More than a month at Unknown time  Riky Salazar, PAGenaroC

## 2020-08-07 NOTE — CONSULTS
Consult Date:  2020      CHIEF COMPLAINT:  Right ankle injury.      HISTORY OF PRESENT ILLNESS:  Dr. Deshpande has requested orthopedic evaluation for this 88-year-old female who sustained a fall with an isolated right ankle injury.  She lives with her daughter and has memory issues.  She describes no other injuries or problems and orthopedic evaluation requested regarding fracture treatment.      PAST MEDICAL HISTORY:  Significant for depression and osteoporosis.  She has had a recent left knee replacement by Dr. Tineo.      MEDICATIONS:  Citalopram and donepezil.      ALLERGIES:  SHE HAS NO KNOWN DRUG ALLERGIES.      PHYSICAL EXAMINATION:  A splint is intact.  She is able to flex and extend her toes.  She has intact skin and perfusion of the visible area of the toes themselves.  Her general appearance and affect are unremarkable, although she appears to be a relatively poor historian by questions.      RADIOGRAPHIC EVALUATION:  X-rays show a mildly displaced bimalleolar ankle fracture with osteopenia.      IMPRESSION:  Trimalleolar right ankle fracture.      RECOMMENDATIONS:  I recommend ORIF.  I had an extensive discussion with the patient as well as with her daughter regarding the risks, benefits, complications and postoperative course associated with this.  I recommend surgery as soon as she is medically cleared and as soon as there is operating room space.  The operating room is delayed and surgery will be planned in the morning.  I spoke with the daughter and reviewed the risks and benefits including skin issues fixation problems and stiffness/arthritis.  All questions were answered.  I will assess her skin now to determine if her translation will be problematic.         REBECCA ORTIZ MD             D: 2020   T: 2020   MT: ALEJANDRO      Name:     OLE ROBISON   MRN:      [id number]        Account:       [id number]   :      1932           Consult Date:  2020      Document:  [id number]

## 2020-08-07 NOTE — ED TRIAGE NOTES
Pt arrives by ambulance from a rehab care facility, she was getting her hair cut by her daughter and c/o neck pain, dizziness, nausea and vomiting, then she had a syncopal episode, did not hit her head, however somehow her right ankle got injured and the ankle is deformed with the bone almost puncturing the skin.    ABC's intact, recent left knee replacement and recent toe infection. 50 mcg Fentanyl intranasal given by EMS.

## 2020-08-07 NOTE — ED PROVIDER NOTES
History     Chief Complaint:  Ankle Pain  Syncope     HPI   Juany Collazo is a 88 year old female who presents with ankle pain. EMS states that the patient is living with her daughter and twisted her ankle when she got up from her walker. The patient collapsed due to her ankle and EMS was called to transfer the patient to the ED for further evaluation. The patient received 50 mcg intranasal fentanyl en route. The patient was noted to have an obvious deformity to the right ankle. She denies any chest pain, shortness of breath, or numbness.     Of note, after speaking with the patient's daughter, she states that the patient was sitting on the seat on her walker getting her hair cut when the patient started complaining of neck pain. The daughter states that the patient will get intermittent pain in her neck which is not new. The daughter used a massager to massage the patient's neck when the patient started becoming sweaty and had a syncopal event. The patient fell forward and hit her head on the wall. She also vomited.  The daughter went to help the patient and that was when the daughter noticed the patient's twisted right ankle and called EMS.     Allergies:  No Known Drug Allergies     Medications:    Amlodipine   aspirin   celecoxib   citalopram   donepezil   lisinopril-hydrochlorothiazide   omeprazole   simvastatin     Past Medical History:    Arthritis   Carpal tunnel syndrome   Chronic pain   Gastro-oesophageal reflux disease   Hyperlipidemia   Hypertension   Mild major depression   Osteoporosis     Past Surgical History:    Arthroplasty hip   Arthroplasty knee   Decompression lumbar   Eye surgery     Family History:    Cardiovascular   Musculoskeletal Disorder   Muscular Dystrophy  Hypertension   Cardiovascular   Hypertension   Arthritis   Eye Disorder   Cataract  Osteoporosis   Glaucoma      Social History:  Smoking Status: former  Smokeless Tobacco: Never Used  Alcohol Use: No  Drug Use: No  PCP:  Gaurang Reeves     Review of Systems   Respiratory: Negative for shortness of breath.    Cardiovascular: Negative for chest pain.   Musculoskeletal:        Left ankle pain and deformity   Neurological: Negative for numbness.   All other systems reviewed and are negative.    Physical Exam     Patient Vitals for the past 24 hrs:   BP Temp Temp src Heart Rate Resp SpO2   08/07/20 0000 122/63 -- -- 63 14 100 %   08/06/20 2350 117/75 -- -- 61 16 98 %   08/06/20 2345 126/62 -- -- 61 21 99 %   08/06/20 2340 126/62 -- -- 60 21 99 %   08/06/20 2335 114/78 -- -- 62 26 100 %   08/06/20 2333 -- -- -- 64 -- 100 %   08/06/20 2330 133/81 -- -- -- -- 92 %   08/06/20 2330 133/81 -- -- 68 -- --   08/06/20 2329 -- -- -- 67 12 --   08/06/20 2310 122/93 -- -- 67 -- 99 %   08/06/20 2307 122/93 98.5  F (36.9  C) Oral 67 16 98 %        Physical Exam   Nursing note and vitals reviewed.  Constitutional: Cooperative.   HENT:   Mouth/Throat: Mucous membranes are normal.   Full ROM of neck, no Sp tenderness  Eyes: Pupils are round, and reactive to light. Pupils slightly unequal with the right 3-4mm and left 2-3mm, EOMs intact   Cardiovascular: Normal rate, regular rhythm and normal heart sounds.  No murmur.  Pulmonary/Chest: Effort normal and breath sounds normal. No respiratory distress. No wheezes. No rales.   Abdominal: Soft. Normal appearance and bowel sounds are normal. No distension. There is no tenderness. There is no rigidity and no guarding.   Musculoskeletal: Normal range of motion of UE's and LLE. Right ankle is Externally rotated 90 degrees with significant tenting of the skin over the medial malleolus, no puncture wounds, 2+ dp pulse   Neurological: Alert. Sensation to light touch is normal to the great toe and able to wiggle toes, Normal strength in all extremities. CN 2-12 intact.  GCS 15. Oriented x4  Skin: Skin is warm and dry. No rash noted.   Psychiatric: Normal mood and affect.      Emergency Department Course    ECG:  ECG taken at 0002  Sinus rhythm  Low voltage QRS  Borderline ECG  Vent. rate 66 BPM  ME interval 166 ms  QRS duration 80 ms  QT/QTc 444/465 ms  P-R-T axes 72 57 71    Imaging:  Radiology findings were communicated with the patient and family who voiced understanding of the findings.    Ankle XR, G/E 3 views, right (Post reduction)  Final Result  IMPRESSION: Oblique mildly displaced fracture distal right fibula at and above the level of the ankle mortise. There is mild lateral displacement of the distal fracture fragment. Transverse displaced medial malleolar fracture. Vertically oriented   minimally displaced posterior malleolar fracture. There also appears to be a displaced fracture fragment along the distal right tibia anteriorly best seen on the lateral view. Ankle mortise is disrupted with lateral subluxation of the talus relative to   the tibia. Overlapping cast material present.  Reading per radiology     CTA Head Neck with Contrast  Final Result  IMPRESSION:   HEAD CTA:   1.  No large vessel occlusion or significant stenosis identified.  2.  Mild to moderate intracranial stenoses, as above.  Reading per radiology     NECK CTA:  1. No significant stenosis, occlusion or dissection.  2. Hyperdense nodule within the right posterior aspect of the thyroid gland measuring 1.5 cm. Recommend non-emergent thyroid function testing and thyroid ultrasound for further assessment.  3. 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  Reading per radiology     CT Head w/o Contrast  Final Result  IMPRESSION:  1.  No CT evidence for acute intracranial process.  2.  Brain atrophy and presumed chronic microvascular ischemic changes as above.  Reading per radiology     Laboratory:  Laboratory findings were communicated with the patient and family who voiced understanding of the findings.    CBC:  WBC 17.4 (H), HGB 11.8, ,  o/w WNL  BMP: Glucose 133 (H),  (L), potassium 3.2 (L), o/w WNL (Creatinine: 0.78)     Asymptomatic COVID-19 Virus (Coronavirus) by PCR Nasopharyngeal swab: pending      INR: 0.94  Partial thromboplastin time: 31    Troponin(2319):  <0.015        Procedures (3)    1. Sedation - procedural  2. Closed reduction - right ankle dislocation/fracture  3. Splint placement - right lower extremity    [facility]    -Dislocation - Lower Extremity    Date/Time: 8/7/2020 12:34 AM  Performed by: Ruddy Chilel MD  Authorized by: Ruddy Chilel MD     ED EVALUATION:      I have performed an Emergency Department Evaluation including taking a history and physical examination, this evaluation will be documented in the electronic medical record for this ED encounter.      ASA Class: Class 2- mild systemic disease, no acute problems, no functional limitations  UNIVERSAL PROTOCOL   Site Marked: Yes  Prior Images Obtained and Reviewed:  NA  Required items: Required blood products, implants, devices and special equipment available    Patient identity confirmed:  Verbally with patient and arm band  Patient was reevaluated immediately before administering moderate or deep sedation or anesthesia  Confirmation Checklist:  Patient's identity using two indicators, procedure was appropriate and matched the consent or emergent situation, relevant allergies and correct equipment/implants were available  Time out: Immediately prior to the procedure a time out was called    Universal Protocol: the Joint Commission Universal Protocol was followed          LOCATION     Location:  Ankle    Ankle injury location:  R ankle    Ankle dislocation type: lateral      PRE PROCEDURE DETAILS:     Distal perfusion: normal      Range of motion: reduced      SEDATION    Patient Sedated: Yes    Sedation Type:  Deep  Sedation:  Propofol  Vital signs: Vital signs monitored during sedation    ANESTHESIA (see MAR for exact dosages)      Anesthesia  method:  None    PROCEDURE DETAILS      Manipulation performed: yes      Ankle reduction method:  Traction and counter traction    Reduction successful: yes      Reduction confirmed with imaging: yes      Immobilization:  Splint    Splint type:  Short leg    Supplies used:  Ortho-Glass    POST PROCEDURE DETAILS      Neurological function: normal      Distal perfusion: normal      PROCEDURE   Patient Tolerance:  Patient tolerated the procedure well with no immediate complications    Length of time physician/provider present for 1:1 monitoring during sedation: 10    Interventions:  2319 0.9% sodium chloride BOLUS 1000 mL IV   2321 Fentanyl 50 mcg IV  2321 zofran 4 mg IV   2327 Propofol 40 mg IV  2328 Propofol 20 mg IV  2329 Propofol 20 mg IV  2333 Propofol 10 mg IV    Emergency Department Course:  Past medical records, nursing notes, and vitals reviewed.    2338 I performed an exam of the patient as documented above.     IV was inserted and blood was drawn for laboratory testing, results above.     The patient was sent for imaging while in the emergency department, results above.      I spoke with the patient's daughter regarding patient's presentation, findings, and plan of care.      0130 Patient rechecked and updated.      0200 I spoke with Dr. Deshpande of the Hospitalist service from [facility] regarding patient's presentation, findings, and plan of care.       Findings and plan explained to the Patient who consents to admission. Discussed the patient with Dr. Deshpande, who will admit the patient to a adult med/surg bed for further monitoring, evaluation, and treatment.      Impression & Plan   Covid-19  Juany Collazo was evaluated during a global COVID-19 pandemic, which necessitated consideration that the patient might be at risk for infection with the SARS-CoV-2 virus that causes COVID-19.   Applicable protocols for evaluation were followed during the patient's care.   COVID-19 was considered as part of the  patient's evaluation. The plan for testing is:  a test was obtained during this visit.    Medical Decision Making:  Juany Collazo is a 88 year old female who presents after a syncopal event. The etiology of this is likely due to vagal stimulation due to carotid massage as her daughter was actively massaging the left side of her neck at which she became sweaty and vomited.  Fortunately her head CT and CTA were unrevealing.  She did sustain a significant injury to her right ankle with a trimalleolar fracture dislocation which was reduced and splinted as per the procedure note.  She is neurovascularly intact.  She is likely going to need operative repair.  Given her age and comorbidities she will be admitted to the hospital service with plan for orthopedic consultation in the morning.    Discharge Diagnosis:    ICD-10-CM    1. Syncope, likely vagal stimulation related R55    2. Ankle dislocation, right, initial encounter  S93.04XA    3. Closed trimalleolar fracture of right ankle S82.851A    4.      Hyponatremia    Disposition:  Admitted.    Scribe Disclosure:  Magdi AVELAR, am serving as a scribe at 11:38 PM on 8/6/2020 to document services personally performed by Ruddy Chilel MD based on my observations and the provider's statements to me.      8/6/2020   Ruddy Chilel MD Amdahl, John, MD  08/07/20 0300       Ruddy Chilel MD  08/07/20 2052

## 2020-08-07 NOTE — PROGRESS NOTES
Monitored pt's ETCO2 and respiratory status throughout sedation. Pt placed on 6 lpm with sat's in the low to mid 90's. Pt  bagged briefly due to apneic periods.  Pt is stable, more wakeful now and on   3 lpm with sat's of 100%, ETCO2 of 32.    Mariluz Dodson, RT on 8/6/2020 at 11:54 PM

## 2020-08-07 NOTE — PLAN OF CARE
Pt has some confusion, VSS, afebrile. Tolerating regular diet. Transfers with Ax1, gait belt, and walker. Dressing CDI, CMS intact. Voiding in adequate amounts., purewick in place. Pain managed with Oxy 10mg, IV dialudid 0.3 and ice. Plans d/c to home.

## 2020-08-07 NOTE — PROGRESS NOTES
Ortho:  Opened splint to check skin. Skin clean dry intact no blisters to medial or lateral aspect.    Rewrapped splint    A/P: right ankle fracture  Plan ORIF tomorrow am. NPO post midnight. Discussed with RN to try PO tramadol due to side effects of dilaudid and oxycodone. Order placed.  Rut Malone PA-C  [phone]

## 2020-08-08 ENCOUNTER — ANESTHESIA EVENT (OUTPATIENT)
Dept: SURGERY | Facility: CLINIC | Age: 85
DRG: 493 | End: 2020-08-08
Payer: COMMERCIAL

## 2020-08-08 ENCOUNTER — APPOINTMENT (OUTPATIENT)
Dept: GENERAL RADIOLOGY | Facility: CLINIC | Age: 85
DRG: 493 | End: 2020-08-08
Attending: INTERNAL MEDICINE
Payer: COMMERCIAL

## 2020-08-08 ENCOUNTER — ANESTHESIA (OUTPATIENT)
Dept: SURGERY | Facility: CLINIC | Age: 85
DRG: 493 | End: 2020-08-08
Payer: COMMERCIAL

## 2020-08-08 PROBLEM — S82.90XA FRACTURE OF LOWER EXTREMITY: Status: ACTIVE | Noted: 2020-08-08

## 2020-08-08 LAB
ANION GAP SERPL CALCULATED.3IONS-SCNC: 3 MMOL/L (ref 3–14)
BUN SERPL-MCNC: 8 MG/DL (ref 7–30)
CALCIUM SERPL-MCNC: 8.3 MG/DL (ref 8.5–10.1)
CHLORIDE SERPL-SCNC: 100 MMOL/L (ref 94–109)
CO2 SERPL-SCNC: 27 MMOL/L (ref 20–32)
CREAT SERPL-MCNC: 0.61 MG/DL (ref 0.52–1.04)
ERYTHROCYTE [DISTWIDTH] IN BLOOD BY AUTOMATED COUNT: 15 % (ref 10–15)
GFR SERPL CREATININE-BSD FRML MDRD: 81 ML/MIN/{1.73_M2}
GLUCOSE SERPL-MCNC: 131 MG/DL (ref 70–99)
HCT VFR BLD AUTO: 35.9 % (ref 35–47)
HGB BLD-MCNC: 10.6 G/DL (ref 11.7–15.7)
MCH RBC QN AUTO: 27.2 PG (ref 26.5–33)
MCHC RBC AUTO-ENTMCNC: 29.5 G/DL (ref 31.5–36.5)
MCV RBC AUTO: 92 FL (ref 78–100)
PLATELET # BLD AUTO: 241 10E9/L (ref 150–450)
POTASSIUM SERPL-SCNC: 3.8 MMOL/L (ref 3.4–5.3)
RBC # BLD AUTO: 3.9 10E12/L (ref 3.8–5.2)
SODIUM SERPL-SCNC: 130 MMOL/L (ref 133–144)
WBC # BLD AUTO: 13.2 10E9/L (ref 4–11)

## 2020-08-08 PROCEDURE — 25000132 ZZH RX MED GY IP 250 OP 250 PS 637: Performed by: ORTHOPAEDIC SURGERY

## 2020-08-08 PROCEDURE — 25000128 H RX IP 250 OP 636: Performed by: ANESTHESIOLOGY

## 2020-08-08 PROCEDURE — C1769 GUIDE WIRE: HCPCS | Performed by: ORTHOPAEDIC SURGERY

## 2020-08-08 PROCEDURE — 25000125 ZZHC RX 250: Performed by: ORTHOPAEDIC SURGERY

## 2020-08-08 PROCEDURE — 36000065 ZZH SURGERY LEVEL 4 W FLUORO 1ST 30 MIN: Performed by: ORTHOPAEDIC SURGERY

## 2020-08-08 PROCEDURE — 25000128 H RX IP 250 OP 636: Performed by: ORTHOPAEDIC SURGERY

## 2020-08-08 PROCEDURE — 25000128 H RX IP 250 OP 636: Performed by: PHYSICIAN ASSISTANT

## 2020-08-08 PROCEDURE — 37000008 ZZH ANESTHESIA TECHNICAL FEE, 1ST 30 MIN: Performed by: ORTHOPAEDIC SURGERY

## 2020-08-08 PROCEDURE — 25000125 ZZHC RX 250: Performed by: NURSE ANESTHETIST, CERTIFIED REGISTERED

## 2020-08-08 PROCEDURE — 71000013 ZZH RECOVERY PHASE 1 LEVEL 1 EA ADDTL HR: Performed by: ORTHOPAEDIC SURGERY

## 2020-08-08 PROCEDURE — 25000128 H RX IP 250 OP 636: Performed by: NURSE ANESTHETIST, CERTIFIED REGISTERED

## 2020-08-08 PROCEDURE — 37000009 ZZH ANESTHESIA TECHNICAL FEE, EACH ADDTL 15 MIN: Performed by: ORTHOPAEDIC SURGERY

## 2020-08-08 PROCEDURE — 80048 BASIC METABOLIC PNL TOTAL CA: CPT | Performed by: INTERNAL MEDICINE

## 2020-08-08 PROCEDURE — 40000277 XR SURGERY CARM FLUORO LESS THAN 5 MIN W STILLS: Mod: TC

## 2020-08-08 PROCEDURE — C1713 ANCHOR/SCREW BN/BN,TIS/BN: HCPCS | Performed by: ORTHOPAEDIC SURGERY

## 2020-08-08 PROCEDURE — 0QSG04Z REPOSITION RIGHT TIBIA WITH INTERNAL FIXATION DEVICE, OPEN APPROACH: ICD-10-PCS | Performed by: ORTHOPAEDIC SURGERY

## 2020-08-08 PROCEDURE — 27210794 ZZH OR GENERAL SUPPLY STERILE: Performed by: ORTHOPAEDIC SURGERY

## 2020-08-08 PROCEDURE — 25000132 ZZH RX MED GY IP 250 OP 250 PS 637: Performed by: INTERNAL MEDICINE

## 2020-08-08 PROCEDURE — 71000012 ZZH RECOVERY PHASE 1 LEVEL 1 FIRST HR: Performed by: ORTHOPAEDIC SURGERY

## 2020-08-08 PROCEDURE — 25000132 ZZH RX MED GY IP 250 OP 250 PS 637: Performed by: PHYSICIAN ASSISTANT

## 2020-08-08 PROCEDURE — 0QSJ04Z REPOSITION RIGHT FIBULA WITH INTERNAL FIXATION DEVICE, OPEN APPROACH: ICD-10-PCS | Performed by: ORTHOPAEDIC SURGERY

## 2020-08-08 PROCEDURE — 36415 COLL VENOUS BLD VENIPUNCTURE: CPT | Performed by: INTERNAL MEDICINE

## 2020-08-08 PROCEDURE — 36000063 ZZH SURGERY LEVEL 4 EA 15 ADDTL MIN: Performed by: ORTHOPAEDIC SURGERY

## 2020-08-08 PROCEDURE — 85027 COMPLETE CBC AUTOMATED: CPT | Performed by: INTERNAL MEDICINE

## 2020-08-08 PROCEDURE — 99233 SBSQ HOSP IP/OBS HIGH 50: CPT | Performed by: HOSPITALIST

## 2020-08-08 PROCEDURE — 12000000 ZZH R&B MED SURG/OB

## 2020-08-08 PROCEDURE — 40000306 ZZH STATISTIC PRE PROC ASSESS II: Performed by: ORTHOPAEDIC SURGERY

## 2020-08-08 PROCEDURE — 25800030 ZZH RX IP 258 OP 636: Performed by: NURSE ANESTHETIST, CERTIFIED REGISTERED

## 2020-08-08 DEVICE — IMP SCR SYN CAN 4.0X18MM FT SS 206.018: Type: IMPLANTABLE DEVICE | Site: ANKLE | Status: FUNCTIONAL

## 2020-08-08 DEVICE — IMP PLATE SYN 1/3 TUBULAR 93MM 08H SS 241.381: Type: IMPLANTABLE DEVICE | Site: ANKLE | Status: FUNCTIONAL

## 2020-08-08 DEVICE — IMP SCR SYN CAN 4.0X16MM FT SS 206.016: Type: IMPLANTABLE DEVICE | Site: ANKLE | Status: FUNCTIONAL

## 2020-08-08 DEVICE — IMP SCR SYN CAN 4.0X20MM FT SS 206.020: Type: IMPLANTABLE DEVICE | Site: ANKLE | Status: FUNCTIONAL

## 2020-08-08 DEVICE — IMP SCR SYN CAN 4.0X40MM LONG THRD SS 207.740: Type: IMPLANTABLE DEVICE | Site: ANKLE | Status: FUNCTIONAL

## 2020-08-08 DEVICE — IMP SCR SYN CORTEX 3.5X12MM SELF TAP SS 204.812: Type: IMPLANTABLE DEVICE | Site: ANKLE | Status: FUNCTIONAL

## 2020-08-08 RX ORDER — ONDANSETRON 4 MG/1
4 TABLET, ORALLY DISINTEGRATING ORAL EVERY 6 HOURS PRN
Status: DISCONTINUED | OUTPATIENT
Start: 2020-08-08 | End: 2020-08-11 | Stop reason: HOSPADM

## 2020-08-08 RX ORDER — DIPHENHYDRAMINE HYDROCHLORIDE 50 MG/ML
12.5 INJECTION INTRAMUSCULAR; INTRAVENOUS EVERY 6 HOURS PRN
Status: DISCONTINUED | OUTPATIENT
Start: 2020-08-08 | End: 2020-08-11 | Stop reason: HOSPADM

## 2020-08-08 RX ORDER — SODIUM CHLORIDE, SODIUM LACTATE, POTASSIUM CHLORIDE, CALCIUM CHLORIDE 600; 310; 30; 20 MG/100ML; MG/100ML; MG/100ML; MG/100ML
INJECTION, SOLUTION INTRAVENOUS CONTINUOUS
Status: DISCONTINUED | OUTPATIENT
Start: 2020-08-08 | End: 2020-08-08 | Stop reason: HOSPADM

## 2020-08-08 RX ORDER — NALOXONE HYDROCHLORIDE 0.4 MG/ML
.1-.4 INJECTION, SOLUTION INTRAMUSCULAR; INTRAVENOUS; SUBCUTANEOUS
Status: DISCONTINUED | OUTPATIENT
Start: 2020-08-08 | End: 2020-08-11 | Stop reason: HOSPADM

## 2020-08-08 RX ORDER — ACETAMINOPHEN 325 MG/1
975 TABLET ORAL EVERY 8 HOURS
Status: DISPENSED | OUTPATIENT
Start: 2020-08-08 | End: 2020-08-11

## 2020-08-08 RX ORDER — PROPOFOL 10 MG/ML
INJECTION, EMULSION INTRAVENOUS PRN
Status: DISCONTINUED | OUTPATIENT
Start: 2020-08-08 | End: 2020-08-08

## 2020-08-08 RX ORDER — BUPIVACAINE HYDROCHLORIDE 5 MG/ML
INJECTION, SOLUTION EPIDURAL; INTRACAUDAL PRN
Status: DISCONTINUED | OUTPATIENT
Start: 2020-08-08 | End: 2020-08-08 | Stop reason: HOSPADM

## 2020-08-08 RX ORDER — CEFAZOLIN SODIUM 1 G/50ML
1 INJECTION, SOLUTION INTRAVENOUS EVERY 8 HOURS
Status: COMPLETED | OUTPATIENT
Start: 2020-08-08 | End: 2020-08-09

## 2020-08-08 RX ORDER — PROCHLORPERAZINE MALEATE 5 MG
5 TABLET ORAL EVERY 6 HOURS PRN
Status: DISCONTINUED | OUTPATIENT
Start: 2020-08-08 | End: 2020-08-11 | Stop reason: HOSPADM

## 2020-08-08 RX ORDER — ONDANSETRON 2 MG/ML
4 INJECTION INTRAMUSCULAR; INTRAVENOUS EVERY 6 HOURS PRN
Status: DISCONTINUED | OUTPATIENT
Start: 2020-08-08 | End: 2020-08-11 | Stop reason: HOSPADM

## 2020-08-08 RX ORDER — AMOXICILLIN 250 MG
1 CAPSULE ORAL 2 TIMES DAILY
Status: DISCONTINUED | OUTPATIENT
Start: 2020-08-08 | End: 2020-08-11 | Stop reason: HOSPADM

## 2020-08-08 RX ORDER — CEFAZOLIN SODIUM 2 G/100ML
2 INJECTION, SOLUTION INTRAVENOUS
Status: COMPLETED | OUTPATIENT
Start: 2020-08-08 | End: 2020-08-08

## 2020-08-08 RX ORDER — LIDOCAINE 40 MG/G
CREAM TOPICAL
Status: DISCONTINUED | OUTPATIENT
Start: 2020-08-08 | End: 2020-08-11 | Stop reason: HOSPADM

## 2020-08-08 RX ORDER — OXYCODONE HYDROCHLORIDE 5 MG/1
5-10 TABLET ORAL EVERY 4 HOURS PRN
Status: DISCONTINUED | OUTPATIENT
Start: 2020-08-08 | End: 2020-08-11 | Stop reason: HOSPADM

## 2020-08-08 RX ORDER — DIPHENHYDRAMINE HCL 12.5MG/5ML
12.5 LIQUID (ML) ORAL EVERY 6 HOURS PRN
Status: DISCONTINUED | OUTPATIENT
Start: 2020-08-08 | End: 2020-08-11 | Stop reason: HOSPADM

## 2020-08-08 RX ORDER — ONDANSETRON 2 MG/ML
INJECTION INTRAMUSCULAR; INTRAVENOUS PRN
Status: DISCONTINUED | OUTPATIENT
Start: 2020-08-08 | End: 2020-08-08

## 2020-08-08 RX ORDER — CEFAZOLIN SODIUM 1 G/3ML
1 INJECTION, POWDER, FOR SOLUTION INTRAMUSCULAR; INTRAVENOUS SEE ADMIN INSTRUCTIONS
Status: DISCONTINUED | OUTPATIENT
Start: 2020-08-08 | End: 2020-08-08 | Stop reason: HOSPADM

## 2020-08-08 RX ORDER — LANOLIN ALCOHOL/MO/W.PET/CERES
3-6 CREAM (GRAM) TOPICAL
Status: DISCONTINUED | OUTPATIENT
Start: 2020-08-09 | End: 2020-08-11 | Stop reason: HOSPADM

## 2020-08-08 RX ORDER — DEXAMETHASONE SODIUM PHOSPHATE 4 MG/ML
INJECTION, SOLUTION INTRA-ARTICULAR; INTRALESIONAL; INTRAMUSCULAR; INTRAVENOUS; SOFT TISSUE PRN
Status: DISCONTINUED | OUTPATIENT
Start: 2020-08-08 | End: 2020-08-08

## 2020-08-08 RX ORDER — LIDOCAINE HYDROCHLORIDE 10 MG/ML
INJECTION, SOLUTION INFILTRATION; PERINEURAL PRN
Status: DISCONTINUED | OUTPATIENT
Start: 2020-08-08 | End: 2020-08-08

## 2020-08-08 RX ORDER — MAGNESIUM HYDROXIDE 1200 MG/15ML
LIQUID ORAL PRN
Status: DISCONTINUED | OUTPATIENT
Start: 2020-08-08 | End: 2020-08-08 | Stop reason: HOSPADM

## 2020-08-08 RX ORDER — CELECOXIB 100 MG/1
100 CAPSULE ORAL 2 TIMES DAILY
Status: COMPLETED | OUTPATIENT
Start: 2020-08-08 | End: 2020-08-10

## 2020-08-08 RX ORDER — ACETAMINOPHEN 325 MG/1
650 TABLET ORAL EVERY 4 HOURS PRN
Status: DISCONTINUED | OUTPATIENT
Start: 2020-08-11 | End: 2020-08-11 | Stop reason: HOSPADM

## 2020-08-08 RX ORDER — SODIUM CHLORIDE, SODIUM LACTATE, POTASSIUM CHLORIDE, CALCIUM CHLORIDE 600; 310; 30; 20 MG/100ML; MG/100ML; MG/100ML; MG/100ML
INJECTION, SOLUTION INTRAVENOUS CONTINUOUS
Status: DISCONTINUED | OUTPATIENT
Start: 2020-08-08 | End: 2020-08-10

## 2020-08-08 RX ORDER — HYDROMORPHONE HYDROCHLORIDE 1 MG/ML
.3-.5 INJECTION, SOLUTION INTRAMUSCULAR; INTRAVENOUS; SUBCUTANEOUS
Status: DISCONTINUED | OUTPATIENT
Start: 2020-08-08 | End: 2020-08-11 | Stop reason: HOSPADM

## 2020-08-08 RX ORDER — LABETALOL 20 MG/4 ML (5 MG/ML) INTRAVENOUS SYRINGE
PRN
Status: DISCONTINUED | OUTPATIENT
Start: 2020-08-08 | End: 2020-08-08

## 2020-08-08 RX ORDER — TRAMADOL HYDROCHLORIDE 50 MG/1
50 TABLET ORAL EVERY 6 HOURS PRN
Status: DISCONTINUED | OUTPATIENT
Start: 2020-08-08 | End: 2020-08-11 | Stop reason: HOSPADM

## 2020-08-08 RX ORDER — GLYCOPYRROLATE 0.2 MG/ML
INJECTION, SOLUTION INTRAMUSCULAR; INTRAVENOUS PRN
Status: DISCONTINUED | OUTPATIENT
Start: 2020-08-08 | End: 2020-08-08

## 2020-08-08 RX ORDER — SODIUM CHLORIDE, SODIUM LACTATE, POTASSIUM CHLORIDE, CALCIUM CHLORIDE 600; 310; 30; 20 MG/100ML; MG/100ML; MG/100ML; MG/100ML
INJECTION, SOLUTION INTRAVENOUS CONTINUOUS PRN
Status: DISCONTINUED | OUTPATIENT
Start: 2020-08-08 | End: 2020-08-08

## 2020-08-08 RX ORDER — LIDOCAINE 40 MG/G
CREAM TOPICAL
Status: DISCONTINUED | OUTPATIENT
Start: 2020-08-08 | End: 2020-08-08 | Stop reason: HOSPADM

## 2020-08-08 RX ORDER — FENTANYL CITRATE 50 UG/ML
INJECTION, SOLUTION INTRAMUSCULAR; INTRAVENOUS PRN
Status: DISCONTINUED | OUTPATIENT
Start: 2020-08-08 | End: 2020-08-08

## 2020-08-08 RX ORDER — FENTANYL CITRATE 50 UG/ML
25 INJECTION, SOLUTION INTRAMUSCULAR; INTRAVENOUS ONCE
Status: COMPLETED | OUTPATIENT
Start: 2020-08-08 | End: 2020-08-08

## 2020-08-08 RX ORDER — AMOXICILLIN 250 MG
2 CAPSULE ORAL 2 TIMES DAILY
Status: DISCONTINUED | OUTPATIENT
Start: 2020-08-08 | End: 2020-08-11 | Stop reason: HOSPADM

## 2020-08-08 RX ADMIN — GLYCOPYRROLATE 0.2 MG: 0.2 INJECTION, SOLUTION INTRAMUSCULAR; INTRAVENOUS at 08:36

## 2020-08-08 RX ADMIN — CEFAZOLIN SODIUM 1 G: 1 INJECTION, SOLUTION INTRAVENOUS at 20:57

## 2020-08-08 RX ADMIN — TRAMADOL HYDROCHLORIDE 50 MG: 50 TABLET, FILM COATED ORAL at 04:09

## 2020-08-08 RX ADMIN — HYDROMORPHONE HYDROCHLORIDE 0.5 MG: 1 INJECTION, SOLUTION INTRAMUSCULAR; INTRAVENOUS; SUBCUTANEOUS at 09:18

## 2020-08-08 RX ADMIN — CELECOXIB 100 MG: 100 CAPSULE ORAL at 20:04

## 2020-08-08 RX ADMIN — SODIUM CHLORIDE, POTASSIUM CHLORIDE, SODIUM LACTATE AND CALCIUM CHLORIDE: 600; 310; 30; 20 INJECTION, SOLUTION INTRAVENOUS at 09:37

## 2020-08-08 RX ADMIN — LABETALOL 20 MG/4 ML (5 MG/ML) INTRAVENOUS SYRINGE 5 MG: at 09:45

## 2020-08-08 RX ADMIN — LIDOCAINE HYDROCHLORIDE 50 MG: 10 INJECTION, SOLUTION INFILTRATION; PERINEURAL at 08:36

## 2020-08-08 RX ADMIN — ASPIRIN 325 MG: 325 TABLET, COATED ORAL at 17:39

## 2020-08-08 RX ADMIN — ACETAMINOPHEN 975 MG: 325 TABLET, FILM COATED ORAL at 17:39

## 2020-08-08 RX ADMIN — PROPOFOL 50 MG: 10 INJECTION, EMULSION INTRAVENOUS at 08:39

## 2020-08-08 RX ADMIN — SIMVASTATIN 20 MG: 20 TABLET, FILM COATED ORAL at 21:49

## 2020-08-08 RX ADMIN — HYDROMORPHONE HYDROCHLORIDE 0.5 MG: 1 INJECTION, SOLUTION INTRAMUSCULAR; INTRAVENOUS; SUBCUTANEOUS at 08:39

## 2020-08-08 RX ADMIN — DONEPEZIL HYDROCHLORIDE 10 MG: 10 TABLET ORAL at 21:49

## 2020-08-08 RX ADMIN — DEXAMETHASONE SODIUM PHOSPHATE 4 MG: 4 INJECTION, SOLUTION INTRA-ARTICULAR; INTRALESIONAL; INTRAMUSCULAR; INTRAVENOUS; SOFT TISSUE at 08:36

## 2020-08-08 RX ADMIN — CEFAZOLIN SODIUM 2 G: 2 INJECTION, SOLUTION INTRAVENOUS at 08:38

## 2020-08-08 RX ADMIN — FENTANYL CITRATE 25 MCG: 50 INJECTION, SOLUTION INTRAMUSCULAR; INTRAVENOUS at 09:51

## 2020-08-08 RX ADMIN — DOCUSATE SODIUM AND SENNOSIDES 1 TABLET: 8.6; 5 TABLET ORAL at 20:04

## 2020-08-08 RX ADMIN — FENTANYL CITRATE 25 MCG: 50 INJECTION, SOLUTION INTRAMUSCULAR; INTRAVENOUS at 08:05

## 2020-08-08 RX ADMIN — FENTANYL CITRATE 25 MCG: 50 INJECTION, SOLUTION INTRAMUSCULAR; INTRAVENOUS at 09:53

## 2020-08-08 RX ADMIN — SODIUM CHLORIDE, POTASSIUM CHLORIDE, SODIUM LACTATE AND CALCIUM CHLORIDE: 600; 310; 30; 20 INJECTION, SOLUTION INTRAVENOUS at 07:52

## 2020-08-08 RX ADMIN — FENTANYL CITRATE 100 MCG: 50 INJECTION, SOLUTION INTRAMUSCULAR; INTRAVENOUS at 08:36

## 2020-08-08 RX ADMIN — PROPOFOL 150 MG: 10 INJECTION, EMULSION INTRAVENOUS at 08:36

## 2020-08-08 RX ADMIN — ONDANSETRON HYDROCHLORIDE 4 MG: 2 INJECTION, SOLUTION INTRAVENOUS at 09:37

## 2020-08-08 ASSESSMENT — ACTIVITIES OF DAILY LIVING (ADL)
ADLS_ACUITY_SCORE: 20
ADLS_ACUITY_SCORE: 22
ADLS_ACUITY_SCORE: 20
ADLS_ACUITY_SCORE: 22

## 2020-08-08 NOTE — PROGRESS NOTES
Brief Hospitalist Cross cover note    Reason for the call : AUR    Actions taken :    EMR reviewed.      Significant findings/Current problem:    Vital signs : /57   Pulse 72   Temp 98.1  F (36.7  C) (Temporal)   Resp 16   Wt 78.3 kg (172 lb 9.6 oz)   SpO2 91%   BMI 32.61 kg/m         Acute Urinary Retention -Bladder scanned for 500 ml    Surgery planned     Recommendation/Plan:    May place shaw and remove postop per protocol     Critical Care time: none

## 2020-08-08 NOTE — PLAN OF CARE
Pt on bedrest. RLE elevated, splinted. Pt had increased confusion this morning, so switched pain meds to tramadol. Does have some baseline confusion and forgetfulness per dtr. IVF. Replaced K+. Voiding via purewick. NPO at 2200. Surgery tomorrow morning.

## 2020-08-08 NOTE — ANESTHESIA POSTPROCEDURE EVALUATION
Patient: Juany Collazo    Procedure(s):  OPEN REDUCTION INTERNAL FIXATION, FRACTURE,RIGHT ANKLE    Diagnosis:Ankle fracture [S82.899A]  Diagnosis Additional Information: No value filed.    Anesthesia Type:  General    Note:  Anesthesia Post Evaluation    Patient location during evaluation: PACU  Patient participation: Able to fully participate in evaluation  Level of consciousness: awake and alert  Pain management: adequate  Airway patency: patent  Cardiovascular status: acceptable  Respiratory status: acceptable  Hydration status: acceptable  PONV: controlled     Anesthetic complications: None          Last vitals:  Vitals:    08/08/20 1015 08/08/20 1020 08/08/20 1030   BP: 100/52 118/50 99/52   Pulse: 71 68 72   Resp: 14 13 14   Temp:      SpO2: 97% 97% 98%         Electronically Signed By: Puneet Mccabe MD  August 8, 2020  11:14 AM

## 2020-08-08 NOTE — PLAN OF CARE
Pt arrived to floor from PACU on bed around 1300. Lethargic, oriented to person. Dtr was waiting in room when pt arrived. VSS, 4L NC. Clear liquids. RLE elevated. Have blue foam elevator in room. IVF. Nothing given for pain in PACU or on floor yet. Pt voided large incontinent amt in PACU. Purewick in place. Patient sleeping on and off. Please have evening nurse call to update dtr.

## 2020-08-08 NOTE — ANESTHESIA CARE TRANSFER NOTE
Patient: Juany Collazo    Procedure(s):  OPEN REDUCTION INTERNAL FIXATION, FRACTURE,RIGHT ANKLE    Diagnosis: Ankle fracture [S82.510C]  Diagnosis Additional Information: No value filed.    Anesthesia Type:   General     Note:  Airway :Face Mask  Patient transferred to:PACU  Comments: VSS, snoring and resting comfortably, report to RN,no anesthetic complications. Handoff Report: Identifed the Patient, Identified the Reponsible Provider, Reviewed the pertinent medical history, Discussed the surgical course, Reviewed Intra-OP anesthesia mangement and issues during anesthesia and Allowed opportunity for questions and acknowledgement of understanding      Vitals: (Last set prior to Anesthesia Care Transfer)    CRNA VITALS  8/8/2020 0933 - 8/8/2020 1009      8/8/2020             NIBP:  115/55    NIBP Mean:  66    SpO2:  99 %                Electronically Signed By: BETH Live CRNA  August 8, 2020  10:09 AM

## 2020-08-08 NOTE — ANESTHESIA PREPROCEDURE EVALUATION
Anesthesia Pre-Procedure Evaluation    Patient: Juany Collazo   MRN: [id number] : 1932          Preoperative Diagnosis: Ankle fracture [S82.899A]    Procedure(s):  OPEN REDUCTION INTERNAL FIXATION, FRACTURE,RIGHT ANKLE    Past Medical History:   Diagnosis Date     Arthritis      Carpal tunnel syndrome      Chronic pain     right leg     Gastro-oesophageal reflux disease      Hyperlipidemia 10/31/2010     Hypertension 10/27/2003     Mild major depression 2014     Osteoporosis      Past Surgical History:   Procedure Laterality Date     ARTHROPLASTY HIP Right      ARTHROPLASTY KNEE Left 3/9/2020    Procedure: Left total knee arthroplasty ([employer] #3 PCR femur; #2 tibia; 35 mm thin patella; 9 mm tibial insert);  Surgeon: Isreal Tineo MD;  Location: [facility] OR     DECOMPRESSION LUMBAR ONE LEVEL  2013    Procedure: DECOMPRESSION LUMBAR ONE LEVEL;  Decompression Bilateral L4-5  ;  Surgeon: Lang Garcia MD;  Location: [facility] OR     EYE SURGERY       Tubal ligation NOS  1970     Anesthesia Evaluation     . Pt has had prior anesthetic. Type: General and Regional           ROS/MED HX    ENT/Pulmonary:  - neg pulmonary ROS     Neurologic:  - neg neurologic ROS     Cardiovascular:     (+) Dyslipidemia, hypertension----. : . . . :. .       METS/Exercise Tolerance:     Hematologic: Comments: Lab Test        08/08/20     08/06/20     03/10/20     03/09/20     02/17/20      --          13                       0620          2319          0745          1553          1203           --           1600          WBC          13.2*        17.4*         --           --          12.6*          < >        8.7           HGB          10.6*        11.8         9.7*          --          12.1           < >        12.5          MCV          92           89            --           --          90             < >        88            PLT          241          313           --          236          374             < >        269           INR           --          0.94          --           --           --           --          0.92           < > = values in this interval not displayed.                  Lab Test        08/08/20     08/07/20     08/06/20     03/10/20     03/09/20      --          01/27/20                       0620          1739          2319          0764          1553           --                             NA           130*          --          130*          --           --           --          136           POTASSIUM    3.8          3.9          3.2*          --           --            < >        4.3           CHLORIDE     100           --          96            --           --           --          102           CO2          27            --          25            --           --           --          27            BUN          8             --          12            --           --           --          14            CR           0.61          --          0.78          --          0.73           < >        0.75          ANIONGAP     3             --          9             --           --           --          7             MAUREEN          8.3*          --          8.7           --           --           --          9.4           GLC          131*          --          133*         179*          --           --          107            < > = values in this interval not displayed.                         Musculoskeletal:   (+) arthritis, fracture lower extremity: Ankle, other musculoskeletal- Osteoporosis      GI/Hepatic:     (+) GERD Asymptomatic on medication,       Renal/Genitourinary:  - ROS Renal section negative       Endo:  - neg endo ROS       Psychiatric:     (+) psychiatric history depression      Infectious Disease:  - neg infectious disease ROS       Malignancy:      - no malignancy   Other:    (+) No chance of pregnancy C-spine cleared: N/A, no H/O Chronic Pain,no other significant  "disability   - neg other ROS                      Physical Exam  Normal systems: cardiovascular, pulmonary and dental    Airway   Mallampati: II  TM distance: >3 FB  Neck ROM: full    Dental     Cardiovascular       Pulmonary             Lab Results   Component Value Date    WBC 13.2 (H) 08/08/2020    HGB 10.6 (L) 08/08/2020    HCT 35.9 08/08/2020     08/08/2020     (L) 08/08/2020    POTASSIUM 3.8 08/08/2020    CHLORIDE 100 08/08/2020    CO2 27 08/08/2020    BUN 8 08/08/2020    CR 0.61 08/08/2020     (H) 08/08/2020    MAUREEN 8.3 (L) 08/08/2020    MAG 2.2 11/13/2018    ALBUMIN 3.6 11/13/2018    PROTTOTAL 6.7 (L) 11/13/2018    ALT 15 01/27/2020    AST 17 01/27/2020    ALKPHOS 78 11/13/2018    BILITOTAL 0.5 11/13/2018    PTT 31 08/06/2020    INR 0.94 08/06/2020    TSH 2.14 09/29/2013       Preop Vitals  BP Readings from Last 3 Encounters:   08/08/20 128/57   03/10/20 125/50   02/17/20 136/82    Pulse Readings from Last 3 Encounters:   08/07/20 72   03/09/20 69   02/17/20 60      Resp Readings from Last 3 Encounters:   08/08/20 16   03/10/20 16   02/17/20 14    SpO2 Readings from Last 3 Encounters:   08/08/20 91%   03/10/20 97%   01/10/20 95%      Temp Readings from Last 1 Encounters:   08/08/20 98.1  F (36.7  C) (Temporal)    Ht Readings from Last 1 Encounters:   04/16/20 1.549 m (5' 1\")      Wt Readings from Last 1 Encounters:   08/07/20 78.3 kg (172 lb 9.6 oz)    Estimated body mass index is 32.61 kg/m  as calculated from the following:    Height as of 4/16/20: 1.549 m (5' 1\").    Weight as of this encounter: 78.3 kg (172 lb 9.6 oz).       Anesthesia Plan      History & Physical Review  History and physical reviewed and following examination; no interval change.    ASA Status:  3 .    NPO Status:  > 8 hours    Plan for General with Propofol and Intravenous induction. Maintenance will be Balanced.    PONV prophylaxis:  Ondansetron (or other 5HT-3) and Dexamethasone or Solumedrol         Postoperative " Care  Postoperative pain management:  IV analgesics.      Consents  Anesthetic plan, risks, benefits and alternatives discussed with:  Patient.  Use of blood products discussed: Yes.   Use of blood products discussed with Patient.  Consented to blood products.  .                 Puneet Mccabe MD                    .

## 2020-08-08 NOTE — PROGRESS NOTES
[facility]    Hospitalist Progress Note  Name: Juany Collazo    MRN: [id number]  Provider:  Ruddy Ty DO, MPH  Date of Service: 08/08/2020    Summary of Stay: Juany Collazo is a 88 year old female with a history of hypertension, hyperlipidemia, degenerative joint disease, dementia admitted on 8/6/2020 with fall and ankle fracture.      Problem List:   1. Right distal fibular fx: Ortho consult, repair today. Optimized for surgery.  2. Syncope: Daughter massaging the neck immediately before, likely carotid sinus involvement and vagal response. Tele and TTE OK. No further work up.  3. Leukocytosis: Likely stress, monitor. No fever nor e/o infection.  4. Asx COVID 19: Neg.  5. HTN: BP OK but soft, hold PTA lisinopril, hydrochlorothiazide, norvasc.    DVT Prophylaxis: Pneumatic Compression Devices  Code Status: Full Code  Diet: NPO per Anesthesia Guidelines for Procedure/Surgery Except for: Meds    Bella Catheter: not present  Disposition: Expected discharge in 2-3 days to LTC vs TCU. Goals prior to discharge include PT.   Incidental Findings: None.  Family updated today: Yes      Interval History   The patient reports doing well. No chest pain or shortness of breath. No nausea, vomiting, diarrhea, constipation. No fevers. No other specific complaints identified.     -Data reviewed today: I personally reviewed all new labs and imaging results over the last 24 hours.     Physical Exam   Temp: 97.8  F (36.6  C) Temp src: Temporal BP: 109/51 Pulse: 72 Heart Rate: 81 Resp: 19 SpO2: 93 % O2 Device: Nasal cannula Oxygen Delivery: 4 LPM  Vitals:    08/07/20 0257   Weight: 78.3 kg (172 lb 9.6 oz)     Vital Signs with Ranges  Temp:  [97.8  F (36.6  C)-100.5  F (38.1  C)] 97.8  F (36.6  C)  Pulse:  [68-73] 72  Heart Rate:  [63-81] 81  Resp:  [13-19] 19  BP: ()/(46-73) 109/51  SpO2:  [91 %-99 %] 93 %  I/O last 3 completed shifts:  In: 360 [P.O.:360]  Out: 775 [Urine:775]    GENERAL: No apparent  distress. Awake, alert, and fully oriented.  HEENT: Normocephalic, atraumatic. Extraocular movements intact.  CARDIOVASCULAR: Regular rate and rhythm without murmurs or rubs. No S3.  PULMONARY: Clear bilaterally.  GASTROINTESTINAL: Soft, non-tender, non-distended. Bowel sounds normoactive.   EXTREMITIES: No cyanosis or clubbing. No edema.  NEUROLOGICAL: CN 2-12 grossly intact, no focal neurological deficits.  DERMATOLOGICAL: No rash, ulcer, bruising, nor jaundice.     Medications     dextrose 5% and 0.45% NaCl + KCl 20 mEq/L 100 mL/hr at 08/07/20 0345       [Auto Hold] amLODIPine  2.5 mg Oral Daily     [Auto Hold] citalopram  20 mg Oral Daily     [Auto Hold] donepezil  10 mg Oral At Bedtime     [Auto Hold] omeprazole  20 mg Oral Daily     [Auto Hold] simvastatin  20 mg Oral At Bedtime     [Auto Hold] sodium chloride (PF)  3 mL Intracatheter Q8H     Data     Laboratory:  Recent Labs   Lab 08/08/20  0620 08/06/20  2319   WBC 13.2* 17.4*   HGB 10.6* 11.8   HCT 35.9 39.1   MCV 92 89    313     Recent Labs   Lab 08/08/20  0620 08/07/20  1739 08/06/20  2319   *  --  130*   POTASSIUM 3.8 3.9 3.2*   CHLORIDE 100  --  96   CO2 27  --  25   ANIONGAP 3  --  9   *  --  133*   BUN 8  --  12   CR 0.61  --  0.78   GFRESTIMATED 81  --  67   GFRESTBLACK >90  --  78   MAUREEN 8.3*  --  8.7     No results for input(s): CULT in the last 168 hours.    Imaging:  Recent Results (from the past 24 hour(s))   XR Surgery ANDIE L/T 5 Min Fluoro w Stills    Narrative    This exam was marked as non-reportable because it will not be read by a   radiologist or a [place] non-radiologist provider.               Ruddy Ty DO MPH  [facility] Hospitalist  [address] [place] [address]  Pager: [phone]  08/08/2020

## 2020-08-08 NOTE — OP NOTE
Procedure Date: 08/08/2020      PREOPERATIVE DIAGNOSIS:  Displaced trimalleolar right ankle fracture.      POSTOPERATIVE DIAGNOSIS:  Displaced trimalleolar right ankle fracture.      PROCEDURE PERFORMED:  Open reduction with internal fixation, right trimalleolar ankle fracture.      SURGEON:  Himanshu Nash MD      ASSISTANT:  Rut Malone PA-C      ANESTHESIA:  General with local field block.      ESTIMATED BLOOD LOSS:  10 mL      TOURNIQUET TIME:  Approximately 75 minutes.      COMPLICATIONS:  None.      DESCRIPTION OF PROCEDURE:  The patient was taken to the operating room where after administration of general anesthetic, antibiotic prophylaxis and sterile prep and drape, the leg was exsanguinated and a lateral incision was made with full thickness flaps, taking care to avoid the course of the superficial peroneal nerve, which was not encountered.  The bone was relatively soft and for this reason, a Synthes locking plate was used with proximal and distal screws of good purchase and orientation.  The fracture orientation was such that the lag screw was from posterior to anterior.  A cancellous lag screw was placed as the bone quality was poor.  Overall, fixation with a locking plate; however, was adequate.  Of note, there was an anterior avulsion fracture of the tib-fib ligament, which was repaired with suture through the plate.  This wound was irrigated and provisionally closed.      Attention was directed to the medial side.  There was skin erythema and early blistering which was avoided with a longitudinal incision.  Anatomic reduction was performed followed by 2 Synthes 4 mm cannulated screws with excellent purchase and orientation.  The construct was quite stable after this was stress testing.  The wounds were irrigated copiously and closed in layers, followed by a sterile compressive bandage.  Of note, tissue and bone quality were poor and great care was taken both with tissue handling and screw  fixation.  Intraoperative 3 views of the ankle showed excellent hardware alignment and position with no complications.         REBECCA ORTIZ MD             D: 2020   T: 2020   MT: MARTHA      Name:     OLE ROBISON   MRN:      [id number]        Account:        [id number]   :      1932           Procedure Date: 2020      Document: [id number]

## 2020-08-08 NOTE — PLAN OF CARE
Pt is alert, but forgetful. NPO since 10pm. IV infusing. Pure wick in place with good output. Leg is elevated with splint wrap. Tramadol for pain. Plan for surgery this morning.

## 2020-08-09 ENCOUNTER — APPOINTMENT (OUTPATIENT)
Dept: OCCUPATIONAL THERAPY | Facility: CLINIC | Age: 85
DRG: 493 | End: 2020-08-09
Attending: ORTHOPAEDIC SURGERY
Payer: COMMERCIAL

## 2020-08-09 ENCOUNTER — APPOINTMENT (OUTPATIENT)
Dept: PHYSICAL THERAPY | Facility: CLINIC | Age: 85
DRG: 493 | End: 2020-08-09
Attending: ORTHOPAEDIC SURGERY
Payer: COMMERCIAL

## 2020-08-09 LAB — GLUCOSE BLDC GLUCOMTR-MCNC: 130 MG/DL (ref 70–99)

## 2020-08-09 PROCEDURE — 12000000 ZZH R&B MED SURG/OB

## 2020-08-09 PROCEDURE — 97535 SELF CARE MNGMENT TRAINING: CPT | Mod: GO | Performed by: REHABILITATION PRACTITIONER

## 2020-08-09 PROCEDURE — 97530 THERAPEUTIC ACTIVITIES: CPT | Mod: GP

## 2020-08-09 PROCEDURE — 25000132 ZZH RX MED GY IP 250 OP 250 PS 637: Performed by: ORTHOPAEDIC SURGERY

## 2020-08-09 PROCEDURE — 97165 OT EVAL LOW COMPLEX 30 MIN: CPT | Mod: GO | Performed by: REHABILITATION PRACTITIONER

## 2020-08-09 PROCEDURE — 97161 PT EVAL LOW COMPLEX 20 MIN: CPT | Mod: GP

## 2020-08-09 PROCEDURE — 25000128 H RX IP 250 OP 636: Performed by: ORTHOPAEDIC SURGERY

## 2020-08-09 PROCEDURE — 25000132 ZZH RX MED GY IP 250 OP 250 PS 637: Performed by: INTERNAL MEDICINE

## 2020-08-09 PROCEDURE — 99233 SBSQ HOSP IP/OBS HIGH 50: CPT | Performed by: HOSPITALIST

## 2020-08-09 PROCEDURE — 00000146 ZZHCL STATISTIC GLUCOSE BY METER IP

## 2020-08-09 RX ADMIN — AMLODIPINE BESYLATE 2.5 MG: 2.5 TABLET ORAL at 08:59

## 2020-08-09 RX ADMIN — TRAMADOL HYDROCHLORIDE 50 MG: 50 TABLET, FILM COATED ORAL at 01:33

## 2020-08-09 RX ADMIN — CELECOXIB 100 MG: 100 CAPSULE ORAL at 20:59

## 2020-08-09 RX ADMIN — OMEPRAZOLE 20 MG: 20 CAPSULE, DELAYED RELEASE ORAL at 08:59

## 2020-08-09 RX ADMIN — SIMVASTATIN 20 MG: 20 TABLET, FILM COATED ORAL at 21:03

## 2020-08-09 RX ADMIN — ASPIRIN 325 MG: 325 TABLET, COATED ORAL at 09:00

## 2020-08-09 RX ADMIN — CITALOPRAM HYDROBROMIDE 20 MG: 20 TABLET, FILM COATED ORAL at 09:02

## 2020-08-09 RX ADMIN — ACETAMINOPHEN 975 MG: 325 TABLET, FILM COATED ORAL at 08:59

## 2020-08-09 RX ADMIN — ACETAMINOPHEN 975 MG: 325 TABLET, FILM COATED ORAL at 15:36

## 2020-08-09 RX ADMIN — CELECOXIB 100 MG: 100 CAPSULE ORAL at 09:00

## 2020-08-09 RX ADMIN — TRAMADOL HYDROCHLORIDE 50 MG: 50 TABLET, FILM COATED ORAL at 21:00

## 2020-08-09 RX ADMIN — ACETAMINOPHEN 975 MG: 325 TABLET, FILM COATED ORAL at 01:33

## 2020-08-09 RX ADMIN — CEFAZOLIN SODIUM 1 G: 1 INJECTION, SOLUTION INTRAVENOUS at 13:13

## 2020-08-09 RX ADMIN — CEFAZOLIN SODIUM 1 G: 1 INJECTION, SOLUTION INTRAVENOUS at 05:03

## 2020-08-09 RX ADMIN — TRAMADOL HYDROCHLORIDE 50 MG: 50 TABLET, FILM COATED ORAL at 09:00

## 2020-08-09 RX ADMIN — DOCUSATE SODIUM AND SENNOSIDES 2 TABLET: 8.6; 5 TABLET, FILM COATED ORAL at 09:00

## 2020-08-09 RX ADMIN — DOCUSATE SODIUM AND SENNOSIDES 2 TABLET: 8.6; 5 TABLET, FILM COATED ORAL at 20:59

## 2020-08-09 RX ADMIN — DONEPEZIL HYDROCHLORIDE 10 MG: 10 TABLET ORAL at 21:00

## 2020-08-09 ASSESSMENT — ACTIVITIES OF DAILY LIVING (ADL)
ADLS_ACUITY_SCORE: 22
ADLS_ACUITY_SCORE: 22
ADLS_ACUITY_SCORE: 19
ADLS_ACUITY_SCORE: 22
ADLS_ACUITY_SCORE: 19
ADLS_ACUITY_SCORE: 18

## 2020-08-09 NOTE — PLAN OF CARE
Pt disorientated x4, afebrile. Denies numbness/tingling. Tolerating regular diet. 1L O2 with capno. Pure wick in place with good output. Repositioning in bed with assist of 2. Tramadol given for pain. Plan to work with PT today.

## 2020-08-09 NOTE — PROGRESS NOTES
Juany Collazo  2020  POD # 1 s/p ORIF right ankle fracture  Admit Date:  2020      Doing well.  Clean wound without signs of infection.  No immediate surgical complications identified.  No excessive bleeding  Pain well-controlled.  Objective: no chest pain or shortness of breath.   Blood pressure 121/59, pulse 72, temperature 97.7  F (36.5  C), temperature source Temporal, resp. rate 20, weight 84.1 kg (185 lb 4.8 oz), SpO2 94 %.    Temperatures:  Current - Temp: 97.7  F (36.5  C); Max - Temp  Av.8  F (36.6  C)  Min: 96  F (35.6  C)  Max: 98.8  F (37.1  C)  Pulse range: Pulse  Av  Min: 68  Max: 73  Blood pressure range: Systolic (24hrs), Av , Min:90 , Max:134   ; Diastolic (24hrs), Av, Min:46, Max:78    Exam:  CMS: intact  alert, stable, wound ok  Splint in good position.   Able to move toes.   Sensation intact to light touch distally in the RLE  Communicates clearly with examiner    Labs:  Recent Labs   Lab Test 20  0620 20  1739 20  231   POTASSIUM 3.8 3.9 3.2*     Recent Labs   Lab Test 20  0620 20  2319 03/10/20  0745   HGB 10.6* 11.8 9.7*     Recent Labs   Lab Test 20  1600   INR 0.94 0.92     Recent Labs   Lab Test 20  0620 20  2319 20  1553    313 236       PLAN: continue current therapy regimen. Aspirin for dvt ppx. NWB in the RLE. Use walker for ambulatory assistance. Discharge to rehab first of this week.

## 2020-08-09 NOTE — PLAN OF CARE
"OT eval complete and treatment initiated.  Pt admitted for ORIF right ankle fracture s/p fall.  PMH of HTN, HLD, DJD, dementia, recent hx of right knee replacement in March 2020.  Patient has an ILF apartment, however has been staying with daughter since March 2020 due to COVID.  Patient's daughter has single story home, single step to enter.  Daughter reports that she and patient's grandson are working from home so family can provide 24/7 assist of 1-2.  Family cared for patient's  for 10 years until he passed away from dementia. Patient uses walker at baseline.      Discharge Planner OT   Patient plan for discharge: home with support of Family  Current status: Pt supine upon arrival.  Able to come to EOB with min A given simple one step directions and extra time for processing.  Good sitting balance.  OT provided education in NWB precautions and pivot transfer techniques to commode.  Pt able to complete sit/stand and pivot to commode with min A using FWW and gait belt.  Mod A needed for LE dressing and toilet hygiene.  She then stood with min A to pivot to recliner chair.  Daughter present for education in DME needs to include a 18\" w/c with elevating/extending leg rest, bedside commode and extended tub transfer bench.  Barriers to return to prior living situation: NWB precautions, DJD in multiple joints (shoulders, right knee) with recent L TKA Recommendations for discharge: home with 24/7 supervision and assist with dressing, bathing, toileting, cooking, homemaking, laundry, medication management  Rationale for recommendations: Pt would benefit from continued OT to maximize safety and independence in ADLs and functional tranfers using DME with Pt/Caregiver training.       Entered by: Lucio Celis 08/09/2020 3:04 PM       "

## 2020-08-09 NOTE — PLAN OF CARE
A&O x 4. Forgetful. NWB RLE- Up with A x 2 to pivot transfer. Dressing CDI, denies N/T. Able to wiggle toes on R side. Ultram, tylenol, and celebrex for pain. NSR on tele. Purewic in place. Tolerating regular diet. Baseline pt has uneven pupils.   Plan for daughter to be here at 0900 to work with PT on mobility. Plan to discharge home with family.   PT/OT/Ortho following

## 2020-08-09 NOTE — PROGRESS NOTES
08/09/20 1018   Quick Adds   Type of Visit Initial PT Evaluation   Living Environment   Lives With child(marvin), adult   Living Arrangements independent living facility   Home Accessibility no concerns   Transportation Anticipated car, drives self   Self-Care   Usual Activity Tolerance moderate   Current Activity Tolerance fair   Equipment Currently Used at Home walker, rolling   Functional Level Prior   Ambulation 1-->assistive equipment   Transferring 1-->assistive equipment   Toileting 0-->independent   Bathing 0-->independent   Communication 0-->understands/communicates without difficulty   Swallowing 0-->swallows foods/liquids without difficulty   Cognition 1 - attention or memory deficits   Fall history within last six months yes   Number of times patient has fallen within last six months 2   Which of the above functional risks had a recent onset or change? fall history;dressing;bathing;toileting;transferring;ambulation   Prior Functional Level Comment Pt is mod I at baseline with use of walker. Pt endorsed help from ILF for med managment and stated that they can help with whatever she may need.    General Information   Onset of Illness/Injury or Date of Surgery - Date 08/06/20   Referring Physician Himanshu Nash MD    Patient/Family Goals Statement return to home   Pertinent History of Current Problem (include personal factors and/or comorbidities that impact the POC) Patient with PMH including HTN, HLD, DJD, dementia, recent hx of right knee replacement in March 2020 now seen POD#1 s/p ORIF right ankle fracture.    Precautions/Limitations fall precautions   Weight-Bearing Status - RLE nonweight-bearing   General Info Comments [place]   Cognitive Status Examination   Orientation orientation to person, place and time   Pain Assessment   Patient Currently in Pain Yes, see Vital Sign flowsheet   Posture    Posture Forward head position;Protracted shoulders   Range of Motion (ROM)   ROM Comment WFL  "  Strength   Strength Comments decreased global strength   Bed Mobility   Bed Mobility Comments min A   Transfer Skills   Transfer Comments min A x2 with 2WW   Gait   Gait Comments patient unable to ambulate at this date   General Therapy Interventions   Planned Therapy Interventions balance training;bed mobility training;gait training;transfer training;home program guidelines;progressive activity/exercise   Clinical Impression   Criteria for Skilled Therapeutic Intervention yes, treatment indicated   PT Diagnosis decreased independence with mobility   Influenced by the following impairments NWB right LE, pain   Functional limitations due to impairments difficulties with gait, transfers   Clinical Presentation Stable/Uncomplicated   Clinical Presentation Rationale complex pmh, good social support   Clinical Decision Making (Complexity) Low complexity   Therapy Frequency Daily   Predicted Duration of Therapy Intervention (days/wks) 3 days   Anticipated Equipment Needs at Discharge bedside commode;tub bench;wheelchair   Anticipated Discharge Disposition Home with Assist;Home with Home Therapy   Risk & Benefits of therapy have been explained Yes   Patient, Family & other staff in agreement with plan of care Yes   [facility] TM \"6 Clicks\"   2016, Trustees of [facility], under license to [employer].  All rights reserved.   6 Clicks Short Forms Basic Mobility Inpatient Short Form   [facility]  \"6 Clicks\" V.2 Basic Mobility Inpatient Short Form   1. Turning from your back to your side while in a flat bed without using bedrails? 3 - A Little   2. Moving from lying on your back to sitting on the side of a flat bed without using bedrails? 3 - A Little   3. Moving to and from a bed to a chair (including a wheelchair)? 2 - A Lot   4. Standing up from a chair using your arms (e.g., wheelchair, or bedside chair)? 3 - A Little   5. To walk in hospital room? 1 - Total   6. Climbing 3-5 steps " with a railing? 1 - Total   Basic Mobility Raw Score (Score out of 24.Lower scores equate to lower levels of function) 13   Total Evaluation Time   Total Evaluation Time (Minutes) 5

## 2020-08-09 NOTE — PLAN OF CARE
PT: Patient seen by physical therapy for evaluation and treatment.  Patient with PMH including HTN, HLD, DJD, dementia, recent hx of right knee replacement in March 2020 now seen POD#1 s/p ORIF right ankle fracture.  Patient has an ILF apartment, however has been staying with daughter since March 2020 due to COVID.  Patient's daughter has single story home, single step to enter.  Daughter reports that she and patient's grandson are working from home so family can provide 24/7 assist of 1-2.  Family cared for patient's  for 10 years until he passed away from dementia. Patient uses walker at baseline.    Discharge Planner PT   Patient plan for discharge: Home with daughter, HENRY and grandchildren providing 24/7 care.  Therapist called daughter after session to discuss likely equipment needs for return to home.  Daughter will be present for PT session tomorrow at 9 am for transfer training and equipment recommendations.    Current status: Patient supine upon arrival.  Education provided regarding NWB at right LE.  Patient transferred to sitting at EOB with min A at right LE.  Patient transferred to standing with min A x2 and 2WW.  Patient demonstrated weight bearing through right LE even with direct cueing during transfer, able to keep right LE off ground once in standing.  Patient unable to demonstrate ambulation (unable to clear left foot while maintaining NWB at right LE).  Patient returned to sitting at EOB, required max A to return to supine.  Therapist called patient's daughter to update regarding session.  Barriers to return to prior living situation: NWB, DJD in multiple joints (shoulders, right knee) with recent TKA at left LE, high fall risk, unable to maintain NWB during transfers  Recommendations for discharge: Home with wheelchair for mobility, family providing 24/7 A of 1-2, Home RN/OT/HHA  Rationale for recommendations: Patient presents below baseline for functional mobility.  Patient will  requires wheelchair for all mobility (needs 18x18 wheelchair with elevating and extendable leg rests).  Patient will require walker and Ax1-2 for pivot transfers.  Patient will likely require bedside commode, shower chair for home use.  Patient requires home therapy at this time for ongoing transfer training and equipment recommendations as attending therapy in a clinic setting would be a considerable and significantly taxing effort, limiting her ability to participate in therapy session.           Entered by: Chelsey Garibay 08/09/2020 10:22 AM

## 2020-08-09 NOTE — PROGRESS NOTES
08/09/20 1445   Quick Adds   Type of Visit Initial Occupational Therapy Evaluation   Living Environment   Lives With child(marvin), adult   Living Arrangements independent living facility   Home Accessibility no concerns   Transportation Anticipated car, drives self   Living Environment Comment Pt was living in ILF, but moved to Daughter's home during COVID pandemic and is planning to stay there.   Self-Care   Usual Activity Tolerance moderate   Current Activity Tolerance fair   Regular Exercise No   Equipment Currently Used at Home walker, rolling   Activity/Exercise/Self-Care Comment Pt independent in ADLs at baseline.  Family does cooking, homemaking, meal prep and med management   Functional Level   Ambulation 1-->assistive equipment   Transferring 1-->assistive equipment   Toileting 0-->independent   Bathing 0-->independent   Dressing 0-->independent   Eating 0-->independent   Communication 0-->understands/communicates without difficulty   Swallowing 0-->swallows foods/liquids without difficulty   Cognition 1 - attention or memory deficits   Fall history within last six months yes   Number of times patient has fallen within last six months 2       Present no   General Information   Onset of Illness/Injury or Date of Surgery - Date 08/06/20   Referring Physician Dr. Himanshu Nash   Patient/Family Goals Statement return to home   Additional Occupational Profile Info/Pertinent History of Current Problem Patient with PMH including HTN, HLD, DJD, dementia, recent hx of right knee replacement in March 2020.  Patient has an ILF apartment, however has been staying with daughter since March 2020 due to COVID.  Patient's daughter has single story home, single step to enter.  Daughter reports that she and patient's grandson are working from home so family can provide 24/7 assist of 1-2.  Family cared for patient's  for 10 years until he passed away from dementia. Patient uses walker at  baseline.  s/p ORIF right ankle fracture   Precautions/Limitations fall precautions   Weight-Bearing Status - RLE nonweight-bearing   Cognitive Status Examination   Orientation person   Level of Consciousness alert;confused   Follows Commands (Cognition) follows one step commands   Memory impaired   Organization/Problem Solving Problem solving impaired;Sequencing impaired   Executive Function Working memory impaired, decreased storage of information for performing tasks;Planning ability impaired;Self awareness/monitoring impaired   Visual Perception   Visual Perception Wears glasses   Sensory Examination   Sensory Quick Adds No deficits were identified   Pain Assessment   Patient Currently in Pain Yes, see Vital Sign flowsheet   Range of Motion (ROM)   ROM Quick Adds No deficits were identified   ROM Comment B UE AROM WFL   Strength   Manual Muscle Testing Quick Adds Other   Strength Comments B UE strength grossly 4/5   Hand Strength   Hand Strength Comments WFL   Muscle Tone Assessment   Muscle Tone Quick Adds No deficits were identified   Coordination   Upper Extremity Coordination No deficits were identified   Mobility   Bed Mobility Comments min A supine>sit   Transfer Skill: Bed to Chair/Chair to Bed   Level of [place]: Bed to Chair minimum assist (75% patients effort)   Physical Assist/Nonphysical Assist: Bed to Chair verbal cues   Weight-Bearing Restrictions nonweight-bearing   Assistive Device - Transfer Skill Bed to Chair Chair to Bed Rehab Eval rolling walker   Transfer Skill: Sit to Stand   Level of [place]: Sit/Stand minimum assist (75% patients effort)   Physical Assist/Nonphysical Assist: Sit/Stand verbal cues   Transfer Skill: Sit to Stand nonweight-bearing   Assistive Device for Transfer: Sit/Stand rolling walker   Transfer Skill: Toilet Transfer   Level of [place]: Toilet minimum assist (75% patients effort)   Physical Assist/Nonphysical Assist: Toilet verbal cues   Weight-Bearing  "Restrictions: Toilet nonweight-bearing   Assistive Device rolling walker;other (see comments)  (bedside commode)   Lower Body Dressing   Level of [place]: Dress Lower Body maximum assist (25% patients effort)   Physical Assist/Nonphysical Assist: Dress Lower Body set-up required   Toileting   Level of [place]: Toilet moderate assist (50% patients effort)   Physical Assist/Nonphysical Assist: Toilet set-up required   Grooming   Level of [place]: Grooming minimum assist (75% patients effort)   Physical Assist/Nonphysical Assist: Grooming set-up required  (sitting up in chair)   Eating/Self Feeding   Level of [place]: Eating stand-by assist   Physical Assist/Nonphysical Assist: Eating set-up required   Activities of Daily Living Analysis   Impairments Contributing to Impaired Activities of Daily Living balance impaired;cognition impaired;pain;post surgical precautions   General Therapy Interventions   Planned Therapy Interventions ADL retraining;transfer training   Clinical Impression   Criteria for Skilled Therapeutic Interventions Met yes, treatment indicated   OT Diagnosis decreased ADL performance   Influenced by the following impairments R LE NWB, pain, cognition   Assessment of Occupational Performance 3-5 Performance Deficits   Identified Performance Deficits Pt with decreased ability to manage dressing, bathing, toileting   Clinical Decision Making (Complexity) Low complexity   Therapy Frequency Daily   Predicted Duration of Therapy Intervention (days/wks) 2 days   Anticipated Equipment Needs at Discharge wheelchair;bedside commode;tub bench   Anticipated Discharge Disposition Home with Home Therapy   Risks and Benefits of Treatment have been explained. Yes   Patient, Family & other staff in agreement with plan of care Yes   [facility] AM-PAC TM \"6 Clicks\"   2016, Trustees of [facility], under license to [employer].  All rights reserved.   6 Clicks Short Forms Daily Activity " "Inpatient Short Form   [facility] AM-PAC  \"6 Clicks\" Daily Activity Inpatient Short Form   1. Putting on and taking off regular lower body clothing? 2 - A Lot   2. Bathing (including washing, rinsing, drying)? 2 - A Lot   3. Toileting, which includes using toilet, bedpan or urinal? 2 - A Lot   4. Putting on and taking off regular upper body clothing? 3 - A Little   5. Taking care of personal grooming such as brushing teeth? 3 - A Little   6. Eating meals? 4 - None   Daily Activity Raw Score (Score out of 24.Lower scores equate to lower levels of function) 16   Total Evaluation Time   Total Evaluation Time (Minutes) 8     "

## 2020-08-09 NOTE — PROGRESS NOTES
[facility]    Hospitalist Progress Note  Name: Juany Collazo    MRN: [id number]  Provider:  Ruddy Ty DO, MPH  Date of Service: 08/09/2020    Summary of Stay: Juany Collazo is a 88 year old female with a history of hypertension, hyperlipidemia, degenerative joint disease, dementia admitted on 8/6/2020 with fall and ankle fracture.      Problem List:   1. Right distal fibular fx: Ortho consult, repair 8/9. Optimized for surgery. Doing well. PT/OT/SW.  2. Syncope: Daughter massaging the neck immediately before, likely carotid sinus involvement and vagal response. Tele and TTE OK. No further work up.  3. Leukocytosis: Likely stress, monitor. No fever nor e/o infection.  4. Asx COVID 19: Neg.  5. HTN: BP OK but soft, hold PTA lisinopril, hydrochlorothiazide, norvasc.    DVT Prophylaxis: Pneumatic Compression Devices  Code Status: Full Code  Diet: Advance Diet as Tolerated: Regular Diet Adult    Bella Catheter: not present  Disposition: Expected discharge in 1-2 days to LTC vs TCU. Goals prior to discharge include PT.   Incidental Findings: None.  Family updated today: Not today.     Interval History   The patient reports doing well. No chest pain or shortness of breath. No nausea, vomiting, diarrhea, constipation. No fevers. No other specific complaints identified.     -Data reviewed today: I personally reviewed all new labs and imaging results over the last 24 hours.     Physical Exam   Temp: 97.7  F (36.5  C) Temp src: Temporal BP: 110/47 Pulse: 72 Heart Rate: 67 Resp: 20 SpO2: 94 % O2 Device: Nasal cannula Oxygen Delivery: 1 LPM  Vitals:    08/07/20 0257 08/09/20 0642   Weight: 78.3 kg (172 lb 9.6 oz) 84.1 kg (185 lb 4.8 oz)     Vital Signs with Ranges  Temp:  [96  F (35.6  C)-98.8  F (37.1  C)] 97.7  F (36.5  C)  Pulse:  [68-73] 72  Heart Rate:  [63-81] 67  Resp:  [13-20] 20  BP: ()/(46-78) 110/47  SpO2:  [92 %-99 %] 94 %  I/O last 3 completed shifts:  In: 1750 [P.O.:550;  I.V.:1200]  Out: 200 [Urine:200]    GENERAL: No apparent distress. Awake, alert, and fully oriented.  HEENT: Normocephalic, atraumatic. Extraocular movements intact.  CARDIOVASCULAR: Regular rate and rhythm without murmurs or rubs. No S3.  PULMONARY: Clear bilaterally.  GASTROINTESTINAL: Soft, non-tender, non-distended. Bowel sounds normoactive.   EXTREMITIES: No cyanosis or clubbing. No edema.  NEUROLOGICAL: CN 2-12 grossly intact, no focal neurological deficits.  DERMATOLOGICAL: No rash, ulcer, bruising, nor jaundice.     Medications     dextrose 5% and 0.45% NaCl + KCl 20 mEq/L 100 mL/hr at 08/07/20 0345     lactated ringers 100 mL/hr at 08/08/20 1335       acetaminophen  975 mg Oral Q8H     amLODIPine  2.5 mg Oral Daily     aspirin  325 mg Oral Daily     ceFAZolin  1 g Intravenous Q8H     celecoxib  100 mg Oral BID     citalopram  20 mg Oral Daily     donepezil  10 mg Oral At Bedtime     omeprazole  20 mg Oral Daily     senna-docusate  1 tablet Oral BID    Or     senna-docusate  2 tablet Oral BID     simvastatin  20 mg Oral At Bedtime     sodium chloride (PF)  3 mL Intracatheter Q8H     Data     Laboratory:  Recent Labs   Lab 08/08/20  0620 08/06/20  2319   WBC 13.2* 17.4*   HGB 10.6* 11.8   HCT 35.9 39.1   MCV 92 89    313     Recent Labs   Lab 08/08/20  0620 08/07/20  1739 08/06/20  2319   *  --  130*   POTASSIUM 3.8 3.9 3.2*   CHLORIDE 100  --  96   CO2 27  --  25   ANIONGAP 3  --  9   *  --  133*   BUN 8  --  12   CR 0.61  --  0.78   GFRESTIMATED 81  --  67   GFRESTBLACK >90  --  78   MAUREEN 8.3*  --  8.7     No results for input(s): CULT in the last 168 hours.    Imaging:  No results found for this or any previous visit (from the past 24 hour(s)).      Ruddy Ty DO MPH  [facility] Hospitalist  [address] [place] [address]  Pager: [phone]  08/09/2020

## 2020-08-10 ENCOUNTER — APPOINTMENT (OUTPATIENT)
Dept: PHYSICAL THERAPY | Facility: CLINIC | Age: 85
DRG: 493 | End: 2020-08-10
Payer: COMMERCIAL

## 2020-08-10 ENCOUNTER — APPOINTMENT (OUTPATIENT)
Dept: OCCUPATIONAL THERAPY | Facility: CLINIC | Age: 85
DRG: 493 | End: 2020-08-10
Payer: COMMERCIAL

## 2020-08-10 PROCEDURE — 97530 THERAPEUTIC ACTIVITIES: CPT | Mod: GP | Performed by: PHYSICAL THERAPIST

## 2020-08-10 PROCEDURE — 94640 AIRWAY INHALATION TREATMENT: CPT

## 2020-08-10 PROCEDURE — 25000132 ZZH RX MED GY IP 250 OP 250 PS 637: Performed by: INTERNAL MEDICINE

## 2020-08-10 PROCEDURE — 40000275 ZZH STATISTIC RCP TIME EA 10 MIN

## 2020-08-10 PROCEDURE — 99232 SBSQ HOSP IP/OBS MODERATE 35: CPT | Performed by: HOSPITALIST

## 2020-08-10 PROCEDURE — 25000132 ZZH RX MED GY IP 250 OP 250 PS 637: Performed by: HOSPITALIST

## 2020-08-10 PROCEDURE — 12000000 ZZH R&B MED SURG/OB

## 2020-08-10 PROCEDURE — 40000274 ZZH STATISTIC RCP CONSULT EA 30 MIN

## 2020-08-10 PROCEDURE — 99207 ZZC CDG-MDM COMPONENT: MEETS LOW - DOWN CODED: CPT | Performed by: HOSPITALIST

## 2020-08-10 PROCEDURE — 97535 SELF CARE MNGMENT TRAINING: CPT | Mod: GO | Performed by: OCCUPATIONAL THERAPIST

## 2020-08-10 PROCEDURE — 25000132 ZZH RX MED GY IP 250 OP 250 PS 637: Performed by: ORTHOPAEDIC SURGERY

## 2020-08-10 RX ORDER — ONDANSETRON 4 MG/1
4 TABLET, ORALLY DISINTEGRATING ORAL EVERY 6 HOURS PRN
Qty: 6 TABLET | Refills: 0 | Status: SHIPPED | OUTPATIENT
Start: 2020-08-10 | End: 2020-08-20

## 2020-08-10 RX ORDER — ASPIRIN 325 MG
325 TABLET, DELAYED RELEASE (ENTERIC COATED) ORAL DAILY
Qty: 30 TABLET | Refills: 0 | Status: SHIPPED | OUTPATIENT
Start: 2020-08-10 | End: 2020-08-27

## 2020-08-10 RX ORDER — AMOXICILLIN 500 MG/1
500 CAPSULE ORAL 3 TIMES DAILY
Status: DISCONTINUED | OUTPATIENT
Start: 2020-08-10 | End: 2020-08-11 | Stop reason: HOSPADM

## 2020-08-10 RX ORDER — AMOXICILLIN 500 MG/1
500 CAPSULE ORAL 3 TIMES DAILY
Qty: 21 CAPSULE | Refills: 0 | Status: SHIPPED | OUTPATIENT
Start: 2020-08-10 | End: 2020-08-27

## 2020-08-10 RX ORDER — TRAMADOL HYDROCHLORIDE 50 MG/1
50 TABLET ORAL EVERY 6 HOURS PRN
Qty: 20 TABLET | Refills: 0 | Status: SHIPPED | OUTPATIENT
Start: 2020-08-10 | End: 2020-08-20

## 2020-08-10 RX ORDER — ACETAMINOPHEN 325 MG/1
650 TABLET ORAL EVERY 6 HOURS PRN
Qty: 30 TABLET | Refills: 0 | Status: SHIPPED | OUTPATIENT
Start: 2020-08-10 | End: 2020-08-20

## 2020-08-10 RX ORDER — ALBUTEROL SULFATE 90 UG/1
2 AEROSOL, METERED RESPIRATORY (INHALATION) EVERY 6 HOURS PRN
Status: DISCONTINUED | OUTPATIENT
Start: 2020-08-10 | End: 2020-08-11 | Stop reason: HOSPADM

## 2020-08-10 RX ORDER — AMOXICILLIN 250 MG
1 CAPSULE ORAL 2 TIMES DAILY PRN
Qty: 16 TABLET | Refills: 0 | Status: SHIPPED | OUTPATIENT
Start: 2020-08-10 | End: 2020-08-20

## 2020-08-10 RX ADMIN — DONEPEZIL HYDROCHLORIDE 10 MG: 10 TABLET ORAL at 21:06

## 2020-08-10 RX ADMIN — AMLODIPINE BESYLATE 2.5 MG: 2.5 TABLET ORAL at 08:20

## 2020-08-10 RX ADMIN — TRAMADOL HYDROCHLORIDE 50 MG: 50 TABLET, FILM COATED ORAL at 08:21

## 2020-08-10 RX ADMIN — ASPIRIN 325 MG: 325 TABLET, COATED ORAL at 08:20

## 2020-08-10 RX ADMIN — DOCUSATE SODIUM AND SENNOSIDES 2 TABLET: 8.6; 5 TABLET, FILM COATED ORAL at 08:24

## 2020-08-10 RX ADMIN — AMOXICILLIN 500 MG: 500 CAPSULE ORAL at 21:06

## 2020-08-10 RX ADMIN — OMEPRAZOLE 20 MG: 20 CAPSULE, DELAYED RELEASE ORAL at 08:20

## 2020-08-10 RX ADMIN — CELECOXIB 100 MG: 100 CAPSULE ORAL at 08:20

## 2020-08-10 RX ADMIN — AMOXICILLIN 500 MG: 500 CAPSULE ORAL at 14:49

## 2020-08-10 RX ADMIN — CITALOPRAM HYDROBROMIDE 20 MG: 20 TABLET, FILM COATED ORAL at 08:20

## 2020-08-10 RX ADMIN — ALBUTEROL SULFATE 2 PUFF: 90 AEROSOL, METERED RESPIRATORY (INHALATION) at 11:22

## 2020-08-10 RX ADMIN — ACETAMINOPHEN 975 MG: 325 TABLET, FILM COATED ORAL at 08:21

## 2020-08-10 RX ADMIN — SIMVASTATIN 20 MG: 20 TABLET, FILM COATED ORAL at 21:06

## 2020-08-10 ASSESSMENT — ACTIVITIES OF DAILY LIVING (ADL)
ADLS_ACUITY_SCORE: 19

## 2020-08-10 NOTE — PLAN OF CARE
Discharge Planner OT   Patient plan for discharge: home with daughter and grandson  Current status: pt and daughter educated in DME for BR transfers.  Pt attempted transfer to extended tub bench, needing ModA and 2WW.  Pt with increased shoulder pain, difficult for her to use walker for transfers. Daughter plans to purchase BSC today. Pt educated in LE dressing.  Barriers to return to prior living situation: NWB precautions, DJD in multiple joints (shoulders, right knee) with recent L TKA   Recommendations for discharge: home with 24/7 supervision and assist with dressing, bathing, toileting, cooking, homemaking, laundry, medication management, Home OT   Rationale for recommendations: Pt would benefit from continued OT to maximize safety and independence in ADLs and functional tranfers using DME with Pt/Caregiver training       Entered by: Belem Miller 08/10/2020 11:31 AM

## 2020-08-10 NOTE — PROGRESS NOTES
".Care Transition Initial Assessment -   Discharge planning: Noted per ortho PA the anticipation of pt's discharge home tomorrow with home care services. Noted PT/OT assessments with recommendations for discharge home, with w/c family assist 24/7, and home RN/OT/HHA  Met with: Patient and Family    Active Problems:    Ankle fracture    Fracture of lower extremity       DATA  Lives With: child(marvin), adult   Living Arrangements: independent living facility  Quality of Family Relationships: involved, supportive  Description of Support System: Involved, Supportive     Support Assessment: Adequate family and caregiver support.     Resources List: Home Care     Quality of Family Relationships: involved, supportive  Transportation Anticipated: ([facility], time and date pending)    INTERVENTION  Met with pt, daughter Kelly also present in room with whom pt has been residing for several months now due to her care and support needs. Pt's primary residence is at the [facility].    Daughter is in agreement with pt's discharge to her residence noting that in addition to herself, she has a son who resides there also. Discussed recommended DME, w/c, commode and bath chair.. based on conversation referral has been made to [facility] for the w/c ([phone], daughter will be purchasing a commode unit from a local retail store, and given that pt will be having home care services she would like to see what is recommended by home care provider once pt is at home as the bathroom is small and she would be uncertain what may \"fit\" best.       In discussing pt's return home the options of family vs medical transport by w/c were discussed. For safety aspect, daughter has asked that medical transport be arranged, SW is in process of doing this but there is a question of [facility] representative of need for w/c or stretcher transport due to stairs a the home residence. It was discussed that the w/c transport would not be " covered by pt's insurance, private pay of $75/base and $5/mi which was accepted by daughter.        Regarding home care, daughter requested that referral be made to [facility], referral made, they are checking staff availability and in agency contract with pt's [facility] insurance.     ASSESSMENT  Good family support and needs identification.    PLAN  Anticipate pt's discharge to home with support of family, w/c in process trough [facility], transportation referral alos made, waiting on determination of ability to provide w/c transport as noted above. With anticipated discharge tomorrow, anticipate later day ( about 1800) transport to assure family is home for assist as needed.          .    ODIN Lopez   Care Transitions Services   [facility]     [phone]

## 2020-08-10 NOTE — PLAN OF CARE
VS-afebrile, stable. Has uneven pupils,. Alert and orientated x4 for me, forgetful at times.   Lung Sounds-exp wheezes bilaterally, dry nonprod good cough, informed Hospit[facility]--inhaler ordered. Using IS upto 1500. Cleared, but diminished as shift went on. encouraged pt to use the IS freq.  O2-on room air.   GI-+Bs,+flatus, lbm was on the 8th per pt. Tolerating reg diet, denies nausea. Senna given.   -purwick at night. Taken out during the day. Voided 150 on bsc.   IVF-sl iv.   Dressings-splint on R leg.   CMS-+ wiggle toes, denies numbness and tingling. Warm. Elevated when in bed. NWB on R leg,   Drain-none.   Activity-up with one to 2 assist, gb, walker. Pivot to chair.   Pain-taking ultram prior to therapy, denies pain both before and after therapy. On scheduled tylenol. Using ice to below and top of ankle. Elevation foam pillow in bed.   D/C Plan-home with daughter and grandson. Pt will live with them for a while. [place]. . Daughter here visiting pt and updated. Daughter here during OT and PT. Planning for discharge home tomorrow later in day.

## 2020-08-10 NOTE — PROGRESS NOTES
"[facility] RCAT     Date: 8/10/20  Admission Dx: Right ankle fracture  Pulmonary History: remote smoking history  Home Nebulizer/MDI Use: none  Home Oxygen: room air  Acuity Level (RCAT flow sheet): 5 (will treat as 4)  Aerosol Therapy initiated: continue Albuterol 2 puffs Q6 hours prn.        Volume Expansion initiated: IS TID      Current Oxygen Requirements: Room air  Current SpO2: 96%    Re-evaluation date: 8/13/20    Patient Education: Informed Pt and family member of RCAT.  Explained effects of albuterol.  Encouraged continued use of the IS.      See \"RT Assessments\" flow sheet for patient assessment scoring and Acuity Level Details.             "

## 2020-08-10 NOTE — PROGRESS NOTES
Orthopedic Surgery  Juany Collazo  8/10/2020  Admit Date:  2020  POD: 2 Days Post-Op   Procedure(s):  Open reduction with internal fixation, right trimalleolar ankle fracture.    Patient resting comfortably in chair.    Pain controlled.  Tolerating oral intake.    Denies nausea or vomiting  Denies chest pain or shortness of breath    Vital Sign Ranges  Temperature Temp  Av.1  F (36.2  C)  Min: 96.2  F (35.7  C)  Max: 98.7  F (37.1  C)   Blood pressure Systolic (24hrs), Av , Min:118 , Max:153        Diastolic (24hrs), Av, Min:54, Max:69      Pulse Pulse  Av  Min: 74  Max: 74   Respirations Resp  Av.3  Min: 16  Max: 20   Pulse oximetry SpO2  Av.6 %  Min: 93 %  Max: 96 %       Splint is clean, dry and intact  Able to flex and extend toes  Pulses present  Capillary refill < 2 seconds  Sensation intact.      Labs:  Recent Labs   Lab Test 20  0620  1739 20  2319   POTASSIUM 3.8 3.9 3.2*     Recent Labs   Lab Test 20  0620 20  2319 03/10/20  0745   HGB 10.6* 11.8 9.7*     Recent Labs   Lab Test 20  2319 13  1600   INR 0.94 0.92     Recent Labs   Lab Test 20  0620 20  2319 20  1553    313 236       1. PLAN:   Continue ASA for DVT prophylaxis.     Mobilize with PT/OT    Non-WB Right LE.     Continue current pain regiment.   Dressings: Keep intact.      2. Disposition   Anticipate d/c to home with home care tomorrow when medically cleared and progressing in PT.    Twyla Galo PA-C

## 2020-08-10 NOTE — PLAN OF CARE
Pt disorientated x3. Pain managed with Ultram. Denies numbness/tingling. Regular diet. Incontinent at times, pure wick in place. Up with assist of 1-2 with gb/walker. Plan to go home with family.

## 2020-08-10 NOTE — PLAN OF CARE
Discharge Planner PT   Patient plan for discharge: Home with daughter, HENRY and grandchildren providing 24/7 care.  Pt dtg present during session, declines TCU  Current status: Patient supine upon arrival.  Pt and dtg educated on NWB and how strict this should be. Pt was cued for elbow extension. Pt was cued for supine to sit, needing min A. Pt was cued for sit to stand with NWB, pt able to perform this with min A x 1. Pt was cued for NWB in standing. Pt was cued for scooting steps to commode for transfer. Pt performing more foot flat wt bearing(wt of LE on ground but very good at wt shifting the the L). Pt was able to perform 2nd transfer back to bed with improved ease. Pt was cued for several more stands with NWB. Pt dtg educated on transfer to W/C- Min A of therapist for safety as well, but overall good stability with this. Pt and dtg educated on fall risk and need for A x 2 for mobility. Discussed use of W/C up the stair- tilting backwards and going in reverse up the stair. The other option was discussed of having pt stand and turn and sit on a chair on the platform step. Did also discuss the option of TCU and pt's dtg feels she will have enough support to help pt at home with care.   Barriers to return to prior living situation: NWB, DJD in multiple joints (shoulders, right knee) with recent TKA at left LE, high fall risk, unable to maintain NWB during transfers  Recommendations for discharge: Home with wheelchair for mobility, family providing 24/7 A of 1-2, Home RN/OT/HHA  Rationale for recommendations: Patient presents below baseline for functional mobility.  Patient will requires wheelchair for all mobility (needs 18x18 wheelchair with elevating and extendable leg rests).  Patient will require walker and Ax1-2 for pivot transfers.  Patient will likely require bedside commode, shower chair for home use.  Patient requires home therapy at this time for ongoing transfer training and equipment recommendations  as attending therapy in a clinic setting would be a considerable and significantly taxing effort, limiting her ability to participate in therapy session.           Entered by: Chelsy Medina 08/10/2020 10:44 AM

## 2020-08-10 NOTE — PROGRESS NOTES
[facility]    Hospitalist Progress Note  Name: Juany Collazo    MRN: [id number]  Provider:  Ruddy Ty DO, MPH  Date of Service: 08/10/2020    Summary of Stay: Juany Collazo is a 88 year old female with a history of hypertension, hyperlipidemia, degenerative joint disease, dementia admitted on 8/6/2020 with fall and ankle fracture.      Problem List:   1. Right distal fibular fx: Ortho consult, repair 8/9. Optimized for surgery. Doing well. PT/OT/SW.  2. Syncope: Daughter massaging the neck immediately before, likely carotid sinus involvement and vagal response. Tele and TTE OK. No further work up.  3. Leukocytosis: Likely stress, monitor. No fever nor e/o infection.  4. Asx COVID 19: Neg.  5. HTN: BP OK but soft, hold PTA lisinopril, hydrochlorothiazide, norvasc.  6. Bronchospasm: Start PRN albuterol MDI.    DVT Prophylaxis: Pneumatic Compression Devices  Code Status: Full Code  Diet: Advance Diet as Tolerated: Regular Diet Adult  Diet    Bella Catheter: not present  Disposition: Expected discharge in 1 days to LTC. Goals prior to discharge include PT.   Incidental Findings: None.  Family updated today: Not today.     Interval History   The patient reports doing well. No chest pain or shortness of breath. No nausea, vomiting, diarrhea, constipation. No fevers. No other specific complaints identified.     -Data reviewed today: I personally reviewed all new labs and imaging results over the last 24 hours.     Physical Exam   Temp: 96.2  F (35.7  C) Temp src: Oral BP: (!) 153/69 Pulse: 74 Heart Rate: 54 Resp: 20 SpO2: 93 % O2 Device: None (Room air)    Vitals:    08/07/20 0257 08/09/20 0642 08/10/20 0945   Weight: 78.3 kg (172 lb 9.6 oz) 84.1 kg (185 lb 4.8 oz) 84.4 kg (186 lb)     Vital Signs with Ranges  Temp:  [96.2  F (35.7  C)-98.7  F (37.1  C)] 96.2  F (35.7  C)  Pulse:  [74] 74  Heart Rate:  [54-59] 54  Resp:  [16-20] 20  BP: (118-153)/(54-69) 153/69  SpO2:  [93 %-96 %] 93 %  I/O last 3  completed shifts:  In: 240 [P.O.:240]  Out: 300 [Urine:300]    GENERAL: No apparent distress. Awake, alert, and fully oriented.  HEENT: Normocephalic, atraumatic. Extraocular movements intact.  CARDIOVASCULAR: Regular rate and rhythm without murmurs or rubs. No S3.  PULMONARY: Clear bilaterally.  GASTROINTESTINAL: Soft, non-tender, non-distended. Bowel sounds normoactive.   EXTREMITIES: No cyanosis or clubbing. No edema.  NEUROLOGICAL: CN 2-12 grossly intact, no focal neurological deficits.  DERMATOLOGICAL: No rash, ulcer, bruising, nor jaundice.     Medications     dextrose 5% and 0.45% NaCl + KCl 20 mEq/L 100 mL/hr at 08/07/20 0345     lactated ringers 100 mL/hr at 08/08/20 1335       acetaminophen  975 mg Oral Q8H     amLODIPine  2.5 mg Oral Daily     aspirin  325 mg Oral Daily     citalopram  20 mg Oral Daily     donepezil  10 mg Oral At Bedtime     omeprazole  20 mg Oral Daily     senna-docusate  1 tablet Oral BID    Or     senna-docusate  2 tablet Oral BID     simvastatin  20 mg Oral At Bedtime     sodium chloride (PF)  3 mL Intracatheter Q8H     Data     Laboratory:  Recent Labs   Lab 08/08/20  0620 08/06/20  2319   WBC 13.2* 17.4*   HGB 10.6* 11.8   HCT 35.9 39.1   MCV 92 89    313     Recent Labs   Lab 08/08/20  0620 08/07/20  1739 08/06/20  2319   *  --  130*   POTASSIUM 3.8 3.9 3.2*   CHLORIDE 100  --  96   CO2 27  --  25   ANIONGAP 3  --  9   *  --  133*   BUN 8  --  12   CR 0.61  --  0.78   GFRESTIMATED 81  --  67   GFRESTBLACK >90  --  78   MAUREEN 8.3*  --  8.7     No results for input(s): CULT in the last 168 hours.    Imaging:  No results found for this or any previous visit (from the past 24 hour(s)).      Ruddy Ty DO MPH  [facility] Hospitalist  [address] [place] [place] [address]  Pager: [phone]  08/10/2020

## 2020-08-11 ENCOUNTER — APPOINTMENT (OUTPATIENT)
Dept: OCCUPATIONAL THERAPY | Facility: CLINIC | Age: 85
DRG: 493 | End: 2020-08-11
Payer: COMMERCIAL

## 2020-08-11 ENCOUNTER — APPOINTMENT (OUTPATIENT)
Dept: PHYSICAL THERAPY | Facility: CLINIC | Age: 85
DRG: 493 | End: 2020-08-11
Payer: COMMERCIAL

## 2020-08-11 VITALS
HEART RATE: 60 BPM | DIASTOLIC BLOOD PRESSURE: 66 MMHG | TEMPERATURE: 98.3 F | RESPIRATION RATE: 16 BRPM | WEIGHT: 182 LBS | SYSTOLIC BLOOD PRESSURE: 153 MMHG | BODY MASS INDEX: 34.39 KG/M2 | OXYGEN SATURATION: 96 %

## 2020-08-11 PROCEDURE — 25000132 ZZH RX MED GY IP 250 OP 250 PS 637: Performed by: HOSPITALIST

## 2020-08-11 PROCEDURE — 99207 ZZC CDG-CODE CATEGORY CHANGED: CPT | Performed by: HOSPITALIST

## 2020-08-11 PROCEDURE — 97530 THERAPEUTIC ACTIVITIES: CPT | Mod: GP

## 2020-08-11 PROCEDURE — 25000132 ZZH RX MED GY IP 250 OP 250 PS 637: Performed by: ORTHOPAEDIC SURGERY

## 2020-08-11 PROCEDURE — 97535 SELF CARE MNGMENT TRAINING: CPT | Mod: GO

## 2020-08-11 PROCEDURE — 99231 SBSQ HOSP IP/OBS SF/LOW 25: CPT | Performed by: HOSPITALIST

## 2020-08-11 PROCEDURE — 25000132 ZZH RX MED GY IP 250 OP 250 PS 637: Performed by: INTERNAL MEDICINE

## 2020-08-11 RX ORDER — LISINOPRIL AND HYDROCHLOROTHIAZIDE 12.5; 2 MG/1; MG/1
1 TABLET ORAL DAILY
COMMUNITY
Start: 2020-08-11 | End: 2020-08-20

## 2020-08-11 RX ADMIN — AMLODIPINE BESYLATE 2.5 MG: 2.5 TABLET ORAL at 08:57

## 2020-08-11 RX ADMIN — OMEPRAZOLE 20 MG: 20 CAPSULE, DELAYED RELEASE ORAL at 08:57

## 2020-08-11 RX ADMIN — CITALOPRAM HYDROBROMIDE 20 MG: 20 TABLET, FILM COATED ORAL at 08:57

## 2020-08-11 RX ADMIN — ASPIRIN 325 MG: 325 TABLET, COATED ORAL at 08:57

## 2020-08-11 RX ADMIN — AMOXICILLIN 500 MG: 500 CAPSULE ORAL at 13:27

## 2020-08-11 RX ADMIN — ACETAMINOPHEN 975 MG: 325 TABLET, FILM COATED ORAL at 08:56

## 2020-08-11 RX ADMIN — ACETAMINOPHEN 975 MG: 325 TABLET, FILM COATED ORAL at 02:35

## 2020-08-11 RX ADMIN — AMOXICILLIN 500 MG: 500 CAPSULE ORAL at 08:57

## 2020-08-11 ASSESSMENT — ACTIVITIES OF DAILY LIVING (ADL)
ADLS_ACUITY_SCORE: 19

## 2020-08-11 NOTE — PLAN OF CARE
Reviewed discharge instructions and medications with patient and (fior Mendoza over the phone). Questions answered. Patient discharged to home with discharge instructions, medications (amoxicillin, senna, tramadol, senna, zofran, aspirin, tylenol), and belongings at this time. Transported to fior Mendoza's home via [facility].

## 2020-08-11 NOTE — PROGRESS NOTES
SWS    D: Discharge planing continuing.. noted plan for pt's discharge today, home care services (RN, PT/OT, A)     I: SW has spoken to Tigist at [facility], they will provide the w/c transport as long as family is home to assist as needed, transport arranged for 1800 to accommodate family availability. Follow-up with Nydia at [facility], they have contacted daughter and arranged for w/c delivery today. SW also spoke today to [facility], regarding pt's discharge home, MD orders faxed.        Message left for daughter noting above arrange,ments.     A/P: Anticipate no problem with arrangements made for discharge today.       .    Karla Morales, [facility]   Care Transitions Services   [facility]     [phone]

## 2020-08-11 NOTE — DISCHARGE SUMMARY
Hospitalist Discharge Summary  [facility]    Juany Collazo MRN# [id number]   YOB: 1932 Age: 88 year old     Date of Admission:  8/6/2020  Date of Discharge:  8/11/2020  Admitting Physician:  Himanshu Nash MD  Discharge Physician:  Ruddy Ty DO  Discharging Service:  Hospitalist     Primary Provider: Gaurang Reeves          Discharge Diagnosis:   1.  Right distal fibular fracture, status post surgical repair.   2.  Syncopal episode, suspect vasovagal and carotid sinus stimulation.   3.  Leukocytosis, likely white blood cell stress demargination.   4.  Hypertension.   5.  Bronchospasm.              Discharge Disposition:   Discharged to home           Allergies:   Allergies   Allergen Reactions     No Known Drug Allergies               Discharge Medications:   Current Discharge Medication List      START taking these medications    Details   acetaminophen (TYLENOL) 325 MG tablet Take 2 tablets (650 mg) by mouth every 6 hours as needed for pain  Qty: 30 tablet, Refills: 0    Associated Diagnoses: Ankle dislocation, right, initial encounter; Closed trimalleolar fracture of right ankle, initial encounter      ondansetron (ZOFRAN-ODT) 4 MG ODT tab Take 1 tablet (4 mg) by mouth every 6 hours as needed for nausea or vomiting  Qty: 6 tablet, Refills: 0    Associated Diagnoses: Ankle dislocation, right, initial encounter; Closed trimalleolar fracture of right ankle, initial encounter      senna-docusate (SENOKOT-S/PERICOLACE) 8.6-50 MG tablet Take 1 tablet by mouth 2 times daily as needed for constipation  Qty: 16 tablet, Refills: 0    Associated Diagnoses: Ankle dislocation, right, initial encounter; Closed trimalleolar fracture of right ankle, initial encounter      traMADol (ULTRAM) 50 MG tablet Take 1 tablet (50 mg) by mouth every 6 hours as needed for moderate to severe pain  Qty: 20 tablet, Refills: 0    Associated Diagnoses: Ankle dislocation, right, initial  encounter; Closed trimalleolar fracture of right ankle, initial encounter         CONTINUE these medications which have CHANGED    Details   amoxicillin (AMOXIL) 500 MG capsule Take 1 capsule (500 mg) by mouth 3 times daily Amoxil 500 mg TID for 10 days  Qty: 21 capsule, Refills: 0    Associated Diagnoses: Ankle dislocation, right, initial encounter      aspirin (ASA) 325 MG EC tablet Take 1 tablet (325 mg) by mouth daily  Qty: 30 tablet, Refills: 0    Associated Diagnoses: Ankle dislocation, right, initial encounter; Closed trimalleolar fracture of right ankle, initial encounter         CONTINUE these medications which have NOT CHANGED    Details   amLODIPine (NORVASC) 2.5 MG tablet Take 1 tablet (2.5 mg) by mouth daily  Qty: 90 tablet, Refills: 3    Associated Diagnoses: Essential hypertension, benign      Biotin 10 MG CAPS Take 1 capsule by mouth daily      citalopram (CELEXA) 20 MG tablet Take 1 tablet (20 mg) by mouth daily  Qty: 30 tablet, Refills: 3    Associated Diagnoses: Major depressive disorder, recurrent episode, mild (H)      diclofenac (VOLTAREN) 1 % topical gel Apply 2 g topically 2 times daily as needed for moderate pain  Qty: 1 Tube, Refills: 0    Associated Diagnoses: Arthritis of left shoulder region      donepezil (ARICEPT) 10 MG tablet Take 1 tablet (10 mg) by mouth At Bedtime  Qty: 30 tablet    Associated Diagnoses: Major depressive disorder, recurrent episode, mild (H)      lisinopril-hydrochlorothiazide (ZESTORETIC) 20-12.5 MG tablet Take 1 tablet by mouth daily  Qty:        omeprazole (PRILOSEC) 20 MG DR capsule Take 20 mg by mouth daily       simvastatin (ZOCOR) 20 MG tablet Take 1 tablet (20 mg) by mouth At Bedtime  Qty: 90 tablet, Refills: 3    Associated Diagnoses: Hyperlipidemia LDL goal <130         STOP taking these medications       tiZANidine (ZANAFLEX) 2 MG tablet Comments:   Reason for Stopping:                      Condition on Discharge:   Discharge condition: Stable    Discharge vitals: Blood pressure (!) 161/76, pulse 60, temperature 98.2  F (36.8  C), temperature source Temporal, resp. rate 20, weight 82.6 kg (182 lb), SpO2 95 %.   Code status on discharge: Full Code      BASIC PHYSICAL EXAMINATION:  GENERAL: No apparent distress.  CARDIOVASCULAR: Regular rate and rhythm without murmurs.  PULMONARY: Clear to auscultation bilaterally.   GASTROINTESTINAL: Abdomen soft, non-tender.  EXTREMITIES: No edema, pulses intact.  NEUROLOGIC: No focal deficits.            History of Illness:   See detailed admission note for full details.               Procedures excluding imaging which is summarized below:   Please see details in the electronic medical record.           Consultations:   ORTHOPEDIC SURGERY IP CONSULT  HOSPITALIST IP CONSULT  OCCUPATIONAL THERAPY ADULT IP CONSULT  PHYSICAL THERAPY ADULT IP CONSULT  SOCIAL WORK IP CONSULT          Significant Results:   Results for orders placed or performed during the hospital encounter of 08/06/20   Ankle XR, G/E 3 views, right    Narrative    EXAM: XR ANKLE RT G/E 3 VW  LOCATION: [facility]  DATE/TIME: 8/7/2020 12:29 AM    INDICATION: Trauma.    COMPARISON: None.      Impression    IMPRESSION: Oblique mildly displaced fracture distal right fibula at and above the level of the ankle mortise. There is mild lateral displacement of the distal fracture fragment. Transverse displaced medial malleolar fracture. Vertically oriented   minimally displaced posterior malleolar fracture. There also appears to be a displaced fracture fragment along the distal right tibia anteriorly best seen on the lateral view. Ankle mortise is disrupted with lateral subluxation of the talus relative to   the tibia. Overlapping cast material present.   CT Head w/o Contrast    Narrative    EXAM: CT HEAD W/O CONTRAST  LOCATION: [facility]  DATE/TIME: 8/7/2020 12:18 AM    INDICATION: Syncope. Loss of consciousness.  COMPARISON: CT head dated  11/13/2018.  TECHNIQUE: Routine without IV contrast. Multiplanar reformats. Dose reduction techniques were used.    FINDINGS:  INTRACRANIAL CONTENTS: No intracranial hemorrhage, extraaxial collection, or mass effect.  No CT evidence of acute infarct. Moderate presumed chronic small vessel ischemic changes. Mild generalized volume loss. No hydrocephalus.     VISUALIZED ORBITS/SINUSES/MASTOIDS: Prior bilateral cataract surgery. Visualized portions of the orbits are otherwise unremarkable. No paranasal sinus mucosal disease. No middle ear or mastoid effusion.    BONES/SOFT TISSUES: No acute abnormality. Incompletely imaged prominent degenerative pannus located at C1-C2.      Impression    IMPRESSION:  1.  No CT evidence for acute intracranial process.  2.  Brain atrophy and presumed chronic microvascular ischemic changes as above.   CTA Head Neck with Contrast    Narrative    EXAM: CTA  HEAD NECK WITH CONTRAST  LOCATION: [facility]  DATE/TIME: 8/7/2020 12:18 AM    INDICATION: Syncope. Left neck pain. Loss of consciousness.  COMPARISON: MRI brain dated 11/21/2018. CT head dated 11/13/2018.  CONTRAST: 70mL Isovue-370  TECHNIQUE: Head and neck CT angiogram with IV contrast. Axial helical CT images of the head and neck vessels obtained during the arterial phase of intravenous contrast administration. Axial 2D reconstructed images and multiplanar 3D MIP reconstructed   images of the head and neck vessels were performed by the technologist. Dose reduction techniques were used. All stenosis measurements made according to NASCET criteria unless otherwise specified.    FINDINGS:       HEAD CTA:  ANTERIOR CIRCULATION: Mild narrowing of the proximal right M2 posterior division middle cerebral artery (image 376 through 371 series 6). Additionally there is mild to moderate narrowing of a right M2 ascending division middle cerebral artery. No large   vessel occlusion. No aneurysm. No high flow vascular malformation.  Standard [place] anatomy.    POSTERIOR CIRCULATION: No stenosis/occlusion, aneurysm, or high flow vascular malformation. Infundibular origins of the bilateral posterior communicating arteries. Dominant left and smaller right vertebral artery contribute to a normal basilar artery.   The right intradural vertebral artery is hypoplastic.    DURAL VENOUS SINUSES: Expected enhancement of the major dural venous sinuses.    NECK CTA:  RIGHT CAROTID: Minimal plaque at the bifurcation. No measurable stenosis or dissection.    LEFT CAROTID: Minimal plaque at the bifurcation. No measurable stenosis or dissection.    VERTEBRAL ARTERIES: No focal stenosis or dissection. Dominant left and smaller right vertebral arteries. The right vertebral artery is hypoplastic.    AORTIC ARCH: Classic aortic arch anatomy with no significant stenosis at the origin of the great vessels.    NONVASCULAR STRUCTURES: Heterogeneous appearance of the thyroid gland. There is asymmetric enlargement of the right lobe of the thyroid. A hyperdense nodule is identified within the right posterior thyroid gland measuring 1.5 cm. Presumed left apical   scarring. Multilevel degenerative changes of the cervical spine. There is approximately 4 mm of anterolisthesis at the level of C4 on C5. At C5 on C6 is approximately 4 mm of retrolisthesis. There is a prominent degenerative pannus located at C1-C2   measuring approximately 9 mm in AP dimensions and causing severe central spinal canal stenosis.      Impression    IMPRESSION:   HEAD CTA:   1.  No large vessel occlusion or significant stenosis identified.  2.  Mild to moderate intracranial stenoses, as above.    NECK CTA:  1. No significant stenosis, occlusion or dissection.  2. Hyperdense nodule within the right posterior aspect of the thyroid gland measuring 1.5 cm. Recommend non-emergent thyroid function testing and thyroid ultrasound for further assessment.  3. Prominent degenerative pannus located at  C1-C2 measuring approximately 9 mm in AP dimensions and causing severe central spinal canal stenosis.       XR Surgery ANDIE L/T 5 Min Fluoro w Stills    Narrative    This exam was marked as non-reportable because it will not be read by a   radiologist or a [place] non-radiologist provider.         Echocardiogram Complete    Narrative    [id number]  [id number]  [id number]  [id number]^[id number]           [facility]  Echocardiography Laboratory  [address] [place] [address]        Name: OLE ROBISON  MRN: [id number]  : 1932  Study Date: 2020 08:46 AM  Age: 88 yrs  Gender: Female  Patient Location: [facility]  Reason For Study: Syncope and Collapse  Ordering Physician: KRISTI HOLLOWAY  Referring Physician: Garuang Reeves  Performed By: Rema Bourgeois     BSA: 1.8 m2  Height: 63 in  Weight: 165 lb  BP: 140/62 mmHg  _____________________________________________________________________________  __        Procedure  Complete Echo Adult.  _____________________________________________________________________________  __        Interpretation Summary     The visual ejection fraction is estimated at 65-70%.  Normal left ventricular wall motion  The right ventricle is normal in size and function.  Small inferior vena cava size consistent with hypovolemia.  No hemodynamically significant valvular abnormalities on 2D or color flow  imaging.  _____________________________________________________________________________  __        Left Ventricle  The left ventricle is normal in size. A sigmoid septum is present. The visual  ejection fraction is estimated at 65-70%. Left ventricular diastolic function  is indeterminate. Diastolic Doppler findings (E/E' ratio and/or other  parameters) suggest left ventricular filling pressures are indeterminate.  Normal left ventricular wall motion.     Right Ventricle  The right ventricle is normal in size and function.     Atria  Normal left atrial  size. Right atrial size is normal. There is no color  Doppler evidence of an atrial shunt.     Mitral Valve  The mitral valve is normal in structure and function. There is trace mitral  regurgitation.        Tricuspid Valve  The tricuspid valve is not well visualized, but is grossly normal. There is  trace tricuspid regurgitation.     Aortic Valve  There is trivial trileaflet aortic sclerosis. There is trace aortic  regurgitation.     Pulmonic Valve  The pulmonic valve is not well visualized. There is trace pulmonic valvular  regurgitation.     Vessels  The aortic root is normal size. Normal size ascending aorta. Small inferior  vena cava size consistent with hypovolemia.     Pericardium  There is no pericardial effusion.        Rhythm  Sinus rhythm was noted.  _____________________________________________________________________________  __  MMode/2D Measurements & Calculations     IVSd: 1.2 cm  LVIDd: 4.4 cm  LVIDs: 3.0 cm  LVPWd: 0.86 cm  FS: 32.4 %  LV mass(C)d: 154.2 grams  LV mass(C)dI: 86.5 grams/m2  Ao root diam: 2.9 cm  asc Aorta Diam: 3.4 cm  LVOT diam: 2.0 cm  LVOT area: 3.1 cm2  LA Volume (BP): 54.0 ml  LA Volume Index (BP): 30.3 ml/m2  RWT: 0.39           Doppler Measurements & Calculations  MV E max leo: 68.1 cm/sec  MV A max leo: 102.7 cm/sec  MV E/A: 0.66  MV dec time: 0.29 sec  E/E' av.4  Lateral E/e': 9.2  Medial E/e': 13.7           _____________________________________________________________________________  __           Report approved by: Deedee Warren 2020 03:44 PM            Transthoracic Echocardiogram Results:  No results found for this or any previous visit (from the past 4320 hour(s)).             Pending Results:   Unresulted Labs Ordered in the Past 30 Days of this Admission     No orders found from 2020 to 2020.                      Discharge Instructions and Follow-Up:   Discharge instructions and follow-up:   Discharge Procedure Orders   Home Care PT Referral  for Hospital Discharge   Referral Priority: Routine Referral Type: Home Health Therapies & Aides   Number of Visits Requested: 1     Home care nursing referral   Referral Priority: Routine Referral Type: Home Health Therapies & Aides   Number of Visits Requested: 1     Home Care OT Referral for Hospital Discharge   Referral Priority: Routine   Number of Visits Requested: 1     Home care nursing referral   Referral Priority: Routine Referral Type: Home Health Therapies & Aides   Number of Visits Requested: 1     Reason for your hospital stay   Order Comments: Right ankle fracture:  ORIF     Activity   Order Comments: Your activity upon discharge: Non-Weight bearing right LE     Order Specific Question Answer Comments   Is discharge order? Yes      Wound care and dressings   Order Comments: Instructions to care for your wound at home: keep splint clean and dry, nothing down splint.  Call if gets saturated.     Discharge Instructions   Order Comments: Call if increasing pain or drainage at surgical site, consistently worsening calf pain or feeling of spasm, shortness of breath, fevers >101     MD face to face encounter   Order Comments: Documentation of Face to Face and Certification for Home Health Services    I certify that patient: Juany Collazo is under my care and that I, or a nurse practitioner or physician's assistant working with me, had a face-to-face encounter that meets the physician face-to-face encounter requirements with this patient on: 8/10/2020.    This encounter with the patient was in whole, or in part, for the following medical condition, which is the primary reason for home health care: right ankle fracture, difficulty leaving home.    I certify that, based on my findings, the following services are medically necessary home health services: Nursing and Physical Therapy.    My clinical findings support the need for the above services because: Nurse is needed: For complex aftercare of surgical  procedures because the patient needs instruction and cannot perform care on their own due to: non-weight bearing on right leg, imbalance. and To assess changes in medications or other medical regimen. and Physical Therapy Services are needed to assess and treat the following functional impairments: weakness, imbalance.    Further, I certify that my clinical findings support that this patient is homebound (i.e. absences from home require considerable and taxing effort and are for medical reasons or [facility] services or infrequently or of short duration when for other reasons) because: Leaving home is medically contraindicated for the following reason(s): Dyspnea on exertion that makes it so they cannot leave their home for needed services without clinical deterioration. and Unable to tolerate sitting for more than 15 minutes.. and Requires assistance of another person or specialized equipment to access medical services because patient: Has prohibitive pain during ambulation., Is unable to operate assistive equipment on their own. and Requires supervision of another for safe transfer...    Based on the above findings. I certify that this patient is confined to the home and needs intermittent skilled nursing care, physical therapy and/or speech therapy.  The patient is under my care, and I have initiated the establishment of the plan of care.  This patient will be followed by a physician who will periodically review the plan of care.  Physician/Provider to provide follow up care: Gaurang Reeves    Attending hospital physician (the Medicare certified [place] provider): Caden Deshpande MD  Physician Signature: See electronic signature associated with these discharge orders.  Date: 8/10/2020     Follow-up and recommended labs and tests   Order Comments: Follow up with Dr. Nash/Quin WORLEY at [facility] 2 weeks after surgery.  Call [phone] for appointment and questions.  Follow-up PCP in 1 week.  Hold  lisinopril-hydrochlorothiazide until follow-up with PCP.  You can resume Amlodipine today.     Wheelchair Order   Order Comments: I, the undersigned, certify that the above prescribed supplies are medically necessary for this patient and is both reasonable and necessary in reference to accepted standards of medical and necessary in reference to accepted standards of medical practice in the treatment of this patient's condition and is not prescribed as a convenience.      Order Specific Question Answer Comments   Wheelchair Type: Standard    Length of Need: Lifetime    Leg Rests: Standard    Arm Rests: Standard    The face to face evaluation was performed on: 8/10/2020      Diet   Order Comments: Follow this diet upon discharge: Orders Placed This Encounter      Advance Diet as Tolerated: Regular Diet Adult       Order Specific Question Answer Comments   Is discharge order? Yes              Hospital Course:   This is an 88-year-old female with a history of hypertension, hyperlipidemia, degenerative joint disease and mild cognitive impairment, presented to [facility] 08/06 following a syncopal episode.  The patient was getting her hair cut by her daughter.  The daughter started massaging her neck due to some soreness.  The patient subsequently passed out and fell.  She came to the Emergency Department due to right leg pain.  Imaging revealed a distal fibular fracture.  She was admitted to the hospital.  Echocardiogram and telemetry were unremarkable.  Syncopal episode was likely explained by carotid sinus stimulation and vagal response.      She underwent surgical repair, which was accomplished successfully without complication.  She has been participating in physical therapy since surgery and is doing well and cleared for discharge home with home therapy and services.  To note, prior to admission, she was taking lisinopril, hydrochlorothiazide, and Norvasc.  Her blood pressure was initially soft, but is  gradually rising now to the normal range.  I think it is safe to resume all 3 of these medications given systolic BP is now 160s.  She also had some mild bronchospasm noted yesterday, which is now resolved with use of an as-needed albuterol inhaler.  The patient currently feels well and appears suitable for discharge home with followup as noted above.     The patient was seen, examined, and counseled on this day. The hospitalization and plan of care was reviewed with the patient and daughter extensively. All questions were addressed and the patient agreed to follow-up as noted above.      Total time spent in face to face contact with the patient and coordinating discharge was:  35 Minutes    Ruddy Ty DO, MPH  [facility] Hospitalist  [address] [place] [address]  Pager: [phone]  2020        D: 2020   T: 2020   MT: MINOR      Name:     OLE ROBISON   MRN:      [id number]        Account:        [id number]   :      1932           Admit Date:     2020                                  Discharge Date:       Document: [id number]       cc: Gaurang CAMPOS CNP

## 2020-08-11 NOTE — PLAN OF CARE
Discharge Planner OT   Patient plan for discharge: home with daughter and grandson  Current status:Pt sup>sit ind in bed. Sit<>stand CGA with FWW. Pivot to toilet able to maintain NWB on RLE. Max A for clothing mangement. Pt ind. with pericares. CGA to pivot back to bed. Ind with sit>sup. Pt feels prepared for d/c to home.   Barriers to return to prior living situation: NWB precautions, DJD in multiple joints (shoulders, right knee) with recent L TKA   Recommendations for discharge: home with 24/7 supervision and assist with dressing, bathing, toileting, cooking, homemaking, laundry, medication management, Home OT   Rationale for recommendations: Pt would benefit from continued OT to maximize safety and independence in ADLs and functional tranfers using DME with Pt/Caregiver training       Entered by: Zora Gardner 08/11/2020 2:58 PM

## 2020-08-11 NOTE — PLAN OF CARE
Pt A&Ox4. Forgetful at times. VSS. Afebrile. RA. Uneven puils. LS exp wheesing Bilaterally. Dry nonproductive cough. IS use done. +BS/flatus. Had one large formed BM this evening. Tolerated regular diet. Sat in chair for dinner. Up with assist of 1 gait belt and walker and pivoted. NWB to RLE. Voiding adequate amounts using BSC.  purewick placed for the night at 2117. Dressing/splint to R leg c/d/I elevated while in bed with foam wedge. Patient said she has no pain declined scheduled Tylenol and did not take any Tramadol. Plan to discharge to [facility] via [facility] later in the day tomorrow. Will continue to monitor.

## 2020-08-11 NOTE — PROGRESS NOTES
Orthopedic Surgery  Juany Collazo  2020  Admit Date:  2020  POD: 3 Days Post-Op   Procedure(s):  Open reduction with internal fixation, right trimalleolar ankle fracture.    Alert and converstional.  Patient resting comfortably in chair    Pain controlled.  Tolerating oral intake.    Denies nausea or vomiting  Denies chest pain or shortness of breath    Vital Sign Ranges  Temperature Temp  Av.7  F (36.5  C)  Min: 96.7  F (35.9  C)  Max: 98.6  F (37  C)   Blood pressure Systolic (24hrs), Av , Min:123 , Max:161        Diastolic (24hrs), Av, Min:55, Max:76      Pulse Pulse  Av  Min: 60  Max: 60   Respirations Resp  Av.3  Min: 12  Max: 20   Pulse oximetry SpO2  Av.8 %  Min: 94 %  Max: 96 %       Splint is clean, dry and intact  Able to flex and extend toes  Pulses present  Capillary refill < 2 seconds  Sensation intact.      Labs:  Recent Labs   Lab Test 20  0620  1739 20  2319   POTASSIUM 3.8 3.9 3.2*     Recent Labs   Lab Test 20  0620 20  2319 03/10/20  0745   HGB 10.6* 11.8 9.7*     Recent Labs   Lab Test 20  2319 13  1600   INR 0.94 0.92     Recent Labs   Lab Test 20  0620 20  2319 20  1553    313 236       1. PLAN:   Continue ASA for DVT prophylaxis.                Mobilize with PT/OT               Non-WB Right LE.                Continue current pain regiment.              Dressings: Keep intact.      2. Disposition   Anticipate d/c to home with home care today if medically cleared.  Ok from ortho standpoint.     Twyla Galo PA-C

## 2020-08-11 NOTE — PLAN OF CARE
Discharge Planner PT   Patient plan for discharge: Home with daughter, HENRY and grandchildren providing 24/7 care.   Current status: Patient falling asleep in chair. Agreeable to limited session only to return to bed. Denies concerns with stair transfer today. Sit>stand with Danelle, cued for hand transition; needs consistent A under R LE to maintain NWB. SPT to bed with Danelle, cues for body and L LE positioning and to maintain NWB. Sit>supine with modA. Danelle and cues needed to reposition in bed.   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  Patient will require walker and Ax1-2 for pivot transfers.  Patient will likely require bedside commode, shower chair for home use.  Patient requires home therapy at this time for ongoing transfer training and equipment recommendations as attending therapy in a clinic setting would be a considerable and significantly taxing effort, limiting her ability to participate in therapy session.       Entered by: Ericka Bass 08/11/2020 11:28 AM

## 2020-08-11 NOTE — DISCHARGE INSTRUCTIONS
Your home care referral was sent to [facility]  If you haven't heard from them within the next 24-48 hours,  Please call them at [phone]

## 2020-08-11 NOTE — PROGRESS NOTES
Pt is alert and oriented, vss,on RA, up with assist of one walker and gait/ NWB to RLE. Tolerating regular diet, passing flatus,+ve bowel sounds. md del castillo.pain controlled with scheduled tylenol/ice.Pt able to wiggle toes to RLE.Leg elevated on a wedge pillow.Dressing is clean dry and intact.Voididng adequate amounts. Plan to discharge home with daughter today. HE providing transportation.

## 2020-08-12 ENCOUNTER — PATIENT OUTREACH (OUTPATIENT)
Dept: GERIATRIC MEDICINE | Facility: CLINIC | Age: 85
End: 2020-08-12

## 2020-08-12 ENCOUNTER — TELEPHONE (OUTPATIENT)
Dept: GERIATRICS | Facility: CLINIC | Age: 85
End: 2020-08-12

## 2020-08-12 NOTE — PLAN OF CARE
Occupational Therapy Discharge Summary    Reason for therapy discharge:    Discharged to home with home therapy.    Progress towards therapy goal(s). See goals on Care Plan in [facility] electronic health record for goal details.  Goals partially met.  Barriers to achieving goals:   limited tolerance for therapy.    Therapy recommendation(s):    Continued therapy is recommended.  Rationale/Recommendations:  Home OT to improve safety ind in ADLs, provide caregiver education. .

## 2020-08-12 NOTE — PROGRESS NOTES
"Physical Therapy Discharge Summary    Reason for therapy discharge:    Discharged to home with home therapy.    Progress towards therapy goal(s). See goals on Care Plan in [facility] electronic health record for goal details.  Goals not met.  Barriers to achieving goals:   discharge from facility.    Therapy recommendation(s):    \"Home with wheelchair for mobility, family providing 24/7 A of 1-2 for transfers/cares, Home RN/OT/HHA\"      "

## 2020-08-13 ENCOUNTER — PATIENT OUTREACH (OUTPATIENT)
Dept: GERIATRIC MEDICINE | Facility: CLINIC | Age: 85
End: 2020-08-13

## 2020-08-13 NOTE — PROGRESS NOTES
8-13-20  TC from IZABELA Escoto at [facility] [phone].  She states concern with member ability to return home.  Malgorzata LAGUNAS, is struggling caring for her at home.   Does she need to return to the AL or go to TCU does have a bed available and DEIRDRE Merlos is willing to support and assist with orders as needed.  [facility] has left messages for BEBO Mcgarry and dgt Kelly.  Awaiting a return call.    CC spoke with [facility] - member has actually not lived in the [place] since March (vacations ect) but still has her apartment there.  She is concern with the status as it is Thursday so if need for her to come to [place] or to TCU, plans should be made today to arrange services/relocation.  MARILUZ stated understanding and stated she would call LEONA LAGUNAS to see if she would answer.    MARILUZ contacted BEBO Mcgarry with [facility].  She states she saw Juany again today at her daughter home, she is doing better.  Dgt is working FT but member is able to now transfer with min assist of 1 and they think will do fine at the dgt home.  Malgorzata will call [facility] to discuss further, however plan at this time if for member to stay with dgt.  Malgorzata is aware of the bed availability at the TCU.  Lauren Lopez RN N  [facility]   [phone]

## 2020-08-13 NOTE — PROGRESS NOTES
TRANSITIONS OF CARE (LESTER) LOG   LESTER tasks should be completed by the CC within one (1) business day of notification of each transition. Follow up contact with member is required after return to their usual care setting.  Note:  If CC finds out about the transitions fifteen (15) days or more after the member has returned to their usual care setting, no LESTER log is needed. However, the CC should check in with the member to discuss the transition process, any changes needed to the care plan and document it in a case note.    Member Name:  Juany Collazo O Name:  [facility] M[facility]/Health Plan Member ID#: [id number]   Product: [employer] Care Coordinator Contact:  Nany Malcolm MA, [facility]/Care System: [facility]   Transition Communication Actions from Care Management Contact   Transition #1   Notification Date: 8-12-20 Transition Date:   8-6-20 Transition From: Assisted Living, [facility]     Is this the member s usual care setting?               yes Transition To: [facility]   Transition Type:  Unplanned  Reason for Admission/Comments:  8-6-20 Syncope resulting in Open reduction with internal fixation, right trimalleolar ankle fracture. (Right Ankle)  Communication as per process and EMR. Facility also in contact with family and dc planner   Shared CC contact info, care plan/services with receiving setting--Date completed: 8-6-20    Notified PCP of transition--Date completed:  8-6-20     via  EMR   Transition #2   Transition #3  (if applicable)   Notification Date: 8-13-20         Transition To:  Assisted Living, home per epic notes  Transition Date: 8-11-20     Transition Type:    Planned  Notified PCP -- Date completed: 8-11-20              Shared CC contact info, care plan/services with receiving setting or, if applicable, home care agency--Date completed:  8-11-20  *Complete additional tasks below, if this transition is a return to usual care setting.       Comments:  Home with PT/OT, HC, HHA.  Dgt educated and available to assist.  W/c also ordered for member.  Information sent with member back to facility and EMR. Facility also in contact with family  IZABELA Keller  [facility]   [phone]      Notification Date:          Transition To:    Transition Date:              Transition Type:      Notified PCP--Date completed:          Shared CC contact info, care plan/services with receiving setting or, if applicable, home care agency--Date completed:       *Complete additional tasks below, if this transition is a return to usual care setting.      Comments:       *Complete tasks below when the member is discharging TO their usual care setting within one (1) business day of notification.  For situations where the Care Coordinator is notified of the discharge prior to the date of discharge, the Care Coordinator must follow up with the member or designated representative to confirm that discharge actually occurred and discuss required LESTER tasks as outlined in the LESTER Instructions.  (This includes situations where it may be a  new  usual care setting for the member. (i.e., a community member who decides upon permanent nursing home placement following hospitalization and rehab).    Date completed: 8-13-20  Communicated with member or their designated representative about the following:  care transition process; about changes to the member s health status; plan of care updates; education about transitions and how to prevent unplanned transitions/readmissions  Four Pillars for Optimal Transition:    Check  Yes  - if the member, family member and/or SNF/facility staff manages the following:    If  No  provide explanation in the comments section.          [x]  Yes     []  No     Does the member have a follow-up appointment scheduled with primary care or specialist? (Mental health hospitalizations--the appt. should be w/in 7 days)   [x]  Yes     []   No     Can the member manage their medications or is there a system in place to manage medications (e.g. home care set-up)?         [x]  Yes     []  No     Can the member verbalize warning signs and symptoms to watch for and how to respond?         [x]  Yes     []  No     Does the member use a Personal Health Care Record?  Check  Yes  if visit summary, discharge summary, and/or healthcare summary are being used as a PHR.                                                                                                                                                                                    [] Yes      [x] No      Have you updated the member s care plan?  If  No  provide explanation in comments.   Comments:  Lives in Assisted Living setting. Seen by on site by NP/MD team as appropriate. Facility manages meds, records, POC and assessment of clinical status as needed  Lauren Lopez RN PHN  [facility]   [phone]

## 2020-08-20 ENCOUNTER — TELEPHONE (OUTPATIENT)
Dept: GERIATRICS | Facility: CLINIC | Age: 85
End: 2020-08-20

## 2020-08-20 ENCOUNTER — NURSING HOME VISIT (OUTPATIENT)
Dept: GERIATRICS | Facility: CLINIC | Age: 85
End: 2020-08-20
Payer: COMMERCIAL

## 2020-08-20 VITALS
HEIGHT: 61 IN | BODY MASS INDEX: 31.64 KG/M2 | RESPIRATION RATE: 18 BRPM | OXYGEN SATURATION: 97 % | SYSTOLIC BLOOD PRESSURE: 151 MMHG | DIASTOLIC BLOOD PRESSURE: 72 MMHG | TEMPERATURE: 98 F | HEART RATE: 68 BPM | WEIGHT: 167.6 LBS

## 2020-08-20 DIAGNOSIS — I10 ESSENTIAL HYPERTENSION: ICD-10-CM

## 2020-08-20 DIAGNOSIS — Z87.81 S/P ORIF (OPEN REDUCTION INTERNAL FIXATION) FRACTURE: ICD-10-CM

## 2020-08-20 DIAGNOSIS — F32.A DEPRESSION, UNSPECIFIED DEPRESSION TYPE: ICD-10-CM

## 2020-08-20 DIAGNOSIS — S82.851D CLOSED TRIMALLEOLAR FRACTURE OF RIGHT ANKLE WITH ROUTINE HEALING, SUBSEQUENT ENCOUNTER: ICD-10-CM

## 2020-08-20 DIAGNOSIS — K59.00 CONSTIPATION, UNSPECIFIED CONSTIPATION TYPE: ICD-10-CM

## 2020-08-20 DIAGNOSIS — S93.04XA ANKLE DISLOCATION, RIGHT, INITIAL ENCOUNTER: ICD-10-CM

## 2020-08-20 DIAGNOSIS — R55 VASOVAGAL SYNCOPE: Primary | ICD-10-CM

## 2020-08-20 DIAGNOSIS — G89.29 OTHER CHRONIC PAIN: ICD-10-CM

## 2020-08-20 DIAGNOSIS — G89.18 ACUTE POST-OPERATIVE PAIN: ICD-10-CM
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
## 2020-08-20 DIAGNOSIS — R41.89 COGNITIVE IMPAIRMENT: ICD-10-CM

## 2020-08-20 DIAGNOSIS — Z98.890 S/P ORIF (OPEN REDUCTION INTERNAL FIXATION) FRACTURE: ICD-10-CM

## 2020-08-20 DIAGNOSIS — R53.81 DEBILITY: ICD-10-CM

## 2020-08-20 DIAGNOSIS — S82.851A CLOSED TRIMALLEOLAR FRACTURE OF RIGHT ANKLE, INITIAL ENCOUNTER: ICD-10-CM

## 2020-08-20 DIAGNOSIS — J98.01 ACUTE BRONCHOSPASM: ICD-10-CM

## 2020-08-20 PROCEDURE — 99309 SBSQ NF CARE MODERATE MDM 30: CPT | Performed by: NURSE PRACTITIONER

## 2020-08-20 RX ORDER — ACETAMINOPHEN 500 MG
1000 TABLET ORAL 3 TIMES DAILY
Start: 2020-08-20 | End: 2020-08-21

## 2020-08-20 RX ORDER — POLYETHYLENE GLYCOL 3350 17 G/17G
1 POWDER, FOR SOLUTION ORAL DAILY PRN
Start: 2020-08-20 | End: 2024-06-14

## 2020-08-20 RX ORDER — LANOLIN ALCOHOL/MO/W.PET/CERES
3 CREAM (GRAM) TOPICAL
COMMUNITY
Start: 2020-08-20 | End: 2023-04-07

## 2020-08-20 RX ORDER — LISINOPRIL AND HYDROCHLOROTHIAZIDE 12.5; 2 MG/1; MG/1
1 TABLET ORAL 2 TIMES DAILY
Start: 2020-08-20 | End: 2020-09-09

## 2020-08-20 ASSESSMENT — MIFFLIN-ST. JEOR: SCORE: 1127.61

## 2020-08-20 NOTE — LETTER
8/20/2020        RE: Juany Collazo  [place] [facility]  [address]  [place] [address]        [facility]  PRIMARY CARE PROVIDER AND CLINIC:  Gaurang Reeves, BETH CNP, [address] / [place] [address]  Chief Complaint   Patient presents with     Hospital F/U     [facility] Record Number:  [id number]  Place of Service where encounter took place:  [facility] [[id number]]    Juany Collazo  is a 88 year old  (1/7/1932), admitted to the above facility from [facility]. Hospital stay Aug 6 through Aug 11.  She initially went home to [facility] but family not able to help enough thus she transitioned from [facility] to next door at the TCU now.  Admitted to this facility for  rehab, medical management and nursing care.    HPI:    HPI information obtained from: facility chart records, facility staff, patient report, [facility] chart review and [facility] Sean RN. .     Brief Summary of Hospital Course: Mrs. Collazo is a 89 y/o from [facility] whom has a PMH of HTN, chronic pain, cognitive impairment and depression.  She was out with her family and they were massaging her sore neck when she passed out and fell.  Came to and had Right ankle pain.  Presented to hospital and found to have a displaced Right trimalleolar ankle fracture.  Is now S/P ORIF on 8/8.  They noted telemetry and ECHO unremarkable, they felt syncope likely carotid massage/vasovagle in etiology.  She did have some mild post op bronchospasms treated with PRN Albuterol.    She initially transitioned back to [facility] with NWB RLE and family help but family not able to keep up with cares and not [facility] appropriate.  Thus she now has transitioned next door here at the TCU for ongoing cares and PT/OT.      Updates on Status Since Skilled nursing Admission: In meeting Mrs. Collazo today for admission, she clearly has a degree of cognitive/memory impairment.  She reports she remembers the  "fall and being in hospital, can not remember details.  She reports she has \"Some\" pain in her R ankle but does not rate.  Reports some soreness in her LLE after PT/OT, but vague/does not rate.  She reports she never has light headedness/dizziness issues before.  She reports she has not had SOB or coughing issues before, does endorse some mild BRICEÑO with activity as chronic.  Currently besides the mild pain, she has no complaints.      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 She also has no past medical history of Malignant hyperthermia or PONV (postoperative nausea and vomiting).  PAST SURGICAL HISTORY:   has a past surgical history that includes Decompression lumbar one level (4/19/2013); Eye surgery; Arthroplasty knee (Left, 3/9/2020); Arthroplasty hip (Right, 1998); Tubal ligation NOS (1970); and Open reduction internal fixation ankle (Right, 8/8/2020).  FAMILY HISTORY: family history includes Arthritis in her mother; Cardiovascular in her brother and father; Eye Disorder in her mother; Glaucoma in her mother; Hypertension in her father and mother; Musculoskeletal Disorder in her brother; Osteoporosis in her maternal aunt and mother.  SOCIAL HISTORY:   reports that she quit smoking about 54 years ago. She has never used smokeless tobacco. She reports previous alcohol use. She reports that she does not use drugs.    Post Discharge Medication Reconciliation Status: discharge medications reconciled and changed, per note/orders    Current Outpatient Medications   Medication Sig Dispense Refill     acetaminophen (TYLENOL) 500 MG tablet Take 2 tablets (1,000 mg) by mouth 3 " "times daily       lisinopril-hydrochlorothiazide (ZESTORETIC) 20-12.5 MG tablet Take 1 tablet by mouth 2 times daily       polyethylene glycol (MIRALAX) 17 GM/Dose powder Take 17 g (1 capful) by mouth daily as needed for constipation       amLODIPine (NORVASC) 2.5 MG tablet Take 1 tablet (2.5 mg) by mouth daily 90 tablet 3     amoxicillin (AMOXIL) 500 MG capsule Take 1 capsule (500 mg) by mouth 3 times daily Amoxil 500 mg TID for 10 days 21 capsule 0     aspirin (ASA) 325 MG EC tablet Take 1 tablet (325 mg) by mouth daily 30 tablet 0     citalopram (CELEXA) 20 MG tablet Take 1 tablet (20 mg) by mouth daily 30 tablet 3     diclofenac (VOLTAREN) 1 % topical gel Apply 2 g topically 2 times daily as needed for moderate pain 1 Tube 0     donepezil (ARICEPT) 10 MG tablet Take 1 tablet (10 mg) by mouth At Bedtime 30 tablet      melatonin 3 MG tablet Take 1 tablet (3 mg) by mouth nightly as needed for sleep       omeprazole (PRILOSEC) 20 MG DR capsule Take 20 mg by mouth daily        simvastatin (ZOCOR) 20 MG tablet Take 1 tablet (20 mg) by mouth At Bedtime 90 tablet 3     ROS:  Limited secondary to cognitive impairment but today pt reports 7 point ROS done including, light headedness/dizziness, fever/chills, pain, Resp, CV, GI, and  and is negative other than noted in HPI.      Vitals:  BP (!) 151/72   Pulse 68   Temp 98  F (36.7  C)   Resp 18   Ht 1.549 m (5' 1\")   Wt 76 kg (167 lb 9.6 oz)   SpO2 97%   BMI 31.67 kg/m       Exam:  EXAM LIMITED DUE TO [facility] social distancing recommendations:  GENERAL APPEARANCE:  Elderly female sitting up in WC.  NAD, non-toxic.  RESP:  Regular relaxed breathing effort.  No cough.    EXTREMITIES:  No lower Left extremity edema, no calf tenderness.   Right leg is in surgical splint below knee to toes.  Toes have good C/M/S.  PSYCH: Alert to self, some what to place and situation.  Clear degree of cognitive/memory impairment.     Lab/Diagnostic data:  I have personally reviewed labs, " which are in facility or EMR chart.     ASSESSMENT/PLAN:  Vasovagal syncope  Fall, subsequent encounter  Hospital noted ECHO and telemetry unremarkable, no h/o light headedness issues.  Fall thought to be secondary to carotid massage/vasovagel in nature.    -No changes, continue to clinically monitor.     Closed trimalleolar fracture of right ankle with routine healing, subsequent encounter  S/P ORIF (open reduction internal fixation) fracture, 8/8  Acute post-operative pain  Other chronic pain  She reports mild/vague pain.  Has a below knee to toes surgical splint in place, orders to not touch.  Good CMS in toes.   -Is on ASA for prophylaxis.   -Is NWB RLE for now.   -Ortho f/u in 2 weeks.   -On TID Acetaminophen.   -On PRN Diclofenac BID PRN.      Some notes indicated on PRN Tramadol from hospital, but not on profile now.  Will try to avoid if able.  Consider resuming if pain issues.     Acute bronchospasm  Had issue of acute bronchospasm in hospital post op, treated with PRN Albuterol.  She does not remember this.  She denies any current respiratory issues, doing well on RA.   -Does continue PRN Albuterol for now.     Essential hypertension  SBP's were noted to be soft in hospital, anti hypertensives held, but then resumed on discharge.   Some notes indicate Lisinopril-hydrochlorothiazide as daily, some note as BID.  I spoke with the [facility] RN whom confirmed she takes it BID, older PCP notes indicated BID.    Review of VS's show VSS and within acceptable range.   No current s/s of clinical CHF.   -Continues Lisinopril/HCTZ BID for now.   -Continues Norvasc.   -Continues Statin.      Cognitive impairment  Depression, unspecified depression type  Do note mild-moderate degree of cognitive/memory impairment.   -Continues Citalopram.   -Continues Donepezil.   -Started recently on PRN Melatonin.     Debility  -PT/OT to eval and treat.   -Is NWB RLE for now.     Orders written by provider at facility  -As noted above.      Electronically signed by:  BETH Olmstead CNP                 Sincerely,        BETH Olmstead CNP

## 2020-08-20 NOTE — TELEPHONE ENCOUNTER
New admit to [facility] TCU. Had recent hospital stay but appears went from hospital to be with her daughter but must have needed more assistance so now at TCU. Her home is at [facility]. Called re: c/o insomnia as settling in. Likely situational as staff is doing regular vitals, etc on admission. Ok for Melatonin 3 mg every evening PRN insomnia. Concern with giving at this time of night though is lethargy next day so to use with caution. Per nursing staff, her last COVID screen was on the 7th while in hospital so may need another screen now that she is in TCU.    Cheyenne Garcia, DO

## 2020-08-20 NOTE — PROGRESS NOTES
"[place] [facility]  PRIMARY CARE PROVIDER AND CLINIC:  Gaurang Reeves, APRN CNP, [address] / [place] [address]  Chief Complaint   Patient presents with     Hospital F/U     [place] Medical Record Number:  [id number]  Place of Service where encounter took place:  [facility] ([facility]) [[id number]]    Juany Collazo  is a 88 year old  (1/7/1932), admitted to the above facility from [facility]. Hospital stay Aug 6 through Aug 11.  She initially went home to [facility] but family not able to help enough thus she transitioned from [facility] to next door at the TCU now.  Admitted to this facility for  rehab, medical management and nursing care.    HPI:    HPI information obtained from: facility chart records, facility staff, patient report, [facility] chart review and [facility] Estrada RN. .     Brief Summary of Hospital Course: Mrs. Collazo is a 87 y/o from [facility] whom has a PMH of HTN, chronic pain, cognitive impairment and depression.  She was out with her family and they were massaging her sore neck when she passed out and fell.  Came to and had Right ankle pain.  Presented to hospital and found to have a displaced Right trimalleolar ankle fracture.  Is now S/P ORIF on 8/8.  They noted telemetry and ECHO unremarkable, they felt syncope likely carotid massage/vasovagle in etiology.  She did have some mild post op bronchospasms treated with PRN Albuterol.    She initially transitioned back to [facility] with NWB RLE and family help but family not able to keep up with cares and not CELY appropriate.  Thus she now has transitioned next door here at the TCU for ongoing cares and PT/OT.      Updates on Status Since Skilled nursing Admission: In meeting Mrs. Collazo today for admission, she clearly has a degree of cognitive/memory impairment.  She reports she remembers the fall and being in hospital, can not remember details.  She reports she has \"Some\" pain in her R ankle but does not " rate.  Reports some soreness in her LLE after PT/OT, but vague/does not rate.  She reports she never has light headedness/dizziness issues before.  She reports she has not had SOB or coughing issues before, does endorse some mild BRICEÑO with activity as chronic.  Currently besides the mild pain, she has no complaints.      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 She also has no past medical history of Malignant hyperthermia or PONV (postoperative nausea and vomiting).  PAST SURGICAL HISTORY:   has a past surgical history that includes Decompression lumbar one level (4/19/2013); Eye surgery; Arthroplasty knee (Left, 3/9/2020); Arthroplasty hip (Right, 1998); Tubal ligation NOS (1970); and Open reduction internal fixation ankle (Right, 8/8/2020).  FAMILY HISTORY: family history includes Arthritis in her mother; Cardiovascular in her brother and father; Eye Disorder in her mother; Glaucoma in her mother; Hypertension in her father and mother; Musculoskeletal Disorder in her brother; Osteoporosis in her maternal aunt and mother.  SOCIAL HISTORY:   reports that she quit smoking about 54 years ago. She has never used smokeless tobacco. She reports previous alcohol use. She reports that she does not use drugs.    Post Discharge Medication Reconciliation Status: discharge medications reconciled and changed, per note/orders    Current Outpatient Medications   Medication Sig Dispense Refill     acetaminophen (TYLENOL) 500 MG tablet Take 2 tablets (1,000 mg) by mouth 3 times daily       lisinopril-hydrochlorothiazide (ZESTORETIC) 20-12.5 MG tablet Take 1 tablet by mouth 2 times daily    "    polyethylene glycol (MIRALAX) 17 GM/Dose powder Take 17 g (1 capful) by mouth daily as needed for constipation       amLODIPine (NORVASC) 2.5 MG tablet Take 1 tablet (2.5 mg) by mouth daily 90 tablet 3     amoxicillin (AMOXIL) 500 MG capsule Take 1 capsule (500 mg) by mouth 3 times daily Amoxil 500 mg TID for 10 days 21 capsule 0     aspirin (ASA) 325 MG EC tablet Take 1 tablet (325 mg) by mouth daily 30 tablet 0     citalopram (CELEXA) 20 MG tablet Take 1 tablet (20 mg) by mouth daily 30 tablet 3     diclofenac (VOLTAREN) 1 % topical gel Apply 2 g topically 2 times daily as needed for moderate pain 1 Tube 0     donepezil (ARICEPT) 10 MG tablet Take 1 tablet (10 mg) by mouth At Bedtime 30 tablet      melatonin 3 MG tablet Take 1 tablet (3 mg) by mouth nightly as needed for sleep       omeprazole (PRILOSEC) 20 MG DR capsule Take 20 mg by mouth daily        simvastatin (ZOCOR) 20 MG tablet Take 1 tablet (20 mg) by mouth At Bedtime 90 tablet 3     ROS:  Limited secondary to cognitive impairment but today pt reports 7 point ROS done including, light headedness/dizziness, fever/chills, pain, Resp, CV, GI, and  and is negative other than noted in HPI.      Vitals:  BP (!) 151/72   Pulse 68   Temp 98  F (36.7  C)   Resp 18   Ht 1.549 m (5' 1\")   Wt 76 kg (167 lb 9.6 oz)   SpO2 97%   BMI 31.67 kg/m       Exam:  EXAM LIMITED DUE TO [facility] social distancing recommendations:  GENERAL APPEARANCE:  Elderly female sitting up in WC.  NAD, non-toxic.  RESP:  Regular relaxed breathing effort.  No cough.    EXTREMITIES:  No lower Left extremity edema, no calf tenderness.   Right leg is in surgical splint below knee to toes.  Toes have good C/M/S.  PSYCH: Alert to self, some what to place and situation.  Clear degree of cognitive/memory impairment.     Lab/Diagnostic data:  I have personally reviewed labs, which are in facility or EMR chart.     ASSESSMENT/PLAN:  Vasovagal syncope  Fall, subsequent encounter  Hospital noted " ECHO and telemetry unremarkable, no h/o light headedness issues.  Fall thought to be secondary to carotid massage/vasovagel in nature.    -No changes, continue to clinically monitor.     Closed trimalleolar fracture of right ankle with routine healing, subsequent encounter  S/P ORIF (open reduction internal fixation) fracture, 8/8  Acute post-operative pain  Other chronic pain  She reports mild/vague pain.  Has a below knee to toes surgical splint in place, orders to not touch.  Good CMS in toes.   -Is on ASA for prophylaxis.   -Is NWB RLE for now.   -Ortho f/u in 2 weeks.   -On TID Acetaminophen.   -On PRN Diclofenac BID PRN.      Some notes indicated on PRN Tramadol from hospital, but not on profile now.  Will try to avoid if able.  Consider resuming if pain issues.     Acute bronchospasm  Had issue of acute bronchospasm in hospital post op, treated with PRN Albuterol.  She does not remember this.  She denies any current respiratory issues, doing well on RA.   -Does continue PRN Albuterol for now.     Essential hypertension  SBP's were noted to be soft in hospital, anti hypertensives held, but then resumed on discharge.   Some notes indicate Lisinopril-hydrochlorothiazide as daily, some note as BID.  I spoke with the longterm RN whom confirmed she takes it BID, older PCP notes indicated BID.    Review of VS's show VSS and within acceptable range.   No current s/s of clinical CHF.   -Continues Lisinopril/HCTZ BID for now.   -Continues Norvasc.   -Continues Statin.      Cognitive impairment  Depression, unspecified depression type  Do note mild-moderate degree of cognitive/memory impairment.   -Continues Citalopram.   -Continues Donepezil.   -Started recently on PRN Melatonin.     Debility  -PT/OT to eval and treat.   -Is NWB RLE for now.     Orders written by provider at facility  -As noted above.     Electronically signed by:  BETH Olmstead CNP

## 2020-08-21 DIAGNOSIS — Z87.81 S/P ORIF (OPEN REDUCTION INTERNAL FIXATION) FRACTURE: Primary | ICD-10-CM

## 2020-08-21 DIAGNOSIS — G89.18 ACUTE POST-OPERATIVE PAIN: Primary | ICD-10-CM

## 2020-08-21 DIAGNOSIS — Z98.890 S/P ORIF (OPEN REDUCTION INTERNAL FIXATION) FRACTURE: Primary | ICD-10-CM

## 2020-08-21 RX ORDER — TRAMADOL HYDROCHLORIDE 50 MG/1
25 TABLET ORAL EVERY 6 HOURS PRN
Qty: 30 TABLET | Refills: 0 | Status: SHIPPED | OUTPATIENT
Start: 2020-08-21 | End: 2020-08-21

## 2020-08-21 RX ORDER — HYDROCODONE BITARTRATE AND ACETAMINOPHEN 5; 325 MG/1; MG/1
1 TABLET ORAL EVERY 4 HOURS PRN
Qty: 30 TABLET | Refills: 0 | Status: SHIPPED | OUTPATIENT
Start: 2020-08-21 | End: 2020-09-03

## 2020-08-23 ENCOUNTER — RECORDS - HEALTHEAST (OUTPATIENT)
Dept: LAB | Facility: CLINIC | Age: 85
End: 2020-08-23

## 2020-08-24 LAB
ANION GAP SERPL CALCULATED.3IONS-SCNC: 10 MMOL/L (ref 5–18)
BUN SERPL-MCNC: 21 MG/DL (ref 8–28)
CALCIUM SERPL-MCNC: 9.3 MG/DL (ref 8.5–10.5)
CHLORIDE BLD-SCNC: 97 MMOL/L (ref 98–107)
CO2 SERPL-SCNC: 25 MMOL/L (ref 22–31)
CREAT SERPL-MCNC: 0.74 MG/DL (ref 0.6–1.1)
ERYTHROCYTE [DISTWIDTH] IN BLOOD BY AUTOMATED COUNT: 15.9 % (ref 11–14.5)
GFR SERPL CREATININE-BSD FRML MDRD: >60 ML/MIN/1.73M2
GLUCOSE BLD-MCNC: 85 MG/DL (ref 70–125)
HCT VFR BLD AUTO: 36.8 % (ref 35–47)
HGB BLD-MCNC: 10.9 G/DL (ref 12–16)
MCH RBC QN AUTO: 26.7 PG (ref 27–34)
MCHC RBC AUTO-ENTMCNC: 29.6 G/DL (ref 32–36)
MCV RBC AUTO: 90 FL (ref 80–100)
PLATELET # BLD AUTO: 356 THOU/UL (ref 140–440)
PMV BLD AUTO: 11.1 FL (ref 8.5–12.5)
POTASSIUM BLD-SCNC: 3.8 MMOL/L (ref 3.5–5)
RBC # BLD AUTO: 4.09 MILL/UL (ref 3.8–5.4)
SODIUM SERPL-SCNC: 132 MMOL/L (ref 136–145)
WBC: 12.6 THOU/UL (ref 4–11)

## 2020-08-27 RX ORDER — ALBUTEROL SULFATE 90 UG/1
2 AEROSOL, METERED RESPIRATORY (INHALATION) EVERY 4 HOURS PRN
COMMUNITY

## 2020-08-27 RX ORDER — BENZOCAINE/MENTHOL 6 MG-10 MG
LOZENGE MUCOUS MEMBRANE
COMMUNITY
Start: 2023-04-05

## 2020-08-27 RX ORDER — AMLODIPINE BESYLATE 2.5 MG/1
2.5 TABLET ORAL DAILY
COMMUNITY

## 2020-08-28 ENCOUNTER — NURSING HOME VISIT (OUTPATIENT)
Dept: GERIATRICS | Facility: CLINIC | Age: 85
End: 2020-08-28
Payer: COMMERCIAL

## 2020-08-28 VITALS
HEIGHT: 61 IN | TEMPERATURE: 98.2 F | OXYGEN SATURATION: 94 % | SYSTOLIC BLOOD PRESSURE: 111 MMHG | RESPIRATION RATE: 18 BRPM | DIASTOLIC BLOOD PRESSURE: 63 MMHG | HEART RATE: 65 BPM | WEIGHT: 165 LBS | BODY MASS INDEX: 31.15 KG/M2

## 2020-08-28 DIAGNOSIS — Z98.890 S/P ORIF (OPEN REDUCTION INTERNAL FIXATION) FRACTURE: ICD-10-CM

## 2020-08-28 DIAGNOSIS — E04.1 THYROID NODULE: ICD-10-CM

## 2020-08-28 DIAGNOSIS — G47.01 INSOMNIA DUE TO MEDICAL CONDITION: ICD-10-CM

## 2020-08-28 DIAGNOSIS — J98.01 ACUTE BRONCHOSPASM: ICD-10-CM

## 2020-08-28 DIAGNOSIS — R53.81 PHYSICAL DECONDITIONING: ICD-10-CM

## 2020-08-28 DIAGNOSIS — Z87.81 S/P ORIF (OPEN REDUCTION INTERNAL FIXATION) FRACTURE: ICD-10-CM

## 2020-08-28 DIAGNOSIS — F03.90 DEMENTIA WITHOUT BEHAVIORAL DISTURBANCE, UNSPECIFIED DEMENTIA TYPE: ICD-10-CM

## 2020-08-28 DIAGNOSIS — I10 ESSENTIAL HYPERTENSION: ICD-10-CM

## 2020-08-28 DIAGNOSIS — R55 VASOVAGAL SYNCOPE: ICD-10-CM

## 2020-08-28 DIAGNOSIS — S82.851D CLOSED TRIMALLEOLAR FRACTURE OF RIGHT ANKLE WITH ROUTINE HEALING, SUBSEQUENT ENCOUNTER: Primary | ICD-10-CM

## 2020-08-28 DIAGNOSIS — D72.829 LEUKOCYTOSIS, UNSPECIFIED TYPE: ICD-10-CM

## 2020-08-28 DIAGNOSIS — E87.1 HYPONATREMIA: ICD-10-CM

## 2020-08-28 PROCEDURE — 99305 1ST NF CARE MODERATE MDM 35: CPT | Mod: AI | Performed by: INTERNAL MEDICINE

## 2020-08-28 ASSESSMENT — MIFFLIN-ST. JEOR: SCORE: 1115.82

## 2020-08-28 NOTE — LETTER
8/28/2020        RE: Juany Collazo  [facility]  [address]  [facility] [address]        [place] [facility]  PHYSICIAN NOTE    PRIMARY CARE PROVIDER AND CLINIC:  Gaurang Reeves, BETH CNP, [address] / [place] [address]    Chief Complaint   Patient presents with     Hospital F/U     [place] Medical Record Number:  [id number]  Place of Service where encounter took place:  [facility] ([facility]) [[id number]]    Juany Collazo is a 88 year old (1/7/1932), admitted to the above facility from  [facility]. Hospital stay 8/6/20 through 8/11/20. Admitted to this facility for  rehab, medical management and nursing care.     HPI:    HPI information obtained from: facility chart records, facility staff, patient report and [facility] chart review.     Brief summary of hospital course: Juany has h/o dementia living at [facility] who had a syncopal event when daughter was using a neck massager on her neck after a haircut leading to diaphoresis, emesis and sudden fall forward with twisting her ankle. It was found that she had broken her right ankle and she underwent successful ORIF. Is to be NWB RLE, ASA for DVT prophy and had post-op Amoxicillin course. Tele and echo fine and based on history, it was thought Juany had vasovagal syncope d/t carotid sinus stimulation. Discharged back to [facility] but with care needs, later transferred to [facility].     Updates on status since skilled nursing admission: Juany is seen in her room today and she is cheerful/delightful. Mentions some pain localized to right ankle but pain medication is helpful. Did have BM today. No urinary concerns, calf pain, dyspnea, chest pain or dizziness. She has only vague recollection of her fall. She says she sleeps well with aide of added Melatonin here. No nursing concerns. She appropriately asks about when her ortho appointment is (next Wed) and if  transportation is arranged yet by .    CODE STATUS/ADVANCE DIRECTIVES DISCUSSION:  FULL  Patient's living condition: lives in an assisted living facility    ALLERGIES: Patient has no known allergies.    Past Medical History:   Diagnosis Date     Arthritis      Carpal tunnel syndrome      Chronic pain     right leg     Dementia (H)      Gastro-oesophageal reflux disease      Hyperlipidemia 10/31/2010     Hypertension 10/27/2003     Mild major depression 2014     Osteoporosis       Past Surgical History:   Procedure Laterality Date     ARTHROPLASTY HIP Right      ARTHROPLASTY KNEE Left 3/9/2020    Procedure: Left total knee arthroplasty ([employer]an [employer] Nephew #3 PCR femur; #2 tibia; 35 mm thin patella; 9 mm tibial insert);  Surgeon: Isreal Tineo MD;  Location: [facility] OR     DECOMPRESSION LUMBAR ONE LEVEL  2013    Procedure: DECOMPRESSION LUMBAR ONE LEVEL;  Decompression Bilateral L4-5  ;  Surgeon: Lang Garcia MD;  Location: [facility] OR     EYE SURGERY       OPEN REDUCTION INTERNAL FIXATION ANKLE Right 2020    Procedure: Open reduction with internal fixation, right trimalleolar ankle fracture.;  Surgeon: Himanshu Nash MD;  Location: [facility] OR     Tubal ligation NOS  1970     Family History   Problem Relation Age of Onset     Cardiovascular Brother      Musculoskeletal Disorder Brother         Muscular Dystrophy     Hypertension Father      Cardiovascular Father      Hypertension Mother      Arthritis Mother      Eye Disorder Mother         Cataract     Osteoporosis Mother      Glaucoma Mother         possible per Pt     Osteoporosis Maternal Aunt      Macular Degeneration No family hx of      Social History     Tobacco Use     Smoking status: Former Smoker     Last attempt to quit: 1966     Years since quittin.2     Smokeless tobacco: Never Used   Substance Use Topics     Alcohol use: Not Currently     Frequency: Monthly or less     Drinks per session: 1 or 2     Binge  "frequency: Never     Comment: One beer day     Drug use: No        Post-discharge medication reconciliation status: Reviewed and updated in [facility] according to facility MAR    Current Outpatient Medications   Medication Sig Dispense Refill     albuterol (PROAIR HFA/PROVENTIL HFA/VENTOLIN HFA) 108 (90 Base) MCG/ACT inhaler Inhale 2 puffs into the lungs every 4 hours as needed for shortness of breath / dyspnea or wheezing       amLODIPine (NORVASC) 2.5 MG tablet Take 2.5 mg by mouth daily       aspirin (ASA) 325 MG EC tablet Take 325 mg by mouth daily Until 9/8/20 for DVT prophylaxis       citalopram (CELEXA) 20 MG tablet Take 1 tablet (20 mg) by mouth daily 30 tablet 3     diclofenac (VOLTAREN) 1 % topical gel Apply 2 g topically 2 times daily as needed for moderate pain 1 Tube 0     donepezil (ARICEPT) 10 MG tablet Take 1 tablet (10 mg) by mouth At Bedtime 30 tablet      HYDROcodone-acetaminophen (NORCO) 5-325 MG tablet Take 1 tablet by mouth every 4 hours as needed for pain 30 tablet 0     hydrocortisone (CORTAID) 1 % external cream Apply topically 2 times daily as needed (L forearm rash)       lisinopril-hydrochlorothiazide (ZESTORETIC) 20-12.5 MG tablet Take 1 tablet by mouth 2 times daily       melatonin 3 MG tablet Take 1 tablet (3 mg) by mouth nightly as needed for sleep       omeprazole (PRILOSEC) 20 MG DR capsule Take 20 mg by mouth daily        polyethylene glycol (MIRALAX) 17 GM/Dose powder Take 17 g (1 capful) by mouth daily as needed for constipation       simvastatin (ZOCOR) 20 MG tablet Take 1 tablet (20 mg) by mouth At Bedtime 90 tablet 3       ROS:  Limited secondary to cognitive impairment but today pt reports as above in HPI    Exam:  /63   Pulse 65   Temp 98.2  F (36.8  C)   Resp 18   Ht 1.549 m (5' 1\")   Wt 74.8 kg (165 lb)   SpO2 94%   BMI 31.18 kg/m    Alert, pleasant, NAD  No scleral icterus  Moist oral mucosa  Breathing non-labored on room air, no cough  Non-obese abdomen  RLE is " in splint with visible toes of normal color, temp and is able wiggle them with ease  No left calf swelling, extremities seem well perfused  Mood upbeat  Vague historian, normal speech, no tremor      Lab/Diagnostic data:  8/24/20: WBC 12.6, Hgb 10.9, Platelets 356, Na 132, K 3.8, BUN 21, Cr 0.74    ASSESSMENT/PLAN:  Closed trimalleolar fracture of right ankle with routine healing, subsequent encounter  S/P ORIF (open reduction internal fixation) fracture, 8/8  Physical deconditioning  Continue work with therapies; RLE exam as above appears appropriate  NWB RLE for now   mg daily for DVT prophy x 1 month (had been off of list for awhile so restarted)  Pain is well managed with PRN Norco and bowels are moving  F/u with ortho as scheduled on 9/2/20    Vasovagal syncope  Likely d/t carotid sinus stimulation as noted based on history  No recurrent symptoms  VSS  Tele and echo unremarkable in hospital  CTA did show spinal canal stenosis (which would explain why she gets some component of neck pain from time to time); CTA incidentally also noted thyroid nodule to consider future f/u on as outpatient    Dementia without behavioral disturbance, unspecified dementia type (H)  Insomnia due to medical condition  Occupational therapy eval and treat  Is on Donepezil for dementia, Celexa for mood and she also finds Melatonin has been helpful for sleep    Essential hypertension  Hyponatremia  BPs well controlled without hypotension; Na only minimally low at 132 (is on hydrochlorothiazide)    Acute bronchospasm  Noted in hospital with PRN Albuterol but seems resolved    Leukocytosis, unspecified type  Noted consistently since 2019; mild elevation - f/u with PCP for additional hematologic work up as desired by patient / family  Slightly elevated lymphocytes (?indolent CLL)     Advanced directive  She came as DNR/I but note in the chart code was changed to FULL this stay       Electronically signed by:  Cheyenne Garcia,  DO        Sincerely,        Cheyenne Garcia, DO

## 2020-08-28 NOTE — PROGRESS NOTES
[place] [facility]  PHYSICIAN NOTE    PRIMARY CARE PROVIDER AND CLINIC:  Gaurang Reeves, APRN CNP, [address] / [place] [address]    Chief Complaint   Patient presents with     Hospital F/U     [place] Medical Record Number:  [id number]  Place of Service where encounter took place:  [facility] ([facility]) [[id number]]    Juany Collazo is a 88 year old (1/7/1932), admitted to the above facility from  [facility]. Hospital stay 8/6/20 through 8/11/20. Admitted to this facility for  rehab, medical management and nursing care.     HPI:    HPI information obtained from: facility chart records, facility staff, patient report and [facility] chart review.     Brief summary of hospital course: Juany has h/o dementia living at [facility] who had a syncopal event when daughter was using a neck massager on her neck after a haircut leading to diaphoresis, emesis and sudden fall forward with twisting her ankle. It was found that she had broken her right ankle and she underwent successful ORIF. Is to be NWB RLE, ASA for DVT prophy and had post-op Amoxicillin course. Tele and echo fine and based on history, it was thought Juany had vasovagal syncope d/t carotid sinus stimulation. Discharged back to [facility] but with care needs, later transferred to [facility]CU.     Updates on status since skilled nursing admission: Juany is seen in her room today and she is cheerful/delightful. Mentions some pain localized to right ankle but pain medication is helpful. Did have BM today. No urinary concerns, calf pain, dyspnea, chest pain or dizziness. She has only vague recollection of her fall. She says she sleeps well with aide of added Melatonin here. No nursing concerns. She appropriately asks about when her ortho appointment is (next Wed) and if transportation is arranged yet by .    CODE STATUS/ADVANCE DIRECTIVES DISCUSSION:  FULL  Patient's  living condition: lives in an assisted living facility    ALLERGIES: Patient has no known allergies.    Past Medical History:   Diagnosis Date     Arthritis      Carpal tunnel syndrome      Chronic pain     right leg     Dementia (H)      Gastro-oesophageal reflux disease      Hyperlipidemia 10/31/2010     Hypertension 10/27/2003     Mild major depression 2014     Osteoporosis       Past Surgical History:   Procedure Laterality Date     ARTHROPLASTY HIP Right      ARTHROPLASTY KNEE Left 3/9/2020    Procedure: Left total knee arthroplasty (Dean [employer] Nephew #3 PCR femur; #2 tibia; 35 mm thin patella; 9 mm tibial insert);  Surgeon: Isreal Tineo MD;  Location: [facility] OR     DECOMPRESSION LUMBAR ONE LEVEL  2013    Procedure: DECOMPRESSION LUMBAR ONE LEVEL;  Decompression Bilateral L4-5  ;  Surgeon: Lang Garcia MD;  Location: [facility] OR     EYE SURGERY       OPEN REDUCTION INTERNAL FIXATION ANKLE Right 2020    Procedure: Open reduction with internal fixation, right trimalleolar ankle fracture.;  Surgeon: Himanshu Nash MD;  Location: [facility] OR     Tubal ligation NOS  1970     Family History   Problem Relation Age of Onset     Cardiovascular Brother      Musculoskeletal Disorder Brother         Muscular Dystrophy     Hypertension Father      Cardiovascular Father      Hypertension Mother      Arthritis Mother      Eye Disorder Mother         Cataract     Osteoporosis Mother      Glaucoma Mother         possible per Pt     Osteoporosis Maternal Aunt      Macular Degeneration No family hx of      Social History     Tobacco Use     Smoking status: Former Smoker     Last attempt to quit: 1966     Years since quittin.2     Smokeless tobacco: Never Used   Substance Use Topics     Alcohol use: Not Currently     Frequency: Monthly or less     Drinks per session: 1 or 2     Binge frequency: Never     Comment: One beer day     Drug use: No        Post-discharge medication reconciliation status:  "Reviewed and updated in [facility] according to facility MAR    Current Outpatient Medications   Medication Sig Dispense Refill     albuterol (PROAIR HFA/PROVENTIL HFA/VENTOLIN HFA) 108 (90 Base) MCG/ACT inhaler Inhale 2 puffs into the lungs every 4 hours as needed for shortness of breath / dyspnea or wheezing       amLODIPine (NORVASC) 2.5 MG tablet Take 2.5 mg by mouth daily       aspirin (ASA) 325 MG EC tablet Take 325 mg by mouth daily Until 9/8/20 for DVT prophylaxis       citalopram (CELEXA) 20 MG tablet Take 1 tablet (20 mg) by mouth daily 30 tablet 3     diclofenac (VOLTAREN) 1 % topical gel Apply 2 g topically 2 times daily as needed for moderate pain 1 Tube 0     donepezil (ARICEPT) 10 MG tablet Take 1 tablet (10 mg) by mouth At Bedtime 30 tablet      HYDROcodone-acetaminophen (NORCO) 5-325 MG tablet Take 1 tablet by mouth every 4 hours as needed for pain 30 tablet 0     hydrocortisone (CORTAID) 1 % external cream Apply topically 2 times daily as needed (L forearm rash)       lisinopril-hydrochlorothiazide (ZESTORETIC) 20-12.5 MG tablet Take 1 tablet by mouth 2 times daily       melatonin 3 MG tablet Take 1 tablet (3 mg) by mouth nightly as needed for sleep       omeprazole (PRILOSEC) 20 MG DR capsule Take 20 mg by mouth daily        polyethylene glycol (MIRALAX) 17 GM/Dose powder Take 17 g (1 capful) by mouth daily as needed for constipation       simvastatin (ZOCOR) 20 MG tablet Take 1 tablet (20 mg) by mouth At Bedtime 90 tablet 3       ROS:  Limited secondary to cognitive impairment but today pt reports as above in HPI    Exam:  /63   Pulse 65   Temp 98.2  F (36.8  C)   Resp 18   Ht 1.549 m (5' 1\")   Wt 74.8 kg (165 lb)   SpO2 94%   BMI 31.18 kg/m    Alert, pleasant, NAD  No scleral icterus  Moist oral mucosa  Breathing non-labored on room air, no cough  Non-obese abdomen  RLE is in splint with visible toes of normal color, temp and is able wiggle them with ease  No left calf swelling, " extremities seem well perfused  Mood upbeat  Vague historian, normal speech, no tremor      Lab/Diagnostic data:  8/24/20: WBC 12.6, Hgb 10.9, Platelets 356, Na 132, K 3.8, BUN 21, Cr 0.74    ASSESSMENT/PLAN:  Closed trimalleolar fracture of right ankle with routine healing, subsequent encounter  S/P ORIF (open reduction internal fixation) fracture, 8/8  Physical deconditioning  Continue work with therapies; RLE exam as above appears appropriate  NWB RLE for now   mg daily for DVT prophy x 1 month (had been off of list for awhile so restarted)  Pain is well managed with PRN Norco and bowels are moving  F/u with ortho as scheduled on 9/2/20    Vasovagal syncope  Likely d/t carotid sinus stimulation as noted based on history  No recurrent symptoms  VSS  Tele and echo unremarkable in hospital  CTA did show spinal canal stenosis (which would explain why she gets some component of neck pain from time to time); CTA incidentally also noted thyroid nodule to consider future f/u on as outpatient    Dementia without behavioral disturbance, unspecified dementia type (H)  Insomnia due to medical condition  Occupational therapy eval and treat  Is on Donepezil for dementia, Celexa for mood and she also finds Melatonin has been helpful for sleep    Essential hypertension  Hyponatremia  BPs well controlled without hypotension; Na only minimally low at 132 (is on hydrochlorothiazide)    Acute bronchospasm  Noted in hospital with PRN Albuterol but seems resolved    Leukocytosis, unspecified type  Noted consistently since 2019; mild elevation - f/u with PCP for additional hematologic work up as desired by patient / family  Slightly elevated lymphocytes (?indolent CLL)     Advanced directive  She came as DNR/I but note in the chart code was changed to FULL this stay       Electronically signed by:  Cheyenne Garcia DO

## 2020-08-28 NOTE — Clinical Note
dmitri Foley ladsuellen here at TCU. Has some incidental findings noted that will let you decide in conversations with patient/family how/if to follow up (thyroid nodule and chronic mild leukocytosis). Fahad, per paper chart her code was changed back to full recently but I don't see it reflected in Matrix. Maybe that's r/t the Matrix change over that is happening now. Can you please follow up on this next week? Thanks!
good balance

## 2020-08-31 ENCOUNTER — NURSING HOME VISIT (OUTPATIENT)
Dept: GERIATRICS | Facility: CLINIC | Age: 85
End: 2020-08-31
Payer: COMMERCIAL

## 2020-08-31 VITALS
RESPIRATION RATE: 14 BRPM | TEMPERATURE: 98.1 F | OXYGEN SATURATION: 95 % | BODY MASS INDEX: 31.15 KG/M2 | HEIGHT: 61 IN | HEART RATE: 62 BPM | SYSTOLIC BLOOD PRESSURE: 144 MMHG | DIASTOLIC BLOOD PRESSURE: 76 MMHG | WEIGHT: 165 LBS

## 2020-08-31 DIAGNOSIS — R53.81 DEBILITY: ICD-10-CM

## 2020-08-31 DIAGNOSIS — D72.829 LEUKOCYTOSIS, UNSPECIFIED TYPE: ICD-10-CM

## 2020-08-31 DIAGNOSIS — R41.89 COGNITIVE IMPAIRMENT: ICD-10-CM

## 2020-08-31 DIAGNOSIS — R55 VASOVAGAL SYNCOPE: ICD-10-CM

## 2020-08-31 DIAGNOSIS — F32.A DEPRESSION, UNSPECIFIED DEPRESSION TYPE: ICD-10-CM

## 2020-08-31 DIAGNOSIS — I10 ESSENTIAL HYPERTENSION: ICD-10-CM
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
## 2020-08-31 DIAGNOSIS — G89.18 ACUTE POST-OPERATIVE PAIN: ICD-10-CM

## 2020-08-31 DIAGNOSIS — E87.1 HYPONATREMIA: ICD-10-CM

## 2020-08-31 DIAGNOSIS — Z98.890 S/P ORIF (OPEN REDUCTION INTERNAL FIXATION) FRACTURE: ICD-10-CM

## 2020-08-31 DIAGNOSIS — G89.29 OTHER CHRONIC PAIN: ICD-10-CM

## 2020-08-31 DIAGNOSIS — Z87.81 S/P ORIF (OPEN REDUCTION INTERNAL FIXATION) FRACTURE: ICD-10-CM

## 2020-08-31 DIAGNOSIS — S82.851D CLOSED TRIMALLEOLAR FRACTURE OF RIGHT ANKLE WITH ROUTINE HEALING, SUBSEQUENT ENCOUNTER: ICD-10-CM

## 2020-08-31 PROCEDURE — 99309 SBSQ NF CARE MODERATE MDM 30: CPT | Performed by: NURSE PRACTITIONER

## 2020-08-31 ASSESSMENT — MIFFLIN-ST. JEOR: SCORE: 1115.82

## 2020-08-31 NOTE — LETTER
8/31/2020        RE: Juany Collazo  [facility]  [address]  [facility] [address]        [place] [facility]  [place] [facility] Record Number:  [id number]  Place of Service where encounter took place:  [facility] ([facility] [[id number]]  Chief Complaint   Patient presents with     Nursing Home Acute       HPI:    Juany Collazo  is a 88 year old (1/7/1932), who is being seen today for an episodic care visit.  HPI information obtained from: facility chart records, facility staff, patient report and [facility] chart review.     Past Medical and Surgical History reviewed in [facility] today.    Met with Mrs. Collazo today for follow up.  Mrs. Collazo reports she is doing ok.  Continues to have mod-advanced cognitive/memory impairment that hinders ROS, but she does endorse some pain in her RLE/ankle area, does not rate.  She endorses ongoing chronic stiff neck, but not new.  She has no other complaints.  Specifically denies any tooth pain or pain with eating.     MEDICATIONS:  Current Outpatient Medications   Medication Sig Dispense Refill     albuterol (PROAIR HFA/PROVENTIL HFA/VENTOLIN HFA) 108 (90 Base) MCG/ACT inhaler Inhale 2 puffs into the lungs every 4 hours as needed for shortness of breath / dyspnea or wheezing       amLODIPine (NORVASC) 2.5 MG tablet Take 2.5 mg by mouth daily       aspirin (ASA) 325 MG EC tablet Take 325 mg by mouth daily Until 9/8/20 for DVT prophylaxis       citalopram (CELEXA) 20 MG tablet Take 1 tablet (20 mg) by mouth daily 30 tablet 3     diclofenac (VOLTAREN) 1 % topical gel Apply 2 g topically 2 times daily as needed for moderate pain 1 Tube 0     donepezil (ARICEPT) 10 MG tablet Take 1 tablet (10 mg) by mouth At Bedtime 30 tablet      HYDROcodone-acetaminophen (NORCO) 5-325 MG tablet Take 1 tablet by mouth every 4 hours as needed for pain 30 tablet 0     hydrocortisone (CORTAID) 1 % external cream Apply topically 2 times daily as  "needed (L forearm rash)       lisinopril-hydrochlorothiazide (ZESTORETIC) 20-12.5 MG tablet Take 1 tablet by mouth 2 times daily       melatonin 3 MG tablet Take 1 tablet (3 mg) by mouth nightly as needed for sleep       omeprazole (PRILOSEC) 20 MG DR capsule Take 20 mg by mouth daily        polyethylene glycol (MIRALAX) 17 GM/Dose powder Take 17 g (1 capful) by mouth daily as needed for constipation       simvastatin (ZOCOR) 20 MG tablet Take 1 tablet (20 mg) by mouth At Bedtime 90 tablet 3     REVIEW OF SYSTEMS:  Limited secondary to cognitive impairment but today pt reports 7 point ROS done including, light headedness/dizziness, fever/chills, pain, Resp, CV, GI, and  and is negative other than noted in HPI.      Objective:  BP (!) 144/76   Pulse 62   Temp 98.1  F (36.7  C)   Resp 14   Ht 1.549 m (5' 1\")   Wt 74.8 kg (165 lb)   SpO2 95%   BMI 31.18 kg/m       Exam:  EXAM LIMITED DUE TO [facility] social distancing recommendations:  GENERAL APPEARANCE:  Elderly female sitting up in WC.  NAD, non-toxic.  RESP:  Regular relaxed breathing effort.  No cough.    EXTREMITIES:  No lower Left extremity edema, no calf tenderness.   Right leg is in surgical splint below knee to toes.  Toes have good C/M/S.  PSYCH: Alert to self, some what to place and situation.  Clear degree of cognitive/memory impairment.  Stable/unchanged.     Labs:   I have personally reviewed labs, which are in facility or EMR chart.     ASSESSMENT/PLAN:  Fall, subsequent encounter  Closed trimalleolar fracture of right ankle with routine healing, subsequent encounter  S/P ORIF (open reduction internal fixation) fracture, 8/8  Continues to be NWB RLE has surgical splint in place.   Does endorse some RLE ankle pain, but does not rate.   -Continues with  q daily.   -Continues NWB RLE.   --Was to have Ortho f/u by now, do not see.  Will have TCU call and arrange Ortho f/u.     Acute post-operative pain  Other chronic pain  Does endorse RLE/ankle " pain but does not rate, endorses chronic stiff neck and general aches/pain's, all as chronic.   -Continues PRN Acetaminophen/Hydrocodone.   -Continues PRN Diclofenac.     Vasovagal syncope  Essential hypertension  SBP's 110-140's at times, but asymptomatic.    Review of VS's show VSS and within acceptable range.   No current s/s of clinical CHF.   -No changes, continue to clinically monitor.     Hyponatremia  Leukocytosis, unspecified type  Ongoing lower Na+ 130's since surgery, is euvolemic.  Ongoing mildly elevated WBC going on since mid 2019, unclear etiology.   -Rechecks planned 7 Sept.     Depression, unspecified depression type  Cognitive impairment  Ongoing mod-advanced cognitive/memory impairment, stable/unchanged.   -No changes, continue to clinically monitor.     Debility  -Continues NWB RLE.   -Continues with PT/OT.      Orders written by provider at facility  -As noted above.    Electronically signed by:  BETH Olmstead CNP         Sincerely,        BETH Olmstead CNP

## 2020-08-31 NOTE — PROGRESS NOTES
[place] [facility]  [place] [facility] Record Number:  [id number]  Place of Service where encounter took place:  [facility] ([facility]) [[id number]]  Chief Complaint   Patient presents with     Nursing Home Acute       HPI:    Juany Collazo  is a 88 year old (1/7/1932), who is being seen today for an episodic care visit.  HPI information obtained from: facility chart records, facility staff, patient report and [facility] chart review.     Past Medical and Surgical History reviewed in [facility] today.    Met with Mrs. Collazo today for follow up.  Mrs. Collazo reports she is doing ok.  Continues to have mod-advanced cognitive/memory impairment that hinders ROS, but she does endorse some pain in her RLE/ankle area, does not rate.  She endorses ongoing chronic stiff neck, but not new.  She has no other complaints.  Specifically denies any tooth pain or pain with eating.     MEDICATIONS:  Current Outpatient Medications   Medication Sig Dispense Refill     albuterol (PROAIR HFA/PROVENTIL HFA/VENTOLIN HFA) 108 (90 Base) MCG/ACT inhaler Inhale 2 puffs into the lungs every 4 hours as needed for shortness of breath / dyspnea or wheezing       amLODIPine (NORVASC) 2.5 MG tablet Take 2.5 mg by mouth daily       aspirin (ASA) 325 MG EC tablet Take 325 mg by mouth daily Until 9/8/20 for DVT prophylaxis       citalopram (CELEXA) 20 MG tablet Take 1 tablet (20 mg) by mouth daily 30 tablet 3     diclofenac (VOLTAREN) 1 % topical gel Apply 2 g topically 2 times daily as needed for moderate pain 1 Tube 0     donepezil (ARICEPT) 10 MG tablet Take 1 tablet (10 mg) by mouth At Bedtime 30 tablet      HYDROcodone-acetaminophen (NORCO) 5-325 MG tablet Take 1 tablet by mouth every 4 hours as needed for pain 30 tablet 0     hydrocortisone (CORTAID) 1 % external cream Apply topically 2 times daily as needed (L forearm rash)       lisinopril-hydrochlorothiazide (ZESTORETIC) 20-12.5 MG tablet Take 1 tablet by  "mouth 2 times daily       melatonin 3 MG tablet Take 1 tablet (3 mg) by mouth nightly as needed for sleep       omeprazole (PRILOSEC) 20 MG DR capsule Take 20 mg by mouth daily        polyethylene glycol (MIRALAX) 17 GM/Dose powder Take 17 g (1 capful) by mouth daily as needed for constipation       simvastatin (ZOCOR) 20 MG tablet Take 1 tablet (20 mg) by mouth At Bedtime 90 tablet 3     REVIEW OF SYSTEMS:  Limited secondary to cognitive impairment but today pt reports 7 point ROS done including, light headedness/dizziness, fever/chills, pain, Resp, CV, GI, and  and is negative other than noted in HPI.      Objective:  BP (!) 144/76   Pulse 62   Temp 98.1  F (36.7  C)   Resp 14   Ht 1.549 m (5' 1\")   Wt 74.8 kg (165 lb)   SpO2 95%   BMI 31.18 kg/m       Exam:  EXAM LIMITED DUE TO [facility] social distancing recommendations:  GENERAL APPEARANCE:  Elderly female sitting up in WC.  NAD, non-toxic.  RESP:  Regular relaxed breathing effort.  No cough.    EXTREMITIES:  No lower Left extremity edema, no calf tenderness.   Right leg is in surgical splint below knee to toes.  Toes have good C/M/S.  PSYCH: Alert to self, some what to place and situation.  Clear degree of cognitive/memory impairment.  Stable/unchanged.     Labs:   I have personally reviewed labs, which are in facility or EMR chart.     ASSESSMENT/PLAN:  Fall, subsequent encounter  Closed trimalleolar fracture of right ankle with routine healing, subsequent encounter  S/P ORIF (open reduction internal fixation) fracture, 8/8  Continues to be NWB RLE has surgical splint in place.   Does endorse some RLE ankle pain, but does not rate.   -Continues with  q daily.   -Continues NWB RLE.   --Was to have Ortho f/u by now, do not see.  Will have TCU call and arrange Ortho f/u.     Acute post-operative pain  Other chronic pain  Does endorse RLE/ankle pain but does not rate, endorses chronic stiff neck and general aches/pain's, all as chronic.   -Continues PRN " Acetaminophen/Hydrocodone.   -Continues PRN Diclofenac.     Vasovagal syncope  Essential hypertension  SBP's 110-140's at times, but asymptomatic.    Review of VS's show VSS and within acceptable range.   No current s/s of clinical CHF.   -No changes, continue to clinically monitor.     Hyponatremia  Leukocytosis, unspecified type  Ongoing lower Na+ 130's since surgery, is euvolemic.  Ongoing mildly elevated WBC going on since mid 2019, unclear etiology.   -Rechecks planned 7 Sept.     Depression, unspecified depression type  Cognitive impairment  Ongoing mod-advanced cognitive/memory impairment, stable/unchanged.   -No changes, continue to clinically monitor.     Debility  -Continues NWB RLE.   -Continues with PT/OT.      Orders written by provider at facility  -As noted above.    Electronically signed by:  BETH Olmstead CNP

## 2020-09-03 DIAGNOSIS — G89.18 ACUTE POST-OPERATIVE PAIN: ICD-10-CM

## 2020-09-03 RX ORDER — HYDROCODONE BITARTRATE AND ACETAMINOPHEN 5; 325 MG/1; MG/1
1 TABLET ORAL EVERY 4 HOURS PRN
Qty: 30 TABLET | Refills: 0 | Status: SHIPPED | OUTPATIENT
Start: 2020-09-03 | End: 2020-09-29

## 2020-09-08 ENCOUNTER — RECORDS - HEALTHEAST (OUTPATIENT)
Dept: LAB | Facility: CLINIC | Age: 85
End: 2020-09-08

## 2020-09-08 LAB
ANION GAP SERPL CALCULATED.3IONS-SCNC: 12 MMOL/L (ref 5–18)
BUN SERPL-MCNC: 15 MG/DL (ref 8–28)
CALCIUM SERPL-MCNC: 9.1 MG/DL (ref 8.5–10.5)
CHLORIDE BLD-SCNC: 96 MMOL/L (ref 98–107)
CO2 SERPL-SCNC: 22 MMOL/L (ref 22–31)
CREAT SERPL-MCNC: 0.78 MG/DL (ref 0.6–1.1)
GFR SERPL CREATININE-BSD FRML MDRD: >60 ML/MIN/1.73M2
GLUCOSE BLD-MCNC: 105 MG/DL (ref 70–125)
POTASSIUM BLD-SCNC: 4 MMOL/L (ref 3.5–5)
SODIUM SERPL-SCNC: 130 MMOL/L (ref 136–145)

## 2020-09-09 ENCOUNTER — NURSING HOME VISIT (OUTPATIENT)
Dept: GERIATRICS | Facility: CLINIC | Age: 85
End: 2020-09-09
Payer: COMMERCIAL

## 2020-09-09 VITALS
HEART RATE: 58 BPM | DIASTOLIC BLOOD PRESSURE: 64 MMHG | OXYGEN SATURATION: 94 % | SYSTOLIC BLOOD PRESSURE: 116 MMHG | TEMPERATURE: 98.2 F | RESPIRATION RATE: 16 BRPM | WEIGHT: 169.8 LBS | HEIGHT: 61 IN | BODY MASS INDEX: 32.06 KG/M2

## 2020-09-09 DIAGNOSIS — Z87.81 S/P ORIF (OPEN REDUCTION INTERNAL FIXATION) FRACTURE: ICD-10-CM

## 2020-09-09 DIAGNOSIS — R41.89 COGNITIVE IMPAIRMENT: ICD-10-CM

## 2020-09-09 DIAGNOSIS — Z98.890 S/P ORIF (OPEN REDUCTION INTERNAL FIXATION) FRACTURE: ICD-10-CM

## 2020-09-09 DIAGNOSIS — I10 ESSENTIAL HYPERTENSION: ICD-10-CM

## 2020-09-09 DIAGNOSIS — F32.A DEPRESSION, UNSPECIFIED DEPRESSION TYPE: ICD-10-CM

## 2020-09-09 DIAGNOSIS — E87.1 HYPONATREMIA: ICD-10-CM

## 2020-09-09 DIAGNOSIS — G89.18 ACUTE POST-OPERATIVE PAIN: ICD-10-CM

## 2020-09-09 DIAGNOSIS — S82.851D CLOSED TRIMALLEOLAR FRACTURE OF RIGHT ANKLE WITH ROUTINE HEALING, SUBSEQUENT ENCOUNTER: ICD-10-CM

## 2020-09-09 DIAGNOSIS — R53.81 DEBILITY: ICD-10-CM
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
## 2020-09-09 PROCEDURE — 99309 SBSQ NF CARE MODERATE MDM 30: CPT | Performed by: NURSE PRACTITIONER

## 2020-09-09 RX ORDER — LISINOPRIL AND HYDROCHLOROTHIAZIDE 12.5; 2 MG/1; MG/1
1 TABLET ORAL DAILY
Start: 2020-09-09

## 2020-09-09 ASSESSMENT — MIFFLIN-ST. JEOR: SCORE: 1137.59

## 2020-09-09 NOTE — PROGRESS NOTES
[place] [facility]  [place] [facility] Record Number:  [id number]  Place of Service where encounter took place:  [facility] ([facility]) [[id number]]  Chief Complaint   Patient presents with     Nursing Home Acute       HPI:    Juany Collazo  is a 88 year old (1/7/1932), who is being seen today for an episodic care visit.  HPI information obtained from: facility chart records, facility staff, patient report and [facility] chart review.      Past Medical and Surgical History reviewed in [facility] today.    Met with Mrs. Collazo today for follow up.  Mrs. Collazo has a degree of cognitive/memory impairment, but has had ortho f/u and reports she was told she is still supposed to be NWB RLE, has a new cast in place.  She continues to endorse R ankle pain but tolerable, endorses vague chronic pain's but stable/unchanged/tolerable.  She has no other complaints.  Specifically denies any light headedness/dizziness.     MEDICATIONS:  Current Outpatient Medications   Medication Sig Dispense Refill     lisinopril-hydrochlorothiazide (ZESTORETIC) 20-12.5 MG tablet Take 1 tablet by mouth daily       albuterol (PROAIR HFA/PROVENTIL HFA/VENTOLIN HFA) 108 (90 Base) MCG/ACT inhaler Inhale 2 puffs into the lungs every 4 hours as needed for shortness of breath / dyspnea or wheezing       amLODIPine (NORVASC) 2.5 MG tablet Take 2.5 mg by mouth daily       aspirin (ASA) 325 MG EC tablet Take 325 mg by mouth daily Until 9/8/20 for DVT prophylaxis       citalopram (CELEXA) 20 MG tablet Take 1 tablet (20 mg) by mouth daily 30 tablet 3     diclofenac (VOLTAREN) 1 % topical gel Apply 2 g topically 2 times daily as needed for moderate pain 1 Tube 0     donepezil (ARICEPT) 10 MG tablet Take 1 tablet (10 mg) by mouth At Bedtime 30 tablet      HYDROcodone-acetaminophen (NORCO) 5-325 MG tablet Take 1 tablet by mouth every 4 hours as needed for pain 30 tablet 0     hydrocortisone (CORTAID) 1 % external cream Apply  "topically 2 times daily as needed (L forearm rash)       melatonin 3 MG tablet Take 1 tablet (3 mg) by mouth nightly as needed for sleep       omeprazole (PRILOSEC) 20 MG DR capsule Take 20 mg by mouth daily        polyethylene glycol (MIRALAX) 17 GM/Dose powder Take 17 g (1 capful) by mouth daily as needed for constipation       simvastatin (ZOCOR) 20 MG tablet Take 1 tablet (20 mg) by mouth At Bedtime 90 tablet 3     REVIEW OF SYSTEMS:  Limited secondary to cognitive impairment but today pt reports 7 point ROS done including, light headedness/dizziness, fever/chills, pain, Resp, CV, GI, and  and is negative other than noted in HPI.     Objective:  /64   Pulse 58   Temp 98.2  F (36.8  C)   Resp 16   Ht 1.549 m (5' 1\")   Wt 77 kg (169 lb 12.8 oz)   SpO2 94%   BMI 32.08 kg/m       Exam:  EXAM LIMITED DUE TO [facility] social distancing recommendations:  GENERAL APPEARANCE:  Elderly female sitting up in WC.  NAD, non-toxic.  RESP:  Regular relaxed breathing effort.  No cough.    EXTREMITIES:  No lower Left extremity edema, no calf tenderness.   Right leg is in a new hard cast, toes to below knee.  Toes have good C/M/S.  PSYCH: Alert to self, some what to place and situation.  Clear degree of cognitive/memory impairment.  Stable/unchanged.      Labs:   I have personally reviewed labs, which are in facility or EMR chart.     ASSESSMENT/PLAN:  Fall, subsequent encounter  Closed trimalleolar fracture of right ankle with routine healing, subsequent encounter  S/P ORIF (open reduction internal fixation) fracture, 8/8  Acute post-operative pain  Has been followed by Ortho and had f/u, no formal rec's received.  She states she was told to remain NWB and that sounds correct for her situation.  Has a new hard cast on RLE, good C/M/S in toes.  Does continue to have some intermittent pain, but well controlled.   -Remains on increased  mg's for now, we did not lower due to ongoing NWB status, can lower to 81 once " mobilizing more.   -Continues PRN Acetaminophen-Hydrocodone.   -Continues NWB RLE.   --Ortho f/u 9/23.     Essential hypertension  Hyponatremia  Ongoing mild hyponatremia with unclear etiology, appears euvolemic.  Was on Lisinopril-hydrochlorothiazide BID, query if that is contributing, but unclear.   Due to soft SBP's and mild hyponatremia, and appearing euvolemic, will lower down to daily and monitor.   -Lowered Lisinopril-hydrochlorothiazide down to q daily.   -BMP 14 Sept.   -Continues Norvasc.   -Continues Statin.   -ASA as noted above.     Depression, unspecified depression type  Cognitive impairment  Doing well, stable/unchanged.   -No changes, continue to clinically monitor.     Debility  -Continues NWB RLE.   -Continues with PT/OT.     Orders written by provider at facility  -As noted above.     Electronically signed by:  BETH Olmstead CNP

## 2020-09-09 NOTE — LETTER
9/9/2020        RE: Juany Collazo  [facility]  [address]  [facility] [address]        [place] [facility]  [place] [facility] Record Number:  [id number]  Place of Service where encounter took place:  [facility] ([facility]) [[id number]]  Chief Complaint   Patient presents with     Nursing Home Acute       HPI:    Juany Collazo  is a 88 year old (1/7/1932), who is being seen today for an episodic care visit.  HPI information obtained from: facility chart records, facility staff, patient report and [facility] chart review.      Past Medical and Surgical History reviewed in [facility] today.    Met with Mrs. Collazo today for follow up.  Mrs. Collazo has a degree of cognitive/memory impairment, but has had ortho f/u and reports she was told she is still supposed to be NWB RLE, has a new cast in place.  She continues to endorse R ankle pain but tolerable, endorses vague chronic pain's but stable/unchanged/tolerable.  She has no other complaints.  Specifically denies any light headedness/dizziness.     MEDICATIONS:  Current Outpatient Medications   Medication Sig Dispense Refill     lisinopril-hydrochlorothiazide (ZESTORETIC) 20-12.5 MG tablet Take 1 tablet by mouth daily       albuterol (PROAIR HFA/PROVENTIL HFA/VENTOLIN HFA) 108 (90 Base) MCG/ACT inhaler Inhale 2 puffs into the lungs every 4 hours as needed for shortness of breath / dyspnea or wheezing       amLODIPine (NORVASC) 2.5 MG tablet Take 2.5 mg by mouth daily       aspirin (ASA) 325 MG EC tablet Take 325 mg by mouth daily Until 9/8/20 for DVT prophylaxis       citalopram (CELEXA) 20 MG tablet Take 1 tablet (20 mg) by mouth daily 30 tablet 3     diclofenac (VOLTAREN) 1 % topical gel Apply 2 g topically 2 times daily as needed for moderate pain 1 Tube 0     donepezil (ARICEPT) 10 MG tablet Take 1 tablet (10 mg) by mouth At Bedtime 30 tablet      HYDROcodone-acetaminophen (NORCO) 5-325 MG tablet Take 1 tablet  "by mouth every 4 hours as needed for pain 30 tablet 0     hydrocortisone (CORTAID) 1 % external cream Apply topically 2 times daily as needed (L forearm rash)       melatonin 3 MG tablet Take 1 tablet (3 mg) by mouth nightly as needed for sleep       omeprazole (PRILOSEC) 20 MG DR capsule Take 20 mg by mouth daily        polyethylene glycol (MIRALAX) 17 GM/Dose powder Take 17 g (1 capful) by mouth daily as needed for constipation       simvastatin (ZOCOR) 20 MG tablet Take 1 tablet (20 mg) by mouth At Bedtime 90 tablet 3     REVIEW OF SYSTEMS:  Limited secondary to cognitive impairment but today pt reports 7 point ROS done including, light headedness/dizziness, fever/chills, pain, Resp, CV, GI, and  and is negative other than noted in HPI.     Objective:  /64   Pulse 58   Temp 98.2  F (36.8  C)   Resp 16   Ht 1.549 m (5' 1\")   Wt 77 kg (169 lb 12.8 oz)   SpO2 94%   BMI 32.08 kg/m       Exam:  EXAM LIMITED DUE TO [facility] social distancing recommendations:  GENERAL APPEARANCE:  Elderly female sitting up in WC.  NAD, non-toxic.  RESP:  Regular relaxed breathing effort.  No cough.    EXTREMITIES:  No lower Left extremity edema, no calf tenderness.   Right leg is in a new hard cast, toes to below knee.  Toes have good C/M/S.  PSYCH: Alert to self, some what to place and situation.  Clear degree of cognitive/memory impairment.  Stable/unchanged.      Labs:   I have personally reviewed labs, which are in facility or EMR chart.     ASSESSMENT/PLAN:  Fall, subsequent encounter  Closed trimalleolar fracture of right ankle with routine healing, subsequent encounter  S/P ORIF (open reduction internal fixation) fracture, 8/8  Acute post-operative pain  Has been followed by Ortho and had f/u, no formal rec's received.  She states she was told to remain NWB and that sounds correct for her situation.  Has a new hard cast on RLE, good C/M/S in toes.  Does continue to have some intermittent pain, but well controlled. "   -Remains on increased  mg's for now, we did not lower due to ongoing NWB status, can lower to 81 once mobilizing more.   -Continues PRN Acetaminophen-Hydrocodone.   -Continues NWB RLE.   --Ortho f/u 9/23.     Essential hypertension  Hyponatremia  Ongoing mild hyponatremia with unclear etiology, appears euvolemic.  Was on Lisinopril-hydrochlorothiazide BID, query if that is contributing, but unclear.   Due to soft SBP's and mild hyponatremia, and appearing euvolemic, will lower down to daily and monitor.   -Lowered Lisinopril-hydrochlorothiazide down to q daily.   -BMP 14 Sept.   -Continues Norvasc.   -Continues Statin.   -ASA as noted above.     Depression, unspecified depression type  Cognitive impairment  Doing well, stable/unchanged.   -No changes, continue to clinically monitor.     Debility  -Continues NWB RLE.   -Continues with PT/OT.     Orders written by provider at facility  -As noted above.     Electronically signed by:  BETH Olmstead CNP         Sincerely,        BETH Olmstead CNP

## 2020-09-14 ENCOUNTER — TRANSFERRED RECORDS (OUTPATIENT)
Dept: HEALTH INFORMATION MANAGEMENT | Facility: CLINIC | Age: 85
End: 2020-09-14

## 2020-09-14 ENCOUNTER — RECORDS - HEALTHEAST (OUTPATIENT)
Dept: LAB | Facility: CLINIC | Age: 85
End: 2020-09-14

## 2020-09-14 LAB
ANION GAP SERPL CALCULATED.3IONS-SCNC: 8 MMOL/L (ref 5–18)
ANION GAP SERPL CALCULATED.3IONS-SCNC: 8 MMOL/L (ref 5–18)
BASOPHILS # BLD AUTO: 0.1 THOU/UL (ref 0–0.2)
BASOPHILS NFR BLD AUTO: 1 % (ref 0–2)
BUN SERPL-MCNC: 8 MG/DL (ref 8–28)
BUN SERPL-MCNC: 8 MG/DL (ref 8–28)
CALCIUM SERPL-MCNC: 9.5 MG/DL (ref 8.5–10.5)
CALCIUM SERPL-MCNC: 9.5 MG/DL (ref 8.5–10.5)
CHLORIDE BLD-SCNC: 98 MMOL/L (ref 98–107)
CHLORIDE SERPLBLD-SCNC: 98 MMOL/L (ref 98–107)
CO2 SERPL-SCNC: 28 MMOL/L (ref 22–31)
CO2 SERPL-SCNC: 28 MMOL/L (ref 22–31)
CREAT SERPL-MCNC: 0.66 MG/DL (ref 0.6–1.1)
CREAT SERPL-MCNC: 0.66 MG/DL (ref 0.6–1.1)
DIFFERENTIAL: ABNORMAL
EOSINOPHIL # BLD AUTO: 0.2 THOU/UL (ref 0–0.4)
EOSINOPHIL NFR BLD AUTO: 2 % (ref 0–6)
ERYTHROCYTE [DISTWIDTH] IN BLOOD BY AUTOMATED COUNT: 14.7 % (ref 11–14.5)
ERYTHROCYTE [DISTWIDTH] IN BLOOD BY AUTOMATED COUNT: 14.7 % (ref 11–14.5)
GFR SERPL CREATININE-BSD FRML MDRD: >60 ML/MIN/1.73M2
GFR SERPL CREATININE-BSD FRML MDRD: >60 ML/MIN/1.73M2
GLUCOSE BLD-MCNC: 74 MG/DL (ref 70–125)
GLUCOSE SERPL-MCNC: 74 MG/DL (ref 70–125)
HCT VFR BLD AUTO: 36.2 % (ref 35–47)
HCT VFR BLD AUTO: 36.2 % (ref 35–47)
HEMOGLOBIN: 11 G/DL (ref 12–16)
HGB BLD-MCNC: 11 G/DL (ref 12–16)
IMM GRANULOCYTES # BLD: 0 THOU/UL
IMM GRANULOCYTES NFR BLD: 0 %
LYMPHOCYTES # BLD AUTO: 5.4 THOU/UL (ref 0.8–4.4)
LYMPHOCYTES NFR BLD AUTO: 51 % (ref 20–40)
MCH RBC QN AUTO: 27.3 PG (ref 27–34)
MCH RBC QN AUTO: 27.3 PG (ref 27–34)
MCHC RBC AUTO-ENTMCNC: 30.4 G/DL (ref 32–36)
MCHC RBC AUTO-ENTMCNC: 30.4 G/DL (ref 32–36)
MCV RBC AUTO: 90 FL (ref 80–100)
MCV RBC AUTO: 90 FL (ref 80–100)
MONOCYTES # BLD AUTO: 0.8 THOU/UL (ref 0–0.9)
MONOCYTES NFR BLD AUTO: 7 % (ref 2–10)
NEUTROPHILS # BLD AUTO: 4.1 THOU/UL (ref 2–7.7)
NEUTROPHILS NFR BLD AUTO: 39 % (ref 50–70)
PLATELET # BLD AUTO: 288 THOU/UL (ref 140–440)
PLATELET # BLD AUTO: 288 THOU/UL (ref 140–440)
PMV BLD AUTO: 11.7 FL (ref 8.5–12.5)
POTASSIUM BLD-SCNC: 4.1 MMOL/L (ref 3.5–5)
POTASSIUM SERPL-SCNC: 4.1 MMOL/L (ref 3.5–5)
RBC # BLD AUTO: 4.03 MILL/UL (ref 3.8–5.4)
RBC # BLD AUTO: 4.03 MILL/UL (ref 3.8–5.4)
SODIUM SERPL-SCNC: 134 MMOL/L (ref 136–145)
SODIUM SERPL-SCNC: 134 MMOL/L (ref 136–145)
WBC # BLD AUTO: 10.6 THOU/UL (ref 4–11)
WBC: 10.6 THOU/UL (ref 4–11)

## 2020-09-15 ENCOUNTER — NURSING HOME VISIT (OUTPATIENT)
Dept: GERIATRICS | Facility: CLINIC | Age: 85
End: 2020-09-15
Payer: COMMERCIAL

## 2020-09-15 VITALS
BODY MASS INDEX: 31.24 KG/M2 | SYSTOLIC BLOOD PRESSURE: 136 MMHG | WEIGHT: 169.75 LBS | OXYGEN SATURATION: 96 % | TEMPERATURE: 99.1 F | HEART RATE: 64 BPM | DIASTOLIC BLOOD PRESSURE: 66 MMHG | RESPIRATION RATE: 16 BRPM | HEIGHT: 62 IN

## 2020-09-15 DIAGNOSIS — R53.81 DEBILITY: ICD-10-CM

## 2020-09-15 DIAGNOSIS — S82.851D CLOSED TRIMALLEOLAR FRACTURE OF RIGHT ANKLE WITH ROUTINE HEALING, SUBSEQUENT ENCOUNTER: ICD-10-CM

## 2020-09-15 DIAGNOSIS — R41.89 COGNITIVE IMPAIRMENT: ICD-10-CM

## 2020-09-15 DIAGNOSIS — F32.A DEPRESSION, UNSPECIFIED DEPRESSION TYPE: ICD-10-CM
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
## 2020-09-15 DIAGNOSIS — G89.18 ACUTE POST-OPERATIVE PAIN: ICD-10-CM

## 2020-09-15 DIAGNOSIS — E87.1 HYPONATREMIA: ICD-10-CM

## 2020-09-15 DIAGNOSIS — Z98.890 S/P ORIF (OPEN REDUCTION INTERNAL FIXATION) FRACTURE: ICD-10-CM

## 2020-09-15 DIAGNOSIS — I10 ESSENTIAL HYPERTENSION: ICD-10-CM

## 2020-09-15 DIAGNOSIS — Z87.81 S/P ORIF (OPEN REDUCTION INTERNAL FIXATION) FRACTURE: ICD-10-CM

## 2020-09-15 PROCEDURE — 99309 SBSQ NF CARE MODERATE MDM 30: CPT | Performed by: NURSE PRACTITIONER

## 2020-09-15 ASSESSMENT — MIFFLIN-ST. JEOR: SCORE: 1153.25

## 2020-09-15 NOTE — LETTER
9/15/2020        RE: Juany Collazo  [place]  [address]  [facility] [address]        [place] [facility]  [place] Medical Record Number:  [id number]  Place of Service where encounter took place:  [facility] ([facility]) [[id number]]  Chief Complaint   Patient presents with     Nursing Home Acute       HPI:    Juany Collazo  is a 88 year old (1/7/1932), who is being seen today for an episodic care visit.  HPI information obtained from: facility chart records, facility staff, patient report and [facility] chart review.     Past Medical and Surgical History reviewed in [facility] today.    We met with Mrs. Collazo today for follow up.  Mrs. Collazo reports overall she is doing well.  She does endorse some faint/mild intermittent pain in her Right ankle, but minimal.  She has no other complaints.      MEDICATIONS:    Current Outpatient Medications   Medication Sig Dispense Refill     albuterol (PROAIR HFA/PROVENTIL HFA/VENTOLIN HFA) 108 (90 Base) MCG/ACT inhaler Inhale 2 puffs into the lungs every 4 hours as needed for shortness of breath / dyspnea or wheezing       amLODIPine (NORVASC) 2.5 MG tablet Take 2.5 mg by mouth daily       aspirin (ASA) 325 MG EC tablet Take 325 mg by mouth daily Until 9/8/20 for DVT prophylaxis       citalopram (CELEXA) 20 MG tablet Take 1 tablet (20 mg) by mouth daily 30 tablet 3     diclofenac (VOLTAREN) 1 % topical gel Apply 2 g topically 2 times daily as needed for moderate pain 1 Tube 0     donepezil (ARICEPT) 10 MG tablet Take 1 tablet (10 mg) by mouth At Bedtime 30 tablet      HYDROcodone-acetaminophen (NORCO) 5-325 MG tablet Take 1 tablet by mouth every 4 hours as needed for pain 30 tablet 0     hydrocortisone (CORTAID) 1 % external cream Apply topically 2 times daily as needed (L forearm rash)       lisinopril-hydrochlorothiazide (ZESTORETIC) 20-12.5 MG tablet Take 1 tablet by mouth daily       melatonin 3 MG tablet Take 1  "tablet (3 mg) by mouth nightly as needed for sleep       omeprazole (PRILOSEC) 20 MG DR capsule Take 20 mg by mouth daily        polyethylene glycol (MIRALAX) 17 GM/Dose powder Take 17 g (1 capful) by mouth daily as needed for constipation       simvastatin (ZOCOR) 20 MG tablet Take 1 tablet (20 mg) by mouth At Bedtime 90 tablet 3     REVIEW OF SYSTEMS:  Limited secondary to cognitive impairment but today pt reports 7 point ROS done including, light headedness/dizziness, fever/chills, pain, Resp, CV, GI, and  and is negative other than noted in HPI.     Objective:  /66   Pulse 64   Temp 99.1  F (37.3  C)   Resp 16   Ht 1.575 m (5' 2\")   Wt 77 kg (169 lb 12.1 oz)   SpO2 96%   BMI 31.05 kg/m       Exam:  EXAM LIMITED DUE TO [facility] social distancing recommendations:  GENERAL APPEARANCE:  Elderly female sitting up in WC.  NAD, non-toxic.  RESP:  Lungs are CTA.  Regular relaxed breathing effort.  No cough.    EXTREMITIES:  No lower Left extremity edema, no calf tenderness.   Right leg is in a new hard cast, toes to below knee.  Toes have good C/M/S.  PSYCH: Alert to self, some what to place and situation.  Clear degree of cognitive/memory impairment.  Stable/unchanged.     Labs:   I have personally reviewed labs, which are in facility or EMR chart.     ASSESSMENT/PLAN:  Fall, subsequent encounter  Closed trimalleolar fracture of right ankle with routine healing, subsequent encounter  S/P ORIF (open reduction internal fixation) fracture, 8/8  Acute post-operative pain  Continues to be NWB RLE.  Continues to be on increased ASA for now for prophylaxis.  Continues to have a hard RLE cast below knee to toes on.  Notes only minimal intermittent pain.   -f/u with Ortho 9/23.   -Continues  q daily, lower when more mobile.   -Continue NWB RLE.     Essential hypertension  Hyponatremia  Noted worsening hypotension, we changed her BID Lisinopril/HCTZ down to q daily.  Sodium much improved, 134 on 9/14.    Review " of VS's show VSS and within acceptable range.   No current s/s of clinical CHF.   --Continue to monitor to see if tolerating decrease in Lisinopril/HCTZ.   -Continues Norvasc.   -Continues Statin.     Depression, unspecified depression type  Cognitive impairment  Doing well, continues to have mod-advanced cognitive/memory impairment, but stable/unchanged.   -No changes, continue to clinically monitor.     Debility  -Continues NWB RLE.   -Does continue with PT/OT.     Orders written by provider at facility  -NNO.     Electronically signed by:  BETH Olmstead CNP           Sincerely,        BETH Olmstead CNP

## 2020-09-15 NOTE — PROGRESS NOTES
[place] [facility]  [place] Medical Record Number:  [id number]  Place of Service where encounter took place:  [facility] ([facility]) [[id number]]  Chief Complaint   Patient presents with     Nursing Home Acute       HPI:    Juany Collazo  is a 88 year old (1/7/1932), who is being seen today for an episodic care visit.  HPI information obtained from: facility chart records, facility staff, patient report and [facility] chart review.     Past Medical and Surgical History reviewed in [facility] today.    We met with Mrs. Collazo today for follow up.  Mrs. Collazo reports overall she is doing well.  She does endorse some faint/mild intermittent pain in her Right ankle, but minimal.  She has no other complaints.      MEDICATIONS:    Current Outpatient Medications   Medication Sig Dispense Refill     albuterol (PROAIR HFA/PROVENTIL HFA/VENTOLIN HFA) 108 (90 Base) MCG/ACT inhaler Inhale 2 puffs into the lungs every 4 hours as needed for shortness of breath / dyspnea or wheezing       amLODIPine (NORVASC) 2.5 MG tablet Take 2.5 mg by mouth daily       aspirin (ASA) 325 MG EC tablet Take 325 mg by mouth daily Until 9/8/20 for DVT prophylaxis       citalopram (CELEXA) 20 MG tablet Take 1 tablet (20 mg) by mouth daily 30 tablet 3     diclofenac (VOLTAREN) 1 % topical gel Apply 2 g topically 2 times daily as needed for moderate pain 1 Tube 0     donepezil (ARICEPT) 10 MG tablet Take 1 tablet (10 mg) by mouth At Bedtime 30 tablet      HYDROcodone-acetaminophen (NORCO) 5-325 MG tablet Take 1 tablet by mouth every 4 hours as needed for pain 30 tablet 0     hydrocortisone (CORTAID) 1 % external cream Apply topically 2 times daily as needed (L forearm rash)       lisinopril-hydrochlorothiazide (ZESTORETIC) 20-12.5 MG tablet Take 1 tablet by mouth daily       melatonin 3 MG tablet Take 1 tablet (3 mg) by mouth nightly as needed for sleep       omeprazole (PRILOSEC) 20 MG DR capsule Take 20 mg by mouth  "daily        polyethylene glycol (MIRALAX) 17 GM/Dose powder Take 17 g (1 capful) by mouth daily as needed for constipation       simvastatin (ZOCOR) 20 MG tablet Take 1 tablet (20 mg) by mouth At Bedtime 90 tablet 3     REVIEW OF SYSTEMS:  Limited secondary to cognitive impairment but today pt reports 7 point ROS done including, light headedness/dizziness, fever/chills, pain, Resp, CV, GI, and  and is negative other than noted in HPI.     Objective:  /66   Pulse 64   Temp 99.1  F (37.3  C)   Resp 16   Ht 1.575 m (5' 2\")   Wt 77 kg (169 lb 12.1 oz)   SpO2 96%   BMI 31.05 kg/m       Exam:  EXAM LIMITED DUE TO [facility] social distancing recommendations:  GENERAL APPEARANCE:  Elderly female sitting up in WC.  NAD, non-toxic.  RESP:  Lungs are CTA.  Regular relaxed breathing effort.  No cough.    EXTREMITIES:  No lower Left extremity edema, no calf tenderness.   Right leg is in a new hard cast, toes to below knee.  Toes have good C/M/S.  PSYCH: Alert to self, some what to place and situation.  Clear degree of cognitive/memory impairment.  Stable/unchanged.     Labs:   I have personally reviewed labs, which are in facility or EMR chart.     ASSESSMENT/PLAN:  Fall, subsequent encounter  Closed trimalleolar fracture of right ankle with routine healing, subsequent encounter  S/P ORIF (open reduction internal fixation) fracture, 8/8  Acute post-operative pain  Continues to be NWB RLE.  Continues to be on increased ASA for now for prophylaxis.  Continues to have a hard RLE cast below knee to toes on.  Notes only minimal intermittent pain.   -f/u with Ortho 9/23.   -Continues  q daily, lower when more mobile.   -Continue NWB RLE.     Essential hypertension  Hyponatremia  Noted worsening hypotension, we changed her BID Lisinopril/HCTZ down to q daily.  Sodium much improved, 134 on 9/14.    Review of VS's show VSS and within acceptable range.   No current s/s of clinical CHF.   --Continue to monitor to see if " tolerating decrease in Lisinopril/HCTZ.   -Continues Norvasc.   -Continues Statin.     Depression, unspecified depression type  Cognitive impairment  Doing well, continues to have mod-advanced cognitive/memory impairment, but stable/unchanged.   -No changes, continue to clinically monitor.     Debility  -Continues NWB RLE.   -Does continue with PT/OT.     Orders written by provider at facility  -NNO.     Electronically signed by:  BETH Olmstead CNP

## 2020-09-21 ENCOUNTER — NURSING HOME VISIT (OUTPATIENT)
Dept: GERIATRICS | Facility: CLINIC | Age: 85
End: 2020-09-21
Payer: COMMERCIAL

## 2020-09-21 VITALS
DIASTOLIC BLOOD PRESSURE: 68 MMHG | WEIGHT: 169.3 LBS | OXYGEN SATURATION: 98 % | HEART RATE: 64 BPM | SYSTOLIC BLOOD PRESSURE: 117 MMHG | HEIGHT: 62 IN | TEMPERATURE: 98.1 F | BODY MASS INDEX: 31.15 KG/M2 | RESPIRATION RATE: 18 BRPM

## 2020-09-21 DIAGNOSIS — R53.81 DEBILITY: ICD-10-CM

## 2020-09-21 DIAGNOSIS — E87.1 HYPONATREMIA: ICD-10-CM

## 2020-09-21 DIAGNOSIS — R41.89 COGNITIVE IMPAIRMENT: ICD-10-CM

## 2020-09-21 DIAGNOSIS — S82.851D CLOSED TRIMALLEOLAR FRACTURE OF RIGHT ANKLE WITH ROUTINE HEALING, SUBSEQUENT ENCOUNTER: ICD-10-CM

## 2020-09-21 DIAGNOSIS — Z87.81 S/P ORIF (OPEN REDUCTION INTERNAL FIXATION) FRACTURE: ICD-10-CM

## 2020-09-21 DIAGNOSIS — Z98.890 S/P ORIF (OPEN REDUCTION INTERNAL FIXATION) FRACTURE: ICD-10-CM

## 2020-09-21 DIAGNOSIS — I10 ESSENTIAL HYPERTENSION: ICD-10-CM
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
## 2020-09-21 DIAGNOSIS — R19.5 LOOSE STOOLS: Primary | ICD-10-CM

## 2020-09-21 DIAGNOSIS — G89.18 ACUTE POST-OPERATIVE PAIN: ICD-10-CM

## 2020-09-21 PROCEDURE — 99309 SBSQ NF CARE MODERATE MDM 30: CPT | Performed by: NURSE PRACTITIONER

## 2020-09-21 ASSESSMENT — MIFFLIN-ST. JEOR: SCORE: 1151.19

## 2020-09-21 NOTE — PROGRESS NOTES
[place] [facility]  [place] Medical Record Number:  [id number]  Place of Service where encounter took place:  [facility] ([facility]) [[id number]]  Chief Complaint   Patient presents with     Nursing Home Acute       HPI:    Juany Collazo  is a 88 year old (1/7/1932), who is being seen today for an episodic care visit.  HPI information obtained from: facility chart records, facility staff, patient report and [facility] chart review.     Past Medical and Surgical History reviewed in [facility] today.    Met with Mrs. Collazo today for follow up.  RN's report Mrs. Collazo had loose stools over the weekend.  In meeting Mrs. Collazo she has cognitive/memory impairment but stable/unchanged.  She really does not remember the loose stools, but denies any N/V or abdominal pain issues.  She reports she is doing well, does mention some mild intermittent pain in her Right ankle area, but no other complaints.      MEDICATIONS:  Current Outpatient Medications   Medication Sig Dispense Refill     albuterol (PROAIR HFA/PROVENTIL HFA/VENTOLIN HFA) 108 (90 Base) MCG/ACT inhaler Inhale 2 puffs into the lungs every 4 hours as needed for shortness of breath / dyspnea or wheezing       amLODIPine (NORVASC) 2.5 MG tablet Take 2.5 mg by mouth daily       aspirin (ASA) 325 MG EC tablet Take 325 mg by mouth daily Until 9/8/20 for DVT prophylaxis       citalopram (CELEXA) 20 MG tablet Take 1 tablet (20 mg) by mouth daily 30 tablet 3     diclofenac (VOLTAREN) 1 % topical gel Apply 2 g topically 2 times daily as needed for moderate pain 1 Tube 0     donepezil (ARICEPT) 10 MG tablet Take 1 tablet (10 mg) by mouth At Bedtime 30 tablet      HYDROcodone-acetaminophen (NORCO) 5-325 MG tablet Take 1 tablet by mouth every 4 hours as needed for pain 30 tablet 0     hydrocortisone (CORTAID) 1 % external cream Apply topically 2 times daily as needed (L forearm rash)       lisinopril-hydrochlorothiazide (ZESTORETIC)  "20-12.5 MG tablet Take 1 tablet by mouth daily       melatonin 3 MG tablet Take 1 tablet (3 mg) by mouth nightly as needed for sleep       omeprazole (PRILOSEC) 20 MG DR capsule Take 20 mg by mouth daily        polyethylene glycol (MIRALAX) 17 GM/Dose powder Take 17 g (1 capful) by mouth daily as needed for constipation       simvastatin (ZOCOR) 20 MG tablet Take 1 tablet (20 mg) by mouth At Bedtime 90 tablet 3     REVIEW OF SYSTEMS:  Limited secondary to cognitive impairment but today pt reports 7 point ROS done including, light headedness/dizziness, fever/chills, pain, Resp, CV, GI, and  and is negative other than noted in HPI.      Objective:  /68   Pulse 64   Temp 98.1  F (36.7  C)   Resp 18   Ht 1.575 m (5' 2\")   Wt 76.8 kg (169 lb 4.8 oz)   SpO2 98%   BMI 30.97 kg/m       Exam:  EXAM LIMITED DUE TO [place] social distancing recommendations:  GENERAL APPEARANCE:  Elderly female sitting up in WC.  NAD, non-toxic.  RESP:  Lungs are CTA.  Regular relaxed breathing effort.  No cough.    EXTREMITIES:  No lower Left extremity edema, no calf tenderness.   Right leg is in a hard cast, toes to below knee.  Toes have good C/M/S.  PSYCH: Alert to self, some what to place and situation.  Clear degree of cognitive/memory impairment.  Stable/unchanged.      Labs:   I have personally reviewed labs, which are in facility or EMR chart.     ASSESSMENT/PLAN:  Loose stools  RN's report loose stools over weekend.  Mrs. Collazo does not have much memory of this but reports no N/V or abdominal pain issues; is eating drinking.  Afebrile.  Review of MAR shows she has not been using any PRN bowel med's. She was on antibiotics that ended with hospital admission, for her tooth, but that was over a month ago, thus suspicion for C-diff is low and no other risk factors besides being in a TCU/LTC facility.   -Will clinically monitor only for now, if continues or develops more symptoms, then consider testing.     Fall, subsequent " encounter  Closed trimalleolar fracture of right ankle with routine healing, subsequent encounter  S/P ORIF (open reduction internal fixation) fracture, 8/8  Acute post-operative pain  No new changes, doing well with ongoing RLE hard cast and NWB status.   Is occasionally taking 1 Norco / day PRN for pain.   -Ortho f/u planned 9/23.     Essential hypertension  Hyponatremia  As noted, prior hyponatremia improved with lowering Lisinopril-hydrochlorothiazide tab down to q daily from BID.    Review of VS's show VSS and within acceptable range.   No current s/s of clinical CHF.   -No changes, continue to clinically monitor.   -Will recheck Sodium 9/28.     Cognitive impairment  Ongoing mod-adv cognitive/memory impairment, stable/unchanged.   -No changes, continue to clinically monitor.     Debility  -Continues NWB RLE.   -Continues with PT/OT.     Orders written by provider at facility  -BMP 9/28.     Electronically signed by:  BETH Olmstead CNP

## 2020-09-21 NOTE — LETTER
9/21/2020        RE: Juany Collazo  [facility]  [address]  [facility] [address]        [place] [facility]  [place] Medical Record Number:  [id number]  Place of Service where encounter took place:  [facility] ([facility]) [[id number]]  Chief Complaint   Patient presents with     Nursing Home Acute       HPI:    Juany Collazo  is a 88 year old (1/7/1932), who is being seen today for an episodic care visit.  HPI information obtained from: facility chart records, facility staff, patient report and [facility] chart review.     Past Medical and Surgical History reviewed in [facility] today.    Met with Mrs. Collazo today for follow up.  RN's report Mrs. Collazo had loose stools over the weekend.  In meeting Mrs. Collazo she has cognitive/memory impairment but stable/unchanged.  She really does not remember the loose stools, but denies any N/V or abdominal pain issues.  She reports she is doing well, does mention some mild intermittent pain in her Right ankle area, but no other complaints.      MEDICATIONS:  Current Outpatient Medications   Medication Sig Dispense Refill     albuterol (PROAIR HFA/PROVENTIL HFA/VENTOLIN HFA) 108 (90 Base) MCG/ACT inhaler Inhale 2 puffs into the lungs every 4 hours as needed for shortness of breath / dyspnea or wheezing       amLODIPine (NORVASC) 2.5 MG tablet Take 2.5 mg by mouth daily       aspirin (ASA) 325 MG EC tablet Take 325 mg by mouth daily Until 9/8/20 for DVT prophylaxis       citalopram (CELEXA) 20 MG tablet Take 1 tablet (20 mg) by mouth daily 30 tablet 3     diclofenac (VOLTAREN) 1 % topical gel Apply 2 g topically 2 times daily as needed for moderate pain 1 Tube 0     donepezil (ARICEPT) 10 MG tablet Take 1 tablet (10 mg) by mouth At Bedtime 30 tablet      HYDROcodone-acetaminophen (NORCO) 5-325 MG tablet Take 1 tablet by mouth every 4 hours as needed for pain 30 tablet 0     hydrocortisone (CORTAID) 1 % external cream  "Apply topically 2 times daily as needed (L forearm rash)       lisinopril-hydrochlorothiazide (ZESTORETIC) 20-12.5 MG tablet Take 1 tablet by mouth daily       melatonin 3 MG tablet Take 1 tablet (3 mg) by mouth nightly as needed for sleep       omeprazole (PRILOSEC) 20 MG DR capsule Take 20 mg by mouth daily        polyethylene glycol (MIRALAX) 17 GM/Dose powder Take 17 g (1 capful) by mouth daily as needed for constipation       simvastatin (ZOCOR) 20 MG tablet Take 1 tablet (20 mg) by mouth At Bedtime 90 tablet 3     REVIEW OF SYSTEMS:  Limited secondary to cognitive impairment but today pt reports 7 point ROS done including, light headedness/dizziness, fever/chills, pain, Resp, CV, GI, and  and is negative other than noted in HPI.      Objective:  /68   Pulse 64   Temp 98.1  F (36.7  C)   Resp 18   Ht 1.575 m (5' 2\")   Wt 76.8 kg (169 lb 4.8 oz)   SpO2 98%   BMI 30.97 kg/m       Exam:  EXAM LIMITED DUE TO [facility] social distancing recommendations:  GENERAL APPEARANCE:  Elderly female sitting up in WC.  NAD, non-toxic.  RESP:  Lungs are CTA.  Regular relaxed breathing effort.  No cough.    EXTREMITIES:  No lower Left extremity edema, no calf tenderness.   Right leg is in a hard cast, toes to below knee.  Toes have good C/M/S.  PSYCH: Alert to self, some what to place and situation.  Clear degree of cognitive/memory impairment.  Stable/unchanged.      Labs:   I have personally reviewed labs, which are in facility or EMR chart.     ASSESSMENT/PLAN:  Loose stools  RN's report loose stools over weekend.  Mrs. Collazo does not have much memory of this but reports no N/V or abdominal pain issues; is eating drinking.  Afebrile.  Review of MAR shows she has not been using any PRN bowel med's. She was on antibiotics that ended with hospital admission, for her tooth, but that was over a month ago, thus suspicion for C-diff is low and no other risk factors besides being in a TCU/LTC facility.   -Will clinically " monitor only for now, if continues or develops more symptoms, then consider testing.     Fall, subsequent encounter  Closed trimalleolar fracture of right ankle with routine healing, subsequent encounter  S/P ORIF (open reduction internal fixation) fracture, 8/8  Acute post-operative pain  No new changes, doing well with ongoing RLE hard cast and NWB status.   Is occasionally taking 1 Norco / day PRN for pain.   -Ortho f/u planned 9/23.     Essential hypertension  Hyponatremia  As noted, prior hyponatremia improved with lowering Lisinopril-hydrochlorothiazide tab down to q daily from BID.    Review of VS's show VSS and within acceptable range.   No current s/s of clinical CHF.   -No changes, continue to clinically monitor.   -Will recheck Sodium 9/28.     Cognitive impairment  Ongoing mod-adv cognitive/memory impairment, stable/unchanged.   -No changes, continue to clinically monitor.     Debility  -Continues NWB RLE.   -Continues with PT/OT.     Orders written by provider at facility  -BMP 9/28.     Electronically signed by:  BETH Olmstead CNP         Sincerely,        BETH Olmstead CNP

## 2020-09-28 ENCOUNTER — RECORDS - HEALTHEAST (OUTPATIENT)
Dept: LAB | Facility: CLINIC | Age: 85
End: 2020-09-28

## 2020-09-28 LAB
ANION GAP SERPL CALCULATED.3IONS-SCNC: 8 MMOL/L (ref 5–18)
BUN SERPL-MCNC: 10 MG/DL (ref 8–28)
CALCIUM SERPL-MCNC: 9.5 MG/DL (ref 8.5–10.5)
CHLORIDE BLD-SCNC: 102 MMOL/L (ref 98–107)
CO2 SERPL-SCNC: 28 MMOL/L (ref 22–31)
CREAT SERPL-MCNC: 0.68 MG/DL (ref 0.6–1.1)
GFR SERPL CREATININE-BSD FRML MDRD: >60 ML/MIN/1.73M2
GLUCOSE BLD-MCNC: 89 MG/DL (ref 70–125)
POTASSIUM BLD-SCNC: 4.2 MMOL/L (ref 3.5–5)
SODIUM SERPL-SCNC: 138 MMOL/L (ref 136–145)

## 2020-09-29 DIAGNOSIS — G89.18 ACUTE POST-OPERATIVE PAIN: ICD-10-CM

## 2020-09-29 RX ORDER — HYDROCODONE BITARTRATE AND ACETAMINOPHEN 5; 325 MG/1; MG/1
1 TABLET ORAL EVERY 4 HOURS PRN
Qty: 30 TABLET | Refills: 0 | Status: SHIPPED | OUTPATIENT
Start: 2020-09-29 | End: 2022-03-01

## 2020-10-01 ENCOUNTER — NURSING HOME VISIT (OUTPATIENT)
Dept: GERIATRICS | Facility: CLINIC | Age: 85
End: 2020-10-01
Payer: COMMERCIAL

## 2020-10-01 VITALS
SYSTOLIC BLOOD PRESSURE: 113 MMHG | TEMPERATURE: 97.8 F | HEART RATE: 60 BPM | BODY MASS INDEX: 30.11 KG/M2 | RESPIRATION RATE: 16 BRPM | WEIGHT: 163.6 LBS | OXYGEN SATURATION: 97 % | DIASTOLIC BLOOD PRESSURE: 66 MMHG | HEIGHT: 62 IN

## 2020-10-01 DIAGNOSIS — I10 ESSENTIAL HYPERTENSION: ICD-10-CM
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
## 2020-10-01 DIAGNOSIS — Z98.890 S/P ORIF (OPEN REDUCTION INTERNAL FIXATION) FRACTURE: ICD-10-CM

## 2020-10-01 DIAGNOSIS — R41.89 COGNITIVE IMPAIRMENT: ICD-10-CM

## 2020-10-01 DIAGNOSIS — G89.18 ACUTE POST-OPERATIVE PAIN: ICD-10-CM

## 2020-10-01 DIAGNOSIS — R53.81 DEBILITY: ICD-10-CM

## 2020-10-01 DIAGNOSIS — E87.1 HYPONATREMIA: ICD-10-CM

## 2020-10-01 DIAGNOSIS — Z87.81 S/P ORIF (OPEN REDUCTION INTERNAL FIXATION) FRACTURE: ICD-10-CM

## 2020-10-01 DIAGNOSIS — S82.851D CLOSED TRIMALLEOLAR FRACTURE OF RIGHT ANKLE WITH ROUTINE HEALING, SUBSEQUENT ENCOUNTER: ICD-10-CM

## 2020-10-01 DIAGNOSIS — R60.0 EDEMA OF RIGHT FOOT: ICD-10-CM

## 2020-10-01 PROCEDURE — 99309 SBSQ NF CARE MODERATE MDM 30: CPT | Performed by: NURSE PRACTITIONER

## 2020-10-01 ASSESSMENT — MIFFLIN-ST. JEOR: SCORE: 1125.33

## 2020-10-01 NOTE — LETTER
10/1/2020        RE: Juany Collazo  [place]  [address]  [facility] [address]        [place] [facility]  [place] Medical Record Number:  [id number]  Place of Service where encounter took place:  [facility] ([facility]) [[id number]]  Chief Complaint   Patient presents with     Nursing Home Acute       HPI:    Juany Collazo  is a 88 year old (1/7/1932), who is being seen today for an episodic care visit.  HPI information obtained from: facility chart records, facility staff, patient report and [facility] chart review.     Past Medical and Surgical History reviewed in [facility] today.    Met with Mrs. Collazo today for follow up.  Mrs. Collazo has ongoing cognitive/memory impairment, but continues to report she is doing well.  She does endorse some vague pain in her Right ankle area, worse at night, reports the Norco still helps a good deal.  She continues to report intermittent loose stools but no N/V or cramps.  She has no other complaints.     MEDICATIONS:  Current Outpatient Medications   Medication Sig Dispense Refill     albuterol (PROAIR HFA/PROVENTIL HFA/VENTOLIN HFA) 108 (90 Base) MCG/ACT inhaler Inhale 2 puffs into the lungs every 4 hours as needed for shortness of breath / dyspnea or wheezing       amLODIPine (NORVASC) 2.5 MG tablet Take 2.5 mg by mouth daily       aspirin (ASA) 325 MG EC tablet Take 325 mg by mouth daily Until 9/8/20 for DVT prophylaxis       citalopram (CELEXA) 20 MG tablet Take 1 tablet (20 mg) by mouth daily 30 tablet 3     diclofenac (VOLTAREN) 1 % topical gel Apply 2 g topically 2 times daily as needed for moderate pain 1 Tube 0     donepezil (ARICEPT) 10 MG tablet Take 1 tablet (10 mg) by mouth At Bedtime 30 tablet      HYDROcodone-acetaminophen (NORCO) 5-325 MG tablet Take 1 tablet by mouth every 4 hours as needed for pain 30 tablet 0     hydrocortisone (CORTAID) 1 % external cream Apply topically 2 times daily as needed (L  "forearm rash)       lisinopril-hydrochlorothiazide (ZESTORETIC) 20-12.5 MG tablet Take 1 tablet by mouth daily       melatonin 3 MG tablet Take 1 tablet (3 mg) by mouth nightly as needed for sleep       omeprazole (PRILOSEC) 20 MG DR capsule Take 20 mg by mouth daily        polyethylene glycol (MIRALAX) 17 GM/Dose powder Take 17 g (1 capful) by mouth daily as needed for constipation       simvastatin (ZOCOR) 20 MG tablet Take 1 tablet (20 mg) by mouth At Bedtime 90 tablet 3     REVIEW OF SYSTEMS:  Limited secondary to cognitive impairment but today pt reports 7 point ROS done including, light headedness/dizziness, fever/chills, pain, Resp, CV, GI, and  and is negative other than noted in HPI.      Objective:  /66   Pulse 60   Temp 97.8  F (36.6  C)   Resp 16   Ht 1.575 m (5' 2\")   Wt 74.2 kg (163 lb 9.6 oz)   SpO2 97%   BMI 29.92 kg/m       Exam:  EXAM LIMITED DUE TO [facility] social distancing recommendations:  GENERAL APPEARANCE:  Elderly female sitting up on side of bed.  NAD, non-toxic.  RESP:  Lungs are CTA.  Regular relaxed breathing effort.  No cough.    EXTREMITIES:  2+ Right pedal/ankle edema, none on Left; no calf tenderness.   PSYCH: Alert to self, some what to place and situation.  Clear degree of cognitive/memory impairment.  Stable/unchanged.   WOUND: Lateral Right ankle incision is approximated but not linear, there is a 0.8 cm wide scar/wound bed of off white/yellow fibrinous tissue in middle of incision, minimal sero/sang drainage, no s/s of infection.      Labs:   I have personally reviewed labs, which are in facility or EMR chart.     ASSESSMENT/PLAN:  Fall, subsequent encounter  Closed trimalleolar fracture of right ankle with routine healing, subsequent encounter  S/P ORIF (open reduction internal fixation) fracture, 8/8  Acute post-operative pain  Had f/u with Ortho 9/21, they noted WBAT with walking boot, doing well.   There is an opening of her incision, with a yellow / fibrin wound " bed, mild sero/sang drainage, no s/s of infection.    -WBAT with walking boot.   -Clean incision and use Xeroform daily.   -Continues Norco PRN.   --Ortho f/u in 4 weeks or around 10/21.     Edema of right foot  Now that cast is off, we note ongoing R pedal/ankle edema, likely dependency and due to being in cast for weeks.   -Asked PT/OT to fit for compression.     Essential hypertension  Hyponatremia  Review of VS's show VSS and within acceptable range.   No current s/s of clinical CHF.   Na has normalized with dropping Lisinopril/HCTZ down to daily.   -Continues Lisinopril/HCTZ down to q daily.   -Continues Norvasc.   -Continues Statin.     Cognitive impairment  Ongoing mod cognitive/memory impairment, stable/unchanged.   -No changes, continue to clinically monitor.     Debility  -Was NWB but now WBAT with RLE walking boot, doing well per PT/OT.   -Continues PT/OT.     Orders written by provider at facility  -As noted above.     Electronically signed by:  BETH Olmstead CNP               Sincerely,        BETH Olmstead CNP

## 2020-10-01 NOTE — PROGRESS NOTES
[facility]  [place] Medical Record Number:  [id number]  Place of Service where encounter took place:  [facility] ([facility]) [[id number]]  Chief Complaint   Patient presents with     Nursing Home Acute       HPI:    Juany Collazo  is a 88 year old (1/7/1932), who is being seen today for an episodic care visit.  HPI information obtained from: facility chart records, facility staff, patient report and [facility] chart review.     Past Medical and Surgical History reviewed in [facility] today.    Met with Mrs. Collazo today for follow up.  Mrs. Collazo has ongoing cognitive/memory impairment, but continues to report she is doing well.  She does endorse some vague pain in her Right ankle area, worse at night, reports the Norco still helps a good deal.  She continues to report intermittent loose stools but no N/V or cramps.  She has no other complaints.     MEDICATIONS:  Current Outpatient Medications   Medication Sig Dispense Refill     albuterol (PROAIR HFA/PROVENTIL HFA/VENTOLIN HFA) 108 (90 Base) MCG/ACT inhaler Inhale 2 puffs into the lungs every 4 hours as needed for shortness of breath / dyspnea or wheezing       amLODIPine (NORVASC) 2.5 MG tablet Take 2.5 mg by mouth daily       aspirin (ASA) 325 MG EC tablet Take 325 mg by mouth daily Until 9/8/20 for DVT prophylaxis       citalopram (CELEXA) 20 MG tablet Take 1 tablet (20 mg) by mouth daily 30 tablet 3     diclofenac (VOLTAREN) 1 % topical gel Apply 2 g topically 2 times daily as needed for moderate pain 1 Tube 0     donepezil (ARICEPT) 10 MG tablet Take 1 tablet (10 mg) by mouth At Bedtime 30 tablet      HYDROcodone-acetaminophen (NORCO) 5-325 MG tablet Take 1 tablet by mouth every 4 hours as needed for pain 30 tablet 0     hydrocortisone (CORTAID) 1 % external cream Apply topically 2 times daily as needed (L forearm rash)       lisinopril-hydrochlorothiazide (ZESTORETIC) 20-12.5 MG tablet Take 1 tablet by mouth daily    "    melatonin 3 MG tablet Take 1 tablet (3 mg) by mouth nightly as needed for sleep       omeprazole (PRILOSEC) 20 MG DR capsule Take 20 mg by mouth daily        polyethylene glycol (MIRALAX) 17 GM/Dose powder Take 17 g (1 capful) by mouth daily as needed for constipation       simvastatin (ZOCOR) 20 MG tablet Take 1 tablet (20 mg) by mouth At Bedtime 90 tablet 3     REVIEW OF SYSTEMS:  Limited secondary to cognitive impairment but today pt reports 7 point ROS done including, light headedness/dizziness, fever/chills, pain, Resp, CV, GI, and  and is negative other than noted in HPI.      Objective:  /66   Pulse 60   Temp 97.8  F (36.6  C)   Resp 16   Ht 1.575 m (5' 2\")   Wt 74.2 kg (163 lb 9.6 oz)   SpO2 97%   BMI 29.92 kg/m       Exam:  EXAM LIMITED DUE TO [facility] social distancing recommendations:  GENERAL APPEARANCE:  Elderly female sitting up on side of bed.  NAD, non-toxic.  RESP:  Lungs are CTA.  Regular relaxed breathing effort.  No cough.    EXTREMITIES:  2+ Right pedal/ankle edema, none on Left; no calf tenderness.   PSYCH: Alert to self, some what to place and situation.  Clear degree of cognitive/memory impairment.  Stable/unchanged.   WOUND: Lateral Right ankle incision is approximated but not linear, there is a 0.8 cm wide scar/wound bed of off white/yellow fibrinous tissue in middle of incision, minimal sero/sang drainage, no s/s of infection.      Labs:   I have personally reviewed labs, which are in facility or EMR chart.     ASSESSMENT/PLAN:  Fall, subsequent encounter  Closed trimalleolar fracture of right ankle with routine healing, subsequent encounter  S/P ORIF (open reduction internal fixation) fracture, 8/8  Acute post-operative pain  Had f/u with Ortho 9/21, they noted WBAT with walking boot, doing well.   There is an opening of her incision, with a yellow / fibrin wound bed, mild sero/sang drainage, no s/s of infection.    -WBAT with walking boot.   -Clean incision and use Xeroform " daily.   -Continues Norco PRN.   --Ortho f/u in 4 weeks or around 10/21.     Edema of right foot  Now that cast is off, we note ongoing R pedal/ankle edema, likely dependency and due to being in cast for weeks.   -Asked PT/OT to fit for compression.     Essential hypertension  Hyponatremia  Review of VS's show VSS and within acceptable range.   No current s/s of clinical CHF.   Na has normalized with dropping Lisinopril/HCTZ down to daily.   -Continues Lisinopril/HCTZ down to q daily.   -Continues Norvasc.   -Continues Statin.     Cognitive impairment  Ongoing mod cognitive/memory impairment, stable/unchanged.   -No changes, continue to clinically monitor.     Debility  -Was NWB but now WBAT with RLE walking boot, doing well per PT/OT.   -Continues PT/OT.     Orders written by provider at facility  -As noted above.     Electronically signed by:  BETH Olmstead CNP

## 2020-10-05 ENCOUNTER — DISCHARGE SUMMARY NURSING HOME (OUTPATIENT)
Dept: GERIATRICS | Facility: CLINIC | Age: 85
End: 2020-10-05
Payer: COMMERCIAL

## 2020-10-05 VITALS
OXYGEN SATURATION: 95 % | HEART RATE: 64 BPM | SYSTOLIC BLOOD PRESSURE: 125 MMHG | BODY MASS INDEX: 30.11 KG/M2 | TEMPERATURE: 97.8 F | DIASTOLIC BLOOD PRESSURE: 68 MMHG | RESPIRATION RATE: 14 BRPM | HEIGHT: 62 IN | WEIGHT: 163.6 LBS

## 2020-10-05 DIAGNOSIS — I10 ESSENTIAL HYPERTENSION: ICD-10-CM

## 2020-10-05 DIAGNOSIS — R60.0 EDEMA OF RIGHT FOOT: ICD-10-CM
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
## 2020-10-05 DIAGNOSIS — E04.1 THYROID NODULE: ICD-10-CM

## 2020-10-05 DIAGNOSIS — Z98.890 S/P ORIF (OPEN REDUCTION INTERNAL FIXATION) FRACTURE: ICD-10-CM

## 2020-10-05 DIAGNOSIS — R19.5 LOOSE STOOLS: ICD-10-CM

## 2020-10-05 DIAGNOSIS — R41.89 COGNITIVE IMPAIRMENT: ICD-10-CM

## 2020-10-05 DIAGNOSIS — F32.A DEPRESSION, UNSPECIFIED DEPRESSION TYPE: ICD-10-CM

## 2020-10-05 DIAGNOSIS — K08.9 POOR DENTITION: ICD-10-CM

## 2020-10-05 DIAGNOSIS — E87.1 HYPONATREMIA: ICD-10-CM

## 2020-10-05 DIAGNOSIS — Z87.81 S/P ORIF (OPEN REDUCTION INTERNAL FIXATION) FRACTURE: ICD-10-CM

## 2020-10-05 DIAGNOSIS — G89.18 ACUTE POST-OPERATIVE PAIN: ICD-10-CM

## 2020-10-05 DIAGNOSIS — S82.851D CLOSED TRIMALLEOLAR FRACTURE OF RIGHT ANKLE WITH ROUTINE HEALING, SUBSEQUENT ENCOUNTER: ICD-10-CM

## 2020-10-05 DIAGNOSIS — R53.81 DEBILITY: ICD-10-CM

## 2020-10-05 DIAGNOSIS — R55 VASOVAGAL SYNCOPE: ICD-10-CM

## 2020-10-05 PROCEDURE — 99316 NF DSCHRG MGMT 30 MIN+: CPT | Performed by: NURSE PRACTITIONER

## 2020-10-05 RX ORDER — ASPIRIN 81 MG/1
81 TABLET, CHEWABLE ORAL DAILY
Start: 2020-10-05

## 2020-10-05 ASSESSMENT — MIFFLIN-ST. JEOR: SCORE: 1125.33

## 2020-10-05 NOTE — PROGRESS NOTES
[facility] DISCHARGE SUMMARY  PATIENT'S NAME: Juany Collazo  YOB: 1932  MEDICAL RECORD NUMBER:  [id number]  Place of Service where encounter took place:  [facility] ([facility] [[id number]]    PRIMARY CARE PROVIDER AND CLINIC RESPONSIBLE AFTER TRANSFER:   BETH Hannon CNP, [address] / [place] [address]        Transferring providers: BETH Olmstead CNP, Cheyenne Garcia MD  Recent Hospitalization/ED:  [facility] Hospital stay 08/06/2020 to 08/11/2020.  Date of SNF Admission: October / 11 / 2020  Date of SNF (anticipated) Discharge: October / 07 / 2020  Discharged to: previous assisted living    Cognitive Scores: SLUMS: 21/30 and CPT: 5.0/5.6  Physical Function: No formal balance testing; 150 ft with 4ww and SBA.   DME: Walker    CODE STATUS/ADVANCE DIRECTIVES DISCUSSION:  DNR / DNI   ALLERGIES: Patient has no known allergies.    DISCHARGE DIAGNOSIS/NURSING FACILITY COURSE:   Mrs. Collazo is a 87 y/o from [facility] whom has a PMH of HTN, chronic pain, cognitive impairment and depression.  She was out with her family and they were massaging her sore neck when she passed out and fell.  Came to and had Right ankle pain.  Presented to hospital and found to have a displaced Right trimalleolar ankle fracture; is now S/P ORIF on 8/8.  They noted telemetry and ECHO unremarkable, they felt syncope likely carotid massage/vasovagle in etiology.  She did have some mild post op bronchospasms treated with PRN Albuterol.    She initially transitioned back to [facility] with NWB RLE and family help but family not able to keep up with cares and not [facility] appropriate.  Thus she transitioned next door here at the TCU for ongoing cares and PT/OT.       While at the TCU Mrs. Collazo relatively did well.  No more light headedness/vasovagal concerns.  She was NWB for some time, but finally was seen by Ortho in late Sept and had her cast removed and allowed  to WBAT with walking boot.  She started PT/OT and was doing well with ambulating with walker and walking boot.  Still has some mild pain in R ankle at times, treated well with PRN Norco.      Other issues at TCU: One was mild hyponatremia and soft SBP's, we lowered her Lisinopril/HCTZ from BID to q daily, which resolved both.      Mrs. Collazo was noted to have a couple painful teeth just before this incident and was to see the dentist and have some tooth work done, but delayed due to hospital/fracture.  We did speak with her dentist whom reported no urgency, and consider a course of Amoxicillin if she should develop tooth pain, but she did not for us.      Lastly Just a few days prior to discharge Mrs. Vuong developed loose stools.  No N/V or cramps, no recent antibiotics, no medication changes, thus etiology unclear.  Due to ongoing c/o loose stools, and being in a TCU, we will check a C-diff for completeness, but will leave to PCP to consider starting Imodium PRN if it should be negative.      Vasovagal syncope  Fall, subsequent encounter  Closed trimalleolar fracture of right ankle with routine healing, subsequent encounter  S/P ORIF (open reduction internal fixation) fracture, 8/8  Acute post-operative pain  Last seen by Ortho 9/21 whom noted WBAT with walking boot in place, hard cast was removed.   -She does have a open wound on lateral Right ankle, using daily wound care of cleaning and Xeroform.   -Using PRN Norco with good results for pain.   -Lowered prior ASA down to 81 mg's q daily due to ambulating more now, was up to 325 q daily for prophylaxis.   --Next Ortho f/u around 10/21 or end of Oct.     Edema of right foot  Local R foot edema since cast was removed a couple weeks ago, no leg edema.  Low suspicion for DVT but not ruled out.   -ASA as noted above.   -Started using compression on Right foot for now.   --Consider RLE US if does not improve or if develops pain/cramps.     Essential  hypertension  Hyponatremia  SBP's were soft and had mild hyponatremia, both resolved with lowering Lisinopril/HCTZ down to q daily.  Na was 138 when last checked 9/28.   -Continues ASA 81 mg's q daily, CV health.   -Continues Norvasc, Lisinopril/HCTZ.   -Continues Statin.     Loose stools  Developed 3-4 days before discharge back to [facility].  No N/V or cramps but due to being in TCU, we will order a C-diff for completeness before considering any PRN Imodium.  She is not on any scheduled bowel meds, no new medications, etiology unclear.   -C diff ordered 5 Oct, will let PCP know if results are positive.     Thyroid nodule  Part of hospital workup was a head CT 7 Aug that noted an incidental thyroid nodule in the Right posterior gland measuring 1.5 cm.    -Non urgent TFT's and US recommended for further assessment.   --We left this to the PCP's discretion.     Poor dentition  Was on a course of Amoxicillin just before fall/hospital by her dentist.  She was to have some tooth work done, but delayed due to hospital/fracture.  We did speak with her dentist whom reported no urgency, and consider a course of Amoxicillin if she should develop tooth pain, but she did not for us.    -Recommend f/u with dentist ASAP once home.     Depression, unspecified depression type  Doing well at TCU.   -Continues PTA Citalopram.     Cognitive impairment  Ongoing mild-mod cognitive/memory impairment, stable/unchanged.   -Continues PTA Donepezil.     Debility  -Will DC with Home care.   -See below for F2F.     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 She also has no past medical history of Malignant hyperthermia or PONV (postoperative nausea and vomiting).    Discharge Medications:  Current Outpatient Medications   Medication  "Sig Dispense Refill     aspirin (ASA) 81 MG chewable tablet Take 1 tablet (81 mg) by mouth daily       albuterol (PROAIR HFA/PROVENTIL HFA/VENTOLIN HFA) 108 (90 Base) MCG/ACT inhaler Inhale 2 puffs into the lungs every 4 hours as needed for shortness of breath / dyspnea or wheezing       amLODIPine (NORVASC) 2.5 MG tablet Take 2.5 mg by mouth daily       citalopram (CELEXA) 20 MG tablet Take 1 tablet (20 mg) by mouth daily 30 tablet 3     diclofenac (VOLTAREN) 1 % topical gel Apply 2 g topically 2 times daily as needed for moderate pain 1 Tube 0     donepezil (ARICEPT) 10 MG tablet Take 1 tablet (10 mg) by mouth At Bedtime 30 tablet      HYDROcodone-acetaminophen (NORCO) 5-325 MG tablet Take 1 tablet by mouth every 4 hours as needed for pain 30 tablet 0     hydrocortisone (CORTAID) 1 % external cream Apply topically 2 times daily as needed (L forearm rash)       lisinopril-hydrochlorothiazide (ZESTORETIC) 20-12.5 MG tablet Take 1 tablet by mouth daily       melatonin 3 MG tablet Take 1 tablet (3 mg) by mouth nightly as needed for sleep       omeprazole (PRILOSEC) 20 MG DR capsule Take 20 mg by mouth daily        polyethylene glycol (MIRALAX) 17 GM/Dose powder Take 17 g (1 capful) by mouth daily as needed for constipation       simvastatin (ZOCOR) 20 MG tablet Take 1 tablet (20 mg) by mouth At Bedtime 90 tablet 3     Medication Changes/Rationale:   -As noted above.     Controlled medications sent with patient:   Medication: PRN Norco , 20 tabs given to patient at the time of discharge to take home     ROS:   Limited secondary to cognitive impairment but today pt reports 7 point ROS done including, light headedness/dizziness, fever/chills, pain, Resp, CV, GI, and  and is negative other than noted in HPI.    Physical Exam:   Vitals: /68   Pulse 64   Temp 97.8  F (36.6  C)   Resp 14   Ht 1.575 m (5' 2\")   Wt 74.2 kg (163 lb 9.6 oz)   SpO2 95%   BMI 29.92 kg/m    BMI= Body mass index is 29.92 kg/m . "     EXAM LIMITED DUE TO [facility] social distancing recommendations:  GENERAL APPEARANCE:  Elderly female resting in bed.  NAD, non-toxic.  RESP:  Lungs are CTA.  Regular relaxed breathing effort.  No cough.    EXTREMITIES:  1-2+ Right pedal/ankle edema, none on Left; no calf tenderness.  Compression in place on RLE.   PSYCH: Alert to self, some what to place and situation.  Clear degree of cognitive/memory impairment.  Stable/unchanged.   WOUND: Lateral Right ankle incision is approximated but not linear, there is a 0.8 cm wide scar/wound bed of off white/yellow fibrinous tissue in middle of incision, no drainage,  no s/s of infection.      SNF labs: Labs done while at [facility] Facility done by [facility] lab.     DISCHARGE PLAN:    Follow up labs: No labs orders/due    Medical Follow Up:      Follow up with primary care provider in 1-2 weeks   Follow up with Ortho towards end of Oct, see above        Discharge Services: Home Care:  Occupational Therapy, Physical Therapy, Registered Nurse, Home Health Aide and From:  [facility].     Discharge Instructions Verbalized to Patient at Discharge:     Instructed patient and daughter to arrange/attend above appointments.     TOTAL DISCHARGE TIME:   Greater than 30 minutes  Electronically signed by:  BETH Olmstead CNP       Documentation of Face to Face and Certification for Home Health Services    I certify that patient: Juany Collazo is under my care and that I, or a nurse practitioner or physician's assistant working with me, had a face-to-face encounter that meets the physician face-to-face encounter requirements with this patient on: 5 Oct 20.    This encounter with the patient was in whole, or in part, for the following medical condition, which is the primary reason for home health care: Recent fall with injury, cognitive impairment, debility.    I certify that, based on my findings, the following services are medically necessary home  health services: Nursing, Occupational Therapy, Physical Therapy and HHA.    My clinical findings support the need for the above services because: Nurse is needed: To provide assessment and oversight required in the home to assure adherence to the medical plan due to: Recent fall with injury, cognitive impairment, debility..., Occupational Therapy Services are needed to assess and treat cognitive ability and address ADL safety due to impairment in Recent fall with injury, cognitive impairment, debility.. and Physical Therapy Services are needed to assess and treat the following functional impairments: Recent fall with injury, cognitive impairment, debility..    Further, I certify that my clinical findings support that this patient is homebound (i.e. absences from home require considerable and taxing effort and are for medical reasons or [employer] services or infrequently or of short duration when for other reasons) because: Requires assistance of another person or specialized equipment to access medical services because patient: Requires supervision of another for safe transfer...    Based on the above findings. I certify that this patient is confined to the home and needs intermittent skilled nursing care, physical therapy and/or speech therapy.  The patient is under my care, and I have initiated the establishment of the plan of care.  This patient will be followed by a physician who will periodically review the plan of care.  Physician/Provider to provide follow up care: Gaurang Reeves    Attending hospital physician (the Medicare certified ALBER provider):   Electronically signed by  BETH Christy CNP  [facility]    Physician Signature: See electronic signature associated with these discharge orders.  Date: 10/5/2020

## 2020-10-05 NOTE — LETTER
10/5/2020        RE: Juany Collazo  [place] [facility]  [address]  [place] [address]        [facility] DISCHARGE SUMMARY  PATIENT'S NAME: Juany Collazo  YOB: 1932  MEDICAL RECORD NUMBER:  [id number]  Place of Service where encounter took place:  [facility] ([facility]) [[id number]]    PRIMARY CARE PROVIDER AND CLINIC RESPONSIBLE AFTER TRANSFER:   BETH Hannon CNP, [address] / [place] [address]        Transferring providers: BETH Olmstead CNP, Cheyenne Garcia MD  Recent Hospitalization/ED:  [facility] stay 08/06/2020 to 08/11/2020.  Date of SNF Admission: October / 11 / 2020  Date of SNF (anticipated) Discharge: October / 07 / 2020  Discharged to: previous assisted living    Cognitive Scores: SLUMS: 21/30 and CPT: 5.0/5.6  Physical Function: No formal balance testing; 150 ft with 4ww and SBA.   DME: Walker    CODE STATUS/ADVANCE DIRECTIVES DISCUSSION:  DNR / DNI   ALLERGIES: Patient has no known allergies.    DISCHARGE DIAGNOSIS/NURSING FACILITY COURSE:   Mrs. Collazo is a 87 [place] whom has a PMH of HTN, chronic pain, cognitive impairment and depression.  She was out with her family and they were massaging her sore neck when she passed out and fell.  Came to and had Right ankle pain.  Presented to hospital and found to have a displaced Right trimalleolar ankle fracture; is now S/P ORIF on 8/8.  They noted telemetry and ECHO unremarkable, they felt syncope likely carotid massage/vasovagle in etiology.  She did have some mild post op bronchospasms treated with PRN Albuterol.    She [place] [facility]ed back to [place] with NWB RLE and family help but family not able to keep up with cares and not detention appropriate.  Thus she transitioned next door here at the TCU for ongoing cares and PT/OT.       While at the TCU Mrs. Collazo relatively did well.  No more light headedness/vasovagal  concerns.  She was NWB for some time, but finally was seen by Ortho in late Sept and had her cast removed and allowed to WBAT with walking boot.  She started PT/OT and was doing well with ambulating with walker and walking boot.  Still has some mild pain in R ankle at times, treated well with PRN Norco.      Other issues at TCU: One was mild hyponatremia and soft SBP's, we lowered her Lisinopril/HCTZ from BID to q daily, which resolved both.      Mrs. Collazo was noted to have a couple painful teeth just before this incident and was to see the dentist and have some tooth work done, but delayed due to hospital/fracture.  We did speak with her dentist whom reported no urgency, and consider a course of Amoxicillin if she should develop tooth pain, but she did not for us.      Lastly Just a few days prior to discharge Mrs. Vuong developed loose stools.  No N/V or cramps, no recent antibiotics, no medication changes, thus etiology unclear.  Due to ongoing c/o loose stools, and being in a TCU, we will check a C-diff for completeness, but will leave to PCP to consider starting Imodium PRN if it should be negative.      Vasovagal syncope  Fall, subsequent encounter  Closed trimalleolar fracture of right ankle with routine healing, subsequent encounter  S/P ORIF (open reduction internal fixation) fracture, 8/8  Acute post-operative pain  Last seen by Ortho 9/21 whom noted WBAT with walking boot in place, hard cast was removed.   -She does have a open wound on lateral Right ankle, using daily wound care of cleaning and Xeroform.   -Using PRN Norco with good results for pain.   -Lowered prior ASA down to 81 mg's q daily due to ambulating more now, was up to 325 q daily for prophylaxis.   --Next Ortho f/u around 10/21 or end of Oct.     Edema of right foot  Local R foot edema since cast was removed a couple weeks ago, no leg edema.  Low suspicion for DVT but not ruled out.   -ASA as noted above.   -Started using compression  on Right foot for now.   --Consider RLE US if does not improve or if develops pain/cramps.     Essential hypertension  Hyponatremia  SBP's were soft and had mild hyponatremia, both resolved with lowering Lisinopril/HCTZ down to q daily.  Na was 138 when last checked 9/28.   -Continues ASA 81 mg's q daily, [employer].   -Continues Norvasc, Lisinopril/HCTZ.   -Continues Statin.     Loose stools  Developed 3-4 days before discharge back to [facility].  No N/V or cramps but due to being in TCU, we will order a C-diff for completeness before considering any PRN Imodium.  She is not on any scheduled bowel meds, no new medications, etiology unclear.   -C diff ordered 5 Oct, will let PCP know if results are positive.     Thyroid nodule  Part of hospital workup was a head CT 7 Aug that noted an incidental thyroid nodule in the Right posterior gland measuring 1.5 cm.    -Non urgent TFT's and US recommended for further assessment.   --We left this to the PCP's discretion.     Poor dentition  Was on a course of Amoxicillin just before fall/hospital by her dentist.  She was to have some tooth work done, but delayed due to hospital/fracture.  We did speak with her dentist whom reported no urgency, and consider a course of Amoxicillin if she should develop tooth pain, but she did not for us.    -Recommend f/u with dentist ASAP once home.     Depression, unspecified depression type  Doing well at TCU.   -Continues PTA Citalopram.     Cognitive impairment  Ongoing mild-mod cognitive/memory impairment, stable/unchanged.   -Continues PTA Donepezil.     Debility  -Will DC with Home care.   -See below for F2F.     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 She also has no past medical history of Malignant hyperthermia or PONV  (postoperative nausea and vomiting).    Discharge Medications:  Current Outpatient Medications   Medication Sig Dispense Refill     aspirin (ASA) 81 MG chewable tablet Take 1 tablet (81 mg) by mouth daily       albuterol (PROAIR HFA/PROVENTIL HFA/VENTOLIN HFA) 108 (90 Base) MCG/ACT inhaler Inhale 2 puffs into the lungs every 4 hours as needed for shortness of breath / dyspnea or wheezing       amLODIPine (NORVASC) 2.5 MG tablet Take 2.5 mg by mouth daily       citalopram (CELEXA) 20 MG tablet Take 1 tablet (20 mg) by mouth daily 30 tablet 3     diclofenac (VOLTAREN) 1 % topical gel Apply 2 g topically 2 times daily as needed for moderate pain 1 Tube 0     donepezil (ARICEPT) 10 MG tablet Take 1 tablet (10 mg) by mouth At Bedtime 30 tablet      HYDROcodone-acetaminophen (NORCO) 5-325 MG tablet Take 1 tablet by mouth every 4 hours as needed for pain 30 tablet 0     hydrocortisone (CORTAID) 1 % external cream Apply topically 2 times daily as needed (L forearm rash)       lisinopril-hydrochlorothiazide (ZESTORETIC) 20-12.5 MG tablet Take 1 tablet by mouth daily       melatonin 3 MG tablet Take 1 tablet (3 mg) by mouth nightly as needed for sleep       omeprazole (PRILOSEC) 20 MG DR capsule Take 20 mg by mouth daily        polyethylene glycol (MIRALAX) 17 GM/Dose powder Take 17 g (1 capful) by mouth daily as needed for constipation       simvastatin (ZOCOR) 20 MG tablet Take 1 tablet (20 mg) by mouth At Bedtime 90 tablet 3     Medication Changes/Rationale:   -As noted above.     Controlled medications sent with patient:   Medication: PRN Norco , 20 tabs given to patient at the time of discharge to take home     ROS:   Limited secondary to cognitive impairment but today pt reports 7 point ROS done including, light headedness/dizziness, fever/chills, pain, Resp, CV, GI, and  and is negative other than noted in HPI.    Physical Exam:   Vitals: /68   Pulse 64   Temp 97.8  F (36.6  C)   Resp 14   Ht 1.575 m (5'  "2\")   Wt 74.2 kg (163 lb 9.6 oz)   SpO2 95%   BMI 29.92 kg/m    BMI= Body mass index is 29.92 kg/m .     EXAM LIMITED DUE TO [facility] social distancing recommendations:  GENERAL APPEARANCE:  Elderly female resting in bed.  NAD, non-toxic.  RESP:  Lungs are CTA.  Regular relaxed breathing effort.  No cough.    EXTREMITIES:  1-2+ Right pedal/ankle edema, none on Left; no calf tenderness.  Compression in place on RLE.   PSYCH: Alert to self, some what to place and situation.  Clear degree of cognitive/memory impairment.  Stable/unchanged.   WOUND: Lateral Right ankle incision is approximated but not linear, there is a 0.8 cm wide scar/wound bed of off white/yellow fibrinous tissue in middle of incision, no drainage,  no s/s of infection.      SNF labs: Labs done while at Skilled Nursing Facility done by [facility] lab.     DISCHARGE PLAN:    Follow up labs: No labs orders/due    Medical Follow Up:      Follow up with primary care provider in 1-2 weeks   Follow up with Ortho towards end of Oct, see above        Discharge Services: Home Care:  Occupational Therapy, Physical Therapy, Registered Nurse, Home Health Aide and From:  [facility] home care.     Discharge Instructions Verbalized to Patient at Discharge:     Instructed patient to arrange/attend above appointments.     TOTAL DISCHARGE TIME:   Greater than 30 minutes  Electronically signed by:  BETH Olmstead CNP       Documentation of Face to Face and Certification for Home Health Services    I certify that patient: Juany Collazo is under my care and that I, or a nurse practitioner or physician's assistant working with me, had a face-to-face encounter that meets the physician face-to-face encounter requirements with this patient on: 5 Oct 20.    This encounter with the patient was in whole, or in part, for the following medical condition, which is the primary reason for home health care: Recent fall with injury, cognitive impairment, debility.    I " certify that, based on my findings, the following services are medically necessary home health services: Nursing, Occupational Therapy, Physical Therapy and HHA.    My clinical findings support the need for the above services because: Nurse is needed: To provide assessment and oversight required in the home to assure adherence to the medical plan due to: Recent fall with injury, cognitive impairment, debility..., Occupational Therapy Services are needed to assess and treat cognitive ability and address ADL safety due to impairment in Recent fall with injury, cognitive impairment, debility.. and Physical Therapy Services are needed to assess and treat the following functional impairments: Recent fall with injury, cognitive impairment, debility..    Further, I certify that my clinical findings support that this patient is homebound (i.e. absences from home require considerable and taxing effort and are for medical reasons or Yarsanism services or infrequently or of short duration when for other reasons) because: Requires assistance of another person or specialized equipment to access medical services because patient: Requires supervision of another for safe transfer...    Based on the above findings. I certify that this patient is confined to the home and needs intermittent skilled nursing care, physical therapy and/or speech therapy.  The patient is under my care, and I have initiated the establishment of the plan of care.  This patient will be followed by a physician who will periodically review the plan of care.  Physician/Provider to provide follow up care: Gaurang Reeves    Attending hospital physician (the Medicare certified PECOS provider):   Electronically signed by  BETH Christy CNP  [facility]    Physician Signature: See electronic signature associated with these discharge orders.  Date: 10/5/2020          [facility] DISCHARGE SUMMARY  PATIENT'S NAME: Juany  MG Collazo  YOB: 1932  MEDICAL RECORD NUMBER:  [id number]  Place of Service where encounter took place:  [facility] ([facility]) [[id number]]    PRIMARY CARE PROVIDER AND CLINIC RESPONSIBLE AFTER TRANSFER:   BETH Hannon CNP, [address] / [place] [address]        Transferring providers: BETH Olmstead CNP, Cheyenne Garcia MD  Recent Hospitalization/ED:  [facility] stay 08/06/2020 to 08/11/2020.  Date of SNF Admission: October / 11 / 2020  Date of SNF (anticipated) Discharge: October / 07 / 2020  Discharged to: previous assisted living    Cognitive Scores: SLUMS: 21/30 and CPT: 5.0/5.6  Physical Function: No formal balance testing; 150 ft with 4ww and SBA.   DME: Walker    CODE STATUS/ADVANCE DIRECTIVES DISCUSSION:  DNR / DNI   ALLERGIES: Patient has no known allergies.    DISCHARGE DIAGNOSIS/NURSING FACILITY COURSE:   Mrs. Collazo is a 87 y/o from [facility] whom has a PMH of HTN, chronic pain, cognitive impairment and depression.  She was out with her family and they were massaging her sore neck when she passed out and fell.  Came to and had Right ankle pain.  Presented to hospital and found to have a displaced Right trimalleolar ankle fracture; is now S/P ORIF on 8/8.  They noted telemetry and ECHO unremarkable, they felt syncope likely carotid massage/vasovagle in etiology.  She did have some mild post op bronchospasms treated with PRN Albuterol.    She initially transitioned back to [facility] with NWB RLE and family help but family not able to keep up with cares and not [place] appropriate.  Thus she transitioned next door here at the TCU for ongoing cares and PT/OT.       While at the TCU Mrs. Collazo relatively did well.  No more light headedness/vasovagal concerns.  She was NWB for some time, but finally was seen by Ortho in late Sept and had her cast removed and allowed to WBAT with walking boot.  She started PT/OT and was doing well with  ambulating with walker and walking boot.  Still has some mild pain in R ankle at times, treated well with PRN Norco.      Other issues at TCU: One was mild hyponatremia and soft SBP's, we lowered her Lisinopril/HCTZ from BID to q daily, which resolved both.      Mrs. Collazo was noted to have a couple painful teeth just before this incident and was to see the dentist and have some tooth work done, but delayed due to hospital/fracture.  We did speak with her dentist whom reported no urgency, and consider a course of Amoxicillin if she should develop tooth pain, but she did not for us.      Lastly Just a few days prior to discharge Mrs. Vuong developed loose stools.  No N/V or cramps, no recent antibiotics, no medication changes, thus etiology unclear.  Due to ongoing c/o loose stools, and being in a TCU, we will check a C-diff for completeness, but will leave to PCP to consider starting Imodium PRN if it should be negative.      Vasovagal syncope  Fall, subsequent encounter  Closed trimalleolar fracture of right ankle with routine healing, subsequent encounter  S/P ORIF (open reduction internal fixation) fracture, 8/8  Acute post-operative pain  Last seen by Ortho 9/21 whom noted WBAT with walking boot in place, hard cast was removed.   -She does have a open wound on lateral Right ankle, using daily wound care of cleaning and Xeroform.   -Using PRN Norco with good results for pain.   -Lowered prior ASA down to 81 mg's q daily due to ambulating more now, was up to 325 q daily for prophylaxis.   --Next Ortho f/u around 10/21 or end of Oct.     Edema of right foot  Local R foot edema since cast was removed a couple weeks ago, no leg edema.  Low suspicion for DVT but not ruled out.   -ASA as noted above.   -Started using compression on Right foot for now.   --Consider RLE US if does not improve or if develops pain/cramps.     Essential hypertension  Hyponatremia  SBP's were soft and had mild hyponatremia, both resolved  with lowering Lisinopril/HCTZ down to q daily.  Na was 138 when last checked 9/28.   -Continues ASA 81 mg's q daily, [employer] [facility].   -Continues Norvasc, Lisinopril/HCTZ.   -Continues Statin.     Loose stools  Developed 3-4 days before discharge back to [facility].  No N/V or cramps but due to being in TCU, we will order a C-diff for completeness before considering any PRN Imodium.  She is not on any scheduled bowel meds, no new medications, etiology unclear.   -C diff ordered 5 Oct, will let PCP know if results are positive.     Thyroid nodule  Part of hospital workup was a head CT 7 Aug that noted an incidental thyroid nodule in the Right posterior gland measuring 1.5 cm.    -Non urgent TFT's and US recommended for further assessment.   --We left this to the PCP's discretion.     Poor dentition  Was on a course of Amoxicillin just before fall/hospital by her dentist.  She was to have some tooth work done, but delayed due to hospital/fracture.  We did speak with her dentist whom reported no urgency, and consider a course of Amoxicillin if she should develop tooth pain, but she did not for us.    -Recommend f/u with dentist ASAP once home.     Depression, unspecified depression type  Doing well at TCU.   -Continues PTA Citalopram.     Cognitive impairment  Ongoing mild-mod cognitive/memory impairment, stable/unchanged.   -Continues PTA Donepezil.     Debility  -Will DC with Home care.   -See below for F2F.     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 She also has no past medical history of Malignant hyperthermia or PONV (postoperative nausea and vomiting).    Discharge Medications:  Current Outpatient Medications   Medication Sig Dispense Refill     aspirin (ASA) 81 MG chewable tablet Take 1 tablet (81 mg) by  "mouth daily       albuterol (PROAIR HFA/PROVENTIL HFA/VENTOLIN HFA) 108 (90 Base) MCG/ACT inhaler Inhale 2 puffs into the lungs every 4 hours as needed for shortness of breath / dyspnea or wheezing       amLODIPine (NORVASC) 2.5 MG tablet Take 2.5 mg by mouth daily       citalopram (CELEXA) 20 MG tablet Take 1 tablet (20 mg) by mouth daily 30 tablet 3     diclofenac (VOLTAREN) 1 % topical gel Apply 2 g topically 2 times daily as needed for moderate pain 1 Tube 0     donepezil (ARICEPT) 10 MG tablet Take 1 tablet (10 mg) by mouth At Bedtime 30 tablet      HYDROcodone-acetaminophen (NORCO) 5-325 MG tablet Take 1 tablet by mouth every 4 hours as needed for pain 30 tablet 0     hydrocortisone (CORTAID) 1 % external cream Apply topically 2 times daily as needed (L forearm rash)       lisinopril-hydrochlorothiazide (ZESTORETIC) 20-12.5 MG tablet Take 1 tablet by mouth daily       melatonin 3 MG tablet Take 1 tablet (3 mg) by mouth nightly as needed for sleep       omeprazole (PRILOSEC) 20 MG DR capsule Take 20 mg by mouth daily        polyethylene glycol (MIRALAX) 17 GM/Dose powder Take 17 g (1 capful) by mouth daily as needed for constipation       simvastatin (ZOCOR) 20 MG tablet Take 1 tablet (20 mg) by mouth At Bedtime 90 tablet 3     Medication Changes/Rationale:   -As noted above.     Controlled medications sent with patient:   Medication: PRN Norco , 20 tabs given to patient at the time of discharge to take home     ROS:   Limited secondary to cognitive impairment but today pt reports 7 point ROS done including, light headedness/dizziness, fever/chills, pain, Resp, CV, GI, and  and is negative other than noted in HPI.    Physical Exam:   Vitals: /68   Pulse 64   Temp 97.8  F (36.6  C)   Resp 14   Ht 1.575 m (5' 2\")   Wt 74.2 kg (163 lb 9.6 oz)   SpO2 95%   BMI 29.92 kg/m    BMI= Body mass index is 29.92 kg/m .     EXAM LIMITED DUE TO [facility] social distancing recommendations:  GENERAL APPEARANCE: "  Elderly female resting in bed.  NAD, non-toxic.  RESP:  Lungs are CTA.  Regular relaxed breathing effort.  No cough.    EXTREMITIES:  1-2+ Right pedal/ankle edema, none on Left; no calf tenderness.  Compression in place on RLE.   PSYCH: Alert to self, some what to place and situation.  Clear degree of cognitive/memory impairment.  Stable/unchanged.   WOUND: Lateral Right ankle incision is approximated but not linear, there is a 0.8 cm wide scar/wound bed of off white/yellow fibrinous tissue in middle of incision, no drainage,  no s/s of infection.      SNF labs: Labs done while at Skilled Nursing Facility done by [facility] lab.     DISCHARGE PLAN:    Follow up labs: No labs orders/due    Medical Follow Up:      Follow up with primary care provider in 1-2 weeks   Follow up with Ortho towards end of Oct, see above        Discharge Services: Home Care:  Occupational Therapy, Physical Therapy, Registered Nurse, Home Health Aide and From:  [facility] home [facility].     Discharge Instructions Verbalized to Patient at Discharge:     Instructed patient and daughter to arrange/attend above appointments.     TOTAL DISCHARGE TIME:   Greater than 30 minutes  Electronically signed by:  BETH Olmstead CNP       Documentation of Face to Face and Certification for Home Health Services    I certify that patient: Juany Collazo is under my care and that I, or a nurse practitioner or physician's assistant working with me, had a face-to-face encounter that meets the physician face-to-face encounter requirements with this patient on: 5 Oct 20.    This encounter with the patient was in whole, or in part, for the following medical condition, which is the primary reason for home health care: Recent fall with injury, cognitive impairment, debility.    I certify that, based on my findings, the following services are medically necessary home health services: Nursing, Occupational Therapy, Physical Therapy and HHA.    My clinical  findings support the need for the above services because: Nurse is needed: To provide assessment and oversight required in the home to assure adherence to the medical plan due to: Recent fall with injury, cognitive impairment, debility..., Occupational Therapy Services are needed to assess and treat cognitive ability and address ADL safety due to impairment in Recent fall with injury, cognitive impairment, debility.. and Physical Therapy Services are needed to assess and treat the following functional impairments: Recent fall with injury, cognitive impairment, debility..    Further, I certify that my clinical findings support that this patient is homebound (i.e. absences from home require considerable and taxing effort and are for medical reasons or Confucianism services or infrequently or of short duration when for other reasons) because: Requires assistance of another person or specialized equipment to access medical services because patient: Requires supervision of another for safe transfer...    Based on the above findings. I certify that this patient is confined to the home and needs intermittent skilled nursing care, physical therapy and/or speech therapy.  The patient is under my care, and I have initiated the establishment of the plan of care.  This patient will be followed by a physician who will periodically review the plan of care.  Physician/Provider to provide follow up care: Gaurang Reeves    Attending hospital physician (the Medicare certified PECOS provider):   Electronically signed by  BETH Christy CNP  [facility]    Physician Signature: See electronic signature associated with these discharge orders.  Date: 10/5/2020              Sincerely,        BETH Olmstead CNP

## 2020-10-08 ENCOUNTER — ASSISTED LIVING VISIT (OUTPATIENT)
Dept: GERIATRICS | Facility: CLINIC | Age: 85
End: 2020-10-08
Payer: COMMERCIAL

## 2020-10-08 VITALS
BODY MASS INDEX: 29.81 KG/M2 | WEIGHT: 163 LBS | DIASTOLIC BLOOD PRESSURE: 75 MMHG | SYSTOLIC BLOOD PRESSURE: 138 MMHG | TEMPERATURE: 98.2 F | OXYGEN SATURATION: 94 % | HEART RATE: 62 BPM | RESPIRATION RATE: 16 BRPM

## 2020-10-08 DIAGNOSIS — I10 ESSENTIAL HYPERTENSION: ICD-10-CM

## 2020-10-08 DIAGNOSIS — R60.0 EDEMA OF RIGHT FOOT: ICD-10-CM

## 2020-10-08 DIAGNOSIS — S82.851D CLOSED TRIMALLEOLAR FRACTURE OF RIGHT ANKLE WITH ROUTINE HEALING, SUBSEQUENT ENCOUNTER: Primary | ICD-10-CM

## 2020-10-08 PROBLEM — M17.12 PRIMARY OSTEOARTHRITIS OF LEFT KNEE: Status: ACTIVE | Noted: 2017-10-09

## 2020-10-08 PROBLEM — M81.0 OSTEOPOROSIS: Status: ACTIVE | Noted: 2017-10-09

## 2020-10-08 PROBLEM — K21.9 GERD (GASTROESOPHAGEAL REFLUX DISEASE): Status: ACTIVE | Noted: 2017-10-09

## 2020-10-08 NOTE — LETTER
10/8/2020        RE: Juany Collazo  [facility]  [address] [address]        [place] [facility]  [place] [facility] Record Number: [id number]  Place of Service where encounter took place: [facility] [[id number]]  Chief Complaint   Patient presents with     Pain     Derm Problem       HPI:    Juany Collazo is a 88 year old (1/7/1932), who is being seen today for an episodic care visit. HPI information obtained from: facility chart records, facility staff, patient report, [facility] chart review and family/first contact dtr report. Today's concern is: R ankle pain, incision site redness, R foot edema, HTN.  S/p fall 8/6/20. Displace fx R trimalleolar-ORIF 8/8/20. Current open area distal incision site 2.5x0.8 cm. Yellow slough, wound bed. No drainage. New sl redness of surrounding skin.  Current drsg xeroform.  Had edema of R foot-appears improved since TCU.  Wears cam boot R foot.  For HTN cont. On norvasc, zestoretic. During TCU stay had hyponatremia. zestoretic dose decreased.  BPs, HRs currently stable      Past Medical and Surgical History reviewed in Epic today.    MEDICATIONS:    Current Outpatient Medications   Medication Sig Dispense Refill     albuterol (PROAIR HFA/PROVENTIL HFA/VENTOLIN HFA) 108 (90 Base) MCG/ACT inhaler Inhale 2 puffs into the lungs every 4 hours as needed for shortness of breath / dyspnea or wheezing       amLODIPine (NORVASC) 2.5 MG tablet Take 2.5 mg by mouth daily       aspirin (ASA) 81 MG chewable tablet Take 1 tablet (81 mg) by mouth daily       citalopram (CELEXA) 20 MG tablet Take 1 tablet (20 mg) by mouth daily 30 tablet 3     diclofenac (VOLTAREN) 1 % topical gel Apply 2 g topically 2 times daily as needed for moderate pain 1 Tube 0     donepezil (ARICEPT) 10 MG tablet Take 1 tablet (10 mg) by mouth At Bedtime 30 tablet      HYDROcodone-acetaminophen (NORCO) 5-325 MG tablet Take 1 tablet by  mouth every 4 hours as needed for pain 30 tablet 0     hydrocortisone (CORTAID) 1 % external cream Apply topically 2 times daily as needed (L forearm rash)       lisinopril-hydrochlorothiazide (ZESTORETIC) 20-12.5 MG tablet Take 1 tablet by mouth daily       melatonin 3 MG tablet Take 1 tablet (3 mg) by mouth nightly as needed for sleep       omeprazole (PRILOSEC) 20 MG DR capsule Take 20 mg by mouth daily        polyethylene glycol (MIRALAX) 17 GM/Dose powder Take 17 g (1 capful) by mouth daily as needed for constipation       simvastatin (ZOCOR) 20 MG tablet Take 1 tablet (20 mg) by mouth At Bedtime 90 tablet 3     REVIEW OF SYSTEMS:  CONSTITUTIONAL:  forgetfulness, EYES:  neg, ENT:  Circ[place], CV:  R foot edema, RESPIRATORY: neg, GI:  diarrhea, NEURO:  neg, PSYCH: neg, MUSCULOSKELETAL: occ R foot pain and SKIN: open area R foot incision    Objective:  /75   Pulse 62   Temp 98.2  F (36.8  C)   Resp 16   Wt 73.9 kg (163 lb)   SpO2 94%   BMI 29.81 kg/m    Exam:  GENERAL APPEARANCE:  Alert, in no distress, cooperative  ENT:  Mouth and posterior oropharynx normal, moist mucous membranes, Circle  EYES:  EOM, conjunctivae, lids, pupils and irises normal, PERRL  NECK:  No adenopathy,masses or thyromegaly, FROM  RESP:  respiratory effort and palpation of chest normal, lungs clear to auscultation , no respiratory distress  CV:  Palpation and auscultation of heart done , regular rate and rhythm, no murmur, rub, or gallop, peripheral edema trace+ in R pedal  ABDOMEN:  normal bowel sounds, soft, nontender, no hepatosplenomegaly or other masses, no guarding or rebound  M/S:   Gait and station normal  wears cam boot, uses walker  SKIN:  R foot incision open area. no drainage.  small scab inner R foot   NEURO:   Cranial nerves 2-12 are normal tested and grossly at patient's baseline, speech clear  PSYCH:  insight and judgement impaired, memory impaired , affect and mood normal, SLUMS 21/30    Labs:     Most Recent 3  CBC's:  Recent Labs   Lab Test 09/14/20 08/08/20  0620 08/06/20  2319   WBC 10.6 13.2* 17.4*   HGB 11.0* 10.6* 11.8   MCV 90 92 89    241 313     Most Recent 3 BMP's:  Recent Labs   Lab Test 09/14/20 08/08/20  0620 08/07/20  1739 08/06/20  2319   * 130*  --  130*   POTASSIUM 4.1 3.8 3.9 3.2*   CHLORIDE 98 100  --  96   CO2 28 27  --  25   BUN 8 8  --  12   CR 0.66 0.61  --  0.78   ANIONGAP 8 3  --  9   MAUREEN 9.5 8.3*  --  8.7   GLC 74 131*  --  133*       ASSESSMENT/PLAN:  Closed trimalleolar fracture of right ankle with routine healing, subsequent encounter  Pain gen. Stable. Open area distal incision, no drainage, some increased redness of surrounding tissue  1. Cont. Xeroform drsg  2. HC RN to follow  3. Reassess open area tomorrow, for increased redness, may start keflex  4. PT to start. Cont. Cam boot. WBAT, use walker at all times  5. F/u Ortho appt later this month    Edema of right foot  Improved  1. Monitor for increased R foot pain, discomfort of cam boot  2. Elevate R foot when sitting  3. Reassess edema over next few days    Essential hypertension  BPs stable. Recent hyponatremia stable  1. Cont. Norvasc, zestoretic  2. Follow BPs, HRs  3. Bmp in next couple weeks  4. Encourage fluids      Electronically signed by:  BETH Hannon CNP           Sincerely,        BETH Hannon CNP

## 2020-10-08 NOTE — PROGRESS NOTES
[place] [facility]  [place] [facility] Record Number: [id number]  Place of Service where encounter took place: [facility] ([facility]) [[id number]]  Chief Complaint   Patient presents with     Pain     Derm Problem       HPI:    Juany Collazo is a 88 year old (1/7/1932), who is being seen today for an episodic care visit. HPI information obtained from: facility chart records, facility staff, patient report, [facility] chart review and family/first contact dtr report. Today's concern is: R ankle pain, incision site redness, R foot edema, HTN.  S/p fall 8/6/20. Displace fx R trimalleolar-ORIF 8/8/20. Current open area distal incision site 2.5x0.8 cm. Yellow slough, wound bed. No drainage. New sl redness of surrounding skin.  Current drsg xeroform.  Had edema of R foot-appears improved since TCU.  Wears cam boot R foot.  For HTN cont. On norvasc, zestoretic. During TCU stay had hyponatremia. zestoretic dose decreased.  BPs, HRs currently stable      Past Medical and Surgical History reviewed in Epic today.    MEDICATIONS:    Current Outpatient Medications   Medication Sig Dispense Refill     albuterol (PROAIR HFA/PROVENTIL HFA/VENTOLIN HFA) 108 (90 Base) MCG/ACT inhaler Inhale 2 puffs into the lungs every 4 hours as needed for shortness of breath / dyspnea or wheezing       amLODIPine (NORVASC) 2.5 MG tablet Take 2.5 mg by mouth daily       aspirin (ASA) 81 MG chewable tablet Take 1 tablet (81 mg) by mouth daily       citalopram (CELEXA) 20 MG tablet Take 1 tablet (20 mg) by mouth daily 30 tablet 3     diclofenac (VOLTAREN) 1 % topical gel Apply 2 g topically 2 times daily as needed for moderate pain 1 Tube 0     donepezil (ARICEPT) 10 MG tablet Take 1 tablet (10 mg) by mouth At Bedtime 30 tablet      HYDROcodone-acetaminophen (NORCO) 5-325 MG tablet Take 1 tablet by mouth every 4 hours as needed for pain 30 tablet 0     hydrocortisone (CORTAID) 1 % external cream Apply topically 2  times daily as needed (L forearm rash)       lisinopril-hydrochlorothiazide (ZESTORETIC) 20-12.5 MG tablet Take 1 tablet by mouth daily       melatonin 3 MG tablet Take 1 tablet (3 mg) by mouth nightly as needed for sleep       omeprazole (PRILOSEC) 20 MG DR capsule Take 20 mg by mouth daily        polyethylene glycol (MIRALAX) 17 GM/Dose powder Take 17 g (1 capful) by mouth daily as needed for constipation       simvastatin (ZOCOR) 20 MG tablet Take 1 tablet (20 mg) by mouth At Bedtime 90 tablet 3     REVIEW OF SYSTEMS:  CONSTITUTIONAL:  forgetfulness, EYES:  neg, ENT:  [place], CV:  R foot edema, RESPIRATORY: neg, GI:  diarrhea, NEURO:  neg, PSYCH: neg, MUSCULOSKELETAL: occ R foot pain and SKIN: open area R foot incision    Objective:  /75   Pulse 62   Temp 98.2  F (36.8  C)   Resp 16   Wt 73.9 kg (163 lb)   SpO2 94%   BMI 29.81 kg/m    Exam:  GENERAL APPEARANCE:  Alert, in no distress, cooperative  ENT:  Mouth and posterior oropharynx normal, moist mucous membranes, [place]  EYES:  EOM, conjunctivae, lids, pupils and irises normal, PERRL  NECK:  No adenopathy,masses or thyromegaly, FROM  RESP:  respiratory effort and palpation of chest normal, lungs clear to auscultation , no respiratory distress  CV:  Palpation and auscultation of heart done , regular rate and rhythm, no murmur, rub, or gallop, peripheral edema trace+ in R pedal  ABDOMEN:  normal bowel sounds, soft, nontender, no hepatosplenomegaly or other masses, no guarding or rebound  M/S:   Gait and station normal  wears cam boot, uses walker  SKIN:  R foot incision open area. no drainage.  small scab inner R foot   NEURO:   Cranial nerves 2-12 are normal tested and grossly at patient's baseline, speech clear  PSYCH:  insight and judgement impaired, memory impaired , affect and mood normal, SLUMS 21/30    Labs:     Most Recent 3 CBC's:  Recent Labs   Lab Test 09/14/20 08/08/20  0620 08/06/20  2319   WBC 10.6 13.2* 17.4*   HGB 11.0* 10.6* 11.8   MCV 90  92 89    241 313     Most Recent 3 BMP's:  Recent Labs   Lab Test 09/14/20 08/08/20  0620 08/07/20  1739 08/06/20  2319   * 130*  --  130*   POTASSIUM 4.1 3.8 3.9 3.2*   CHLORIDE 98 100  --  96   CO2 28 27  --  25   BUN 8 8  --  12   CR 0.66 0.61  --  0.78   ANIONGAP 8 3  --  9   MAUREEN 9.5 8.3*  --  8.7   GLC 74 131*  --  133*       ASSESSMENT/PLAN:  Closed trimalleolar fracture of right ankle with routine healing, subsequent encounter  Pain gen. Stable. Open area distal incision, no drainage, some increased redness of surrounding tissue  1. Cont. Xeroform drsg  2. HC RN to follow  3. Reassess open area tomorrow, for increased redness, may start keflex  4. PT to start. Cont. Cam boot. WBAT, use walker at all times  5. F/u Ortho appt later this month    Edema of right foot  Improved  1. Monitor for increased R foot pain, discomfort of cam boot  2. Elevate R foot when sitting  3. Reassess edema over next few days    Essential hypertension  BPs stable. Recent hyponatremia stable  1. Cont. Norvasc, zestoretic  2. Follow BPs, HRs  3. Bmp in next couple weeks  4. Encourage fluids      Electronically signed by:  BETH Hannon CNP

## 2020-10-12 ENCOUNTER — ASSISTED LIVING VISIT (OUTPATIENT)
Dept: GERIATRICS | Facility: CLINIC | Age: 85
End: 2020-10-12
Payer: COMMERCIAL

## 2020-10-12 VITALS
WEIGHT: 163 LBS | RESPIRATION RATE: 16 BRPM | BODY MASS INDEX: 29.81 KG/M2 | DIASTOLIC BLOOD PRESSURE: 75 MMHG | OXYGEN SATURATION: 94 % | SYSTOLIC BLOOD PRESSURE: 138 MMHG | HEART RATE: 62 BPM | TEMPERATURE: 98.2 F

## 2020-10-12 DIAGNOSIS — I10 ESSENTIAL HYPERTENSION: ICD-10-CM

## 2020-10-12 DIAGNOSIS — G30.1 LATE ONSET ALZHEIMER'S DISEASE WITHOUT BEHAVIORAL DISTURBANCE (H): ICD-10-CM

## 2020-10-12 DIAGNOSIS — M15.0 PRIMARY OSTEOARTHRITIS INVOLVING MULTIPLE JOINTS: ICD-10-CM

## 2020-10-12 DIAGNOSIS — K21.9 GASTROESOPHAGEAL REFLUX DISEASE WITHOUT ESOPHAGITIS: ICD-10-CM

## 2020-10-12 DIAGNOSIS — E78.5 HYPERLIPIDEMIA, UNSPECIFIED HYPERLIPIDEMIA TYPE: ICD-10-CM

## 2020-10-12 DIAGNOSIS — S91.301D OPEN WOUND OF RIGHT FOOT, SUBSEQUENT ENCOUNTER: Primary | ICD-10-CM

## 2020-10-12 DIAGNOSIS — R55 VASOVAGAL SYNCOPE: ICD-10-CM

## 2020-10-12 DIAGNOSIS — S82.851D CLOSED TRIMALLEOLAR FRACTURE OF RIGHT ANKLE WITH ROUTINE HEALING, SUBSEQUENT ENCOUNTER: ICD-10-CM

## 2020-10-12 DIAGNOSIS — F02.80 LATE ONSET ALZHEIMER'S DISEASE WITHOUT BEHAVIORAL DISTURBANCE (H): ICD-10-CM

## 2020-10-12 NOTE — PROGRESS NOTES
Juany Collazo is a 88 year old female seen October 12, 2020 at [facility] where she has resided for one and a half years (admit 2/2019)     Pt had elective left TKA in March 2020.  In August 2020 she had a [facility] hospitalization following a syncopal episode that occurred while out with family who were massaging her sore neck.     In the fall she sustained a displaced right trimalleolar fracture and underwent ORIF.   After workup, syncope thought secondary to vasovagal cause in setting of carotid massage.   She transitioned to TCU and was there until last week.    Able to ambulate 150' with 4WW and SBA, WBAT and wearing a walking boot.     There is a persistent open area appeared at the incision and some surrounding redness, so cephalexin was started this past weekend.    Pt reports pain in her left knee, and some in right ankle.   Feels currently regimen is effective.         By chart review, patient has had progressive dementia, with first evaluation for memory loss in 2013.   She has been followed by [facility] clinic, and has been treated with donepezil about 5 years.   She lived in her home with [facility] and family help until a fall in November 2018  in which she suffered trauma to her left knee and right eye   [facility] discharged to her daughter's home in [place], then transferred to [place] for permanent placement.     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   She previously lived in a 3 story home in [place] with [facility]    She has 5 children.   One daughter lives in [place].       ROS:  Ambulatory with 4WW  Left shoulder pain, had cortisone joint injection 6/2020  SLUMS 21/30   CPT 5.0  Wt Readings from Last 5 Encounters:   10/12/20 73.9 kg (163 lb)   10/08/20 73.9 kg (163 lb)   10/05/20 74.2 kg (163 lb 9.6 oz)   10/01/20 74.2 kg (163 lb 9.6 oz)   09/21/20 76.8 kg (169 lb 4.8 oz)        EXAM:  NAD  /75   Pulse 62   Temp 98.2  F (36.8  C)   Resp 16   Wt 73.9 kg (163 lb)   SpO2 94%   BMI 29.81 kg/m     Neck supple without adenopathy  Lungs clear bilaterally with fair air movement  Heart RRR s1s2 without murmur  Abd soft, NT, no distention, +BS  Ext with trace edema bilaterally   Wearing CAM boot   Two open areas on right foot, both about 1-2 cm and with clean edges, fibrinous slough in the base, no granulation seen  No surrounding erythema or drainage.      Neuro: no focal findings, ambulatory independently  Psych: affect okay.     Last Comprehensive Metabolic Panel:  Sodium   Date Value Ref Range Status   09/14/2020 134 (L) 136 - 145 mmol/L Final     Potassium   Date Value Ref Range Status   09/14/2020 4.1 3.5 - 5.0 mmol/L Final     Chloride   Date Value Ref Range Status   09/14/2020 98 98 - 107 mmol/L Final     Carbon Dioxide   Date Value Ref Range Status   09/14/2020 28 22 - 31 mmol/L Final     Anion Gap   Date Value Ref Range Status   09/14/2020 8 5 - 18 mmol/L Final     Glucose   Date Value Ref Range Status   09/14/2020 74 70 - 125 mg/dL Final     Urea Nitrogen   Date Value Ref Range Status   09/14/2020 8 8 - 28 mg/dL Final     Creatinine   Date Value Ref Range Status   09/14/2020 0.66 0.60 - 1.10 mg/dL Final     GFR Estimate   Date Value Ref Range Status   09/14/2020 >60 >60 ml/min/1.73m2 Final     Calcium   Date Value Ref Range Status   09/14/2020 9.5 8.5 - 10.5 mg/dL Final      Lab Results   Component Value Date    WBC 10.6 09/14/2020      HGB 11.0 09/14/2020      MCV 90 09/14/2020       09/14/2020     EXAM: CT HEAD W/O CONTRAST   DATE/TIME: 8/7/2020  12:18 AM  INDICATION: Syncope. Loss of consciousness.  INTRACRANIAL CONTENTS: No intracranial hemorrhage, extraaxial collection, or mass effect.  No CT evidence of acute infarct. Moderate presumed chronic small vessel ischemic changes. Mild generalized volume loss. No hydrocephalus.                                               IMPRESSION:  1.  No CT evidence for acute intracranial process.  2.  Brain atrophy and presumed chronic microvascular ischemic changes as above.    ECHO 8/7/2020  Interpretation Summary  The visual ejection fraction is estimated at 65-70%.       Normal left ventricular wall motion  The right ventricle is normal in size and function.   Small inferior vena cava size consistent with hypovolemia.  No hemodynamically significant valvular abnormalities on 2D or color flow imaging.      IMP/PLAN:   (S99.523M) Open wound of right foot, subsequent encounter   Comment: in incision, and another medially      Plan: [facility] for cleansing, dressing changes  Given slough in the base, would change to [place], or even Santyl to wound base and hopefully promote granulation.        (H92.559E) Closed trimalleolar fracture of right ankle with routine healing, subsequent encounter  Comment: WBAT, wearing CAM boot for comfort       Plan: [facility] PHYSICAL THERAPY for transfers, balance, gait    (R55) Vasovagal syncope  Comment: occurred in setting of neck massage.      Plan: Follow bps with goal to avoid hypotension, consider discontinuation of amlodipine if bps low    (E78.5) Hyperlipidemia, unspecified hyperlipidemia type  Comment: no documentation of CV event    Plan: remains on daily ASA and simvastatin 20 mg/day for primary prevention.   Can discontinue if pt/family inclined.        (I10) Essential hypertension, benign    Comment:   BP Readings from Last 3 Encounters:   10/12/20 138/75   10/08/20 138/75   10/05/20 125/68      Plan: continue amlodipine 2.5 mg/day, lisinopril 20 mg/day, hydrochlorothiazide 12.5 mg/day,  and follow bps  Recheck BMP on diuretic       (G30.1,  F02.80) Late onset Alzheimer's disease without behavioral disturbance  Comment: decline in memory and functional status   Plan: [place] support for meds, meals, activity   Continue PTA donepezil 10 mg/day unless SEs occur.    (M17.12) Osteoarthritis of left knee, unspecified osteoarthritis type  Comment: s/p TKA 6 months ago.       Plan: prn acetaminophen, topical diclofenac, walker use.   Follow up with orthopedics       (F43.21) Adjustment disorder with depressed mood  Comment: many changes, loss of coping skills  Plan: continue citalopram 20 mg/day     (K21.9) Gastroesophageal reflux disease without esophagitis  Comment: ongoing   Plan: continue omeprazole 20 mg/day.       Citlalli Saucedo MD

## 2020-10-12 NOTE — LETTER
10/12/2020        RE: Juany Collazo  [facility]  [address] [address] [address]        Juany Collazo is a 88 year old female seen October 12, 2020 at [facility] where she has resided for one and a half years (admit 2/2019)     Pt had elective left TKA in March 2020.  In August 2020 she had a [facility] hospitalization following a syncopal episode that occurred while out with family who were massaging her sore neck.     In the fall she sustained a displaced right trimalleolar fracture and underwent ORIF.   After workup, syncope thought secondary to vasovagal cause in setting of carotid massage.   She transitioned to TCU and was there until last week.    Able to ambulate 150' with 4WW and SBA, WBAT and wearing a walking boot.     There is a persistent open area appeared at the incision and some surrounding redness, so cephalexin was started this past weekend.    Pt reports pain in her left knee, and some in right ankle.   Feels currently regimen is effective.         By chart review, patient has had progressive dementia, with first evaluation for memory loss in 2013.   She has been followed by [facility] clinic, and has been treated with donepezil about 5 years.   She lived in her home with PCA and family help until a fall in November 2018  in which she suffered trauma to her left knee and right eye   She was in [facility] until mid December, discharged to her daughter's home in [place], then transferred to AL for permanent placement.     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   She previously  lived in a 3 story home in [place] with [facility]    She has 5 children.   One daughter lives in [place].       ROS:  Ambulatory with 4WW  Left shoulder pain, had cortisone joint injection 6/2020  SLUMS 21/30   CPT 5.0  Wt Readings from Last 5 Encounters:   10/12/20 73.9 kg (163 lb)   10/08/20 73.9 kg (163 lb)   10/05/20 74.2 kg (163 lb 9.6 oz)   10/01/20 74.2 kg (163 lb 9.6 oz)   09/21/20 76.8 kg (169 lb 4.8 oz)        EXAM:  NAD  /75   Pulse 62   Temp 98.2  F (36.8  C)   Resp 16   Wt 73.9 kg (163 lb)   SpO2 94%   BMI 29.81 kg/m     Neck supple without adenopathy  Lungs clear bilaterally with fair air movement  Heart RRR s1s2 without murmur  Abd soft, NT, no distention, +BS  Ext with trace edema bilaterally   Wearing CAM boot   Two open areas on right foot, both about 1-2 cm and with clean edges, fibrinous slough in the base, no granulation seen  No surrounding erythema or drainage.      Neuro: no focal findings, ambulatory independently  Psych: affect okay.     Last Comprehensive Metabolic Panel:  Sodium   Date Value Ref Range Status   09/14/2020 134 (L) 136 - 145 mmol/L Final     Potassium   Date Value Ref Range Status   09/14/2020 4.1 3.5 - 5.0 mmol/L Final     Chloride   Date Value Ref Range Status   09/14/2020 98 98 - 107 mmol/L Final     Carbon Dioxide   Date Value Ref Range Status   09/14/2020 28 22 - 31 mmol/L Final     Anion Gap   Date Value Ref Range Status   09/14/2020 8 5 - 18 mmol/L Final     Glucose   Date Value Ref Range Status   09/14/2020 74 70 - 125 mg/dL Final     Urea Nitrogen   Date Value Ref Range Status   09/14/2020 8 8 - 28 mg/dL Final     Creatinine   Date Value Ref Range Status   09/14/2020 0.66 0.60 - 1.10 mg/dL Final     GFR Estimate   Date Value Ref Range Status   09/14/2020 >60 >60 ml/min/1.73m2 Final     Calcium   Date Value Ref Range Status   09/14/2020 9.5 8.5 - 10.5 mg/dL Final      Lab Results   Component Value Date    WBC 10.6 09/14/2020      HGB 11.0  09/14/2020      MCV 90 09/14/2020       09/14/2020     EXAM: CT HEAD W/O CONTRAST   DATE/TIME: 8/7/2020 12:18 AM  INDICATION: Syncope. Loss of consciousness.  INTRACRANIAL CONTENTS: No intracranial hemorrhage, extraaxial collection, or mass effect.  No CT evidence of acute infarct. Moderate presumed chronic small vessel ischemic changes. Mild generalized volume loss. No hydrocephalus.                                               IMPRESSION:  1.  No CT evidence for acute intracranial process.  2.  Brain atrophy and presumed chronic microvascular ischemic changes as above.    ECHO 8/7/2020  Interpretation Summary  The visual ejection fraction is estimated at 65-70%.       Normal left ventricular wall motion  The right ventricle is normal in size and function.   Small inferior vena cava size consistent with hypovolemia.  No hemodynamically significant valvular abnormalities on 2D or color flow imaging.      IMP/PLAN:   (S91.301D) Open wound of right foot, subsequent encounter   Comment: in incision, and another medially      Plan: [facility] for cleansing, dressing changes  Given slough in the base, would change to Medihoney, or even Santyl to wound base and hopefully promote granulation.        (S82.381D) Closed trimalleolar fracture of right ankle with routine healing, subsequent encounter  Comment: WBAT, wearing CAM boot for comfort       Plan: [facility] PHYSICAL THERAPY for transfers, balance, gait    (R55) Vasovagal syncope  Comment: occurred in setting of neck massage.      Plan: Follow bps with goal to avoid hypotension, consider discontinuation of amlodipine if bps low    (E78.5) Hyperlipidemia, unspecified hyperlipidemia type  Comment: no documentation of CV event    Plan: remains on daily ASA and simvastatin 20 mg/day for primary prevention.   Can discontinue if pt/family inclined.        (I10) Essential hypertension, benign    Comment:   BP Readings from Last 3 Encounters:   10/12/20 138/75   10/08/20 138/75    10/05/20 125/68      Plan: continue amlodipine 2.5 mg/day, lisinopril 20 mg/day, hydrochlorothiazide 12.5 mg/day, and follow bps  Recheck BMP on diuretic       (G30.1,  F02.80) Late onset Alzheimer's disease without behavioral disturbance  Comment: decline in memory and functional status   Plan: AL support for meds, meals, activity   Continue PTA donepezil 10 mg/day unless SEs occur.    (M17.12) Osteoarthritis of left knee, unspecified osteoarthritis type  Comment: s/p TKA 6 months ago.       Plan: prn acetaminophen, topical diclofenac, walker use.   Follow up with orthopedics       (F43.21) Adjustment disorder with depressed mood  Comment: many changes, loss of coping skills  Plan: continue citalopram 20 mg/day     (K21.9) Gastroesophageal reflux disease without esophagitis  Comment: ongoing   Plan: continue omeprazole 20 mg/day.       Citlalli Saucedo MD         Sincerely,        Citlalli Saucedo MD

## 2020-10-20 ENCOUNTER — ASSISTED LIVING VISIT (OUTPATIENT)
Dept: GERIATRICS | Facility: CLINIC | Age: 85
End: 2020-10-20
Payer: COMMERCIAL

## 2020-10-20 VITALS
OXYGEN SATURATION: 98 % | DIASTOLIC BLOOD PRESSURE: 70 MMHG | RESPIRATION RATE: 18 BRPM | HEART RATE: 59 BPM | SYSTOLIC BLOOD PRESSURE: 140 MMHG

## 2020-10-20 DIAGNOSIS — R19.7 DIARRHEA, UNSPECIFIED TYPE: ICD-10-CM

## 2020-10-20 DIAGNOSIS — L03.115 CELLULITIS OF RIGHT LEG: Primary | ICD-10-CM

## 2020-10-20 NOTE — PROGRESS NOTES
[facility]  [place] [facility] Record Number: [id number]  Place of Service where encounter took place: [facility] ([facility] [[id number]]  Chief Complaint   Patient presents with     Derm Problem       HPI:    Juany Collazo is a 88 year old (1/7/1932), who is being seen today for an episodic care visit. HPI information obtained from: facility chart records, facility staff, patient report and [facility] chart review. Today's concern is: R LE cellulitis, diarrhea.  S/p ORIF R ankle 8/20.  Had open area at distal incision site with slough.  Also noted to have increased redness of surrounding tissue. Completed keflex course for cellulitis. Redness resolved. Wound bed with scab.  Had increased diarrhea upon return to AL. Per dtr this is chronic.  Resident reports no recent loose stools.       Past Medical and Surgical History reviewed in Epic today.    MEDICATIONS:    Current Outpatient Medications   Medication Sig Dispense Refill     albuterol (PROAIR HFA/PROVENTIL HFA/VENTOLIN HFA) 108 (90 Base) MCG/ACT inhaler Inhale 2 puffs into the lungs every 4 hours as needed for shortness of breath / dyspnea or wheezing       amLODIPine (NORVASC) 2.5 MG tablet Take 2.5 mg by mouth daily       aspirin (ASA) 81 MG chewable tablet Take 1 tablet (81 mg) by mouth daily       citalopram (CELEXA) 20 MG tablet Take 1 tablet (20 mg) by mouth daily 30 tablet 3     diclofenac (VOLTAREN) 1 % topical gel Apply 2 g topically 2 times daily as needed for moderate pain 1 Tube 0     donepezil (ARICEPT) 10 MG tablet Take 1 tablet (10 mg) by mouth At Bedtime 30 tablet      HYDROcodone-acetaminophen (NORCO) 5-325 MG tablet Take 1 tablet by mouth every 4 hours as needed for pain 30 tablet 0     hydrocortisone (CORTAID) 1 % external cream Apply topically 2 times daily as needed (L forearm rash)       lisinopril-hydrochlorothiazide (ZESTORETIC) 20-12.5 MG tablet Take 1 tablet by mouth daily       melatonin 3  MG tablet Take 1 tablet (3 mg) by mouth nightly as needed for sleep       omeprazole (PRILOSEC) 20 MG DR capsule Take 20 mg by mouth daily        polyethylene glycol (MIRALAX) 17 GM/Dose powder Take 17 g (1 capful) by mouth daily as needed for constipation       simvastatin (ZOCOR) 20 MG tablet Take 1 tablet (20 mg) by mouth At Bedtime 90 tablet 3     REVIEW OF SYSTEMS:  No chest pain, shortness of breath, fevers, chills, headache, nausea, vomiting, dysuria or bowel abnormalities.  Appetite is  normal.  No pain except occ R ankle.    Objective:  BP (!) 140/70   Pulse 59   Resp 18   SpO2 98%   Exam:  GENERAL APPEARANCE:  Alert, in no distress, cooperative  ENT:  Mouth and posterior oropharynx normal, moist mucous membranes, normal hearing acuity  EYES:  EOM normal, Conjunctiva and lids normal  RESP:  respiratory effort and palpation of chest normal, lungs clear to auscultation , no respiratory distress  CV:  Palpation and auscultation of heart done , regular rate and rhythm, no murmur, rub, or gallop, peripheral edema trace+ in LEs  ABDOMEN:  normal bowel sounds, soft, nontender, no hepatosplenomegaly or other masses, no guarding or rebound  M/S:   Gait and station normal  SKIN:  scab to R ankle 1.8x1.25 cm. no drainage. no redness of surrounding tissue  NEURO:   Cranial nerves 2-12 are normal tested and grossly at patient's baseline, speech clear  PSYCH:  insight and judgement impaired, memory impaired , affect and mood normal    Labs:     Most Recent 3 CBC's:  Recent Labs   Lab Test 09/14/20 08/08/20  0620 08/06/20  2319   WBC 10.6 13.2* 17.4*   HGB 11.0* 10.6* 11.8   MCV 90 92 89    241 313     Most Recent 3 BMP's:  Recent Labs   Lab Test 09/14/20 08/08/20  0620 08/07/20  1739 08/06/20  2319   * 130*  --  130*   POTASSIUM 4.1 3.8 3.9 3.2*   CHLORIDE 98 100  --  96   CO2 28 27  --  25   BUN 8 8  --  12   CR 0.66 0.61  --  0.78   ANIONGAP 8 3  --  9   MAUREEN 9.5 8.3*  --  8.7   GLC 74 131*  --  133*        ASSESSMENT/PLAN:  Cellulitis of right leg  Resolved. Wound bed with scab. drsg to site discontinued  1. Cont. To leave R ankle scab area open to air  2. Monitor site for drainage  3. Monitor for redness of surrounding skin  4. Pain stable-will discontinue prn norco-not using    Diarrhea, unspecified type  Improved  1. Monitor for further s/s  2. For ongoing s/s consider prn imodium  3. Encourage fluids      Electronically signed by:  BETH Hannon CNP

## 2020-10-20 NOTE — LETTER
10/20/2020        RE: Juany Collazo  [facility]  [address] [address] [address]        [facility]  [place] Medical Record Number: [id number]  Place of Service where encounter took place: [facility] ([facility]) [[id number]]  Chief Complaint   Patient presents with     Derm Problem       HPI:    Juany Collazo is a 88 year old (1/7/1932), who is being seen today for an episodic care visit. HPI information obtained from: facility chart records, facility staff, patient report and [facility] chart review. Today's concern is: R LE cellulitis, diarrhea.  S/p ORIF R ankle 8/20.  Had open area at distal incision site with slough.  Also noted to have increased redness of surrounding tissue. Completed keflex course for cellulitis. Redness resolved. Wound bed with scab.  Had increased diarrhea upon return to AL. Per dtr this is chronic.  Resident reports no recent loose stools.       Past Medical and Surgical History reviewed in Epic today.    MEDICATIONS:    Current Outpatient Medications   Medication Sig Dispense Refill     albuterol (PROAIR HFA/PROVENTIL HFA/VENTOLIN HFA) 108 (90 Base) MCG/ACT inhaler Inhale 2 puffs into the lungs every 4 hours as needed for shortness of breath / dyspnea or wheezing       amLODIPine (NORVASC) 2.5 MG tablet Take 2.5 mg by mouth daily       aspirin (ASA) 81 MG chewable tablet Take 1 tablet (81 mg) by mouth daily       citalopram (CELEXA) 20 MG tablet Take 1 tablet (20 mg) by mouth daily 30 tablet 3     diclofenac (VOLTAREN) 1 % topical gel Apply 2 g topically 2 times daily as needed for moderate pain 1 Tube 0     donepezil (ARICEPT) 10 MG tablet Take 1 tablet (10 mg) by mouth At Bedtime 30 tablet      HYDROcodone-acetaminophen (NORCO) 5-325 MG tablet Take 1 tablet by mouth every 4 hours as needed for pain 30 tablet 0     hydrocortisone (CORTAID) 1 % external cream Apply topically 2 times daily as needed (L forearm  rash)       lisinopril-hydrochlorothiazide (ZESTORETIC) 20-12.5 MG tablet Take 1 tablet by mouth daily       melatonin 3 MG tablet Take 1 tablet (3 mg) by mouth nightly as needed for sleep       omeprazole (PRILOSEC) 20 MG DR capsule Take 20 mg by mouth daily        polyethylene glycol (MIRALAX) 17 GM/Dose powder Take 17 g (1 capful) by mouth daily as needed for constipation       simvastatin (ZOCOR) 20 MG tablet Take 1 tablet (20 mg) by mouth At Bedtime 90 tablet 3     REVIEW OF SYSTEMS:  No chest pain, shortness of breath, fevers, chills, headache, nausea, vomiting, dysuria or bowel abnormalities.  Appetite is  normal.  No pain except occ R ankle.    Objective:  BP (!) 140/70   Pulse 59   Resp 18   SpO2 98%   Exam:  GENERAL APPEARANCE:  Alert, in no distress, cooperative  ENT:  Mouth and posterior oropharynx normal, moist mucous membranes, normal hearing acuity  EYES:  EOM normal, Conjunctiva and lids normal  RESP:  respiratory effort and palpation of chest normal, lungs clear to auscultation , no respiratory distress  CV:  Palpation and auscultation of heart done , regular rate and rhythm, no murmur, rub, or gallop, peripheral edema trace+ in LEs  ABDOMEN:  normal bowel sounds, soft, nontender, no hepatosplenomegaly or other masses, no guarding or rebound  M/S:   Gait and station normal  SKIN:  scab to R ankle 1.8x1.25 cm. no drainage. no redness of surrounding tissue  NEURO:   Cranial nerves 2-12 are normal tested and grossly at patient's baseline, speech clear  PSYCH:  insight and judgement impaired, memory impaired , affect and mood normal    Labs:     Most Recent 3 CBC's:  Recent Labs   Lab Test 09/14/20 08/08/20  0620 08/06/20  2319   WBC 10.6 13.2* 17.4*   HGB 11.0* 10.6* 11.8   MCV 90 92 89    241 313     Most Recent 3 BMP's:  Recent Labs   Lab Test 09/14/20 08/08/20  0620 08/07/20  1739 08/06/20  2319   * 130*  --  130*   POTASSIUM 4.1 3.8 3.9 3.2*   CHLORIDE 98 100  --  96   CO2 28 27  --   25   BUN 8 8  --  12   CR 0.66 0.61  --  0.78   ANIONGAP 8 3  --  9   MAUREEN 9.5 8.3*  --  8.7   GLC 74 131*  --  133*       ASSESSMENT/PLAN:  Cellulitis of right leg  Resolved. Wound bed with scab. drsg to site discontinued  1. Cont. To leave R ankle scab area open to air  2. Monitor site for drainage  3. Monitor for redness of surrounding skin  4. Pain stable-will discontinue prn norco-not using    Diarrhea, unspecified type  Improved  1. Monitor for further s/s  2. For ongoing s/s consider prn imodium  3. Encourage fluids      Electronically signed by:  BETH Hannon CNP           Sincerely,        BETH Hannon CNP

## 2020-10-30 ENCOUNTER — TRANSFERRED RECORDS (OUTPATIENT)
Dept: HEALTH INFORMATION MANAGEMENT | Facility: CLINIC | Age: 85
End: 2020-10-30

## 2020-10-30 ENCOUNTER — TELEPHONE (OUTPATIENT)
Dept: GERIATRICS | Facility: CLINIC | Age: 85
End: 2020-10-30

## 2020-10-30 DIAGNOSIS — L03.115 CELLULITIS OF RIGHT LOWER EXTREMITY: Primary | ICD-10-CM

## 2020-10-30 DIAGNOSIS — I87.2 VENOUS STASIS DERMATITIS OF BOTH LOWER EXTREMITIES: ICD-10-CM

## 2020-10-30 RX ORDER — CEPHALEXIN 500 MG/1
500 CAPSULE ORAL 4 TIMES DAILY
Qty: 40 CAPSULE | Refills: 0 | Status: SHIPPED | OUTPATIENT
Start: 2020-10-30 | End: 2020-11-09

## 2020-10-30 RX ORDER — SILVER SULFADIAZINE 10 MG/G
CREAM TOPICAL DAILY
Qty: 400 G | Refills: 3 | Status: SHIPPED | OUTPATIENT
Start: 2020-10-30 | End: 2021-10-13

## 2020-10-30 NOTE — TELEPHONE ENCOUNTER
[facility] TELEPHONE ENCOUNTER    Chief Complaint   Patient presents with     Cellulitis     right leg       Juany Collazo is a 88 year old  (1/7/1932),Nurse called today to report: patient having increased pain, redness and edema.  Did do face time and saw the red heel and up achilles which has exquisit pain.  See new orders below.     ASSESSMENT/PLAN  1. Cellulitis of right lower extremity    - cephALEXin (KEFLEX) 500 MG capsule; Take 1 capsule (500 mg) by mouth 4 times daily for 10 days  Dispense: 40 capsule; Refill: 0      2. Venous stasis dermatitis of both lower extremities  - silver sulfADIAZINE (SILVADENE) 1 % external cream; Apply topically daily To both legs  Dispense: 400 g; Refill: 3    3. Xray right foot and ankle include calcaneous, multiple views of posterior lateral ankle due to increased pain, edema, possible early signs of osteomyelitis.         Electronically signed by:   BETH Gonzalez CNP

## 2020-11-02 ENCOUNTER — ASSISTED LIVING VISIT (OUTPATIENT)
Dept: GERIATRICS | Facility: CLINIC | Age: 85
End: 2020-11-02
Payer: COMMERCIAL

## 2020-11-02 VITALS
TEMPERATURE: 97.8 F | DIASTOLIC BLOOD PRESSURE: 70 MMHG | OXYGEN SATURATION: 97 % | SYSTOLIC BLOOD PRESSURE: 140 MMHG | HEART RATE: 58 BPM | RESPIRATION RATE: 18 BRPM

## 2020-11-02 DIAGNOSIS — M25.571 PAIN IN JOINT INVOLVING ANKLE AND FOOT, RIGHT: ICD-10-CM

## 2020-11-02 DIAGNOSIS — L03.115 CELLULITIS OF RIGHT LOWER EXTREMITY: Primary | ICD-10-CM

## 2020-11-02 DIAGNOSIS — R60.0 LEG EDEMA, RIGHT: ICD-10-CM

## 2020-11-02 NOTE — PROGRESS NOTES
[facility]  [place] [facility] Record Number: [id number]  Place of Service where encounter took place: [facility] ([facility]) [[id number]]  Chief Complaint   Patient presents with     Derm Problem       HPI:    Juany Collazo is a 88 year old (1/7/1932), who is being seen today for an episodic care visit. HPI information obtained from: facility chart records, facility staff, patient report and [facility] chart review. Today's concern is: cellulitis RLE, R LE edema, pain.  S/p fall R ankle fx. ORF R ankle 8/6/20.  Upon return to AL had open area distal incision site with lizbeth drainage, redness.  Had keflex course-completed last month. F/u ortho appt-discontinue R cam boot. 10/30/20 noted to have redness of R LE, increased edema, RLE.  Started on keflex.  XR done of R ankle.  No reports of osteomyelitis.  Today has ongoing RLE edema. Mild pain at incision site. Amb. Without can boot. For pain cont. On prn norco.       Past Medical and Surgical History reviewed in Epic today.    MEDICATIONS:    Current Outpatient Medications   Medication Sig Dispense Refill     albuterol (PROAIR HFA/PROVENTIL HFA/VENTOLIN HFA) 108 (90 Base) MCG/ACT inhaler Inhale 2 puffs into the lungs every 4 hours as needed for shortness of breath / dyspnea or wheezing       amLODIPine (NORVASC) 2.5 MG tablet Take 2.5 mg by mouth daily       aspirin (ASA) 81 MG chewable tablet Take 1 tablet (81 mg) by mouth daily       cephALEXin (KEFLEX) 500 MG capsule Take 1 capsule (500 mg) by mouth 4 times daily for 10 days 40 capsule 0     citalopram (CELEXA) 20 MG tablet Take 1 tablet (20 mg) by mouth daily 30 tablet 3     diclofenac (VOLTAREN) 1 % topical gel Apply 2 g topically 2 times daily as needed for moderate pain 1 Tube 0     donepezil (ARICEPT) 10 MG tablet Take 1 tablet (10 mg) by mouth At Bedtime 30 tablet      HYDROcodone-acetaminophen (NORCO) 5-325 MG tablet Take 1 tablet by mouth every 4 hours as needed  for pain 30 tablet 0     hydrocortisone (CORTAID) 1 % external cream Apply topically 2 times daily as needed (L forearm rash)       lisinopril-hydrochlorothiazide (ZESTORETIC) 20-12.5 MG tablet Take 1 tablet by mouth daily       melatonin 3 MG tablet Take 1 tablet (3 mg) by mouth nightly as needed for sleep       omeprazole (PRILOSEC) 20 MG DR capsule Take 20 mg by mouth daily        polyethylene glycol (MIRALAX) 17 GM/Dose powder Take 17 g (1 capful) by mouth daily as needed for constipation       silver sulfADIAZINE (SILVADENE) 1 % external cream Apply topically daily To both legs 400 g 3     simvastatin (ZOCOR) 20 MG tablet Take 1 tablet (20 mg) by mouth At Bedtime 90 tablet 3     REVIEW OF SYSTEMS:  Unobtainable secondary to cognitive impairment. Reports occ R ankle pain    Objective:  BP (!) 140/70   Pulse 58   Temp 97.8  F (36.6  C)   Resp 18   SpO2 97%   Exam:  GENERAL APPEARANCE:  Alert, in no distress, cooperative  ENT:  Mouth and posterior oropharynx normal, moist mucous membranes, [place]  EYES:  EOM, conjunctivae, lids, pupils and irises normal, PERRL  RESP:  respiratory effort and palpation of chest normal, lungs clear to auscultation , no respiratory distress  CV:  Palpation and auscultation of heart done , regular rate and rhythm, no murmur, rub, or gallop, peripheral edema 2+ in RLE, pitting  ABDOMEN:  normal bowel sounds, soft, nontender, no hepatosplenomegaly or other masses, no guarding or rebound  M/S:   Gait and station normal  mild pain with ext, flexion R foot  SKIN:  scab 2.0x0.7 cm R outer ankle.  1.0x1.0 scab area inner ankle. no drainage. sl pink of R pretibial area, minimal increased warmth.   NEURO:   Cranial nerves 2-12 are normal tested and grossly at patient's baseline, speech clear  PSYCH:  insight and judgement impaired, memory impaired , affect and mood normal    Labs:     Most Recent 3 CBC's:  Recent Labs   Lab Test 09/14/20 08/08/20  0620 08/06/20  2319   WBC 10.6 13.2* 17.4*    HGB 11.0* 10.6* 11.8   MCV 90 92 89    241 313     Most Recent 3 BMP's:  Recent Labs   Lab Test 09/14/20 08/08/20  0620 08/07/20  1739 08/06/20  2319   * 130*  --  130*   POTASSIUM 4.1 3.8 3.9 3.2*   CHLORIDE 98 100  --  96   CO2 28 27  --  25   BUN 8 8  --  12   CR 0.66 0.61  --  0.78   ANIONGAP 8 3  --  9   MAUREEN 9.5 8.3*  --  8.7   GLC 74 131*  --  133*       ASSESSMENT/PLAN:  Cellulitis of right lower extremity  Recurrent. Recent discontinue of cam boot RLE  1. Monitor scabs for drainage-may start drsg  2. Cont. Keflex  3. Monitor for further increased redness, warmth    Leg edema, right  Increased past few days, likely r/t #1  1. Address cellulitis as above  2. Cont. Hydrochlorothiazide  3. Encourage to elevate RLE when sitting  4. Reassess over next few days  5. Bmp in next 1-3 mos    Pain in joint involving ankle and foot, right  Likely r/t#1. Pain had been mostly resolved prior to cellulitis. Recent discontinue of cam boot per recent Ortho appt. XR stable  1. Cont. Prn norco  2. For ongoing pain, may sched. Tylenol  3. Encourage amb. As ron      Electronically signed by:  BETH Hannon CNP

## 2020-11-02 NOTE — LETTER
11/2/2020        RE: Juany Collazo  [facility]  [address] [address] [address]        [place] [facility]  [place] Medical Record Number: [id number]  Place of Service where encounter took place: [facility] [[id number]]  Chief Complaint   Patient presents with     Derm Problem       HPI:    Juany Collazo is a 88 year old (1/7/1932), who is being seen today for an episodic care visit. HPI information obtained from: facility chart records, facility staff, patient report and [facility] chart review. Today's concern is: cellulitis RLE, R LE edema, pain.  S/p fall R ankle fx. ORF R ankle 8/6/20.  Upon return to [place] had open area distal incision site with lizbeth drainage, redness.  Had keflex course-completed last month. F/u ortho appt-discontinue R cam boot. 10/30/20 noted to have redness of R LE, increased edema, RLE.  Started on keflex.  XR done of R ankle.  No reports of osteomyelitis.  Today has ongoing RLE edema. Mild pain at incision site. Amb. Without can boot. For pain cont. On prn norco.       Past Medical and Surgical History reviewed in Epic today.    MEDICATIONS:    Current Outpatient Medications   Medication Sig Dispense Refill     albuterol (PROAIR HFA/PROVENTIL HFA/VENTOLIN HFA) 108 (90 Base) MCG/ACT inhaler Inhale 2 puffs into the lungs every 4 hours as needed for shortness of breath / dyspnea or wheezing       amLODIPine (NORVASC) 2.5 MG tablet Take 2.5 mg by mouth daily       aspirin (ASA) 81 MG chewable tablet Take 1 tablet (81 mg) by mouth daily       cephALEXin (KEFLEX) 500 MG capsule Take 1 capsule (500 mg) by mouth 4 times daily for 10 days 40 capsule 0     citalopram (CELEXA) 20 MG tablet Take 1 tablet (20 mg) by mouth daily 30 tablet 3     diclofenac (VOLTAREN) 1 % topical gel Apply 2 g topically 2 times daily as needed for moderate pain 1 Tube 0     donepezil (ARICEPT) 10 MG tablet Take 1 tablet (10 mg) by mouth At  Bedtime 30 tablet      HYDROcodone-acetaminophen (NORCO) 5-325 MG tablet Take 1 tablet by mouth every 4 hours as needed for pain 30 tablet 0     hydrocortisone (CORTAID) 1 % external cream Apply topically 2 times daily as needed (L forearm rash)       lisinopril-hydrochlorothiazide (ZESTORETIC) 20-12.5 MG tablet Take 1 tablet by mouth daily       melatonin 3 MG tablet Take 1 tablet (3 mg) by mouth nightly as needed for sleep       omeprazole (PRILOSEC) 20 MG DR capsule Take 20 mg by mouth daily        polyethylene glycol (MIRALAX) 17 GM/Dose powder Take 17 g (1 capful) by mouth daily as needed for constipation       silver sulfADIAZINE (SILVADENE) 1 % external cream Apply topically daily To both legs 400 g 3     simvastatin (ZOCOR) 20 MG tablet Take 1 tablet (20 mg) by mouth At Bedtime 90 tablet 3     REVIEW OF SYSTEMS:  Unobtainable secondary to cognitive impairment. Reports occ R ankle pain    Objective:  BP (!) 140/70   Pulse 58   Temp 97.8  F (36.6  C)   Resp 18   SpO2 97%   Exam:  GENERAL APPEARANCE:  Alert, in no distress, cooperative  ENT:  Mouth and posterior oropharynx normal, moist mucous membranes, [place]  EYES:  EOM, conjunctivae, lids, pupils and irises normal, PERRL  RESP:  respiratory effort and palpation of chest normal, lungs clear to auscultation , no respiratory distress  CV:  Palpation and auscultation of heart done , regular rate and rhythm, no murmur, rub, or gallop, peripheral edema 2+ in RLE, pitting  ABDOMEN:  normal bowel sounds, soft, nontender, no hepatosplenomegaly or other masses, no guarding or rebound  M/S:   Gait and station normal  mild pain with ext, flexion R foot  SKIN:  scab 2.0x0.7 cm R outer ankle.  1.0x1.0 scab area inner ankle. no drainage. sl pink of R pretibial area, minimal increased warmth.   NEURO:   Cranial nerves 2-12 are normal tested and grossly at patient's baseline, speech clear  PSYCH:  insight and judgement impaired, memory impaired , affect and mood  normal    Labs:     Most Recent 3 CBC's:  Recent Labs   Lab Test 09/14/20 08/08/20  0620 08/06/20  2319   WBC 10.6 13.2* 17.4*   HGB 11.0* 10.6* 11.8   MCV 90 92 89    241 313     Most Recent 3 BMP's:  Recent Labs   Lab Test 09/14/20 08/08/20  0620 08/07/20  1739 08/06/20  2319   * 130*  --  130*   POTASSIUM 4.1 3.8 3.9 3.2*   CHLORIDE 98 100  --  96   CO2 28 27  --  25   BUN 8 8  --  12   CR 0.66 0.61  --  0.78   ANIONGAP 8 3  --  9   MAUREEN 9.5 8.3*  --  8.7   GLC 74 131*  --  133*       ASSESSMENT/PLAN:  Cellulitis of right lower extremity  Recurrent. Recent discontinue of cam boot RLE  1. Monitor scabs for drainage-may start drsg  2. Cont. Keflex  3. Monitor for further increased redness, warmth    Leg edema, right  Increased past few days, likely r/t #1  1. Address cellulitis as above  2. Cont. Hydrochlorothiazide  3. Encourage to elevate RLE when sitting  4. Reassess over next few days  5. Bmp in next 1-3 mos    Pain in joint involving ankle and foot, right  Likely r/t#1. Pain had been mostly resolved prior to cellulitis. Recent discontinue of cam boot per recent Ortho appt. XR stable  1. Cont. Prn norco  2. For ongoing pain, may sched. Tylenol  3. Encourage amb. As ron      Electronically signed by:  BETH Hannon CNP               Sincerely,        BETH Hannon CNP

## 2020-11-05 ENCOUNTER — ASSISTED LIVING VISIT (OUTPATIENT)
Dept: GERIATRICS | Facility: CLINIC | Age: 85
End: 2020-11-05
Payer: COMMERCIAL

## 2020-11-05 VITALS
RESPIRATION RATE: 16 BRPM | HEART RATE: 68 BPM | OXYGEN SATURATION: 96 % | DIASTOLIC BLOOD PRESSURE: 74 MMHG | SYSTOLIC BLOOD PRESSURE: 127 MMHG

## 2020-11-05 DIAGNOSIS — L03.115 CELLULITIS OF RIGHT LOWER EXTREMITY: Primary | ICD-10-CM

## 2020-11-05 DIAGNOSIS — R60.0 LEG EDEMA, RIGHT: ICD-10-CM

## 2020-11-05 NOTE — PROGRESS NOTES
[place] [facility]  [place] Medical Record Number: [id number]  Place of Service where encounter took place: [facility] ([facility] [[id number]]  Chief Complaint   Patient presents with     Derm Problem       HPI:    Juany Collazo is a 88 year old (1/7/1932), who is being seen today for an episodic care visit. HPI information obtained from: facility chart records, facility staff, patient report and [facility] chart review. Today's concern is: R LE cellulitis, edema.  S/p R ankle surg. Incision site with scab at distal end.  10/30/20 had increased redness, warmth of surrounding skin.  Started on keflex. For cellulitis.  Has had increased RLE edema as well. Toney rosario recently dc'd. Noted to have increased RLE with cellulitis.  Did have zestoretic dose decrease during TCU stay due to hyponatremia.  BPs, HRs stable.       Past Medical and Surgical History reviewed in Epic today.    MEDICATIONS:    Current Outpatient Medications   Medication Sig Dispense Refill     albuterol (PROAIR HFA/PROVENTIL HFA/VENTOLIN HFA) 108 (90 Base) MCG/ACT inhaler Inhale 2 puffs into the lungs every 4 hours as needed for shortness of breath / dyspnea or wheezing       amLODIPine (NORVASC) 2.5 MG tablet Take 2.5 mg by mouth daily       aspirin (ASA) 81 MG chewable tablet Take 1 tablet (81 mg) by mouth daily       cephALEXin (KEFLEX) 500 MG capsule Take 1 capsule (500 mg) by mouth 4 times daily for 10 days 40 capsule 0     citalopram (CELEXA) 20 MG tablet Take 1 tablet (20 mg) by mouth daily 30 tablet 3     diclofenac (VOLTAREN) 1 % topical gel Apply 2 g topically 2 times daily as needed for moderate pain 1 Tube 0     donepezil (ARICEPT) 10 MG tablet Take 1 tablet (10 mg) by mouth At Bedtime 30 tablet      HYDROcodone-acetaminophen (NORCO) 5-325 MG tablet Take 1 tablet by mouth every 4 hours as needed for pain 30 tablet 0     hydrocortisone (CORTAID) 1 % external cream Apply topically 2 times daily as  needed (L forearm rash)       lisinopril-hydrochlorothiazide (ZESTORETIC) 20-12.5 MG tablet Take 1 tablet by mouth daily       melatonin 3 MG tablet Take 1 tablet (3 mg) by mouth nightly as needed for sleep       omeprazole (PRILOSEC) 20 MG DR capsule Take 20 mg by mouth daily        polyethylene glycol (MIRALAX) 17 GM/Dose powder Take 17 g (1 capful) by mouth daily as needed for constipation       silver sulfADIAZINE (SILVADENE) 1 % external cream Apply topically daily To both legs 400 g 3     simvastatin (ZOCOR) 20 MG tablet Take 1 tablet (20 mg) by mouth At Bedtime 90 tablet 3     REVIEW OF SYSTEMS:  No chest pain, shortness of breath, fevers, chills, headache, nausea, vomiting, dysuria or bowel abnormalities.  Appetite is  normal.  No pain except R ankle with palp..    Objective:  /74   Pulse 68   Resp 16   SpO2 96%   Exam:  GENERAL APPEARANCE:  Alert, cooperative  ENT:  Mouth and posterior oropharynx normal, moist mucous membranes, [place]  EYES:  EOM, conjunctivae, lids, pupils and irises normal, PERRL  RESP:  respiratory effort and palpation of chest normal, lungs clear to auscultation , no respiratory distress  CV:  Palpation and auscultation of heart done , regular rate and rhythm, no murmur, rub, or gallop, peripheral edema trace+ in RLE  ABDOMEN:  normal bowel sounds, soft, nontender, no hepatosplenomegaly or other masses, no guarding or rebound  M/S:   Gait and station normal  muscle strength 5/5 all 4 ext., normal tone  SKIN:  2.5x1.o cm scab R ankle, no drainage, no increased redness, minimal increased warmth of surrounding tissue  PSYCH:  insight and judgement impaired, memory impaired , affect and mood normal    Labs:     Most Recent 3 BMP's:  Recent Labs   Lab Test 09/14/20 08/08/20  0620 08/07/20  1739 08/06/20  2319   * 130*  --  130*   POTASSIUM 4.1 3.8 3.9 3.2*   CHLORIDE 98 100  --  96   CO2 28 27  --  25   BUN 8 8  --  12   CR 0.66 0.61  --  0.78   ANIONGAP 8 3  --  9   MAUREEN 9.5 8.3*   --  8.7   GLC 74 131*  --  133*       ASSESSMENT/PLAN:  Cellulitis of right lower extremity  Improved. Decreased redness, decreased warmth of skin surrounding scab  1. Complete keflex course  2. Monitor site for increased redness, increased warmth, drainage  3. For drainage, restart drsg  4. Cbc next week  5. Monitor for reports of diarrhea    Leg edema, right  Improved since visit earlier this week  1. Cont. zestoretic  2. Follow BPs, HRs  3. Bmp next week  4. If Na+ stable, having increased RLE edema, may increase hydrochlorothiazide dose      Electronically signed by:  BETH Hannon CNP

## 2020-11-05 NOTE — LETTER
11/5/2020        RE: Juany Collazo  [facility]  [address] [address] [address]        [place] [facility]  [place] Medical Record Number: [id number]  Place of Service where encounter took place: [facility] ([facility] [[id number]]  Chief Complaint   Patient presents with     Derm Problem       HPI:    Juany Collazo is a 88 year old (1/7/1932), who is being seen today for an episodic care visit. HPI information obtained from: facility chart records, facility staff, patient report and [facility] chart review. Today's concern is: R LE cellulitis, edema.  S/p R ankle surg. Incision site with scab at distal end.  10/30/20 had increased redness, warmth of surrounding skin.  Started on keflex. For cellulitis.  Has had increased RLE edema as well. Toney rosario recently dc'd. Noted to have increased RLE with cellulitis.  Did have zestoretic dose decrease during TCU stay due to hyponatremia.  BPs, HRs stable.       Past Medical and Surgical History reviewed in Epic today.    MEDICATIONS:    Current Outpatient Medications   Medication Sig Dispense Refill     albuterol (PROAIR HFA/PROVENTIL HFA/VENTOLIN HFA) 108 (90 Base) MCG/ACT inhaler Inhale 2 puffs into the lungs every 4 hours as needed for shortness of breath / dyspnea or wheezing       amLODIPine (NORVASC) 2.5 MG tablet Take 2.5 mg by mouth daily       aspirin (ASA) 81 MG chewable tablet Take 1 tablet (81 mg) by mouth daily       cephALEXin (KEFLEX) 500 MG capsule Take 1 capsule (500 mg) by mouth 4 times daily for 10 days 40 capsule 0     citalopram (CELEXA) 20 MG tablet Take 1 tablet (20 mg) by mouth daily 30 tablet 3     diclofenac (VOLTAREN) 1 % topical gel Apply 2 g topically 2 times daily as needed for moderate pain 1 Tube 0     donepezil (ARICEPT) 10 MG tablet Take 1 tablet (10 mg) by mouth At Bedtime 30 tablet      HYDROcodone-acetaminophen (NORCO) 5-325 MG tablet Take 1 tablet by mouth every 4 hours  as needed for pain 30 tablet 0     hydrocortisone (CORTAID) 1 % external cream Apply topically 2 times daily as needed (L forearm rash)       lisinopril-hydrochlorothiazide (ZESTORETIC) 20-12.5 MG tablet Take 1 tablet by mouth daily       melatonin 3 MG tablet Take 1 tablet (3 mg) by mouth nightly as needed for sleep       omeprazole (PRILOSEC) 20 MG DR capsule Take 20 mg by mouth daily        polyethylene glycol (MIRALAX) 17 GM/Dose powder Take 17 g (1 capful) by mouth daily as needed for constipation       silver sulfADIAZINE (SILVADENE) 1 % external cream Apply topically daily To both legs 400 g 3     simvastatin (ZOCOR) 20 MG tablet Take 1 tablet (20 mg) by mouth At Bedtime 90 tablet 3     REVIEW OF SYSTEMS:  No chest pain, shortness of breath, fevers, chills, headache, nausea, vomiting, dysuria or bowel abnormalities.  Appetite is  normal.  No pain except R ankle with palp..    Objective:  /74   Pulse 68   Resp 16   SpO2 96%   Exam:  GENERAL APPEARANCE:  Alert, cooperative  ENT:  Mouth and posterior oropharynx normal, moist mucous membranes, [place]  EYES:  EOM, conjunctivae, lids, pupils and irises normal, PERRL  RESP:  respiratory effort and palpation of chest normal, lungs clear to auscultation , no respiratory distress  CV:  Palpation and auscultation of heart done , regular rate and rhythm, no murmur, rub, or gallop, peripheral edema trace+ in RLE  ABDOMEN:  normal bowel sounds, soft, nontender, no hepatosplenomegaly or other masses, no guarding or rebound  M/S:   Gait and station normal  muscle strength 5/5 all 4 ext., normal tone  SKIN:  2.5x1.o cm scab R ankle, no drainage, no increased redness, minimal increased warmth of surrounding tissue  PSYCH:  insight and judgement impaired, memory impaired , affect and mood normal    Labs:     Most Recent 3 BMP's:  Recent Labs   Lab Test 09/14/20 08/08/20  0620 08/07/20  1739 08/06/20  2319   * 130*  --  130*   POTASSIUM 4.1 3.8 3.9 3.2*   CHLORIDE 98  100  --  96   CO2 28 27  --  25   BUN 8 8  --  12   CR 0.66 0.61  --  0.78   ANIONGAP 8 3  --  9   MAUREEN 9.5 8.3*  --  8.7   GLC 74 131*  --  133*       ASSESSMENT/PLAN:  Cellulitis of right lower extremity  Improved. Decreased redness, decreased warmth of skin surrounding scab  1. Complete keflex course  2. Monitor site for increased redness, increased warmth, drainage  3. For drainage, restart drsg  4. Cbc next week  5. Monitor for reports of diarrhea    Leg edema, right  Improved since visit earlier this week  1. Cont. zestoretic  2. Follow BPs, HRs  3. Bmp next week  4. If Na+ stable, having increased RLE edema, may increase hydrochlorothiazide dose      Electronically signed by:  BETH Hannon CNP               Sincerely,        BETH Hannon CNP

## 2020-11-06 ENCOUNTER — RECORDS - HEALTHEAST (OUTPATIENT)
Dept: LAB | Facility: CLINIC | Age: 85
End: 2020-11-06

## 2020-11-09 ENCOUNTER — TRANSFERRED RECORDS (OUTPATIENT)
Dept: HEALTH INFORMATION MANAGEMENT | Facility: CLINIC | Age: 85
End: 2020-11-09

## 2020-11-09 LAB
ANION GAP SERPL CALCULATED.3IONS-SCNC: 8 MMOL/L (ref 5–18)
ANION GAP SERPL CALCULATED.3IONS-SCNC: 8 MMOL/L (ref 5–18)
BASOPHILS # BLD AUTO: 0.1 THOU/UL (ref 0–0.2)
BASOPHILS NFR BLD AUTO: 1 % (ref 0–2)
BUN SERPL-MCNC: 8 MG/DL (ref 8–28)
BUN SERPL-MCNC: 8 MG/DL (ref 8–28)
CALCIUM SERPL-MCNC: 9.8 MG/DL (ref 8.5–10.5)
CALCIUM SERPL-MCNC: 9.8 MG/DL (ref 8.5–10.5)
CHLORIDE BLD-SCNC: 102 MMOL/L (ref 98–107)
CHLORIDE SERPLBLD-SCNC: 102 MMOL/L (ref 98–107)
CO2 SERPL-SCNC: 27 MMOL/L (ref 22–31)
CO2 SERPL-SCNC: 27 MMOL/L (ref 22–31)
CREAT SERPL-MCNC: 0.7 MG/DL (ref 0.6–1.1)
CREAT SERPL-MCNC: 0.7 MG/DL (ref 0.6–1.1)
DIFFERENTIAL: ABNORMAL
EOSINOPHIL # BLD AUTO: 0.3 THOU/UL (ref 0–0.4)
EOSINOPHIL NFR BLD AUTO: 2 % (ref 0–6)
ERYTHROCYTE [DISTWIDTH] IN BLOOD BY AUTOMATED COUNT: 14.3 % (ref 11–14.5)
ERYTHROCYTE [DISTWIDTH] IN BLOOD BY AUTOMATED COUNT: 14.3 % (ref 11–14.5)
GFR SERPL CREATININE-BSD FRML MDRD: >60 ML/MIN/1.73M2
GFR SERPL CREATININE-BSD FRML MDRD: >60 ML/MIN/1.73M2
GLUCOSE BLD-MCNC: 86 MG/DL (ref 70–125)
GLUCOSE SERPL-MCNC: 86 MG/DL (ref 70–125)
HCT VFR BLD AUTO: 40.2 % (ref 35–47)
HCT VFR BLD AUTO: 40.2 % (ref 35–47)
HEMOGLOBIN: 12.2 G/DL (ref 12–16)
HGB BLD-MCNC: 12.2 G/DL (ref 12–16)
IMM GRANULOCYTES # BLD: 0 THOU/UL
IMM GRANULOCYTES NFR BLD: 0 %
LYMPHOCYTES # BLD AUTO: 5.9 THOU/UL (ref 0.8–4.4)
LYMPHOCYTES NFR BLD AUTO: 49 % (ref 20–40)
MCH RBC QN AUTO: 27 PG (ref 27–34)
MCH RBC QN AUTO: 27 PG (ref 27–34)
MCHC RBC AUTO-ENTMCNC: 30.3 G/DL (ref 32–36)
MCHC RBC AUTO-ENTMCNC: 30.3 G/DL (ref 32–36)
MCV RBC AUTO: 89 FL (ref 80–100)
MCV RBC AUTO: 89 FL (ref 80–100)
MONOCYTES # BLD AUTO: 1 THOU/UL (ref 0–0.9)
MONOCYTES NFR BLD AUTO: 8 % (ref 2–10)
NEUTROPHILS # BLD AUTO: 4.9 THOU/UL (ref 2–7.7)
NEUTROPHILS NFR BLD AUTO: 40 % (ref 50–70)
PLATELET # BLD AUTO: 309 THOU/UL (ref 140–440)
PLATELET # BLD AUTO: 309 THOU/UL (ref 140–440)
PMV BLD AUTO: 11.9 FL (ref 8.5–12.5)
POTASSIUM BLD-SCNC: 4.4 MMOL/L (ref 3.5–5)
POTASSIUM SERPL-SCNC: 4.4 MMOL/L (ref 3.5–5)
RBC # BLD AUTO: 4.52 MILL/UL (ref 3.8–5.4)
RBC # BLD AUTO: 4.52 MILL/UL (ref 3.8–5.4)
SODIUM SERPL-SCNC: 137 MMOL/L (ref 136–145)
SODIUM SERPL-SCNC: 137 MMOL/L (ref 136–145)
WBC # BLD AUTO: 12.2 THOU/UL (ref 4–11)
WBC: 12.2 THOU/UL (ref 4–11)

## 2020-11-13 ENCOUNTER — ASSISTED LIVING VISIT (OUTPATIENT)
Dept: GERIATRICS | Facility: CLINIC | Age: 85
End: 2020-11-13
Payer: COMMERCIAL

## 2020-11-13 VITALS
OXYGEN SATURATION: 93 % | HEART RATE: 70 BPM | SYSTOLIC BLOOD PRESSURE: 121 MMHG | RESPIRATION RATE: 16 BRPM | DIASTOLIC BLOOD PRESSURE: 67 MMHG

## 2020-11-13 DIAGNOSIS — M25.571 PAIN IN JOINT INVOLVING ANKLE AND FOOT, RIGHT: Primary | ICD-10-CM

## 2020-11-13 DIAGNOSIS — L03.115 CELLULITIS OF RIGHT LEG: ICD-10-CM

## 2020-11-13 NOTE — PROGRESS NOTES
[facility]  [place] Medical Record Number: [id number]  Place of Service where encounter took place: [facility] ([facility] [[id number]]  Chief Complaint   Patient presents with     Pain       HPI:    Juany Collazo is a 88 year old (1/7/1932), who is being seen today for an episodic care visit. HPI information obtained from: facility chart records, facility staff, patient report and [facility] chart review. Today's concern is: r ankle pain, R LE cellulitis.  Completed keflex course for R LE cellulitis.  Redness resolved. No drainage from scab sites R ankle.  Edema of RLE improved.  Resident does reports some R ankle pain with flexion/ext. Of R foot. No pain with w.b.  S/p R ankle surg. Has prn tylenol-not currently using.       Past Medical and Surgical History reviewed in Epic today.    MEDICATIONS:    Current Outpatient Medications   Medication Sig Dispense Refill     albuterol (PROAIR HFA/PROVENTIL HFA/VENTOLIN HFA) 108 (90 Base) MCG/ACT inhaler Inhale 2 puffs into the lungs every 4 hours as needed for shortness of breath / dyspnea or wheezing       amLODIPine (NORVASC) 2.5 MG tablet Take 2.5 mg by mouth daily       aspirin (ASA) 81 MG chewable tablet Take 1 tablet (81 mg) by mouth daily       citalopram (CELEXA) 20 MG tablet Take 1 tablet (20 mg) by mouth daily 30 tablet 3     diclofenac (VOLTAREN) 1 % topical gel Apply 2 g topically 2 times daily as needed for moderate pain 1 Tube 0     donepezil (ARICEPT) 10 MG tablet Take 1 tablet (10 mg) by mouth At Bedtime 30 tablet      HYDROcodone-acetaminophen (NORCO) 5-325 MG tablet Take 1 tablet by mouth every 4 hours as needed for pain 30 tablet 0     hydrocortisone (CORTAID) 1 % external cream Apply topically 2 times daily as needed (L forearm rash)       lisinopril-hydrochlorothiazide (ZESTORETIC) 20-12.5 MG tablet Take 1 tablet by mouth daily       melatonin 3 MG tablet Take 1 tablet (3 mg) by mouth nightly as needed  for sleep       omeprazole (PRILOSEC) 20 MG DR capsule Take 20 mg by mouth daily        polyethylene glycol (MIRALAX) 17 GM/Dose powder Take 17 g (1 capful) by mouth daily as needed for constipation       silver sulfADIAZINE (SILVADENE) 1 % external cream Apply topically daily To both legs 400 g 3     simvastatin (ZOCOR) 20 MG tablet Take 1 tablet (20 mg) by mouth At Bedtime 90 tablet 3     REVIEW OF SYSTEMS:  No chest pain, shortness of breath, fevers, chills, headache, nausea, vomiting, dysuria or bowel abnormalities.  Appetite is  normal.  No pain except occ R ankle.    Objective:  /67   Pulse 70   Resp 16   SpO2 93%   Exam:  GENERAL APPEARANCE:  Alert, in no distress, cooperative  ENT:  Mouth and posterior oropharynx normal, moist mucous membranes, [place]  EYES:  EOM, conjunctivae, lids, pupils and irises normal, PERRL  NECK:  FROM  RESP:  respiratory effort and palpation of chest normal, lungs clear to auscultation , no respiratory distress  CV:  Palpation and auscultation of heart done , regular rate and rhythm, no murmur, rub, or gallop, peripheral edema trace+ in RLE  ABDOMEN:  normal bowel sounds, soft, nontender, no hepatosplenomegaly or other masses, no guarding or rebound  M/S:   Gait and station normal  no pain with palp. of R ankle, no apparent pain with ROM R foot during exam  SKIN:  scab R outer ankle, R inner ankle, dry, no drainage. no redness of surrounding tissue  NEURO:   Cranial nerves 2-12 are normal tested and grossly at patient's baseline  PSYCH:  insight and judgement impaired, memory impaired , affect and mood normal    Labs:     Most Recent 3 CBC's:  Recent Labs   Lab Test 11/09/20 09/14/20 08/08/20  0620   WBC 12.2* 10.6 13.2*   HGB 12.2 11.0* 10.6*   MCV 89 90 92    288 241     Most Recent 3 BMP's:  Recent Labs   Lab Test 11/09/20 09/14/20 08/08/20  0620    134* 130*   POTASSIUM 4.4 4.1 3.8   CHLORIDE 102 98 100   CO2 27 28 27   BUN 8 8 8   CR 0.70 0.66 0.61    ANIONGAP 8 8 3   MAUREEN 9.8 9.5 8.3*   GLC 86 74 131*       ASSESSMENT/PLAN:  Pain in joint involving ankle and foot, right  Ongoing, gen. Improved from last week. No pain elicited during exam  1. Sched. Tylenol bid x 7 days  2. Cont. Prn tylenol  3. Amb. As ron  4. Reassess pain over next week    Cellulitis of right leg  Resolved. No redness, no drainage. No increased warmth. Edema improved  1. Monitor scabs for drainage  2. Monitor RLE for redness, increased warmth  3. Repeat cbc in one month      Electronically signed by:  BETH Hannon CNP

## 2020-11-13 NOTE — LETTER
11/13/2020        RE: Juany Collazo  [facility]  [address] [address] [address]        [place] [facility]  [place] Medical Record Number: [id number]  Place of Service where encounter took place: [facility] ([facility]) [[id number]]  Chief Complaint   Patient presents with     Pain       HPI:    Juany Collazo is a 88 year old (1/7/1932), who is being seen today for an episodic care visit. HPI information obtained from: facility chart records, facility staff, patient report and [facility] chart review. Today's concern is: r ankle pain, R LE cellulitis.  Completed keflex course for R LE cellulitis.  Redness resolved. No drainage from scab sites R ankle.  Edema of RLE improved.  Resident does reports some R ankle pain with flexion/ext. Of R foot. No pain with w.b.  S/p R ankle surg. Has prn tylenol-not currently using.       Past Medical and Surgical History reviewed in Epic today.    MEDICATIONS:    Current Outpatient Medications   Medication Sig Dispense Refill     albuterol (PROAIR HFA/PROVENTIL HFA/VENTOLIN HFA) 108 (90 Base) MCG/ACT inhaler Inhale 2 puffs into the lungs every 4 hours as needed for shortness of breath / dyspnea or wheezing       amLODIPine (NORVASC) 2.5 MG tablet Take 2.5 mg by mouth daily       aspirin (ASA) 81 MG chewable tablet Take 1 tablet (81 mg) by mouth daily       citalopram (CELEXA) 20 MG tablet Take 1 tablet (20 mg) by mouth daily 30 tablet 3     diclofenac (VOLTAREN) 1 % topical gel Apply 2 g topically 2 times daily as needed for moderate pain 1 Tube 0     donepezil (ARICEPT) 10 MG tablet Take 1 tablet (10 mg) by mouth At Bedtime 30 tablet      HYDROcodone-acetaminophen (NORCO) 5-325 MG tablet Take 1 tablet by mouth every 4 hours as needed for pain 30 tablet 0     hydrocortisone (CORTAID) 1 % external cream Apply topically 2 times daily as needed (L forearm rash)       lisinopril-hydrochlorothiazide (ZESTORETIC) 20-12.5  MG tablet Take 1 tablet by mouth daily       melatonin 3 MG tablet Take 1 tablet (3 mg) by mouth nightly as needed for sleep       omeprazole (PRILOSEC) 20 MG DR capsule Take 20 mg by mouth daily        polyethylene glycol (MIRALAX) 17 GM/Dose powder Take 17 g (1 capful) by mouth daily as needed for constipation       silver sulfADIAZINE (SILVADENE) 1 % external cream Apply topically daily To both legs 400 g 3     simvastatin (ZOCOR) 20 MG tablet Take 1 tablet (20 mg) by mouth At Bedtime 90 tablet 3     REVIEW OF SYSTEMS:  No chest pain, shortness of breath, fevers, chills, headache, nausea, vomiting, dysuria or bowel abnormalities.  Appetite is  normal.  No pain except occ R ankle.    Objective:  /67   Pulse 70   Resp 16   SpO2 93%   Exam:  GENERAL APPEARANCE:  Alert, in no distress, cooperative  ENT:  Mouth and posterior oropharynx normal, moist mucous membranes, [place]  EYES:  EOM, conjunctivae, lids, pupils and irises normal, PERRL  NECK:  FROM  RESP:  respiratory effort and palpation of chest normal, lungs clear to auscultation , no respiratory distress  CV:  Palpation and auscultation of heart done , regular rate and rhythm, no murmur, rub, or gallop, peripheral edema trace+ in RLE  ABDOMEN:  normal bowel sounds, soft, nontender, no hepatosplenomegaly or other masses, no guarding or rebound  M/S:   Gait and station normal  no pain with palp. of R ankle, no apparent pain with ROM R foot during exam  SKIN:  scab R outer ankle, R inner ankle, dry, no drainage. no redness of surrounding tissue  NEURO:   Cranial nerves 2-12 are normal tested and grossly at patient's baseline  PSYCH:  insight and judgement impaired, memory impaired , affect and mood normal    Labs:     Most Recent 3 CBC's:  Recent Labs   Lab Test 11/09/20 09/14/20 08/08/20  0620   WBC 12.2* 10.6 13.2*   HGB 12.2 11.0* 10.6*   MCV 89 90 92    288 241     Most Recent 3 BMP's:  Recent Labs   Lab Test 11/09/20 09/14/20 08/08/20  0620   NA  137 134* 130*   POTASSIUM 4.4 4.1 3.8   CHLORIDE 102 98 100   CO2 27 28 27   BUN 8 8 8   CR 0.70 0.66 0.61   ANIONGAP 8 8 3   MAUREEN 9.8 9.5 8.3*   GLC 86 74 131*       ASSESSMENT/PLAN:  Pain in joint involving ankle and foot, right  Ongoing, gen. Improved from last week. No pain elicited during exam  1. Sched. Tylenol bid x 7 days  2. Cont. Prn tylenol  3. Amb. As orn  4. Reassess pain over next week    Cellulitis of right leg  Resolved. No redness, no drainage. No increased warmth. Edema improved  1. Monitor scabs for drainage  2. Monitor RLE for redness, increased warmth  3. Repeat cbc in one month      Electronically signed by:  BETH Hannon CNP               Sincerely,        BETH Hannon CNP

## 2020-12-01 ENCOUNTER — ASSISTED LIVING VISIT (OUTPATIENT)
Dept: GERIATRICS | Facility: CLINIC | Age: 85
End: 2020-12-01
Payer: COMMERCIAL

## 2020-12-01 VITALS
RESPIRATION RATE: 16 BRPM | HEART RATE: 70 BPM | OXYGEN SATURATION: 93 % | TEMPERATURE: 97.8 F | SYSTOLIC BLOOD PRESSURE: 146 MMHG | DIASTOLIC BLOOD PRESSURE: 77 MMHG

## 2020-12-01 DIAGNOSIS — M25.571 PAIN IN JOINT INVOLVING ANKLE AND FOOT, RIGHT: ICD-10-CM

## 2020-12-01 DIAGNOSIS — R11.0 NAUSEA: Primary | ICD-10-CM

## 2020-12-01 NOTE — PROGRESS NOTES
[place]  [place] Medical Record Number: [id number]  Place of Service where encounter took place: [facility] ([facility]) [[id number]]  Chief Complaint   Patient presents with     Nausea       HPI:    Juany Collazo is a 88 year old (1/7/1932), who is being seen today for an episodic care visit. HPI information obtained from: facility chart records, facility staff, patient report and [facility] chart review. Today's concern is: nausea, R ankle pain.  S/p R ankle surgery for fx.  Has had cellulitis at site. Currently scab R outer and R inner ankle. No drainage. No increased redness, warmth at site.  Reports occ pain-cont. On tylenol. Gait steady with walker.  Yesterday reported nausea, headache, diarrhea.  Reports nausea, diarrhea resolved. No headache today. Per staff, did not apparently take am meds yesterday. Has not yet taken am meds today-self administers.  BP sl. Elevated. Cont. On norvasc, zestoretic for HTN.       Past Medical and Surgical History reviewed in Epic today.    MEDICATIONS:    Current Outpatient Medications   Medication Sig Dispense Refill     albuterol (PROAIR HFA/PROVENTIL HFA/VENTOLIN HFA) 108 (90 Base) MCG/ACT inhaler Inhale 2 puffs into the lungs every 4 hours as needed for shortness of breath / dyspnea or wheezing       amLODIPine (NORVASC) 2.5 MG tablet Take 2.5 mg by mouth daily       aspirin (ASA) 81 MG chewable tablet Take 1 tablet (81 mg) by mouth daily       citalopram (CELEXA) 20 MG tablet Take 1 tablet (20 mg) by mouth daily 30 tablet 3     diclofenac (VOLTAREN) 1 % topical gel Apply 2 g topically 2 times daily as needed for moderate pain 1 Tube 0     donepezil (ARICEPT) 10 MG tablet Take 1 tablet (10 mg) by mouth At Bedtime 30 tablet      HYDROcodone-acetaminophen (NORCO) 5-325 MG tablet Take 1 tablet by mouth every 4 hours as needed for pain 30 tablet 0     hydrocortisone (CORTAID) 1 % external cream Apply topically 2 times daily as  needed (L forearm rash)       lisinopril-hydrochlorothiazide (ZESTORETIC) 20-12.5 MG tablet Take 1 tablet by mouth daily       melatonin 3 MG tablet Take 1 tablet (3 mg) by mouth nightly as needed for sleep       omeprazole (PRILOSEC) 20 MG DR capsule Take 20 mg by mouth daily        polyethylene glycol (MIRALAX) 17 GM/Dose powder Take 17 g (1 capful) by mouth daily as needed for constipation       silver sulfADIAZINE (SILVADENE) 1 % external cream Apply topically daily To both legs 400 g 3     simvastatin (ZOCOR) 20 MG tablet Take 1 tablet (20 mg) by mouth At Bedtime 90 tablet 3     REVIEW OF SYSTEMS:  Unobtainable secondary to cognitive impairment. Reports feeling ok today    Objective:  BP (!) 146/77   Pulse 70   Temp 97.8  F (36.6  C)   Resp 16   SpO2 93%   Exam:  GENERAL APPEARANCE:  Alert, in no distress, cooperative  ENT:  Mouth and posterior oropharynx normal, moist mucous membranes, [place]  EYES:  EOM, conjunctivae, lids, pupils and irises normal, PERRL  RESP:  respiratory effort and palpation of chest normal, lungs clear to auscultation , no respiratory distress  CV:  Palpation and auscultation of heart done , regular rate and rhythm, no murmur, rub, or gallop, peripheral edema trace+ in R ankle  ABDOMEN:  normal bowel sounds, soft, nontender, no hepatosplenomegaly or other masses, no guarding or rebound  M/S:   Gait and station normal  NEURO:   Cranial nerves 2-12 are normal tested and grossly at patient's baseline, speech clear  PSYCH:  insight and judgement impaired, memory impaired , affect and mood normal    Labs:     Most Recent 3 CBC's:  Recent Labs   Lab Test 11/09/20 09/14/20 08/08/20  0620   WBC 12.2* 10.6 13.2*   HGB 12.2 11.0* 10.6*   MCV 89 90 92    288 241     Most Recent 3 BMP's:  Recent Labs   Lab Test 11/09/20 09/14/20 08/08/20  0620    134* 130*   POTASSIUM 4.4 4.1 3.8   CHLORIDE 102 98 100   CO2 27 28 27   BUN 8 8 8   CR 0.70 0.66 0.61   ANIONGAP 8 8 3   MAUREEN 9.8 9.5 8.3*    GLC 86 74 131*       ASSESSMENT/PLAN:  Nausea  Currently resolved. Missed prilosec dose yesterday am  1. Cont prilosec  2. Monitor for further reports of nausea, diarrhea  3. Monitor for changes in po intake, follow wt.s  4. Cont.to monitor for further diff. With med. Self-admin.    Pain in joint involving ankle and foot, right  Gen. Stable. occ mild pain with flex/ext. R foot. Scabs stable  1. Cont. Tylenol  2. Monitor scab sites for s/s infection  3. Monitor for changes in gait, cont. Walker at all times      Electronically signed by:  BETH Hannon CNP

## 2020-12-01 NOTE — LETTER
12/1/2020        RE: Juany Collazo  [facility]  [address] [facility] [address]        [place]  [place] Medical Record Number: [id number]  Place of Service where encounter took place: [facility] ([facility] [[id number]]  Chief Complaint   Patient presents with     Nausea       HPI:    Juany Collazo is a 88 year old (1/7/1932), who is being seen today for an episodic care visit. HPI information obtained from: facility chart records, facility staff, patient report and [facility] chart review. Today's concern is: nausea, R ankle pain.  S/p R ankle surgery for fx.  Has had cellulitis at site. Currently scab R outer and R inner ankle. No drainage. No increased redness, warmth at site.  Reports occ pain-cont. On tylenol. Gait steady with walker.  Yesterday reported nausea, headache, diarrhea.  Reports nausea, diarrhea resolved. No headache today. Per staff, did not apparently take am meds yesterday. Has not yet taken am meds today-self administers.  BP sl. Elevated. Cont. On norvasc, zestoretic for HTN.       Past Medical and Surgical History reviewed in Epic today.    MEDICATIONS:    Current Outpatient Medications   Medication Sig Dispense Refill     albuterol (PROAIR HFA/PROVENTIL HFA/VENTOLIN HFA) 108 (90 Base) MCG/ACT inhaler Inhale 2 puffs into the lungs every 4 hours as needed for shortness of breath / dyspnea or wheezing       amLODIPine (NORVASC) 2.5 MG tablet Take 2.5 mg by mouth daily       aspirin (ASA) 81 MG chewable tablet Take 1 tablet (81 mg) by mouth daily       citalopram (CELEXA) 20 MG tablet Take 1 tablet (20 mg) by mouth daily 30 tablet 3     diclofenac (VOLTAREN) 1 % topical gel Apply 2 g topically 2 times daily as needed for moderate pain 1 Tube 0     donepezil (ARICEPT) 10 MG tablet Take 1 tablet (10 mg) by mouth At Bedtime 30 tablet      HYDROcodone-acetaminophen (NORCO) 5-325 MG tablet Take 1 tablet by mouth every 4  hours as needed for pain 30 tablet 0     hydrocortisone (CORTAID) 1 % external cream Apply topically 2 times daily as needed (L forearm rash)       lisinopril-hydrochlorothiazide (ZESTORETIC) 20-12.5 MG tablet Take 1 tablet by mouth daily       melatonin 3 MG tablet Take 1 tablet (3 mg) by mouth nightly as needed for sleep       omeprazole (PRILOSEC) 20 MG DR capsule Take 20 mg by mouth daily        polyethylene glycol (MIRALAX) 17 GM/Dose powder Take 17 g (1 capful) by mouth daily as needed for constipation       silver sulfADIAZINE (SILVADENE) 1 % external cream Apply topically daily To both legs 400 g 3     simvastatin (ZOCOR) 20 MG tablet Take 1 tablet (20 mg) by mouth At Bedtime 90 tablet 3     REVIEW OF SYSTEMS:  Unobtainable secondary to cognitive impairment. Reports feeling ok today    Objective:  BP (!) 146/77   Pulse 70   Temp 97.8  F (36.6  C)   Resp 16   SpO2 93%   Exam:  GENERAL APPEARANCE:  Alert, in no distress, cooperative  ENT:  Mouth and posterior oropharynx normal, moist mucous membranes, [place]  EYES:  EOM, conjunctivae, lids, pupils and irises normal, PERRL  RESP:  respiratory effort and palpation of chest normal, lungs clear to auscultation , no respiratory distress  CV:  Palpation and auscultation of heart done , regular rate and rhythm, no murmur, rub, or gallop, peripheral edema trace+ in R ankle  ABDOMEN:  normal bowel sounds, soft, nontender, no hepatosplenomegaly or other masses, no guarding or rebound  M/S:   Gait and station normal  NEURO:   Cranial nerves 2-12 are normal tested and grossly at patient's baseline, speech clear  PSYCH:  insight and judgement impaired, memory impaired , affect and mood normal    Labs:     Most Recent 3 CBC's:  Recent Labs   Lab Test 11/09/20 09/14/20 08/08/20  0620   WBC 12.2* 10.6 13.2*   HGB 12.2 11.0* 10.6*   MCV 89 90 92    288 241     Most Recent 3 BMP's:  Recent Labs   Lab Test 11/09/20 09/14/20 08/08/20  0620    134* 130*   POTASSIUM  4.4 4.1 3.8   CHLORIDE 102 98 100   CO2 27 28 27   BUN 8 8 8   CR 0.70 0.66 0.61   ANIONGAP 8 8 3   MAUREEN 9.8 9.5 8.3*   GLC 86 74 131*       ASSESSMENT/PLAN:  Nausea  Currently resolved. Missed prilosec dose yesterday am  1. Cont prilosec  2. Monitor for further reports of nausea, diarrhea  3. Monitor for changes in po intake, follow wt.s  4. Cont.to monitor for further diff. With med. Self-admin.    Pain in joint involving ankle and foot, right  Gen. Stable. occ mild pain with flex/ext. R foot. Scabs stable  1. Cont. Tylenol  2. Monitor scab sites for s/s infection  3. Monitor for changes in gait, cont. Walker at all times      Electronically signed by:  BETH Hannon CNP               Sincerely,        BETH Hannon CNP

## 2020-12-14 ENCOUNTER — RECORDS - HEALTHEAST (OUTPATIENT)
Dept: LAB | Facility: CLINIC | Age: 85
End: 2020-12-14

## 2020-12-15 ENCOUNTER — TRANSFERRED RECORDS (OUTPATIENT)
Dept: HEALTH INFORMATION MANAGEMENT | Facility: CLINIC | Age: 85
End: 2020-12-15

## 2020-12-15 LAB
BASOPHILS # BLD AUTO: 0.1 THOU/UL (ref 0–0.2)
BASOPHILS NFR BLD AUTO: 1 % (ref 0–2)
DIFFERENTIAL: ABNORMAL
EOSINOPHIL # BLD AUTO: 0.2 THOU/UL (ref 0–0.4)
EOSINOPHIL NFR BLD AUTO: 2 % (ref 0–6)
ERYTHROCYTE [DISTWIDTH] IN BLOOD BY AUTOMATED COUNT: 14.9 % (ref 11–14.5)
ERYTHROCYTE [DISTWIDTH] IN BLOOD BY AUTOMATED COUNT: 14.9 % (ref 11–14.5)
HCT VFR BLD AUTO: 38.3 % (ref 35–47)
HCT VFR BLD AUTO: 38.3 % (ref 35–47)
HEMOGLOBIN: 12.8 G/DL (ref 12–16)
HGB BLD-MCNC: 12.8 G/DL (ref 12–16)
IMM GRANULOCYTES # BLD: 0 THOU/UL
IMM GRANULOCYTES NFR BLD: 0 %
LYMPHOCYTES # BLD AUTO: 6.2 THOU/UL (ref 0.8–4.4)
LYMPHOCYTES NFR BLD AUTO: 60 % (ref 20–40)
MCH RBC QN AUTO: 28.8 PG (ref 27–34)
MCH RBC QN AUTO: 28.8 PG (ref 27–34)
MCHC RBC AUTO-ENTMCNC: 33.4 G/DL (ref 32–36)
MCHC RBC AUTO-ENTMCNC: 33.4 G/DL (ref 32–36)
MCV RBC AUTO: 86 FL (ref 80–100)
MCV RBC AUTO: 86 FL (ref 80–100)
MONOCYTES # BLD AUTO: 0.7 THOU/UL (ref 0–0.9)
MONOCYTES NFR BLD AUTO: 7 % (ref 2–10)
NEUTROPHILS # BLD AUTO: 3.1 THOU/UL (ref 2–7.7)
NEUTROPHILS NFR BLD AUTO: 30 % (ref 50–70)
PLATELET # BLD AUTO: 331 THOU/UL (ref 140–440)
PLATELET # BLD AUTO: 331 THOU/UL (ref 140–440)
PMV BLD AUTO: 12 FL (ref 8.5–12.5)
RBC # BLD AUTO: 4.44 MILL/UL (ref 3.8–5.4)
RBC # BLD AUTO: 4.44 MILL/UL (ref 3.8–5.4)
WBC # BLD AUTO: 10.3 THOU/UL (ref 4–11)
WBC: 10.3 THOU/UL (ref 4–11)

## 2021-01-14 ENCOUNTER — HEALTH MAINTENANCE LETTER (OUTPATIENT)
Age: 86
End: 2021-01-14

## 2021-02-25 ENCOUNTER — ASSISTED LIVING VISIT (OUTPATIENT)
Dept: GERIATRICS | Facility: CLINIC | Age: 86
End: 2021-02-25
Payer: COMMERCIAL

## 2021-02-25 VITALS
HEART RATE: 78 BPM | SYSTOLIC BLOOD PRESSURE: 129 MMHG | OXYGEN SATURATION: 94 % | DIASTOLIC BLOOD PRESSURE: 77 MMHG | RESPIRATION RATE: 16 BRPM

## 2021-02-25 DIAGNOSIS — F03.90 DEMENTIA WITHOUT BEHAVIORAL DISTURBANCE, UNSPECIFIED DEMENTIA TYPE: ICD-10-CM

## 2021-02-25 DIAGNOSIS — I10 ESSENTIAL HYPERTENSION: Primary | ICD-10-CM

## 2021-02-25 DIAGNOSIS — K21.00 GASTROESOPHAGEAL REFLUX DISEASE WITH ESOPHAGITIS, UNSPECIFIED WHETHER HEMORRHAGE: ICD-10-CM

## 2021-02-25 NOTE — LETTER
2/25/2021        RE: Juany Collazo  [facility]  [address]  [facility] [address]        [place] [facility]  [place] Medical Record Number:  [id number]  Place of Service where encounter took place:  [facility] ([facility] [[id number]]  Chief Complaint   Patient presents with     Hypertension       HPI:    Juany Collazo  is a 89 year old (1/7/1932), who is being seen today for an episodic care visit.  HPI information obtained from: facility chart records, facility staff, patient report and [facility] chart review. Today's concern is: HTN, GERD, dementia.  For HTN cont. On norvasc, zestoretic. BP stable. Today. No recent reports of dizziness or falls.  No LE edema. For GERD cont. On prilosec.  Per staff, po intake stable.  Wt. Stable.  No recent reports of nausea.  Has h/o diarrhea. Denies recent diarrhea, however unreliable historian due to dementia, memory loss.  For dementia cont. On aricept, celexa.  No recent reports of increased confusion. Mood, behaviors gen. Stable.       Past Medical and Surgical History reviewed in Epic today.    MEDICATIONS:    Current Outpatient Medications   Medication Sig Dispense Refill     albuterol (PROAIR HFA/PROVENTIL HFA/VENTOLIN HFA) 108 (90 Base) MCG/ACT inhaler Inhale 2 puffs into the lungs every 4 hours as needed for shortness of breath / dyspnea or wheezing       amLODIPine (NORVASC) 2.5 MG tablet Take 2.5 mg by mouth daily       aspirin (ASA) 81 MG chewable tablet Take 1 tablet (81 mg) by mouth daily       citalopram (CELEXA) 20 MG tablet Take 1 tablet (20 mg) by mouth daily 30 tablet 3     diclofenac (VOLTAREN) 1 % topical gel Apply 2 g topically 2 times daily as needed for moderate pain 1 Tube 0     donepezil (ARICEPT) 10 MG tablet Take 1 tablet (10 mg) by mouth At Bedtime 30 tablet      HYDROcodone-acetaminophen (NORCO) 5-325 MG tablet Take 1 tablet by mouth every 4 hours as needed for pain 30 tablet 0      hydrocortisone (CORTAID) 1 % external cream Apply topically 2 times daily as needed (L forearm rash)       lisinopril-hydrochlorothiazide (ZESTORETIC) 20-12.5 MG tablet Take 1 tablet by mouth daily       melatonin 3 MG tablet Take 1 tablet (3 mg) by mouth nightly as needed for sleep       omeprazole (PRILOSEC) 20 MG DR capsule Take 20 mg by mouth daily        polyethylene glycol (MIRALAX) 17 GM/Dose powder Take 17 g (1 capful) by mouth daily as needed for constipation       silver sulfADIAZINE (SILVADENE) 1 % external cream Apply topically daily To both legs 400 g 3     simvastatin (ZOCOR) 20 MG tablet Take 1 tablet (20 mg) by mouth At Bedtime 90 tablet 3         REVIEW OF SYSTEMS:  Unobtainable secondary to cognitive impairment. Reports feeling fine today    Objective:  /77   Pulse 78   Resp 16   SpO2 94%   Exam:  GENERAL APPEARANCE:  Alert, in no distress, appears healthy  ENT:  Mouth and posterior oropharynx normal, moist mucous membranes, [place]  EYES:  PERRL, EOM normal  NECK:  No adenopathy,masses or thyromegaly, FROM  RESP:  respiratory effort and palpation of chest normal, lungs clear to auscultation , no respiratory distress  CV:  Palpation and auscultation of heart done , regular rate and rhythm, no murmur, rub, or gallop, no edema  ABDOMEN:  normal bowel sounds, soft, nontender, no hepatosplenomegaly or other masses, no guarding or rebound  M/S:   Gait and station normal  muscle strength 5/5 all 4 ext.  NEURO:   Cranial nerves 2-12 are normal tested and grossly at patient's baseline, speech clear  PSYCH:  insight and judgement impaired, memory impaired , affect and mood normal    Labs:     Most Recent 3 CBC's:  Recent Labs   Lab Test 12/15/20 11/09/20 09/14/20   WBC 10.3 12.2* 10.6   HGB 12.8 12.2 11.0*   MCV 86 89 90    309 288     Most Recent 3 BMP's:  Recent Labs   Lab Test 11/09/20 09/14/20 08/08/20  0620    134* 130*   POTASSIUM 4.4 4.1 3.8   CHLORIDE 102 98 100   CO2 27 28 27    BUN 8 8 8   CR 0.70 0.66 0.61   ANIONGAP 8 8 3   MAUREEN 9.8 9.5 8.3*   GLC 86 74 131*       ASSESSMENT/PLAN:  Essential hypertension  BP, HR stable. Today  1. Cont norvasc, zestoretic  2. Follow BPs, HRs  3. Encourage fluids  4. Bmp next week    Gastroesophageal reflux disease with esophagitis, unspecified whether hemorrhage  Appears stable.  No recent reports of nausea  1. Cont. prilosec  2. Monitor for reports of nausea, decreased po intake  3. Monitor for reports of increased diarrhea    Dementia without behavioral disturbance, unspecified dementia type (H)  Memory loss. Mood, behaviors gen. Stable. No recent falls  1. Cont. aricept  2. Cont. celexa  3. Monitor for changes in mood, behaviors  4. Monitor for reports of confusion, increased need of staff assist  5. Follow wt.s      Electronically signed by:  BETH Hannon CNP                 Sincerely,        BETH Hannon CNP

## 2021-02-25 NOTE — PROGRESS NOTES
[place] [facility]  [place] [facility] Record Number:  [id number]  Place of Service where encounter took place:  [facility] ([facility] [[id number]]  Chief Complaint   Patient presents with     Hypertension       HPI:    Juany Collazo  is a 89 year old (1/7/1932), who is being seen today for an episodic care visit.  HPI information obtained from: facility chart records, facility staff, patient report and [facility] chart review. Today's concern is: HTN, GERD, dementia.  For HTN cont. On norvasc, zestoretic. BP stable. Today. No recent reports of dizziness or falls.  No LE edema. For GERD cont. On prilosec.  Per staff, po intake stable.  Wt. Stable.  No recent reports of nausea.  Has h/o diarrhea. Denies recent diarrhea, however unreliable historian due to dementia, memory loss.  For dementia cont. On aricept, celexa.  No recent reports of increased confusion. Mood, behaviors gen. Stable.       Past Medical and Surgical History reviewed in Epic today.    MEDICATIONS:    Current Outpatient Medications   Medication Sig Dispense Refill     albuterol (PROAIR HFA/PROVENTIL HFA/VENTOLIN HFA) 108 (90 Base) MCG/ACT inhaler Inhale 2 puffs into the lungs every 4 hours as needed for shortness of breath / dyspnea or wheezing       amLODIPine (NORVASC) 2.5 MG tablet Take 2.5 mg by mouth daily       aspirin (ASA) 81 MG chewable tablet Take 1 tablet (81 mg) by mouth daily       citalopram (CELEXA) 20 MG tablet Take 1 tablet (20 mg) by mouth daily 30 tablet 3     diclofenac (VOLTAREN) 1 % topical gel Apply 2 g topically 2 times daily as needed for moderate pain 1 Tube 0     donepezil (ARICEPT) 10 MG tablet Take 1 tablet (10 mg) by mouth At Bedtime 30 tablet      HYDROcodone-acetaminophen (NORCO) 5-325 MG tablet Take 1 tablet by mouth every 4 hours as needed for pain 30 tablet 0     hydrocortisone (CORTAID) 1 % external cream Apply topically 2 times daily as needed (L forearm rash)        lisinopril-hydrochlorothiazide (ZESTORETIC) 20-12.5 MG tablet Take 1 tablet by mouth daily       melatonin 3 MG tablet Take 1 tablet (3 mg) by mouth nightly as needed for sleep       omeprazole (PRILOSEC) 20 MG DR capsule Take 20 mg by mouth daily        polyethylene glycol (MIRALAX) 17 GM/Dose powder Take 17 g (1 capful) by mouth daily as needed for constipation       silver sulfADIAZINE (SILVADENE) 1 % external cream Apply topically daily To both legs 400 g 3     simvastatin (ZOCOR) 20 MG tablet Take 1 tablet (20 mg) by mouth At Bedtime 90 tablet 3         REVIEW OF SYSTEMS:  Unobtainable secondary to cognitive impairment. Reports feeling fine today    Objective:  /77   Pulse 78   Resp 16   SpO2 94%   Exam:  GENERAL APPEARANCE:  Alert, in no distress, appears healthy  ENT:  Mouth and posterior oropharynx normal, moist mucous membranes, [place]  EYES:  PERRL, EOM normal  NECK:  No adenopathy,masses or thyromegaly, FROM  RESP:  respiratory effort and palpation of chest normal, lungs clear to auscultation , no respiratory distress  CV:  Palpation and auscultation of heart done , regular rate and rhythm, no murmur, rub, or gallop, no edema  ABDOMEN:  normal bowel sounds, soft, nontender, no hepatosplenomegaly or other masses, no guarding or rebound  M/S:   Gait and station normal  muscle strength 5/5 all 4 ext.  NEURO:   Cranial nerves 2-12 are normal tested and grossly at patient's baseline, speech clear  PSYCH:  insight and judgement impaired, memory impaired , affect and mood normal    Labs:     Most Recent 3 CBC's:  Recent Labs   Lab Test 12/15/20 11/09/20 09/14/20   WBC 10.3 12.2* 10.6   HGB 12.8 12.2 11.0*   MCV 86 89 90    309 288     Most Recent 3 BMP's:  Recent Labs   Lab Test 11/09/20 09/14/20 08/08/20  0620    134* 130*   POTASSIUM 4.4 4.1 3.8   CHLORIDE 102 98 100   CO2 27 28 27   BUN 8 8 8   CR 0.70 0.66 0.61   ANIONGAP 8 8 3   MAUREEN 9.8 9.5 8.3*   GLC 86 74 131*        ASSESSMENT/PLAN:  Essential hypertension  BP, HR stable. Today  1. Cont norvasc, zestoretic  2. Follow BPs, HRs  3. Encourage fluids  4. Bmp next week    Gastroesophageal reflux disease with esophagitis, unspecified whether hemorrhage  Appears stable.  No recent reports of nausea  1. Cont. prilosec  2. Monitor for reports of nausea, decreased po intake  3. Monitor for reports of increased diarrhea    Dementia without behavioral disturbance, unspecified dementia type (H)  Memory loss. Mood, behaviors gen. Stable. No recent falls  1. Cont. aricept  2. Cont. celexa  3. Monitor for changes in mood, behaviors  4. Monitor for reports of confusion, increased need of staff assist  5. Follow wt.s      Electronically signed by:  BETH Hannon CNP

## 2021-02-26 ENCOUNTER — RECORDS - HEALTHEAST (OUTPATIENT)
Dept: LAB | Facility: CLINIC | Age: 86
End: 2021-02-26

## 2021-03-01 ENCOUNTER — TRANSFERRED RECORDS (OUTPATIENT)
Dept: HEALTH INFORMATION MANAGEMENT | Facility: CLINIC | Age: 86
End: 2021-03-01

## 2021-03-01 LAB
ANION GAP SERPL CALCULATED.3IONS-SCNC: 11 MMOL/L (ref 5–18)
ANION GAP SERPL CALCULATED.3IONS-SCNC: 11 MMOL/L (ref 5–18)
BUN SERPL-MCNC: 10 MG/DL (ref 8–28)
BUN SERPL-MCNC: 10 MG/DL (ref 8–28)
CALCIUM SERPL-MCNC: 9.3 MG/DL (ref 8.5–10.5)
CALCIUM SERPL-MCNC: 9.3 MG/DL (ref 8.5–10.5)
CHLORIDE BLD-SCNC: 100 MMOL/L (ref 98–107)
CHLORIDE SERPLBLD-SCNC: 100 MMOL/L (ref 98–107)
CO2 SERPL-SCNC: 25 MMOL/L (ref 22–31)
CO2 SERPL-SCNC: 25 MMOL/L (ref 22–31)
CREAT SERPL-MCNC: 0.74 MG/DL (ref 0.6–1.1)
CREAT SERPL-MCNC: 0.74 MG/DL (ref 0.6–1.1)
GFR SERPL CREATININE-BSD FRML MDRD: >60 ML/MIN/1.73M2
GFR SERPL CREATININE-BSD FRML MDRD: >60 ML/MIN/1.73M2
GLUCOSE BLD-MCNC: 100 MG/DL (ref 70–125)
GLUCOSE SERPL-MCNC: 100 MG/DL (ref 70–125)
POTASSIUM BLD-SCNC: 4.1 MMOL/L (ref 3.5–5)
POTASSIUM SERPL-SCNC: 4.1 MMOL/L (ref 3.5–5)
SODIUM SERPL-SCNC: 136 MMOL/L (ref 136–145)
SODIUM SERPL-SCNC: 136 MMOL/L (ref 136–145)

## 2021-05-09 ENCOUNTER — HEALTH MAINTENANCE LETTER (OUTPATIENT)
Age: 86
End: 2021-05-09

## 2021-05-13 ENCOUNTER — ASSISTED LIVING VISIT (OUTPATIENT)
Dept: GERIATRICS | Facility: CLINIC | Age: 86
End: 2021-05-13
Payer: COMMERCIAL

## 2021-05-13 VITALS
OXYGEN SATURATION: 93 % | SYSTOLIC BLOOD PRESSURE: 102 MMHG | RESPIRATION RATE: 18 BRPM | DIASTOLIC BLOOD PRESSURE: 59 MMHG | HEART RATE: 60 BPM

## 2021-05-13 DIAGNOSIS — F03.90 DEMENTIA WITHOUT BEHAVIORAL DISTURBANCE, UNSPECIFIED DEMENTIA TYPE: ICD-10-CM

## 2021-05-13 DIAGNOSIS — I10 ESSENTIAL HYPERTENSION: Primary | ICD-10-CM

## 2021-05-13 DIAGNOSIS — K21.00 GASTROESOPHAGEAL REFLUX DISEASE WITH ESOPHAGITIS, UNSPECIFIED WHETHER HEMORRHAGE: ICD-10-CM

## 2021-05-13 NOTE — PROGRESS NOTES
[place] [facility]  [place] Medical Record Number:  [id number]  Place of Service where encounter took place:  [facility] ([facility] [[id number]]  Chief Complaint   Patient presents with     Hypertension       HPI:    Juany Collazo  is a 89 year old (1/7/1932), who is being seen today for an episodic care visit.  HPI information obtained from: facility chart records, facility staff, patient report and [facility] chart review. Today's concern is: HTN, GERD, dementia.  For HTN cont on norvasc, zestoretic.  BP sl low today.  Per staff has had adequate fluid intake.  No LE edema.  Resp. Status stable.  For GERD cont. On prilosec. No recent reports of nausea.  Po intake, wt. Stable. Has memory loss due to dementia.  Cont. On aricept.  Gait steady with walker. Muscle strength baseline. Mood, behaviors stable.       Past Medical and Surgical History reviewed in Epic today.    MEDICATIONS:    Current Outpatient Medications   Medication Sig Dispense Refill     albuterol (PROAIR HFA/PROVENTIL HFA/VENTOLIN HFA) 108 (90 Base) MCG/ACT inhaler Inhale 2 puffs into the lungs every 4 hours as needed for shortness of breath / dyspnea or wheezing       amLODIPine (NORVASC) 2.5 MG tablet Take 2.5 mg by mouth daily       aspirin (ASA) 81 MG chewable tablet Take 1 tablet (81 mg) by mouth daily       citalopram (CELEXA) 20 MG tablet Take 1 tablet (20 mg) by mouth daily 30 tablet 3     diclofenac (VOLTAREN) 1 % topical gel Apply 2 g topically 2 times daily as needed for moderate pain 1 Tube 0     donepezil (ARICEPT) 10 MG tablet Take 1 tablet (10 mg) by mouth At Bedtime 30 tablet      HYDROcodone-acetaminophen (NORCO) 5-325 MG tablet Take 1 tablet by mouth every 4 hours as needed for pain 30 tablet 0     hydrocortisone (CORTAID) 1 % external cream Apply topically 2 times daily as needed (L forearm rash)       lisinopril-hydrochlorothiazide (ZESTORETIC) 20-12.5 MG tablet Take 1 tablet by mouth daily        melatonin 3 MG tablet Take 1 tablet (3 mg) by mouth nightly as needed for sleep       omeprazole (PRILOSEC) 20 MG DR capsule Take 20 mg by mouth daily        polyethylene glycol (MIRALAX) 17 GM/Dose powder Take 17 g (1 capful) by mouth daily as needed for constipation       silver sulfADIAZINE (SILVADENE) 1 % external cream Apply topically daily To both legs 400 g 3     simvastatin (ZOCOR) 20 MG tablet Take 1 tablet (20 mg) by mouth At Bedtime 90 tablet 3         REVIEW OF SYSTEMS:  Unobtainable secondary to cognitive impairment. Reports feeling fine today.    Objective:  /59   Pulse 60   Resp 18   SpO2 93%   Exam:  GENERAL APPEARANCE:  Alert, in no distress, appears healthy  ENT:  Mouth and posterior oropharynx normal, moist mucous membranes, [place]  EYES:  EOM, conjunctivae, lids, pupils and irises normal, PERRL  NECK:  No adenopathy,masses or thyromegaly, FROM  RESP:  respiratory effort and palpation of chest normal, lungs clear to auscultation , no respiratory distress  CV:  Palpation and auscultation of heart done , regular rate and rhythm, no murmur, rub, or gallop, no edema  ABDOMEN:  normal bowel sounds, soft, nontender, no hepatosplenomegaly or other masses, no guarding or rebound  M/S:   Gait and station normal  muscle strength 5/5 all 4 ext.  NEURO:   Cranial nerves 2-12 are normal tested and grossly at patient's baseline, speech clear  PSYCH:  insight and judgement impaired, memory impaired , affect and mood normal    Labs:     Most Recent 3 CBC's:  Recent Labs   Lab Test 12/15/20 11/09/20 09/14/20   WBC 10.3 12.2* 10.6   HGB 12.8 12.2 11.0*   MCV 86 89 90    309 288     Most Recent 3 BMP's:  Recent Labs   Lab Test 03/01/21 11/09/20 09/14/20    137 134*   POTASSIUM 4.1 4.4 4.1   CHLORIDE 100 102 98   CO2 25 27 28   BUN 10 8 8   CR 0.74 0.70 0.66   ANIONGAP 11 8 8   MAUREEN 9.3 9.8 9.5    86 74       ASSESSMENT/PLAN:  Essential hypertension  BP sl lower today, gen. Stable  1.  Cont. Norvasc, zestoretic  2. Follow BPs, HRs  3. For SBPs < 100, may hold, discontinue norvasc  4. Monitor for LE edema  5. Bmp in next 2-4 mos    Gastroesophageal reflux disease with esophagitis, unspecified whether hemorrhage  Currently stable  1. Cont. prilosec  2. Follow po intake, wt.s  3. Monitor for reports of nausea  4. Remind to take meds with food    Dementia without behavioral disturbance, unspecified dementia type (H)  Memory loss. Mood, behaviors gen. Stable  1. Cont. aricept  2. Cont. celexa  3. Monitor for changes in mood, behaviors  4. Monitor for s/s depression      Electronically signed by:  BETH Hannon CNP

## 2021-05-13 NOTE — LETTER
5/13/2021        RE: Juany Collazo  [facility]  [address]  [facility] [address]        [place] [facility]  [place] Medical Record Number:  [id number]  Place of Service where encounter took place:  [facility] [[id number]]  Chief Complaint   Patient presents with     Hypertension       HPI:    Juany Collazo  is a 89 year old (1/7/1932), who is being seen today for an episodic care visit.  HPI information obtained from: facility chart records, facility staff, patient report and [facility] chart review. Today's concern is: HTN, GERD, dementia.  For HTN cont on norvasc, zestoretic.  BP sl low today.  Per staff has had adequate fluid intake.  No LE edema.  Resp. Status stable.  For GERD cont. On prilosec. No recent reports of nausea.  Po intake, wt. Stable. Has memory loss due to dementia.  Cont. On aricept.  Gait steady with walker. Muscle strength baseline. Mood, behaviors stable.       Past Medical and Surgical History reviewed in Epic today.    MEDICATIONS:    Current Outpatient Medications   Medication Sig Dispense Refill     albuterol (PROAIR HFA/PROVENTIL HFA/VENTOLIN HFA) 108 (90 Base) MCG/ACT inhaler Inhale 2 puffs into the lungs every 4 hours as needed for shortness of breath / dyspnea or wheezing       amLODIPine (NORVASC) 2.5 MG tablet Take 2.5 mg by mouth daily       aspirin (ASA) 81 MG chewable tablet Take 1 tablet (81 mg) by mouth daily       citalopram (CELEXA) 20 MG tablet Take 1 tablet (20 mg) by mouth daily 30 tablet 3     diclofenac (VOLTAREN) 1 % topical gel Apply 2 g topically 2 times daily as needed for moderate pain 1 Tube 0     donepezil (ARICEPT) 10 MG tablet Take 1 tablet (10 mg) by mouth At Bedtime 30 tablet      HYDROcodone-acetaminophen (NORCO) 5-325 MG tablet Take 1 tablet by mouth every 4 hours as needed for pain 30 tablet 0     hydrocortisone (CORTAID) 1 % external cream Apply topically 2 times daily as needed (L  forearm rash)       lisinopril-hydrochlorothiazide (ZESTORETIC) 20-12.5 MG tablet Take 1 tablet by mouth daily       melatonin 3 MG tablet Take 1 tablet (3 mg) by mouth nightly as needed for sleep       omeprazole (PRILOSEC) 20 MG DR capsule Take 20 mg by mouth daily        polyethylene glycol (MIRALAX) 17 GM/Dose powder Take 17 g (1 capful) by mouth daily as needed for constipation       silver sulfADIAZINE (SILVADENE) 1 % external cream Apply topically daily To both legs 400 g 3     simvastatin (ZOCOR) 20 MG tablet Take 1 tablet (20 mg) by mouth At Bedtime 90 tablet 3         REVIEW OF SYSTEMS:  Unobtainable secondary to cognitive impairment. Reports feeling fine today.    Objective:  /59   Pulse 60   Resp 18   SpO2 93%   Exam:  GENERAL APPEARANCE:  Alert, in no distress, appears healthy  ENT:  Mouth and posterior oropharynx normal, moist mucous membranes, [place]  EYES:  EOM, conjunctivae, lids, pupils and irises normal, PERRL  NECK:  No adenopathy,masses or thyromegaly, FROM  RESP:  respiratory effort and palpation of chest normal, lungs clear to auscultation , no respiratory distress  CV:  Palpation and auscultation of heart done , regular rate and rhythm, no murmur, rub, or gallop, no edema  ABDOMEN:  normal bowel sounds, soft, nontender, no hepatosplenomegaly or other masses, no guarding or rebound  M/S:   Gait and station normal  muscle strength 5/5 all 4 ext.  NEURO:   Cranial nerves 2-12 are normal tested and grossly at patient's baseline, speech clear  PSYCH:  insight and judgement impaired, memory impaired , affect and mood normal    Labs:     Most Recent 3 CBC's:  Recent Labs   Lab Test 12/15/20 11/09/20 09/14/20   WBC 10.3 12.2* 10.6   HGB 12.8 12.2 11.0*   MCV 86 89 90    309 288     Most Recent 3 BMP's:  Recent Labs   Lab Test 03/01/21 11/09/20 09/14/20    137 134*   POTASSIUM 4.1 4.4 4.1   CHLORIDE 100 102 98   CO2 25 27 28   BUN 10 8 8   CR 0.74 0.70 0.66   ANIONGAP 11 8 8   MAUREEN  9.3 9.8 9.5    86 74       ASSESSMENT/PLAN:  Essential hypertension  BP sl lower today, gen. Stable  1. Cont. Norvasc, zestoretic  2. Follow BPs, HRs  3. For SBPs < 100, may hold, discontinue norvasc  4. Monitor for LE edema  5. Bmp in next 2-4 mos    Gastroesophageal reflux disease with esophagitis, unspecified whether hemorrhage  Currently stable  1. Cont. prilosec  2. Follow po intake, wt.s  3. Monitor for reports of nausea  4. Remind to take meds with food    Dementia without behavioral disturbance, unspecified dementia type (H)  Memory loss. Mood, behaviors gen. Stable  1. Cont. aricept  2. Cont. celexa  3. Monitor for changes in mood, behaviors  4. Monitor for s/s depression      Electronically signed by:  BETH Hannon CNP                 Sincerely,        BETH Hannon CNP

## 2021-10-12 ENCOUNTER — ASSISTED LIVING VISIT (OUTPATIENT)
Dept: GERIATRICS | Facility: CLINIC | Age: 86
End: 2021-10-12
Payer: COMMERCIAL

## 2021-10-12 ENCOUNTER — LAB REQUISITION (OUTPATIENT)
Dept: LAB | Facility: CLINIC | Age: 86
End: 2021-10-12
Payer: COMMERCIAL

## 2021-10-12 DIAGNOSIS — I10 ESSENTIAL (PRIMARY) HYPERTENSION: ICD-10-CM

## 2021-10-12 DIAGNOSIS — M25.571 PAIN IN JOINT INVOLVING ANKLE AND FOOT, RIGHT: ICD-10-CM

## 2021-10-12 DIAGNOSIS — I10 ESSENTIAL HYPERTENSION: Primary | ICD-10-CM

## 2021-10-12 DIAGNOSIS — K21.00 GASTROESOPHAGEAL REFLUX DISEASE WITH ESOPHAGITIS, UNSPECIFIED WHETHER HEMORRHAGE: ICD-10-CM

## 2021-10-12 NOTE — PROGRESS NOTES
[facility]  [place] Medical Record Number:  [id number]  Place of Service where encounter took place:  [facility] [[id number]]  Chief Complaint   Patient presents with     Hypertension       HPI:    Juany Collazo  is a 89 year old (1/7/1932), who is being seen today for an episodic care visit.  HPI information obtained from: facility chart records, facility staff, patient report and [facility] chart review. Today's concern is:  HTN, R ankle pain, GERD.  For HTN cont. On norvasc, zestoretic.  BPs, HRs stable. No current LE edema. Reports adequate fluid intake.  No recent dizziness or falls.  S/p R ankle ORIF 8/20.  Pain gen. Stable. Does report occ mild pain at site.  No recent redness of R ankle.  Gait steady. Has prn tylenol for pain. For GERD cont. On prilosec.  No recent reports of nausea. Po intake stable.     Past Medical and Surgical History reviewed in Epic today.    MEDICATIONS:    Current Outpatient Medications   Medication Sig Dispense Refill     albuterol (PROAIR HFA/PROVENTIL HFA/VENTOLIN HFA) 108 (90 Base) MCG/ACT inhaler Inhale 2 puffs into the lungs every 4 hours as needed for shortness of breath / dyspnea or wheezing       amLODIPine (NORVASC) 2.5 MG tablet Take 2.5 mg by mouth daily       aspirin (ASA) 81 MG chewable tablet Take 1 tablet (81 mg) by mouth daily       citalopram (CELEXA) 20 MG tablet Take 1 tablet (20 mg) by mouth daily 30 tablet 3     diclofenac (VOLTAREN) 1 % topical gel Apply 2 g topically 2 times daily as needed for moderate pain 1 Tube 0     donepezil (ARICEPT) 10 MG tablet Take 1 tablet (10 mg) by mouth At Bedtime 30 tablet      HYDROcodone-acetaminophen (NORCO) 5-325 MG tablet Take 1 tablet by mouth every 4 hours as needed for pain 30 tablet 0     hydrocortisone (CORTAID) 1 % external cream Apply topically 2 times daily as needed (L forearm rash)       lisinopril-hydrochlorothiazide (ZESTORETIC) 20-12.5 MG tablet Take 1  tablet by mouth daily       melatonin 3 MG tablet Take 1 tablet (3 mg) by mouth nightly as needed for sleep       omeprazole (PRILOSEC) 20 MG DR capsule Take 20 mg by mouth daily        polyethylene glycol (MIRALAX) 17 GM/Dose powder Take 17 g (1 capful) by mouth daily as needed for constipation       simvastatin (ZOCOR) 20 MG tablet Take 1 tablet (20 mg) by mouth At Bedtime 90 tablet 3         REVIEW OF SYSTEMS:  No chest pain, shortness of breath, fevers, chills, headache, nausea, vomiting, dysuria or bowel abnormalities.  Appetite is  normal.  No pain except occ R ankle.    Objective:  /75   Pulse 64   Resp 16   SpO2 94%   Exam:  GENERAL APPEARANCE:  Alert, in no distress, appears healthy  ENT:  Mouth and posterior oropharynx normal, moist mucous membranes, [place]  EYES:  EOM, conjunctivae, lids, pupils and irises normal, PERRL  RESP:  respiratory effort and palpation of chest normal, lungs clear to auscultation , no respiratory distress  CV:  Palpation and auscultation of heart done , regular rate and rhythm, no murmur, rub, or gallop, no edema  ABDOMEN:  normal bowel sounds, soft, nontender, no hepatosplenomegaly or other masses, no guarding or rebound  M/S:   muscle strength 5/5 al 4 ext. gait stable. normal muscle tone  NEURO:   Cranial nerves 2-12 are normal tested and grossly at patient's baseline, speech clear  PSYCH:  insight and judgement impaired, memory impaired , affect and mood normal    Labs:     Most Recent 3 CBC's:Recent Labs   Lab Test 12/15/20  0700 12/15/20  0000 11/09/20  0750   WBC 10.3 10.3 12.2*   HGB 12.8 12.8 12.2   MCV 86 86 89    331 309     Most Recent 3 BMP's:Recent Labs   Lab Test 03/01/21  0833 03/01/21  0000 11/09/20  0750    136 137   POTASSIUM 4.1 4.1 4.4   CHLORIDE 100 100 102   CO2 25 25 27   BUN 10 10 8   CR 0.74 0.74 0.70   ANIONGAP 11 11 8   MAUREEN 9.3 9.3 9.8    100 86       ASSESSMENT/PLAN:  (I10) Essential hypertension  (primary encounter  diagnosis)  Comment: BPs, HRs stable  Plan: 1. Cont. norvasc  2. Cont. zestoretic  3. Follow BPS, HRs  4. Monitor for dizziness  5. Bmp, FLP tomorrow    (M25.571) Pain in joint involving ankle and foot, right  Comment: occ mild pain.  No recent changes in gait  Plan: 1. Cont. Prn tylenol  2. For increased R ankle pain, may sched. Tylenol  3. Monitor for LE edema  4. Monitor for changes in gait    (K21.00) Gastroesophageal reflux disease with esophagitis, unspecified whether hemorrhage  Comment: currently stable  Plan: 1. Cont. prilosec  2. Monitor for changes in po intake  3. Monitor for reports of nausea  5. Cbc tomorrow    Electronically signed by:  BETH Hannon CNP

## 2021-10-12 NOTE — LETTER
10/12/2021        RE: Juany Collazo  [facility]  [address]  [facility] [address]        [place] [facility]  [place] Medical Record Number:  [id number]  Place of Service where encounter took place:  [facility] ([facility] [[id number]]  Chief Complaint   Patient presents with     Hypertension       HPI:    Juany Collazo  is a 89 year old (1/7/1932), who is being seen today for an episodic care visit.  HPI information obtained from: facility chart records, facility staff, patient report and [facility] chart review. Today's concern is:  HTN, R ankle pain, GERD.  For HTN cont. On norvasc, zestoretic.  BPs, HRs stable. No current LE edema. Reports adequate fluid intake.  No recent dizziness or falls.  S/p R ankle ORIF 8/20.  Pain gen. Stable. Does report occ mild pain at site.  No recent redness of R ankle.  Gait steady. Has prn tylenol for pain. For GERD cont. On prilosec.  No recent reports of nausea. Po intake stable.     Past Medical and Surgical History reviewed in Epic today.    MEDICATIONS:    Current Outpatient Medications   Medication Sig Dispense Refill     albuterol (PROAIR HFA/PROVENTIL HFA/VENTOLIN HFA) 108 (90 Base) MCG/ACT inhaler Inhale 2 puffs into the lungs every 4 hours as needed for shortness of breath / dyspnea or wheezing       amLODIPine (NORVASC) 2.5 MG tablet Take 2.5 mg by mouth daily       aspirin (ASA) 81 MG chewable tablet Take 1 tablet (81 mg) by mouth daily       citalopram (CELEXA) 20 MG tablet Take 1 tablet (20 mg) by mouth daily 30 tablet 3     diclofenac (VOLTAREN) 1 % topical gel Apply 2 g topically 2 times daily as needed for moderate pain 1 Tube 0     donepezil (ARICEPT) 10 MG tablet Take 1 tablet (10 mg) by mouth At Bedtime 30 tablet      HYDROcodone-acetaminophen (NORCO) 5-325 MG tablet Take 1 tablet by mouth every 4 hours as needed for pain 30 tablet 0     hydrocortisone (CORTAID) 1 % external cream Apply topically 2  times daily as needed (L forearm rash)       lisinopril-hydrochlorothiazide (ZESTORETIC) 20-12.5 MG tablet Take 1 tablet by mouth daily       melatonin 3 MG tablet Take 1 tablet (3 mg) by mouth nightly as needed for sleep       omeprazole (PRILOSEC) 20 MG DR capsule Take 20 mg by mouth daily        polyethylene glycol (MIRALAX) 17 GM/Dose powder Take 17 g (1 capful) by mouth daily as needed for constipation       simvastatin (ZOCOR) 20 MG tablet Take 1 tablet (20 mg) by mouth At Bedtime 90 tablet 3         REVIEW OF SYSTEMS:  No chest pain, shortness of breath, fevers, chills, headache, nausea, vomiting, dysuria or bowel abnormalities.  Appetite is  normal.  No pain except occ R ankle.    Objective:  /75   Pulse 64   Resp 16   SpO2 94%   Exam:  GENERAL APPEARANCE:  Alert, in no distress, appears healthy  ENT:  Mouth and posterior oropharynx normal, moist mucous membranes, [place]  EYES:  EOM, conjunctivae, lids, pupils and irises normal, PERRL  RESP:  respiratory effort and palpation of chest normal, lungs clear to auscultation , no respiratory distress  CV:  Palpation and auscultation of heart done , regular rate and rhythm, no murmur, rub, or gallop, no edema  ABDOMEN:  normal bowel sounds, soft, nontender, no hepatosplenomegaly or other masses, no guarding or rebound  M/S:   muscle strength 5/5 al 4 ext. gait stable. normal muscle tone  NEURO:   Cranial nerves 2-12 are normal tested and grossly at patient's baseline, speech clear  PSYCH:  insight and judgement impaired, memory impaired , affect and mood normal    Labs:     Most Recent 3 CBC's:Recent Labs   Lab Test 12/15/20  0700 12/15/20  0000 11/09/20  0750   WBC 10.3 10.3 12.2*   HGB 12.8 12.8 12.2   MCV 86 86 89    331 309     Most Recent 3 BMP's:Recent Labs   Lab Test 03/01/21  0833 03/01/21  0000 11/09/20  0750    136 137   POTASSIUM 4.1 4.1 4.4   CHLORIDE 100 100 102   CO2 25 25 27   BUN 10 10 8   CR 0.74 0.74 0.70   ANIONGAP 11 11 8    MAUREEN 9.3 9.3 9.8    100 86       ASSESSMENT/PLAN:  (I10) Essential hypertension  (primary encounter diagnosis)  Comment: BPs, HRs stable  Plan: 1. Cont. norvasc  2. Cont. zestoretic  3. Follow BPS, HRs  4. Monitor for dizziness  5. Bmp, FLP tomorrow    (M25.571) Pain in joint involving ankle and foot, right  Comment: occ mild pain.  No recent changes in gait  Plan: 1. Cont. Prn tylenol  2. For increased R ankle pain, may sched. Tylenol  3. Monitor for LE edema  4. Monitor for changes in gait    (K21.00) Gastroesophageal reflux disease with esophagitis, unspecified whether hemorrhage  Comment: currently stable  Plan: 1. Cont. prilosec  2. Monitor for changes in po intake  3. Monitor for reports of nausea  5. Cbc tomorrow    Electronically signed by:  BETH Hannon CNP                 Sincerely,        BETH Hannon CNP

## 2021-10-13 ENCOUNTER — TRANSFERRED RECORDS (OUTPATIENT)
Dept: HEALTH INFORMATION MANAGEMENT | Facility: CLINIC | Age: 86
End: 2021-10-13

## 2021-10-13 VITALS
HEART RATE: 64 BPM | OXYGEN SATURATION: 94 % | DIASTOLIC BLOOD PRESSURE: 75 MMHG | RESPIRATION RATE: 16 BRPM | SYSTOLIC BLOOD PRESSURE: 137 MMHG

## 2021-10-13 LAB
ALT SERPL W P-5'-P-CCNC: 11 U/L (ref 0–45)
ANION GAP SERPL CALCULATED.3IONS-SCNC: 10 MMOL/L (ref 5–18)
AST SERPL W P-5'-P-CCNC: 19 U/L (ref 0–40)
BASOPHILS # BLD MANUAL: 0 10E3/UL (ref 0–0.2)
BASOPHILS NFR BLD MANUAL: 0 %
BUN SERPL-MCNC: 10 MG/DL (ref 8–28)
CALCIUM SERPL-MCNC: 9.9 MG/DL (ref 8.5–10.5)
CHLORIDE BLD-SCNC: 104 MMOL/L (ref 98–107)
CHOLEST SERPL-MCNC: 172 MG/DL
CO2 SERPL-SCNC: 24 MMOL/L (ref 22–31)
CREAT SERPL-MCNC: 0.79 MG/DL (ref 0.6–1.1)
EOSINOPHIL # BLD MANUAL: 0.2 10E3/UL (ref 0–0.7)
EOSINOPHIL NFR BLD MANUAL: 2 %
ERYTHROCYTE [DISTWIDTH] IN BLOOD BY AUTOMATED COUNT: 14.4 % (ref 10–15)
FASTING STATUS PATIENT QL REPORTED: ABNORMAL
GFR SERPL CREATININE-BSD FRML MDRD: 67 ML/MIN/1.73M2
GLUCOSE BLD-MCNC: 124 MG/DL (ref 70–125)
HCT VFR BLD AUTO: 40.2 % (ref 35–47)
HDLC SERPL-MCNC: 72 MG/DL
HGB BLD-MCNC: 12.6 G/DL (ref 11.7–15.7)
LDLC SERPL CALC-MCNC: 69 MG/DL
LYMPHOCYTES # BLD MANUAL: 5.9 10E3/UL (ref 0.8–5.3)
LYMPHOCYTES NFR BLD MANUAL: 51 %
MCH RBC QN AUTO: 27.8 PG (ref 26.5–33)
MCHC RBC AUTO-ENTMCNC: 31.3 G/DL (ref 31.5–36.5)
MCV RBC AUTO: 89 FL (ref 78–100)
MONOCYTES # BLD MANUAL: 0.7 10E3/UL (ref 0–1.3)
MONOCYTES NFR BLD MANUAL: 6 %
NEUTROPHILS # BLD MANUAL: 4.8 10E3/UL (ref 1.6–8.3)
NEUTROPHILS NFR BLD MANUAL: 41 %
PLAT MORPH BLD: ABNORMAL
PLATELET # BLD AUTO: 277 10E3/UL (ref 150–450)
POTASSIUM BLD-SCNC: 4.1 MMOL/L (ref 3.5–5)
RBC # BLD AUTO: 4.53 10E6/UL (ref 3.8–5.2)
RBC MORPH BLD: ABNORMAL
SODIUM SERPL-SCNC: 138 MMOL/L (ref 136–145)
TRIGL SERPL-MCNC: 156 MG/DL
WBC # BLD AUTO: 11.6 10E3/UL (ref 4–11)

## 2021-10-13 PROCEDURE — 36415 COLL VENOUS BLD VENIPUNCTURE: CPT | Mod: ORL | Performed by: NURSE PRACTITIONER

## 2021-10-13 PROCEDURE — 84450 TRANSFERASE (AST) (SGOT): CPT | Mod: ORL | Performed by: NURSE PRACTITIONER

## 2021-10-13 PROCEDURE — 80048 BASIC METABOLIC PNL TOTAL CA: CPT | Mod: ORL | Performed by: NURSE PRACTITIONER

## 2021-10-13 PROCEDURE — 84460 ALANINE AMINO (ALT) (SGPT): CPT | Mod: ORL | Performed by: NURSE PRACTITIONER

## 2021-10-13 PROCEDURE — 80061 LIPID PANEL: CPT | Mod: ORL | Performed by: NURSE PRACTITIONER

## 2021-10-13 PROCEDURE — 85027 COMPLETE CBC AUTOMATED: CPT | Mod: ORL | Performed by: NURSE PRACTITIONER

## 2021-10-13 PROCEDURE — P9604 ONE-WAY ALLOW PRORATED TRIP: HCPCS | Mod: ORL | Performed by: NURSE PRACTITIONER

## 2021-10-23 ENCOUNTER — HEALTH MAINTENANCE LETTER (OUTPATIENT)
Age: 86
End: 2021-10-23

## 2021-12-07 ENCOUNTER — PATIENT OUTREACH (OUTPATIENT)
Dept: GERIATRIC MEDICINE | Facility: CLINIC | Age: 86
End: 2021-12-07
Payer: COMMERCIAL

## 2021-12-08 NOTE — PROGRESS NOTES
[facility] [employer] Chart review      chart   No triggering events at this time   CM will follow-up as needed     Nany Malcolm MA [facility] Care Coordinator   [phone] - coja cell phone   [phone] - work fax

## 2022-01-27 NOTE — PLAN OF CARE
"PT: Orders received. PT evaluation completed and treatment initiated. Pt is POD0 L TKA. Pt lives in an ILF, but will be staying with her adult daughter for \"as long as needed\" post op. Pt's daughter lives in a rambler style home with 1 step to enter. Pt's other daughter is flying in from [place] to assist as needed as well. Pt has been ambulating with a 4WW at baseline.     Patient plan: To daughter's home  Current status: Pt supine upon initiation, agreeable to session. Attempted to wean from O2, however pt unable to keep sats above 90% with less than 2LO2. Pt completes supine LE exercises, demonstrates SLR-no KI needed. Pt completes sit<>supine with SBA, increased time/effort, and cues for technique. Pt reports feeling \"woozy\" sitting EOB, but this resolves in ~3 minutes. Pt completes sit<>stand with CGA and cues for safe technique. Pt ambulates ~15' with use of FWW and CGA with cues for safe technique. Pt requires 2 standing rest breaks and cues for PLB to maintain O2 sats above 90% on 2LO2. Gait limited by O2 requirements at this time. Pt returns to supine at end of session, all needs in reach.   Anticipated status at discharge: Anticipate pt to be SBA for all bed mobility, transfers, ambulation. Up to [place] required for stairs.     " L renal mass

## 2022-03-01 ENCOUNTER — ASSISTED LIVING VISIT (OUTPATIENT)
Dept: GERIATRICS | Facility: CLINIC | Age: 87
End: 2022-03-01
Payer: COMMERCIAL

## 2022-03-01 ENCOUNTER — LAB REQUISITION (OUTPATIENT)
Dept: LAB | Facility: CLINIC | Age: 87
End: 2022-03-01
Payer: COMMERCIAL

## 2022-03-01 VITALS
TEMPERATURE: 97.6 F | HEART RATE: 65 BPM | RESPIRATION RATE: 18 BRPM | SYSTOLIC BLOOD PRESSURE: 131 MMHG | OXYGEN SATURATION: 98 % | DIASTOLIC BLOOD PRESSURE: 67 MMHG

## 2022-03-01 DIAGNOSIS — I10 ESSENTIAL (PRIMARY) HYPERTENSION: ICD-10-CM

## 2022-03-01 DIAGNOSIS — R05.9 COUGH: Primary | ICD-10-CM

## 2022-03-01 DIAGNOSIS — E87.4 MIXED DISORDER OF ACID-BASE BALANCE: ICD-10-CM

## 2022-03-01 DIAGNOSIS — K21.00 GASTROESOPHAGEAL REFLUX DISEASE WITH ESOPHAGITIS, UNSPECIFIED WHETHER HEMORRHAGE: ICD-10-CM

## 2022-03-01 DIAGNOSIS — I10 ESSENTIAL HYPERTENSION: ICD-10-CM

## 2022-03-01 NOTE — LETTER
3/1/2022        RE: Juany Collazo  [facility]  [address]  [facility] [address]        [place] [facility]  [place] Medical Record Number:  [id number]  Place of Service where encounter took place:  [facility] [[id number]]  Chief Complaint   Patient presents with     Cough       HPI:    Juayn Collazo  is a 89 year old (1/7/1932), who is being seen today for an episodic care visit.  HPI information obtained from: facility chart records, facility staff, patient report and [facility] chart review. Today's concern is: cough, HTN, GERD.  Per dtr report, resident had expose to covid + family member 2/20/22. A week ago had sinus congestion, cough.  Has been afebrile. Had + rapid covid test done by dtr yesterday.  Cont. On isolation. Cough non-prod. O2 sats stable. Reports some increased fatigue.  Has prn alb MDI. For HTN cont. On norvasc, zestoretic.  BPs, HRs stable. Po intake stable. Cont. On prilosec for GERD.  No recent reports of nausea.       Past Medical and Surgical History reviewed in Epic today.    MEDICATIONS:    Current Outpatient Medications   Medication Sig Dispense Refill     albuterol (PROAIR HFA/PROVENTIL HFA/VENTOLIN HFA) 108 (90 Base) MCG/ACT inhaler Inhale 2 puffs into the lungs every 4 hours as needed for shortness of breath / dyspnea or wheezing       amLODIPine (NORVASC) 2.5 MG tablet Take 2.5 mg by mouth daily       aspirin (ASA) 81 MG chewable tablet Take 1 tablet (81 mg) by mouth daily       citalopram (CELEXA) 20 MG tablet Take 1 tablet (20 mg) by mouth daily 30 tablet 3     diclofenac (VOLTAREN) 1 % topical gel Apply 2 g topically 2 times daily as needed for moderate pain 1 Tube 0     donepezil (ARICEPT) 10 MG tablet Take 1 tablet (10 mg) by mouth At Bedtime 30 tablet      hydrocortisone (CORTAID) 1 % external cream Apply topically 2 times daily as needed (L forearm rash)       lisinopril-hydrochlorothiazide (ZESTORETIC)  20-12.5 MG tablet Take 1 tablet by mouth daily       melatonin 3 MG tablet Take 1 tablet (3 mg) by mouth nightly as needed for sleep       omeprazole (PRILOSEC) 20 MG DR capsule Take 20 mg by mouth daily        polyethylene glycol (MIRALAX) 17 GM/Dose powder Take 17 g (1 capful) by mouth daily as needed for constipation       simvastatin (ZOCOR) 20 MG tablet Take 1 tablet (20 mg) by mouth At Bedtime 90 tablet 3         REVIEW OF SYSTEMS:  No chest pain, shortness of breath, fevers, chills, headache, nausea, vomiting, dysuria or bowel abnormalities.  Appetite is  normal.  No pain except occ low back.    Objective:  /67   Pulse 65   Temp 97.6  F (36.4  C)   Resp 18   SpO2 98%   Exam:  GENERAL APPEARANCE:  Alert, in no distress, cooperative  ENT:  Mouth and posterior oropharynx normal, moist mucous membranes, [place], no rhinitis  EYES:  EOM, conjunctivae, lids, pupils and irises normal, PERRL  NECK:  FROM  RESP:  respiratory effort and palpation of chest normal, lungs clear to auscultation , no respiratory distress, n.p. cough  CV:  Palpation and auscultation of heart done , regular rate and rhythm, no murmur, rub, or gallop, no edema  ABDOMEN:  normal bowel sounds, soft, nontender, no hepatosplenomegaly or other masses, no guarding or rebound  M/S:   Gait and station normal  muscle strength 5/5 all 4 ext.  NEURO:   Cranial nerves 2-12 are normal tested and grossly at patient's baseline, no tremor  PSYCH:  insight and judgement impaired, memory impaired , affect and mood normal    Labs:     Most Recent 3 CBC's:Recent Labs   Lab Test 10/13/21  1145 12/15/20  0700 12/15/20  0000   WBC 11.6* 10.3 10.3   HGB 12.6 12.8 12.8   MCV 89 86 86    331 331     Most Recent 3 BMP's:Recent Labs   Lab Test 10/13/21  1145 03/01/21  0833 03/01/21  0000    136 136   POTASSIUM 4.1 4.1 4.1   CHLORIDE 104 100 100   CO2 24 25 25   BUN 10 10 10   CR 0.79 0.74 0.74   ANIONGAP 10 11 11   MAUREEN 9.9 9.3 9.3    100 100        ASSESSMENT/PLAN:  (R05.9) Cough  (primary encounter diagnosis)  Comment: ongoing.  Non-prod.  + rapid covid test. Reported exposure to + covid family member 2/20/22. No fever.  Plan: 1. Cont. Alb. MDI prn, for wheezing, sob, may sched. MDI  2. Follow temp, O2 sats  3. Encourage fluids    (I10) Essential hypertension  Comment: BPs stable  Plan: 1. Cont. Norvasc, zestoretic  2. Follow BPs, HRs  3. Monitor fopr LE edema  4. Cbc, bmp tomorrow.  FLP, AST, ALT tomorrow    (K21.00) Gastroesophageal reflux disease with esophagitis, unspecified whether hemorrhage  Comment: currently stable  Plan: 1. Cont. prilosec  2. Remind to take meds with food  3. Monitor for decrease in po intake, reports of nausea    Electronically signed by:  BETH Hannon CNP                 Sincerely,        BETH Hannon CNP

## 2022-03-01 NOTE — PROGRESS NOTES
[place] [facility]  [place] Medical Record Number:  [id number]  Place of Service where encounter took place:  [facility] ([facility]) [[id number]]  Chief Complaint   Patient presents with     Cough       HPI:    Juany Collazo  is a 89 year old (1/7/1932), who is being seen today for an episodic care visit.  HPI information obtained from: facility chart records, facility staff, patient report and [facility] chart review. Today's concern is: cough, HTN, GERD.  Per dtr report, resident had expose to covid + family member 2/20/22. A week ago had sinus congestion, cough.  Has been afebrile. Had + rapid covid test done by dtr yesterday.  Cont. On isolation. Cough non-prod. O2 sats stable. Reports some increased fatigue.  Has prn alb MDI. For HTN cont. On norvasc, zestoretic.  BPs, HRs stable. Po intake stable. Cont. On prilosec for GERD.  No recent reports of nausea.       Past Medical and Surgical History reviewed in Epic today.    MEDICATIONS:    Current Outpatient Medications   Medication Sig Dispense Refill     albuterol (PROAIR HFA/PROVENTIL HFA/VENTOLIN HFA) 108 (90 Base) MCG/ACT inhaler Inhale 2 puffs into the lungs every 4 hours as needed for shortness of breath / dyspnea or wheezing       amLODIPine (NORVASC) 2.5 MG tablet Take 2.5 mg by mouth daily       aspirin (ASA) 81 MG chewable tablet Take 1 tablet (81 mg) by mouth daily       citalopram (CELEXA) 20 MG tablet Take 1 tablet (20 mg) by mouth daily 30 tablet 3     diclofenac (VOLTAREN) 1 % topical gel Apply 2 g topically 2 times daily as needed for moderate pain 1 Tube 0     donepezil (ARICEPT) 10 MG tablet Take 1 tablet (10 mg) by mouth At Bedtime 30 tablet      hydrocortisone (CORTAID) 1 % external cream Apply topically 2 times daily as needed (L forearm rash)       lisinopril-hydrochlorothiazide (ZESTORETIC) 20-12.5 MG tablet Take 1 tablet by mouth daily       melatonin 3 MG tablet Take 1 tablet (3 mg) by mouth nightly  as needed for sleep       omeprazole (PRILOSEC) 20 MG DR capsule Take 20 mg by mouth daily        polyethylene glycol (MIRALAX) 17 GM/Dose powder Take 17 g (1 capful) by mouth daily as needed for constipation       simvastatin (ZOCOR) 20 MG tablet Take 1 tablet (20 mg) by mouth At Bedtime 90 tablet 3         REVIEW OF SYSTEMS:  No chest pain, shortness of breath, fevers, chills, headache, nausea, vomiting, dysuria or bowel abnormalities.  Appetite is  normal.  No pain except occ low back.    Objective:  /67   Pulse 65   Temp 97.6  F (36.4  C)   Resp 18   SpO2 98%   Exam:  GENERAL APPEARANCE:  Alert, in no distress, cooperative  ENT:  Mouth and posterior oropharynx normal, moist mucous membranes, [place], no rhinitis  EYES:  EOM, conjunctivae, lids, pupils and irises normal, PERRL  NECK:  FROM  RESP:  respiratory effort and palpation of chest normal, lungs clear to auscultation , no respiratory distress, n.p. cough  CV:  Palpation and auscultation of heart done , regular rate and rhythm, no murmur, rub, or gallop, no edema  ABDOMEN:  normal bowel sounds, soft, nontender, no hepatosplenomegaly or other masses, no guarding or rebound  M/S:   Gait and station normal  muscle strength 5/5 all 4 ext.  NEURO:   Cranial nerves 2-12 are normal tested and grossly at patient's baseline, no tremor  PSYCH:  insight and judgement impaired, memory impaired , affect and mood normal    Labs:     Most Recent 3 CBC's:Recent Labs   Lab Test 10/13/21  1145 12/15/20  0700 12/15/20  0000   WBC 11.6* 10.3 10.3   HGB 12.6 12.8 12.8   MCV 89 86 86    331 331     Most Recent 3 BMP's:Recent Labs   Lab Test 10/13/21  1145 03/01/21  0833 03/01/21  0000    136 136   POTASSIUM 4.1 4.1 4.1   CHLORIDE 104 100 100   CO2 24 25 25   BUN 10 10 10   CR 0.79 0.74 0.74   ANIONGAP 10 11 11   MAUREEN 9.9 9.3 9.3    100 100       ASSESSMENT/PLAN:  (R05.9) Cough  (primary encounter diagnosis)  Comment: ongoing.  Non-prod.  + rapid covid  test. Reported exposure to + covid family member 2/20/22. No fever.  Plan: 1. Cont. Alb. MDI prn, for wheezing, sob, may sched. MDI  2. Follow temp, O2 sats  3. Encourage fluids    (I10) Essential hypertension  Comment: BPs stable  Plan: 1. Cont. Norvasc, zestoretic  2. Follow BPs, HRs  3. Monitor fopr LE edema  4. Cbc, bmp tomorrow.  FLP, AST, ALT tomorrow    (K21.00) Gastroesophageal reflux disease with esophagitis, unspecified whether hemorrhage  Comment: currently stable  Plan: 1. Cont. prilosec  2. Remind to take meds with food  3. Monitor for decrease in po intake, reports of nausea    Electronically signed by:  BETH Hannon CNP

## 2022-03-02 ENCOUNTER — TRANSFERRED RECORDS (OUTPATIENT)
Dept: HEALTH INFORMATION MANAGEMENT | Facility: CLINIC | Age: 87
End: 2022-03-02

## 2022-03-02 LAB
ALT SERPL W P-5'-P-CCNC: <9 U/L (ref 0–45)
ANION GAP SERPL CALCULATED.3IONS-SCNC: 13 MMOL/L (ref 5–18)
AST SERPL W P-5'-P-CCNC: 20 U/L (ref 0–40)
BASOPHILS # BLD MANUAL: 0 10E3/UL (ref 0–0.2)
BASOPHILS NFR BLD MANUAL: 0 %
BUN SERPL-MCNC: 12 MG/DL (ref 8–28)
CALCIUM SERPL-MCNC: 9.4 MG/DL (ref 8.5–10.5)
CHLORIDE BLD-SCNC: 103 MMOL/L (ref 98–107)
CHOLEST SERPL-MCNC: 165 MG/DL
CO2 SERPL-SCNC: 24 MMOL/L (ref 22–31)
CREAT SERPL-MCNC: 0.76 MG/DL (ref 0.6–1.1)
EOSINOPHIL # BLD MANUAL: 0.5 10E3/UL (ref 0–0.7)
EOSINOPHIL NFR BLD MANUAL: 5 %
ERYTHROCYTE [DISTWIDTH] IN BLOOD BY AUTOMATED COUNT: 14.4 % (ref 10–15)
FASTING STATUS PATIENT QL REPORTED: ABNORMAL
GFR SERPL CREATININE-BSD FRML MDRD: 74 ML/MIN/1.73M2
GLUCOSE BLD-MCNC: 98 MG/DL (ref 70–125)
HCT VFR BLD AUTO: 37.7 % (ref 35–47)
HDLC SERPL-MCNC: 56 MG/DL
HGB BLD-MCNC: 11.9 G/DL (ref 11.7–15.7)
LDLC SERPL CALC-MCNC: 75 MG/DL
LYMPHOCYTES # BLD MANUAL: 4.9 10E3/UL (ref 0.8–5.3)
LYMPHOCYTES NFR BLD MANUAL: 49 %
MCH RBC QN AUTO: 28.1 PG (ref 26.5–33)
MCHC RBC AUTO-ENTMCNC: 31.6 G/DL (ref 31.5–36.5)
MCV RBC AUTO: 89 FL (ref 78–100)
MONOCYTES # BLD MANUAL: 0.7 10E3/UL (ref 0–1.3)
MONOCYTES NFR BLD MANUAL: 7 %
NEUTROPHILS # BLD MANUAL: 3.9 10E3/UL (ref 1.6–8.3)
NEUTROPHILS NFR BLD MANUAL: 39 %
PLAT MORPH BLD: ABNORMAL
PLATELET # BLD AUTO: 223 10E3/UL (ref 150–450)
POTASSIUM BLD-SCNC: 4.2 MMOL/L (ref 3.5–5)
RBC # BLD AUTO: 4.23 10E6/UL (ref 3.8–5.2)
RBC MORPH BLD: ABNORMAL
SODIUM SERPL-SCNC: 140 MMOL/L (ref 136–145)
TRIGL SERPL-MCNC: 169 MG/DL
VARIANT LYMPHS BLD QL SMEAR: PRESENT
WBC # BLD AUTO: 10 10E3/UL (ref 4–11)

## 2022-03-02 PROCEDURE — 80061 LIPID PANEL: CPT | Mod: ORL | Performed by: NURSE PRACTITIONER

## 2022-03-02 PROCEDURE — 84460 ALANINE AMINO (ALT) (SGPT): CPT | Mod: ORL | Performed by: NURSE PRACTITIONER

## 2022-03-02 PROCEDURE — 80048 BASIC METABOLIC PNL TOTAL CA: CPT | Mod: ORL | Performed by: NURSE PRACTITIONER

## 2022-03-02 PROCEDURE — 36415 COLL VENOUS BLD VENIPUNCTURE: CPT | Mod: ORL | Performed by: NURSE PRACTITIONER

## 2022-03-02 PROCEDURE — P9603 ONE-WAY ALLOW PRORATED MILES: HCPCS | Mod: ORL | Performed by: NURSE PRACTITIONER

## 2022-03-02 PROCEDURE — 85027 COMPLETE CBC AUTOMATED: CPT | Mod: ORL | Performed by: NURSE PRACTITIONER

## 2022-03-02 PROCEDURE — 84450 TRANSFERASE (AST) (SGOT): CPT | Mod: ORL | Performed by: NURSE PRACTITIONER

## 2022-06-04 ENCOUNTER — HEALTH MAINTENANCE LETTER (OUTPATIENT)
Age: 87
End: 2022-06-04

## 2022-07-26 ENCOUNTER — LAB REQUISITION (OUTPATIENT)
Dept: LAB | Facility: CLINIC | Age: 87
End: 2022-07-26
Payer: COMMERCIAL

## 2022-07-26 ENCOUNTER — ASSISTED LIVING VISIT (OUTPATIENT)
Dept: GERIATRICS | Facility: CLINIC | Age: 87
End: 2022-07-26
Payer: COMMERCIAL

## 2022-07-26 VITALS
HEART RATE: 60 BPM | RESPIRATION RATE: 16 BRPM | BODY MASS INDEX: 29.81 KG/M2 | SYSTOLIC BLOOD PRESSURE: 125 MMHG | HEIGHT: 62 IN | DIASTOLIC BLOOD PRESSURE: 77 MMHG | OXYGEN SATURATION: 98 %

## 2022-07-26 DIAGNOSIS — I10 ESSENTIAL HYPERTENSION: Primary | ICD-10-CM

## 2022-07-26 DIAGNOSIS — M25.571 PAIN IN JOINT INVOLVING ANKLE AND FOOT, RIGHT: ICD-10-CM

## 2022-07-26 DIAGNOSIS — I10 ESSENTIAL (PRIMARY) HYPERTENSION: ICD-10-CM

## 2022-07-26 DIAGNOSIS — K21.9 GASTROESOPHAGEAL REFLUX DISEASE, UNSPECIFIED WHETHER ESOPHAGITIS PRESENT: ICD-10-CM

## 2022-07-26 NOTE — LETTER
7/26/2022        RE: Juany Collazo  [facility]  [address]  [facility] [address]        [place] [facility]  [place] Medical Record Number:  [id number]  Place of Service where encounter took place:  [facility] [[id number]]  Chief Complaint   Patient presents with     Hypertension       HPI:    Juany Collazo  is a 89 year old (1/7/1932), who is being seen today for an episodic care visit.  HPI information obtained from: facility chart records, facility staff, patient report and [facility] chart review. Today's concern is: HTN, R ankle pain, GERD. For HTN cont. On norvasc, zestoretic. BPs, HRs stable. No reports of dizziness. No reports of recent falls. Has chronic R ankle pain. Reports pain gen. Stable. occ s/s. Amb. With walker. No increase pain with w.b. skin at site intact. For GERD cont. On prilosec. No recent reports of nausea, emesis. Po intake stable.       Past Medical and Surgical History reviewed in Epic today.    MEDICATIONS:    Current Outpatient Medications   Medication Sig Dispense Refill     albuterol (PROAIR HFA/PROVENTIL HFA/VENTOLIN HFA) 108 (90 Base) MCG/ACT inhaler Inhale 2 puffs into the lungs every 4 hours as needed for shortness of breath / dyspnea or wheezing       amLODIPine (NORVASC) 2.5 MG tablet Take 2.5 mg by mouth daily       aspirin (ASA) 81 MG chewable tablet Take 1 tablet (81 mg) by mouth daily       citalopram (CELEXA) 20 MG tablet Take 1 tablet (20 mg) by mouth daily 30 tablet 3     diclofenac (VOLTAREN) 1 % topical gel Apply 2 g topically 2 times daily as needed for moderate pain 1 Tube 0     donepezil (ARICEPT) 10 MG tablet Take 1 tablet (10 mg) by mouth At Bedtime 30 tablet      hydrocortisone (CORTAID) 1 % external cream Apply topically 2 times daily as needed (L forearm rash)       lisinopril-hydrochlorothiazide (ZESTORETIC) 20-12.5 MG tablet Take 1 tablet by mouth daily       melatonin 3 MG tablet Take  "1 tablet (3 mg) by mouth nightly as needed for sleep       omeprazole (PRILOSEC) 20 MG DR capsule Take 20 mg by mouth daily        polyethylene glycol (MIRALAX) 17 GM/Dose powder Take 17 g (1 capful) by mouth daily as needed for constipation       simvastatin (ZOCOR) 20 MG tablet Take 1 tablet (20 mg) by mouth At Bedtime 90 tablet 3         REVIEW OF SYSTEMS:  No chest pain, shortness of breath, fevers, chills, headache, nausea, vomiting, dysuria or bowel abnormalities.  Appetite is  normal.  No pain except occ R ankle.    Objective:  /77   Pulse 60   Resp 16   Ht 1.575 m (5' 2\")   SpO2 98%   BMI 29.81 kg/m    Exam:  GENERAL APPEARANCE:  Alert, in no distress, cooperative  ENT:  Mouth and posterior oropharynx normal, moist mucous membranes, [place]  EYES:  EOM, conjunctivae, lids, pupils and irises normal, PERRL  RESP:  respiratory effort and palpation of chest normal, lungs clear to auscultation , no respiratory distress  CV:  Palpation and auscultation of heart done , regular rate and rhythm, no murmur, rub, or gallop, no edema  ABDOMEN:  normal bowel sounds, soft, nontender, no hepatosplenomegaly or other masses, no guarding or rebound  M/S:   muscle strength 5/5 all 4 ext., normal tone. no apparent pain with ROM R ankle  SKIN:  Inspection of skin and subcutaneous tissue baseline  NEURO:   Cranial nerves 2-12 are normal tested and grossly at patient's baseline, speech clear  PSYCH:  insight and judgement impaired, memory impaired , affect and mood normal    Labs:     Most Recent 3 CBC's:Recent Labs   Lab Test 03/02/22  0644 10/13/21  1145 12/15/20  0700   WBC 10.0 11.6* 10.3   HGB 11.9 12.6 12.8   MCV 89 89 86    277 331     Most Recent 3 BMP's:Recent Labs   Lab Test 03/02/22  0644 10/13/21  1145 03/01/21  0833    138 136   POTASSIUM 4.2 4.1 4.1   CHLORIDE 103 104 100   CO2 24 24 25   BUN 12 10 10   CR 0.76 0.79 0.74   ANIONGAP 13 10 11   MAUREEN 9.4 9.9 9.3   GLC 98 124 100 "       ASSESSMENT/PLAN:  (I10) Essential hypertension  (primary encounter diagnosis)  Comment: BPs stable  Plan: 1. Cont. norvasc  2. Cont. zestoretic  3. Follow BPs, HRs  4. Monitor for LE edema  5. Hgb, bmp tomorrow    (M25.571) Pain in joint involving ankle and foot, right  Comment: occ . S/s. Gait steady with walker  Plan: 1. Monitor for changes in gait  2. For further increase in ankle pain, may start tylenol  3. Monitor LE skin for redness    (K21.9) Gastroesophageal reflux disease, unspecified whether esophagitis present  Comment: currently stable  Plan: 1. Cont. prilosec  2. Monitor for reports of nausea, emesis  3. hgb tomorrow  4. Follow wt.s, po intake    Electronically signed by:  BETH Hannon CNP                 Sincerely,        BETH Hannon CNP

## 2022-07-26 NOTE — PROGRESS NOTES
[place] [facility]  [place] Medical Record Number:  [id number]  Place of Service where encounter took place:  [facility] ([facility] [[id number]]  Chief Complaint   Patient presents with     Hypertension       HPI:    Juany Collazo  is a 89 year old (1/7/1932), who is being seen today for an episodic care visit.  HPI information obtained from: facility chart records, facility staff, patient report and [facility] chart review. Today's concern is: HTN, R ankle pain, GERD. For HTN cont. On norvasc, zestoretic. BPs, HRs stable. No reports of dizziness. No reports of recent falls. Has chronic R ankle pain. Reports pain gen. Stable. occ s/s. Amb. With walker. No increase pain with w.b. skin at site intact. For GERD cont. On prilosec. No recent reports of nausea, emesis. Po intake stable.       Past Medical and Surgical History reviewed in Epic today.    MEDICATIONS:    Current Outpatient Medications   Medication Sig Dispense Refill     albuterol (PROAIR HFA/PROVENTIL HFA/VENTOLIN HFA) 108 (90 Base) MCG/ACT inhaler Inhale 2 puffs into the lungs every 4 hours as needed for shortness of breath / dyspnea or wheezing       amLODIPine (NORVASC) 2.5 MG tablet Take 2.5 mg by mouth daily       aspirin (ASA) 81 MG chewable tablet Take 1 tablet (81 mg) by mouth daily       citalopram (CELEXA) 20 MG tablet Take 1 tablet (20 mg) by mouth daily 30 tablet 3     diclofenac (VOLTAREN) 1 % topical gel Apply 2 g topically 2 times daily as needed for moderate pain 1 Tube 0     donepezil (ARICEPT) 10 MG tablet Take 1 tablet (10 mg) by mouth At Bedtime 30 tablet      hydrocortisone (CORTAID) 1 % external cream Apply topically 2 times daily as needed (L forearm rash)       lisinopril-hydrochlorothiazide (ZESTORETIC) 20-12.5 MG tablet Take 1 tablet by mouth daily       melatonin 3 MG tablet Take 1 tablet (3 mg) by mouth nightly as needed for sleep       omeprazole (PRILOSEC) 20 MG DR capsule Take 20 mg by  "mouth daily        polyethylene glycol (MIRALAX) 17 GM/Dose powder Take 17 g (1 capful) by mouth daily as needed for constipation       simvastatin (ZOCOR) 20 MG tablet Take 1 tablet (20 mg) by mouth At Bedtime 90 tablet 3         REVIEW OF SYSTEMS:  No chest pain, shortness of breath, fevers, chills, headache, nausea, vomiting, dysuria or bowel abnormalities.  Appetite is  normal.  No pain except occ R ankle.    Objective:  /77   Pulse 60   Resp 16   Ht 1.575 m (5' 2\")   SpO2 98%   BMI 29.81 kg/m    Exam:  GENERAL APPEARANCE:  Alert, in no distress, cooperative  ENT:  Mouth and posterior oropharynx normal, moist mucous membranes, [place]  EYES:  EOM, conjunctivae, lids, pupils and irises normal, PERRL  RESP:  respiratory effort and palpation of chest normal, lungs clear to auscultation , no respiratory distress  CV:  Palpation and auscultation of heart done , regular rate and rhythm, no murmur, rub, or gallop, no edema  ABDOMEN:  normal bowel sounds, soft, nontender, no hepatosplenomegaly or other masses, no guarding or rebound  M/S:   muscle strength 5/5 all 4 ext., normal tone. no apparent pain with ROM R ankle  SKIN:  Inspection of skin and subcutaneous tissue baseline  NEURO:   Cranial nerves 2-12 are normal tested and grossly at patient's baseline, speech clear  PSYCH:  insight and judgement impaired, memory impaired , affect and mood normal    Labs:     Most Recent 3 CBC's:Recent Labs   Lab Test 03/02/22  0644 10/13/21  1145 12/15/20  0700   WBC 10.0 11.6* 10.3   HGB 11.9 12.6 12.8   MCV 89 89 86    277 331     Most Recent 3 BMP's:Recent Labs   Lab Test 03/02/22  0644 10/13/21  1145 03/01/21  0833    138 136   POTASSIUM 4.2 4.1 4.1   CHLORIDE 103 104 100   CO2 24 24 25   BUN 12 10 10   CR 0.76 0.79 0.74   ANIONGAP 13 10 11   MAUREEN 9.4 9.9 9.3   GLC 98 124 100       ASSESSMENT/PLAN:  (I10) Essential hypertension  (primary encounter diagnosis)  Comment: BPs stable  Plan: 1. Cont. norvasc  2. " Cont. zestoretic  3. Follow BPs, HRs  4. Monitor for LE edema  5. Hgb, bmp tomorrow    (M25.571) Pain in joint involving ankle and foot, right  Comment: occ . S/s. Gait steady with walker  Plan: 1. Monitor for changes in gait  2. For further increase in ankle pain, may start tylenol  3. Monitor LE skin for redness    (K21.9) Gastroesophageal reflux disease, unspecified whether esophagitis present  Comment: currently stable  Plan: 1. Cont. prilosec  2. Monitor for reports of nausea, emesis  3. hgb tomorrow  4. Follow wt.s, po intake    Electronically signed by:  BETH Hannon CNP

## 2022-07-27 ENCOUNTER — TRANSFERRED RECORDS (OUTPATIENT)
Dept: HEALTH INFORMATION MANAGEMENT | Facility: CLINIC | Age: 87
End: 2022-07-27

## 2022-07-27 LAB
ANION GAP SERPL CALCULATED.3IONS-SCNC: 9 MMOL/L (ref 7–15)
BUN SERPL-MCNC: 9.8 MG/DL (ref 8–23)
CALCIUM SERPL-MCNC: 9.3 MG/DL (ref 8.2–9.6)
CHLORIDE SERPL-SCNC: 102 MMOL/L (ref 98–107)
CREAT SERPL-MCNC: 0.68 MG/DL (ref 0.51–0.95)
DEPRECATED HCO3 PLAS-SCNC: 27 MMOL/L (ref 22–29)
GFR SERPL CREATININE-BSD FRML MDRD: 82 ML/MIN/1.73M2
GLUCOSE SERPL-MCNC: 133 MG/DL (ref 70–99)
HGB BLD-MCNC: 12.3 G/DL (ref 11.7–15.7)
POTASSIUM SERPL-SCNC: 3.5 MMOL/L (ref 3.4–5.3)
SODIUM SERPL-SCNC: 138 MMOL/L (ref 136–145)

## 2022-07-27 PROCEDURE — 80048 BASIC METABOLIC PNL TOTAL CA: CPT | Mod: ORL | Performed by: NURSE PRACTITIONER

## 2022-07-27 PROCEDURE — P9604 ONE-WAY ALLOW PRORATED TRIP: HCPCS | Mod: ORL | Performed by: NURSE PRACTITIONER

## 2022-07-27 PROCEDURE — 36415 COLL VENOUS BLD VENIPUNCTURE: CPT | Mod: ORL | Performed by: NURSE PRACTITIONER

## 2022-07-27 PROCEDURE — 85018 HEMOGLOBIN: CPT | Mod: ORL | Performed by: NURSE PRACTITIONER

## 2022-08-20 ENCOUNTER — HOSPITAL ENCOUNTER (OUTPATIENT)
Facility: CLINIC | Age: 87
Setting detail: OBSERVATION
Discharge: HOME | End: 2022-08-21
Attending: EMERGENCY MEDICINE | Admitting: INTERNAL MEDICINE
Payer: COMMERCIAL

## 2022-08-20 ENCOUNTER — APPOINTMENT (OUTPATIENT)
Dept: CT IMAGING | Facility: CLINIC | Age: 87
End: 2022-08-20
Attending: EMERGENCY MEDICINE
Payer: COMMERCIAL

## 2022-08-20 DIAGNOSIS — R07.9 CHEST PAIN, UNSPECIFIED TYPE: ICD-10-CM

## 2022-08-20 LAB
ALBUMIN SERPL BCG-MCNC: 4.3 G/DL (ref 3.5–5.2)
ALBUMIN UR-MCNC: NEGATIVE MG/DL
ALP SERPL-CCNC: 93 U/L (ref 35–104)
ALT SERPL W P-5'-P-CCNC: 16 U/L (ref 10–35)
ANION GAP SERPL CALCULATED.3IONS-SCNC: 13 MMOL/L (ref 7–15)
APPEARANCE UR: CLEAR
AST SERPL W P-5'-P-CCNC: 26 U/L (ref 10–35)
BASOPHILS # BLD MANUAL: 0.1 10E3/UL (ref 0–0.2)
BASOPHILS NFR BLD MANUAL: 1 %
BILIRUB SERPL-MCNC: 0.3 MG/DL
BILIRUB UR QL STRIP: NEGATIVE
BUN SERPL-MCNC: 11.6 MG/DL (ref 8–23)
CALCIUM SERPL-MCNC: 9.7 MG/DL (ref 8.2–9.6)
CHLORIDE SERPL-SCNC: 99 MMOL/L (ref 98–107)
COLOR UR AUTO: NORMAL
CREAT SERPL-MCNC: 0.81 MG/DL (ref 0.51–0.95)
DEPRECATED HCO3 PLAS-SCNC: 25 MMOL/L (ref 22–29)
EOSINOPHIL # BLD MANUAL: 0.3 10E3/UL (ref 0–0.7)
EOSINOPHIL NFR BLD MANUAL: 2 %
ERYTHROCYTE [DISTWIDTH] IN BLOOD BY AUTOMATED COUNT: 14.2 % (ref 10–15)
GFR SERPL CREATININE-BSD FRML MDRD: 69 ML/MIN/1.73M2
GLUCOSE SERPL-MCNC: 125 MG/DL (ref 70–99)
GLUCOSE UR STRIP-MCNC: NEGATIVE MG/DL
HCT VFR BLD AUTO: 40.9 % (ref 35–47)
HGB BLD-MCNC: 12.6 G/DL (ref 11.7–15.7)
HGB UR QL STRIP: NEGATIVE
HOLD SPECIMEN: NORMAL
KETONES UR STRIP-MCNC: NEGATIVE MG/DL
LEUKOCYTE ESTERASE UR QL STRIP: NEGATIVE
LYMPHOCYTES # BLD MANUAL: 7 10E3/UL (ref 0.8–5.3)
LYMPHOCYTES NFR BLD MANUAL: 51 %
MCH RBC QN AUTO: 27.6 PG (ref 26.5–33)
MCHC RBC AUTO-ENTMCNC: 30.8 G/DL (ref 31.5–36.5)
MCV RBC AUTO: 90 FL (ref 78–100)
MONOCYTES # BLD MANUAL: 1 10E3/UL (ref 0–1.3)
MONOCYTES NFR BLD MANUAL: 7 %
NEUTROPHILS # BLD MANUAL: 5.3 10E3/UL (ref 1.6–8.3)
NEUTROPHILS NFR BLD MANUAL: 39 %
NITRATE UR QL: NEGATIVE
PH UR STRIP: 6.5 [PH] (ref 5–7)
PLAT MORPH BLD: ABNORMAL
PLATELET # BLD AUTO: 263 10E3/UL (ref 150–450)
POTASSIUM SERPL-SCNC: 3.8 MMOL/L (ref 3.4–5.3)
PROT SERPL-MCNC: 7.1 G/DL (ref 6.4–8.3)
RBC # BLD AUTO: 4.56 10E6/UL (ref 3.8–5.2)
RBC MORPH BLD: ABNORMAL
RBC URINE: 1 /HPF
SARS-COV-2 RNA RESP QL NAA+PROBE: NEGATIVE
SODIUM SERPL-SCNC: 137 MMOL/L (ref 136–145)
SP GR UR STRIP: 1.01 (ref 1–1.03)
SQUAMOUS EPITHELIAL: <1 /HPF
TROPONIN T SERPL HS-MCNC: 16 NG/L
TROPONIN T SERPL HS-MCNC: 16 NG/L
UROBILINOGEN UR STRIP-MCNC: NORMAL MG/DL
WBC # BLD AUTO: 13.7 10E3/UL (ref 4–11)
WBC URINE: 3 /HPF

## 2022-08-20 PROCEDURE — C9803 HOPD COVID-19 SPEC COLLECT: HCPCS

## 2022-08-20 PROCEDURE — 99220 PR INITIAL OBSERVATION CARE,LEVEL III: CPT | Performed by: INTERNAL MEDICINE

## 2022-08-20 PROCEDURE — 85027 COMPLETE CBC AUTOMATED: CPT | Performed by: EMERGENCY MEDICINE

## 2022-08-20 PROCEDURE — 250N000009 HC RX 250: Performed by: EMERGENCY MEDICINE

## 2022-08-20 PROCEDURE — G0378 HOSPITAL OBSERVATION PER HR: HCPCS

## 2022-08-20 PROCEDURE — 36415 COLL VENOUS BLD VENIPUNCTURE: CPT | Mod: 59 | Performed by: INTERNAL MEDICINE

## 2022-08-20 PROCEDURE — 84484 ASSAY OF TROPONIN QUANT: CPT | Performed by: INTERNAL MEDICINE

## 2022-08-20 PROCEDURE — 99285 EMERGENCY DEPT VISIT HI MDM: CPT | Mod: 25

## 2022-08-20 PROCEDURE — 80053 COMPREHEN METABOLIC PANEL: CPT | Performed by: EMERGENCY MEDICINE

## 2022-08-20 PROCEDURE — 85007 BL SMEAR W/DIFF WBC COUNT: CPT | Performed by: EMERGENCY MEDICINE

## 2022-08-20 PROCEDURE — 81001 URINALYSIS AUTO W/SCOPE: CPT | Performed by: EMERGENCY MEDICINE

## 2022-08-20 PROCEDURE — 250N000011 HC RX IP 250 OP 636: Performed by: EMERGENCY MEDICINE

## 2022-08-20 PROCEDURE — U0003 INFECTIOUS AGENT DETECTION BY NUCLEIC ACID (DNA OR RNA); SEVERE ACUTE RESPIRATORY SYNDROME CORONAVIRUS 2 (SARS-COV-2) (CORONAVIRUS DISEASE [COVID-19]), AMPLIFIED PROBE TECHNIQUE, MAKING USE OF HIGH THROUGHPUT TECHNOLOGIES AS DESCRIBED BY CMS-2020-01-R: HCPCS | Performed by: EMERGENCY MEDICINE

## 2022-08-20 PROCEDURE — 84484 ASSAY OF TROPONIN QUANT: CPT | Performed by: EMERGENCY MEDICINE

## 2022-08-20 PROCEDURE — 36415 COLL VENOUS BLD VENIPUNCTURE: CPT | Performed by: EMERGENCY MEDICINE

## 2022-08-20 PROCEDURE — 93005 ELECTROCARDIOGRAM TRACING: CPT

## 2022-08-20 PROCEDURE — 71275 CT ANGIOGRAPHY CHEST: CPT

## 2022-08-20 RX ORDER — ONDANSETRON 4 MG/1
4 TABLET, ORALLY DISINTEGRATING ORAL EVERY 6 HOURS PRN
Status: DISCONTINUED | OUTPATIENT
Start: 2022-08-20 | End: 2022-08-21 | Stop reason: HOSPADM

## 2022-08-20 RX ORDER — ACETAMINOPHEN 325 MG/1
650 TABLET ORAL EVERY 6 HOURS PRN
Status: DISCONTINUED | OUTPATIENT
Start: 2022-08-20 | End: 2022-08-21 | Stop reason: HOSPADM

## 2022-08-20 RX ORDER — ACETAMINOPHEN 500 MG
1000 TABLET ORAL 2 TIMES DAILY PRN
COMMUNITY

## 2022-08-20 RX ORDER — ASPIRIN 81 MG/1
81 TABLET ORAL DAILY
Status: DISCONTINUED | OUTPATIENT
Start: 2022-08-21 | End: 2022-08-21 | Stop reason: HOSPADM

## 2022-08-20 RX ORDER — ACETAMINOPHEN 650 MG/1
650 SUPPOSITORY RECTAL EVERY 6 HOURS PRN
Status: DISCONTINUED | OUTPATIENT
Start: 2022-08-20 | End: 2022-08-21 | Stop reason: HOSPADM

## 2022-08-20 RX ORDER — ONDANSETRON 2 MG/ML
4 INJECTION INTRAMUSCULAR; INTRAVENOUS EVERY 6 HOURS PRN
Status: DISCONTINUED | OUTPATIENT
Start: 2022-08-20 | End: 2022-08-21 | Stop reason: HOSPADM

## 2022-08-20 RX ORDER — IOPAMIDOL 755 MG/ML
500 INJECTION, SOLUTION INTRAVASCULAR ONCE
Status: COMPLETED | OUTPATIENT
Start: 2022-08-20 | End: 2022-08-20

## 2022-08-20 RX ADMIN — SODIUM CHLORIDE 55 ML: 9 INJECTION, SOLUTION INTRAVENOUS at 16:39

## 2022-08-20 RX ADMIN — IOPAMIDOL 55 ML: 755 INJECTION, SOLUTION INTRAVENOUS at 16:39

## 2022-08-20 ASSESSMENT — ACTIVITIES OF DAILY LIVING (ADL)
ADLS_ACUITY_SCORE: 30
ADLS_ACUITY_SCORE: 37

## 2022-08-20 ASSESSMENT — ENCOUNTER SYMPTOMS
COUGH: 1
BACK PAIN: 0
SHORTNESS OF BREATH: 1

## 2022-08-20 NOTE — ED NOTES
[facility]  ED Nurse Handoff Report    Juany Collazo is a 89 year old female   ED Chief complaint: Chest Pain  . ED Diagnosis:   Final diagnoses:   Chest pain, unspecified type     Allergies: No Known Allergies    Code Status: OK with CPR, NO INTUBATION  Activity level - Baseline/Home:  Assist X 1. Activity Level - Current:   Assist X 1. Lift room needed: No. Bariatric: No   Needed: No   Isolation: No. Infection: Not Applicable.     Vital Signs:   Vitals:    08/20/22 1513 08/20/22 1530 08/20/22 1545 08/20/22 1600   BP: (!) 147/99 (!) 146/76 (!) 142/71 131/70   Pulse: 62 60 60 60   Resp: 18 12 19 18   Temp: 99.2  F (37.3  C)      TempSrc: Oral      SpO2: 98% 96% 97% 99%       Cardiac Rhythm:  ,      Pain level:    Patient confused: No. Patient Falls Risk: Yes.   Elimination Status: Has voided   Patient Report - Initial Complaint: chest pain. Focused Assessment: pt with right sided chest pain which started at 1430 today, improved after nitroglycerin and ASA.    Tests Performed: labs, CT. Abnormal Results: N/A.   Treatments provided: N/A  Family Comments: daughter at bedside  OBS brochure/video discussed/provided to patient:  Yes  ED Medications:   Medications   iopamidol (ISOVUE-370) solution 500 mL (55 mLs Intravenous Given 8/20/22 1639)   CT Scan Flush (55 mLs Intravenous Given 8/20/22 1639)     Drips infusing:  No  For the majority of the shift, the patient's behavior Green. Interventions performed were N/A.    Sepsis treatment initiated: No     Patient tested for COVID 19 prior to admission: YES    ED Nurse Name/Phone Number: Nikki Swenson RN,   5:36 PM

## 2022-08-20 NOTE — ED NOTES
Bed: ED03  Expected date: 8/20/22  Expected time: 3:01 PM  Means of arrival:   Comments:  Siddharth Barger

## 2022-08-20 NOTE — ED TRIAGE NOTES
Pt with right sided chest tightness which radiates to back, started at 1430, pt called EMS. C/o SOB also, given 1 nitroglycerin tablet and 324 ASA en route. Pt states pain now 1/10. ABC's intact, alert and oriented X3.

## 2022-08-20 NOTE — ED PROVIDER NOTES
"  History   Chief Complaint:  Chest Pain       The history is provided by the patient and the EMS personnel.      Juany Collazo is a 89 year old female with history of hyperlipidemia and hypertension who presents with right sided chest pain that began at 1430. In transit EMS gave her 1 nitroglycerin, and 324 mg of Aspirin. In transit she also described her pain as a 7/10.  In the ED, the patient describes her chest pain as \"heavy\" and not radiating to her back. She notes that she is not in pain now. Juany mentions that she is unsure of what happened or how the onset of pain began, but she notes that she thinks that she was in bed. Alongside this, she reports shortness of breath and constant cough. The patient states that she has never had pain like this before. She denies leg pain or rash.    Review of Systems   Respiratory: Positive for cough and shortness of breath (constant).    Cardiovascular: Positive for chest pain. Leg swelling: right side.   Musculoskeletal: Negative for back pain.   All other systems reviewed and are negative.        Allergies:  No known drug allergies     Medications:  Albuterol  Amlodipine   aspirin   celecoxib   citalopram   Aricept   lisinopril-hydrochlorothiazide   omeprazole   simvastatin    Past Medical History:     Arthritis  Carpal tunnel syndrome           Chronic pain   Lumbar stenosis   GERD     Hyperlipidemia              Hypertension     Mild major depression   Osteoporosis      Gluteal tendonitis of right buttock    Past Surgical History:    Arthroplasty hip   Arthroplasty knee   Decompression lumbar   Eye surgery   Tubal ligation      Family History:      Brother: Muscular Dystrophy, heart disease  Father: heart disease  Mother Hypertension, Arthritis, cataracts, Glaucoma, Osteoporosis    Social History:  The patient presents to the ED alone.  The patient presents to the ED via EMS.  PCP: Gaurang Reeves     Physical Exam     Patient Vitals for the past 24 " hrs:   BP Temp Temp src Pulse Resp SpO2   08/20/22 1600 131/70 -- -- 60 18 99 %   08/20/22 1545 (!) 142/71 -- -- 60 19 97 %   08/20/22 1530 (!) 146/76 -- -- 60 12 96 %   08/20/22 1513 (!) 147/99 99.2  F (37.3  C) Oral 62 18 98 %       Physical Exam  Constitutional:       General: She is not in acute distress.     Appearance: She is not diaphoretic.   HENT:      Head: Atraumatic.      Mouth/Throat:      Pharynx: No oropharyngeal exudate.   Eyes:      General: No scleral icterus.     Pupils: Pupils are equal, round, and reactive to light.   Cardiovascular:      Rate and Rhythm: Normal rate and regular rhythm.      Heart sounds: Normal heart sounds.      Comments: No chest wall rash or tenderness.  Pulmonary:      Effort: No respiratory distress.      Breath sounds: Normal breath sounds.   Abdominal:      General: Bowel sounds are normal.      Palpations: Abdomen is soft.      Tenderness: There is no abdominal tenderness.   Musculoskeletal:         General: No tenderness.      Right lower leg: No edema.      Left lower leg: No edema.   Skin:     General: Skin is warm.      Findings: No rash.   Neurological:      General: No focal deficit present.      Mental Status: She is oriented to person, place, and time.   Psychiatric:         Mood and Affect: Mood normal.         Behavior: Behavior normal.           Emergency Department Course   ECG  ECG taken at 1515, ECG read at 1520  Normal sinus rhythm  Normal ECG   No significant change noted as compared to prior, dated 08/07/2020.  Rate 61 bpm. WA interval 148 ms. QRS duration 84 ms. QT/QTc 450/453 ms. P-R-T axes 66 46 68.     Imaging:  CT Chest Pulmonary Embolism w Contrast   Final Result   IMPRESSION:   1.  Pulmonary arteries are well-opacified and there is no evidence for pulmonary embolus in either lung.   2.  No thoracic aortic aneurysm or thoracic aortic dissection.   3.  Mild atelectasis in the lingula no focal consolidation, pneumothorax, pleural effusion or  lymphadenopathy.   4.  Small hiatal hernia with a small amount of fluid in the esophagus.        Report per radiology    Laboratory:  Labs Ordered and Resulted from Time of ED Arrival to Time of ED Departure   COMPREHENSIVE METABOLIC PANEL - Abnormal       Result Value    Sodium 137      Potassium 3.8      Creatinine 0.81      Urea Nitrogen 11.6      Chloride 99      Carbon Dioxide (CO2) 25      Anion Gap 13      Glucose 125 (*)     Calcium 9.7 (*)     Protein Total 7.1      Albumin 4.3      Bilirubin Total 0.3      Alkaline Phosphatase 93      AST 26      ALT 16      GFR Estimate 69     TROPONIN T, HIGH SENSITIVITY - Abnormal    Troponin T, High Sensitivity 16 (*)    CBC WITH PLATELETS AND DIFFERENTIAL - Abnormal    WBC Count 13.7 (*)     RBC Count 4.56      Hemoglobin 12.6      Hematocrit 40.9      MCV 90      MCH 27.6      MCHC 30.8 (*)     RDW 14.2      Platelet Count 263     DIFFERENTIAL - Abnormal    % Neutrophils 39      % Lymphocytes 51      % Monocytes 7      % Eosinophils 2      % Basophils 1      Absolute Neutrophils 5.3      Absolute Lymphocytes 7.0 (*)     Absolute Monocytes 1.0      Absolute Eosinophils 0.3      Absolute Basophils 0.1      RBC Morphology Confirmed RBC Indices      Platelet Assessment        Value: Automated Count Confirmed. Platelet morphology is normal.   ROUTINE UA WITH MICROSCOPIC REFLEX TO CULTURE - Normal    Color Urine Light Yellow      Appearance Urine Clear      Glucose Urine Negative      Bilirubin Urine Negative      Ketones Urine Negative      Specific Gravity Urine 1.012      Blood Urine Negative      pH Urine 6.5      Protein Albumin Urine Negative      Urobilinogen Urine Normal      Nitrite Urine Negative      Leukocyte Esterase Urine Negative      RBC Urine 1      WBC Urine 3      Squamous Epithelials Urine <1     COVID-19 VIRUS (CORONAVIRUS) BY PCR     Emergency Department Course:  Reviewed:  I reviewed nursing notes, vitals, past medical history and Care  Everywhere    Assessments:  1522 I obtained history and examined the patient as noted above.   1720 I rechecked the patient and explained findings.       Interventions:  Medications   iopamidol (ISOVUE-370) solution 500 mL (55 mLs Intravenous Given 8/20/22 1639)   CT Scan Flush (55 mLs Intravenous Given 8/20/22 1639)     Disposition:  The patient was admitted to the hospital under the care of Dr. Vick.     Impression & Plan     Medical Decision Making:  This patient is a 89-year-old woman who presents to the ED with an episode of chest heaviness that resolved after nitroglycerin given by EMS.  She was also given aspirin.  She is pain-free here.  EKG shows very slight ST depression in the inferior and lateral leads although this has been present in the past.  Troponin is elevated but just above the upper limit of normal.  She remains pain-free here.  CT scan does not demonstrate aortic dissection, PE, or infiltrate.    Patient's heart score is 7.  She will be admitted to the hospital service for observation.  At this point we will hold off on full anticoagulation until we get a repeat troponin.        Diagnosis:    ICD-10-CM    1. Chest pain, unspecified type  R07.9        Discharge Medications:  New Prescriptions    No medications on file       Scribe Disclosure:  I, David Ki, am serving as a scribe at 3:22 PM on 8/20/2022 to document services personally performed Alvarado Stanley MD based on my observations and the provider's statements to me.          Alvarado Stanley MD  08/20/22 [id number]

## 2022-08-20 NOTE — H&P
[facility]    HISTORY AND PHYSICAL    (Hospitalist Service)       Date of Admission:  8/20/2022    Assessment & Plan   Juany Collazo is a 89 year old female who presents with chest discomfort.    This is a 89-year-old lady with a past medical history significant for hypertension, dyslipidemia, osteoarthritis.  She lives at a facility, [facility].  She is accompanied by her daughter.  She was brought into the emergency room by EMS.    She was brought into the emergency room for her chest discomfort.  Overall, patient is not able to give a full detail of her symptomology.  According to her, sometimes in the afternoon she started experiencing chest discomfort when she was taking a nap.  Initially she thought it would go away but it persisted so she called the staff.  The staff advised to her to go to the emergency room and EMS were called.  Patient is unsure about the duration of her symptoms.  She does not remember if she still had the symptoms when EMS arrived.  Apparently she was given sublingual nitroglycerin by EMS.  Currently she is chest pain-free.    She denies any similar symptoms in the past.  She does not have any other associated symptoms such as shortness of breath, dizziness, sweating, palpitations, nausea/vomiting.  She denies any new cough or fever.  Denies any urinary symptoms.    At baseline, her activity is quite limited.  She does not walk much.  According to the daughter, most of the activity that she does is when she comes to her house.    In the emergency room, noted to have a marginal rise in the troponin, 16.  EKG did not reveal any significant worrisome abnormalities.  Mild leukocytosis noted.  Patient was referred to medicine team for observation admission.    Problem List:    Chest discomfort.  - Asymptomatic now.  - Overall low likelihood of this being acute coronary syndrome.  - Admission to medicine service as an observation.  Serial troponin check.   Echocardiogram in the morning.  - I discussed the plan with the patient and patient's daughter.  They are in agreement.  They do not want to proceed with more intensive investigation such as stress testing, and definitely not any angiogram; especially because her activity level at baseline is quite poor.  - In March, her LDL was 75 and HDL of 56.  - Continue low-dose aspirin.    Leukocytosis  - Does not appear to have any evidence of acute infection. No h/o fever.  - Underwent CT angiogram which was negative for PE.  No evidence of pneumonia.  Urinalysis unremarkable.  Recheck in the morning.    Hypertension.  Dyslipidemia.  - Waiting for med list to be reconciled.    Possible cognitive dysfunction.      DVT Prophylaxis: Low Risk/Ambulatory with no VTE prophylaxis indicated  Code Status: Full Code.  Discussed with the patient.    Edilberto Vick MD    Primary Care Physician   Gaurang Reeves    Chief Complaint   Chest discomfort      History of Present Illness   Juany Collazo is a 89 year old female presented with chest discomfort      Past Medical History    I have reviewed this patient's medical history and updated it with pertinent information if needed.   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  Past Surgical History:   Procedure Laterality Date     ARTHROPLASTY HIP Right 1998     ARTHROPLASTY KNEE Left 3/9/2020    Procedure: Left total knee arthroplasty ([employer] #3 PCR femur; #2 tibia; 35 mm thin patella; 9 mm tibial insert);  Surgeon: Isreal Tineo MD;  Location: [facility] OR     DECOMPRESSION LUMBAR ONE LEVEL  4/19/2013     Procedure: DECOMPRESSION LUMBAR ONE LEVEL;  Decompression Bilateral L4-5  ;  Surgeon: Lang Garcia MD;  Location: [facility] OR     EYE SURGERY       OPEN REDUCTION INTERNAL FIXATION ANKLE Right 8/8/2020    Procedure: Open reduction with internal fixation, right trimalleolar ankle fracture.;  Surgeon: Himanshu Nash MD;  Location: [facility] OR     Tubal ligation NOS  1970       Prior to Admission Medications   Prior to Admission Medications   Prescriptions Last Dose Informant Patient Reported? Taking?   albuterol (PROAIR HFA/PROVENTIL HFA/VENTOLIN HFA) 108 (90 Base) MCG/ACT inhaler   Yes No   Sig: Inhale 2 puffs into the lungs every 4 hours as needed for shortness of breath / dyspnea or wheezing   amLODIPine (NORVASC) 2.5 MG tablet   Yes No   Sig: Take 2.5 mg by mouth daily   aspirin (ASA) 81 MG chewable tablet   No No   Sig: Take 1 tablet (81 mg) by mouth daily   citalopram (CELEXA) 20 MG tablet   No No   Sig: Take 1 tablet (20 mg) by mouth daily   diclofenac (VOLTAREN) 1 % topical gel   No No   Sig: Apply 2 g topically 2 times daily as needed for moderate pain   donepezil (ARICEPT) 10 MG tablet   No No   Sig: Take 1 tablet (10 mg) by mouth At Bedtime   hydrocortisone (CORTAID) 1 % external cream   Yes No   Sig: Apply topically 2 times daily as needed (L forearm rash)   lisinopril-hydrochlorothiazide (ZESTORETIC) 20-12.5 MG tablet   No No   Sig: Take 1 tablet by mouth daily   melatonin 3 MG tablet   Yes No   Sig: Take 1 tablet (3 mg) by mouth nightly as needed for sleep   omeprazole (PRILOSEC) 20 MG DR capsule   Yes No   Sig: Take 20 mg by mouth daily    polyethylene glycol (MIRALAX) 17 GM/Dose powder   No No   Sig: Take 17 g (1 capful) by mouth daily as needed for constipation   simvastatin (ZOCOR) 20 MG tablet   No No   Sig: Take 1 tablet (20 mg) by mouth At Bedtime      Facility-Administered Medications: None     Allergies   No Known Allergies    Social History   I have reviewed this patient's social history  and updated it with pertinent information if needed. Juany Collazo  reports that she quit smoking about 56 years ago. She has never used smokeless tobacco. She reports previous alcohol use. She reports that she does not use drugs.    Family History   I have reviewed this patient's family history and updated it with pertinent information if needed.   Family History   Problem Relation Age of Onset     Cardiovascular Brother      Musculoskeletal Disorder Brother         Muscular Dystrophy     Hypertension Father      Cardiovascular Father      Hypertension Mother      Arthritis Mother      Eye Disorder Mother         Cataract     Osteoporosis Mother      Glaucoma Mother         possible per Pt     Osteoporosis Maternal Aunt      Macular Degeneration No family hx of        Review of Systems   The 10 point Review of Systems is negative other than noted in the HPI or here.     Physical Exam   Temp: 99.2  F (37.3  C) Temp src: Oral BP: (!) 147/72 Pulse: 59   Resp: 22 SpO2: 96 % O2 Device: None (Room air)    Vital Signs with Ranges  Temp:  [99.2  F (37.3  C)] 99.2  F (37.3  C)  Pulse:  [58-62] 59  Resp:  [12-25] 22  BP: (130-147)/(70-99) 147/72  SpO2:  [96 %-99 %] 96 %  0 lbs 0 oz    Constitutional: Awake, alert, cooperative, no apparent distress.  Eyes: Conjunctiva and pupils examined.  Unequal pupils.  Appears to have scarring of right pupil.  HEENT: Moist mucous membranes, normal dentition.  Respiratory: Clear to auscultation bilaterally, no crackles or wheezing.  Cardiovascular: Regular rate and rhythm, normal S1 and S2, and no murmur noted.  GI: Soft, non-distended, non-tender, normal bowel sounds.  Skin: No rashes, no cyanosis, no edema.  Musculoskeletal: No joint swelling, erythema or tenderness.  Neurologic: Cranial nerves 2-12 intact, normal strength and sensation.  Psychiatric: Alert, oriented to person, place and time, no obvious anxiety or depression.    Data     Recent Labs   Lab 08/20/22  1520   WBC 13.7*    HGB 12.6   MCV 90         POTASSIUM 3.8   CHLORIDE 99   CO2 25   BUN 11.6   CR 0.81   ANIONGAP 13   MAUREEN 9.7*   *   ALBUMIN 4.3   PROTTOTAL 7.1   BILITOTAL 0.3   ALKPHOS 93   ALT 16   AST 26       Recent Results (from the past 24 hour(s))   CT Chest Pulmonary Embolism w Contrast    Narrative    EXAM: CT CHEST PULMONARY EMBOLISM W CONTRAST  LOCATION: [facility]  DATE/TIME: 8/20/2022 4:46 PM    INDICATION: chest pain  COMPARISON: Chest radiograph dated 01/03/2016.  TECHNIQUE: CT chest pulmonary angiogram during arterial phase injection of IV contrast. Multiplanar reformats and MIP reconstructions were performed. Dose reduction techniques were used.   CONTRAST: 55mL Isovue 370    FINDINGS:  ANGIOGRAM CHEST: Pulmonary arteries are well-opacified and there is no evidence for pulmonary embolus in either lung. There is no thoracic aortic aneurysm or thoracic aortic dissection. No CT evidence of right heart strain.    LUNGS AND PLEURA: Mild atelectasis in the inferior lingula. No focal consolidation, pneumothorax or pleural effusion. No suspicious pulmonary nodules or masses.    MEDIASTINUM/AXILLAE: No lymphadenopathy. There is some fluid in the esophagus. No pericardial effusion. Normal heart size. Small hiatal hernia.    CORONARY ARTERY CALCIFICATION: Mild.    UPPER ABDOMEN: 8 mm circumscribed water density focus in the right lobe of the liver on image 270 of series 6 consistent with a small cyst or hemangioma. No suspicious hepatic lesions.    MUSCULOSKELETAL: Mild degenerative change in the spine no fractures or suspicious bony lesions.      Impression    IMPRESSION:  1.  Pulmonary arteries are well-opacified and there is no evidence for pulmonary embolus in either lung.  2.  No thoracic aortic aneurysm or thoracic aortic dissection.  3.  Mild atelectasis in the lingula no focal consolidation, pneumothorax, pleural effusion or lymphadenopathy.  4.  Small hiatal hernia with a  small amount of fluid in the esophagus.

## 2022-08-21 ENCOUNTER — APPOINTMENT (OUTPATIENT)
Dept: CARDIOLOGY | Facility: CLINIC | Age: 87
End: 2022-08-21
Attending: INTERNAL MEDICINE
Payer: COMMERCIAL

## 2022-08-21 VITALS
DIASTOLIC BLOOD PRESSURE: 96 MMHG | TEMPERATURE: 98.1 F | SYSTOLIC BLOOD PRESSURE: 147 MMHG | RESPIRATION RATE: 20 BRPM | HEART RATE: 82 BPM | OXYGEN SATURATION: 99 %

## 2022-08-21 LAB
ERYTHROCYTE [DISTWIDTH] IN BLOOD BY AUTOMATED COUNT: 14.2 % (ref 10–15)
HCT VFR BLD AUTO: 39.3 % (ref 35–47)
HGB BLD-MCNC: 12.1 G/DL (ref 11.7–15.7)
LVEF ECHO: NORMAL
MCH RBC QN AUTO: 28.1 PG (ref 26.5–33)
MCHC RBC AUTO-ENTMCNC: 30.8 G/DL (ref 31.5–36.5)
MCV RBC AUTO: 91 FL (ref 78–100)
PLATELET # BLD AUTO: 237 10E3/UL (ref 150–450)
RBC # BLD AUTO: 4.3 10E6/UL (ref 3.8–5.2)
TROPONIN T SERPL HS-MCNC: 16 NG/L
TROPONIN T SERPL HS-MCNC: 16 NG/L
WBC # BLD AUTO: 11.2 10E3/UL (ref 4–11)

## 2022-08-21 PROCEDURE — 99217 PR OBSERVATION CARE DISCHARGE: CPT | Performed by: INTERNAL MEDICINE

## 2022-08-21 PROCEDURE — 84484 ASSAY OF TROPONIN QUANT: CPT | Performed by: INTERNAL MEDICINE

## 2022-08-21 PROCEDURE — 93306 TTE W/DOPPLER COMPLETE: CPT

## 2022-08-21 PROCEDURE — 85014 HEMATOCRIT: CPT | Performed by: INTERNAL MEDICINE

## 2022-08-21 PROCEDURE — 93306 TTE W/DOPPLER COMPLETE: CPT | Mod: 26 | Performed by: INTERNAL MEDICINE

## 2022-08-21 PROCEDURE — 250N000013 HC RX MED GY IP 250 OP 250 PS 637: Performed by: INTERNAL MEDICINE

## 2022-08-21 PROCEDURE — G0378 HOSPITAL OBSERVATION PER HR: HCPCS

## 2022-08-21 PROCEDURE — 36415 COLL VENOUS BLD VENIPUNCTURE: CPT | Performed by: INTERNAL MEDICINE

## 2022-08-21 RX ADMIN — ASPIRIN 81 MG: 81 TABLET, COATED ORAL at 09:34

## 2022-08-21 ASSESSMENT — ACTIVITIES OF DAILY LIVING (ADL)
ADLS_ACUITY_SCORE: 30

## 2022-08-21 NOTE — DISCHARGE INSTRUCTIONS
Reason for your hospital stay   Atypical chest pain. Normal ECHO.   ?GERD. Increase omeprazole to twice daily for one month, then resume to once daily regime.        Icon medication changes this visit           Your medications have changed    Icon medications to change how you take   CHANGE how you take:  omeprazole (priLOSEC)       Activity instructions    Your activity upon discharge: activity as tolerated      Icon diet      Diet instructions    Follow this diet upon discharge: Orders Placed This Encounter       Regular Diet Adult        Follow-up Appointments    Follow-up Appointments   Follow-up and recommended labs and tests     Follow up with primary care provider, Gaurang Reeves, within 7 days for hospital follow- up.         What's Next    You currently have no upcoming appointments scheduled.       Pending Results    Date and Time Order Name Status Description   8/20/2022  3:21 PM EKG 12 lead Preliminary             Pending Results Instructions    If you had any lab results that were not finalized at the time of your discharge and have MyChart, they will release to your chart when they are completed. Reach out to your primary physician if you have questions about these results. Someone will contact you if any new results are abnormal or indicate that there should be a change in your treatment.      Statement of Approval  (From admission, onward)  Ordered     08/21/22 1224  I have reviewed and agree with all the recommendations and orders detailed in this document.  EFFECTIVE NOW        Approved and electronically signed by Edilberto Vick MD            Admission Information    Date & Time   8/20/2022 Provider   Jana Chandler MD Department   [facility] Emergency Dept Dept. Phone   [phone]     Your Vitals Were  Most recent update: 8/21/2022 11:58 AM  Blood Pressure   139/70    Pulse   57    Temperature   98.1  F (36.7  C) (Oral)    Respirations   13    Pulse Oximetry   95%

## 2022-08-21 NOTE — PHARMACY-ADMISSION MEDICATION HISTORY
Admission medication history interview status for this patient is complete. See [facility] admission navigator for allergy information, prior to admission medications and immunization status.     Medication history interview done, indicate source(s): Patient  Medication history resources (including written lists, pill bottles, clinic record): [facility] MAR  Pharmacy: through [facility]    Changes made to PTA medication list:  Added: acetaminophen  Changed: none  Reported as Not Taking: none  Removed: none    Actions taken by pharmacist (provider contacted, etc): Received MAR from [facility] however it lacked last doses. I spoke with Juany and she said she thinks she took her morning doses today and evening doses last night.     Additional medication history information:None    Medication reconciliation/reorder completed by provider prior to medication history?  N    Prior to Admission medications    Medication Sig Last Dose Taking? Auth Provider Long Term End Date   acetaminophen (TYLENOL) 500 MG tablet Take 1,000 mg by mouth 2 times daily as needed for mild pain Up to 4,000 mg per 24 hours.  Yes Reported, Patient     albuterol (PROAIR HFA/PROVENTIL HFA/VENTOLIN HFA) 108 (90 Base) MCG/ACT inhaler Inhale 2 puffs into the lungs every 4 hours as needed for shortness of breath / dyspnea or wheezing  Yes Reported, Patient     amLODIPine (NORVASC) 2.5 MG tablet Take 2.5 mg by mouth daily 8/20/2022 at Unknown time Yes Reported, Patient No    aspirin (ASA) 81 MG chewable tablet Take 1 tablet (81 mg) by mouth daily 8/20/2022 at Unknown time Yes Fahad Garcia, APRN CNP     citalopram (CELEXA) 20 MG tablet Take 1 tablet (20 mg) by mouth daily 8/20/2022 at Unknown time Yes Aurora Wong, BETH MOSES Yes    diclofenac (VOLTAREN) 1 % topical gel Apply 2 g topically 2 times daily as needed for moderate pain  Yes Riky Salazar PA-C     donepezil (ARICEPT) 10 MG tablet Take 1 tablet (10 mg) by  mouth At Bedtime 8/19/2022 at Unknown time Yes Aurora Wong APRN CNP     hydrocortisone (CORTAID) 1 % external cream Apply topically 2 times daily as needed (L forearm rash)  Yes Reported, Patient     lisinopril-hydrochlorothiazide (ZESTORETIC) 20-12.5 MG tablet Take 1 tablet by mouth daily 8/20/2022 at Unknown time Yes Fahad Garcia APRN CNP Yes    melatonin 3 MG tablet Take 1 tablet (3 mg) by mouth nightly as needed for sleep  Yes Cheyenne Garcia,      omeprazole (PRILOSEC) 20 MG DR capsule Take 20 mg by mouth daily  8/20/2022 at Unknown time Yes Reported, Patient     polyethylene glycol (MIRALAX) 17 GM/Dose powder Take 17 g (1 capful) by mouth daily as needed for constipation  Yes Fahad Garcia APRN CNP     simvastatin (ZOCOR) 20 MG tablet Take 1 tablet (20 mg) by mouth At Bedtime 8/19/2022 at Unknown time Yes Aurora Wong APRN CNP Yes

## 2022-08-21 NOTE — DISCHARGE SUMMARY
"[facility]    DISCHARGE SUMMARY    (Hospitalist Service)    Date of Admission:  8/20/2022  Date of Discharge:  8/21/2022  2:43 PM  Discharging Provider: Edilberto Vick MD  Date of Service (when I saw the patient): 08/21/22    Discharge Diagnoses     Atypical chest pain.  Flat troponin.  Normal echocardiogram.    Hypertension.  Dyslipidemia.  Cognitive dysfunction.    History of Present Illness     \"Juany Collazo is a 89 year old female who presents with chest discomfort.     This is a 89-year-old lady with a past medical history significant for hypertension, dyslipidemia, osteoarthritis.  She lives at a facility, [facility].  She is accompanied by her daughter.  She was brought into the emergency room by EMS.     She was brought into the emergency room for her chest discomfort.  Overall, patient is not able to give a full detail of her symptomology.  According to her, sometimes in the afternoon she started experiencing chest discomfort when she was taking a nap.  Initially she thought it would go away but it persisted so she called the staff.  The staff advised to her to go to the emergency room and EMS were called.  Patient is unsure about the duration of her symptoms.  She does not remember if she still had the symptoms when EMS arrived.  Apparently she was given sublingual nitroglycerin by EMS.  Currently she is chest pain-free.     She denies any similar symptoms in the past.  She does not have any other associated symptoms such as shortness of breath, dizziness, sweating, palpitations, nausea/vomiting.  She denies any new cough or fever.  Denies any urinary symptoms.     At baseline, her activity is quite limited.  She does not walk much.  According to the daughter, most of the activity that she does is when she comes to her house.     In the emergency room, noted to have a marginal rise in the troponin, 16.  EKG did not reveal any significant worrisome abnormalities.  Mild leukocytosis " "noted.  Patient was referred to medicine team for observation admission.\"    Hospital Course     Atypical chest pain.  Patient was admitted as an observation to medicine service.  Noted to have marginal rise in the troponin.  Troponin remained flat without any further rise.  In the emergency room, when I saw her she was chest pain-free.  She remained chest pain-free in the hospital.  Echocardiogram was obtained.  Normal ejection fraction.  No wall motion abnormality.  Patient is ambulating within the room without any discomfort.    On the CT scan, some fluid in the esophagus noted.  Small hiatal hernia.  Possibility of GERD.  That could be behind her symptoms.    Recommended to increase omeprazole to twice daily for 1 month and resume once daily dosing after that.  No other medication change.    Follow-up with primary care physician.  Daughter updated.    I personally evaluated and examined the patient on the day of discharge.    Edilberto Vick MD      Pending Results   These results will be followed up by PCP  Unresulted Labs Ordered in the Past 30 Days of this Admission     No orders found for last 31 day(s).               Primary Care Physician   Gaurang Reeves        Discharge Disposition   Discharged to home  Condition at discharge: Stable    Consultations This Hospital Stay   None    Time Spent on this Encounter   Discharge time: greater than 30 minutes.    Discharge Orders      Follow-up and recommended labs and tests     Follow up with primary care provider, Gaurang Reeves, within 7 days for hospital follow- up.     Activity    Your activity upon discharge: activity as tolerated     Reason for your hospital stay    Atypical chest pain. Normal ECHO.  ?GERD. Increase omeprazole to twice daily for one month, then resume to once daily regime.     Diet    Follow this diet upon discharge: Orders Placed This Encounter      Regular Diet Adult     Discharge Medications   Discharge Medication List as of " 8/21/2022  1:55 PM      CONTINUE these medications which have CHANGED    Details   omeprazole (PRILOSEC) 20 MG DR capsule Take 1 capsule (20 mg) by mouth 2 times daily, Historical         CONTINUE these medications which have NOT CHANGED    Details   acetaminophen (TYLENOL) 500 MG tablet Take 1,000 mg by mouth 2 times daily as needed for mild pain Up to 4,000 mg per 24 hours., Historical      albuterol (PROAIR HFA/PROVENTIL HFA/VENTOLIN HFA) 108 (90 Base) MCG/ACT inhaler Inhale 2 puffs into the lungs every 4 hours as needed for shortness of breath / dyspnea or wheezing, HistoricalPharmacy may dispense brand covered by insurance (Proair, or proventil or ventolin or generic albuterol inhaler)      amLODIPine (NORVASC) 2.5 MG tablet Take 2.5 mg by mouth daily, Historical      aspirin (ASA) 81 MG chewable tablet Take 1 tablet (81 mg) by mouth daily, No Print Out      citalopram (CELEXA) 20 MG tablet Take 1 tablet (20 mg) by mouth daily, Disp-30 tablet, R-3, Transitional      diclofenac (VOLTAREN) 1 % topical gel Apply 2 g topically 2 times daily as needed for moderate pain, Disp-1 Tube,R-0, E-Prescribe      donepezil (ARICEPT) 10 MG tablet Take 1 tablet (10 mg) by mouth At Bedtime, Disp-30 tablet, Transitional      hydrocortisone (CORTAID) 1 % external cream Apply topically 2 times daily as needed (L forearm rash)Historical      lisinopril-hydrochlorothiazide (ZESTORETIC) 20-12.5 MG tablet Take 1 tablet by mouth daily, No Print Out      melatonin 3 MG tablet Take 1 tablet (3 mg) by mouth nightly as needed for sleep, Historical      polyethylene glycol (MIRALAX) 17 GM/Dose powder Take 17 g (1 capful) by mouth daily as needed for constipation, No Print Out      simvastatin (ZOCOR) 20 MG tablet Take 1 tablet (20 mg) by mouth At Bedtime, Disp-90 tablet, R-3, Transitional           Allergies   No Known Allergies  Data   Most Recent 3 CBC's:Recent Labs   Lab Test 08/21/22  0811 08/20/22  1520 07/27/22  0900 03/02/22  0644    WBC 11.2* 13.7*  --  10.0   HGB 12.1 12.6 12.3 11.9   MCV 91 90  --  89    263  --  223      Most Recent 3 BMP's:  Recent Labs   Lab Test 08/20/22  1520 07/27/22  0900 03/02/22  0644    138 140   POTASSIUM 3.8 3.5 4.2   CHLORIDE 99 102 103   CO2 25 27 24   BUN 11.6 9.8 12   CR 0.81 0.68 0.76   ANIONGAP 13 9 13   MAUREEN 9.7* 9.3 9.4   * 133* 98     Most Recent 2 LFT's:  Recent Labs   Lab Test 08/20/22  1520 03/02/22  0644 09/09/19  0705 11/13/18  0952   AST 26 20   < > 17   ALT 16 <9   < > 20   ALKPHOS 93  --   --  78   BILITOTAL 0.3  --   --  0.5    < > = values in this interval not displayed.     Most Recent INR's and Anticoagulation Dosing History:  Anticoagulation Dose History     Recent Dosing and Labs Latest Ref Rng & Units 6/3/2009 9/29/2013 8/6/2020    INR 0.86 - 1.14 0.93 0.92 0.94        Most Recent 3 Troponin's:  Recent Labs   Lab Test 08/06/20  2319   TROPI <0.015     Most Recent Cholesterol Panel:  Recent Labs   Lab Test 03/02/22  0644   CHOL 165   LDL 75   HDL 56   TRIG 169*     Most Recent 6 Bacteria Isolates From Any Culture (See EPIC Reports for Culture Details):  Recent Labs   Lab Test 01/02/16  2335   CULT No Beta Streptococcus isolated     Most Recent TSH, T4 and A1c Labs:No lab results found.

## 2022-08-21 NOTE — ED NOTES
Assisted pt into wheelchair. Pt daughter pulling car up to front ER doors. Daughter given verbal instructions from KRISTY Taylor given to PT at discharge.

## 2022-08-21 NOTE — PLAN OF CARE
PRIMARY DIAGNOSIS: CHEST PAIN  OUTPATIENT/OBSERVATION GOALS TO BE MET BEFORE DISCHARGE:  1. Ruled out acute coronary syndrome (negative or stable Troponin):  No  2. Pain Status: Pain free.  3. Appropriate provocative testing performed: N/A  - Stress Test Procedure: Echo to be completed in the morning.   4. Cleared by Consultants (if applicable):No  5. Return to near baseline physical activity: Yes  Discharge Planner Nurse   Safe discharge environment identified: No  Barriers to discharge: Yes       Entered by: Fozia Mederos RN 08/21/2022 1:57 AM     Please review provider order for any additional goals.   Nurse to notify provider when observation goals have been met and patient is ready for discharge.

## 2022-08-21 NOTE — PLAN OF CARE
PRIMARY DIAGNOSIS: CHEST PAIN  OUTPATIENT/OBSERVATION GOALS TO BE MET BEFORE DISCHARGE:  1. Ruled out acute coronary syndrome (negative or stable Troponin):  No  2. Pain Status: Pain free.  3. Appropriate provocative testing performed: N/A  - Stress Test Procedure: Echo to be completed in the morning.   4. Cleared by Consultants (if applicable):No  5. Return to near baseline physical activity: Yes  Discharge Planner Nurse   Safe discharge environment identified: No  Barriers to discharge: Yes       Entered by: Fozia Mederos RN      Please review provider order for any additional goals.   Nurse to notify provider when observation goals have been met and patient is ready for discharge.  /56 (BP Location: Right arm)   Pulse 61   Temp 98.1  F (36.7  C) (Oral)   Resp 18   SpO2 98%     Pt is A&Ox4. [place]. VSS, denies any chest pain, nausea, and SOB. Up A-1 to BSC. Voiding adequately. Has a frequent dry cough. LS clear. Saline locked. Plan for Echo in the morning and recheck Trop.

## 2022-08-21 NOTE — ED NOTES
Moved pt from room 3 to room 13. Applied monitoring devices (EKG, BP, and pulse ox) onto Patient. Warm blanket provided. Pt resting comfortably in bed and has no further needs at this time.

## 2022-08-21 NOTE — PLAN OF CARE
PRIMARY DIAGNOSIS: CHEST PAIN  OUTPATIENT/OBSERVATION GOALS TO BE MET BEFORE DISCHARGE:  1. Ruled out acute coronary syndrome (negative or stable Troponin):  No  2. Pain Status: Pain free.  3. Appropriate provocative testing performed: N/A  - Stress Test Procedure: Echo to be completed in the morning.   4. Cleared by Consultants (if applicable):No  5. Return to near baseline physical activity: Yes  Discharge Planner Nurse   Safe discharge environment identified: No  Barriers to discharge: Yes       Entered by: Fozia Mederos RN      Please review provider order for any additional goals.   Nurse to notify provider when observation goals have been met and patient is ready for discharge.  BP (!) 162/80   Pulse 53   Temp 98.2  F (36.8  C) (Oral)   Resp 21   SpO2 97%

## 2022-08-22 ENCOUNTER — TELEPHONE (OUTPATIENT)
Dept: GERIATRICS | Facility: CLINIC | Age: 87
End: 2022-08-22

## 2022-08-22 LAB
ATRIAL RATE - MUSE: 61 BPM
DIASTOLIC BLOOD PRESSURE - MUSE: NORMAL MMHG
INTERPRETATION ECG - MUSE: NORMAL
P AXIS - MUSE: 66 DEGREES
PR INTERVAL - MUSE: 148 MS
QRS DURATION - MUSE: 84 MS
QT - MUSE: 450 MS
QTC - MUSE: 453 MS
R AXIS - MUSE: 46 DEGREES
SYSTOLIC BLOOD PRESSURE - MUSE: NORMAL MMHG
T AXIS - MUSE: 68 DEGREES
VENTRICULAR RATE- MUSE: 61 BPM

## 2022-08-22 NOTE — TELEPHONE ENCOUNTER
[facility] Geriatrics Triage Nurse Telephone Encounter    Provider: BETH Lopez CNP   Facility: [facility]  Facility Type:  AL    Caller: Lina  Call Back Number: [phone]    Allergies:  No Known Allergies     Reason for call: Nurse called to report that patient's left eye is blood shot.  Pupil also noted to be pinpoint and non-reactive to light.  Daughter present and stated that this is normal for patient.  Patient states she has been rubbing her eye cause she feels as if something is in it.  Patient denies any vision changes, headache, or cognitive changes.  VS: 134/86, 56, 18, 98.2, and O2 96% on RA.      Verbal Order/Direction given by Provider: May apply warm compress to left eye as needed and start Artificial tear or equivalent formulation 2 drops QID PRN to affected eye(s).    Provider giving Order:  BETH Lopez CNP     Verbal Order given to: Lina Mcdonald RN

## 2022-08-23 ENCOUNTER — ASSISTED LIVING VISIT (OUTPATIENT)
Dept: GERIATRICS | Facility: CLINIC | Age: 87
End: 2022-08-23
Payer: COMMERCIAL

## 2022-08-23 VITALS
HEIGHT: 62 IN | BODY MASS INDEX: 29.81 KG/M2 | RESPIRATION RATE: 16 BRPM | DIASTOLIC BLOOD PRESSURE: 66 MMHG | OXYGEN SATURATION: 94 % | SYSTOLIC BLOOD PRESSURE: 138 MMHG | HEART RATE: 65 BPM

## 2022-08-23 DIAGNOSIS — I10 ESSENTIAL HYPERTENSION: ICD-10-CM

## 2022-08-23 DIAGNOSIS — H11.32 SUBCONJUNCTIVAL HEMATOMA, LEFT: ICD-10-CM

## 2022-08-23 DIAGNOSIS — R07.9 CHEST PAIN, UNSPECIFIED TYPE: Primary | ICD-10-CM

## 2022-08-23 NOTE — LETTER
8/23/2022        RE: Juany Collazo  [facility]  [address] [facility] [address]        [place] [facility]  PRIMARY CARE PROVIDER AND CLINIC:  BETH Hannon [facility], [address] [facility] [facility] [address]  Chief Complaint   Patient presents with     ER F/U     [place] Medical Record Number:  [id number]  Place of Service where encounter took place:  [facility] ([facility] [[id number]]    Juany Collazo  is a 89 year old  (1/7/1932), admitted to the above facility from  [facility] . Hospital stay 08/20/22 through 08/21/22..  Admitted to this facility for  medical management and nursing care.    HPI:    HPI information obtained from: facility chart records, facility staff, patient report and [facility] chart review.   Brief Summary of Hospital Course:   Chest pain: 8/20/22 had c/o chest pain. Sent to [facility] ED. Given nitrostat by EMS. C.p. resolved when arrived to ED. ECG NSR. Echo wnl. Chest CT neg for acute changes.  Did show small hiatal hernia-possible GERD s/s causing c.p. troponin sl elevated, remained flat. prilosec dose increase in bid    HTN: BPs sl elevated in ED. Stable today. Cont. On norvasc, zestoretic.    Subconjunctival hemorrhage L eye. Noted to be rubbing L eye per staff. Sl edema of L eyelid.       Updates on Status Since Skilled nursing Admission: resp. Status stable. No further reports of c.p.    CODE STATUS/ADVANCE DIRECTIVES DISCUSSION:   DNR / DNI  Patient's living condition: lives in an assisted living facility  ALLERGIES: Patient has no known allergies.  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    She has no past medical history of Malignant hyperthermia or PONV (postoperative nausea and  vomiting).  PAST SURGICAL HISTORY:   has a past surgical history that includes Decompression lumbar one level (4/19/2013); Eye surgery; Arthroplasty knee (Left, 3/9/2020); Arthroplasty hip (Right, 1998); Tubal ligation NOS (1970); and Open reduction internal fixation ankle (Right, 8/8/2020).  FAMILY HISTORY: family history includes Arthritis in her mother; Cardiovascular in her brother and father; Eye Disorder in her mother; Glaucoma in her mother; Hypertension in her father and mother; Musculoskeletal Disorder in her brother; Osteoporosis in her maternal aunt and mother.  SOCIAL HISTORY:   reports that she quit smoking about 56 years ago. She has never used smokeless tobacco. She reports previous alcohol use. She reports that she does not use drugs.    Post Discharge Medication Reconciliation Status: discharge medications reconciled, continue medications without change    Current Outpatient Medications   Medication Sig Dispense Refill     acetaminophen (TYLENOL) 500 MG tablet Take 1,000 mg by mouth 2 times daily as needed for mild pain Up to 4,000 mg per 24 hours.       albuterol (PROAIR HFA/PROVENTIL HFA/VENTOLIN HFA) 108 (90 Base) MCG/ACT inhaler Inhale 2 puffs into the lungs every 4 hours as needed for shortness of breath / dyspnea or wheezing       amLODIPine (NORVASC) 2.5 MG tablet Take 2.5 mg by mouth daily       aspirin (ASA) 81 MG chewable tablet Take 1 tablet (81 mg) by mouth daily       citalopram (CELEXA) 20 MG tablet Take 1 tablet (20 mg) by mouth daily 30 tablet 3     diclofenac (VOLTAREN) 1 % topical gel Apply 2 g topically 2 times daily as needed for moderate pain 1 Tube 0     donepezil (ARICEPT) 10 MG tablet Take 1 tablet (10 mg) by mouth At Bedtime 30 tablet      hydrocortisone (CORTAID) 1 % external cream Apply topically 2 times daily as needed (L forearm rash)       lisinopril-hydrochlorothiazide (ZESTORETIC) 20-12.5 MG tablet Take 1 tablet by mouth daily       melatonin 3 MG tablet Take 1 tablet  "(3 mg) by mouth nightly as needed for sleep       omeprazole (PRILOSEC) 20 MG DR capsule Take 1 capsule (20 mg) by mouth 2 times daily       polyethylene glycol (MIRALAX) 17 GM/Dose powder Take 17 g (1 capful) by mouth daily as needed for constipation       simvastatin (ZOCOR) 20 MG tablet Take 1 tablet (20 mg) by mouth At Bedtime 90 tablet 3         ROS:  No chest pain, shortness of breath, fevers, chills, headache, nausea, vomiting, dysuria or bowel abnormalities.  Appetite is  normal.  No pain except occ RLE.    Vitals:  /66   Pulse 65   Resp 16   Ht 1.575 m (5' 2\")   SpO2 94%   BMI 29.81 kg/m    Exam:  GENERAL APPEARANCE:  Alert, in no distress, cooperative  ENT:  Mouth and posterior oropharynx normal, moist mucous membranes, [place]  EYES:  EOM normal, R pupil sl ovoid, fixed, L pupil round, reactive. L eye subconjuctival hemorrhage  RESP:  respiratory effort and palpation of chest normal, lungs clear to auscultation , no respiratory distress  CV:  Palpation and auscultation of heart done , regular rate and rhythm, no murmur, rub, or gallop, no edema  ABDOMEN:  normal bowel sounds, soft, nontender, no hepatosplenomegaly or other masses, no guarding or rebound  M/S:   Gait and station normal  muscle strength 5/5 all 4 ext.  NEURO:   Cranial nerves 2-12 are normal tested and grossly at patient's baseline, no tremor seen  PSYCH:  insight and judgement impaired, memory impaired , affect and mood normal    Lab/Diagnostic data:    Most Recent 3 CBC's:Recent Labs   Lab Test 08/21/22  0811 08/20/22  1520 07/27/22  0900 03/02/22  0644   WBC 11.2* 13.7*  --  10.0   HGB 12.1 12.6 12.3 11.9   MCV 91 90  --  89    263  --  223     Most Recent 3 BMP's:Recent Labs   Lab Test 08/20/22  1520 07/27/22  0900 03/02/22  0644    138 140   POTASSIUM 3.8 3.5 4.2   CHLORIDE 99 102 103   CO2 25 27 24   BUN 11.6 9.8 12   CR 0.81 0.68 0.76   ANIONGAP 13 9 13   MAUREEN 9.7* 9.3 9.4   * 133* 98     Most Recent 3 " Troponin's:Recent Labs   Lab Test 08/06/20  2319   TROPI <0.015       ASSESSMENT/PLAN:  (R07.9) Chest pain, unspecified type  (primary encounter diagnosis)  Comment: atypical, possibly r/t hiatal hernia, GERD.  Plan: 1. Cont. Increased prilosec x 1 month, then resume every day  2. Monitor for reports of GERD, nausea  3. Follow PO intake, wt.s  4. Monitor for further reports of c.p.    (I10) Essential hypertension  Comment: BP stable today, sl elevated in hosp.   Plan: 1. Cont. Norvasc, zestoretic  2. Follow BPs, HRs  3. Monitor for LE edema  4. Cbc, b mp in next 2-4 mos    (H11.32) Subconjunctival hematoma, left  Comment: newer onset. Cont. On asa. Noted to be rubbing L eye  Plan: 1. Start cool compress to L eye bid  2. Monitor for L eye irritation  3. For above s/s may start artificial tears      Electronically signed by:  BETH Hannon CNP                           Sincerely,        BETH Hannon CNP

## 2022-08-23 NOTE — PROGRESS NOTES
[place] [facility]  PRIMARY CARE PROVIDER AND CLINIC:  Gaurang Reeves, APRN CNP, [address] [facility] [address]  Chief Complaint   Patient presents with     ER F/U     [place] Medical Record Number:  [id number]  Place of Service where encounter took place:  [facility] ([facility]) [[id number]]    Juany Collazo  is a 89 year old  (1/7/1932), admitted to the above facility from  [facility] . Hospital stay 08/20/22 through 08/21/22..  Admitted to this facility for  medical management and nursing care.    HPI:    HPI information obtained from: facility chart records, facility staff, patient report and [facility] chart review.   Brief Summary of Hospital Course:   Chest pain: 8/20/22 had c/o chest pain[facility] Given nitrostat by EMS. C.p. resolved when arrived to ED. ECG NSR. Echo wnl. Chest CT neg for acute changes.  Did show small hiatal hernia-possible GERD s/s causing c.p. troponin sl elevated, remained flat. prilosec dose increase in bid    HTN: BPs sl elevated in ED. Stable today. Cont. On norvasc, zestoretic.    Subconjunctival hemorrhage L eye. Noted to be rubbing L eye per staff. Sl edema of L eyelid.       Updates on Status Since Skilled nursing Admission: resp. Status stable. No further reports of c.p.    CODE STATUS/ADVANCE DIRECTIVES DISCUSSION:   DNR / DNI  Patient's living condition: lives in an assisted living facility  ALLERGIES: Patient has no known allergies.  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    She has no past medical history of Malignant hyperthermia or PONV (postoperative nausea and vomiting).  PAST SURGICAL HISTORY:   has a past surgical history that includes Decompression lumbar one level  (4/19/2013); Eye surgery; Arthroplasty knee (Left, 3/9/2020); Arthroplasty hip (Right, 1998); Tubal ligation NOS (1970); and Open reduction internal fixation ankle (Right, 8/8/2020).  FAMILY HISTORY: family history includes Arthritis in her mother; Cardiovascular in her brother and father; Eye Disorder in her mother; Glaucoma in her mother; Hypertension in her father and mother; Musculoskeletal Disorder in her brother; Osteoporosis in her maternal aunt and mother.  SOCIAL HISTORY:   reports that she quit smoking about 56 years ago. She has never used smokeless tobacco. She reports previous alcohol use. She reports that she does not use drugs.    Post Discharge Medication Reconciliation Status: discharge medications reconciled, continue medications without change    Current Outpatient Medications   Medication Sig Dispense Refill     acetaminophen (TYLENOL) 500 MG tablet Take 1,000 mg by mouth 2 times daily as needed for mild pain Up to 4,000 mg per 24 hours.       albuterol (PROAIR HFA/PROVENTIL HFA/VENTOLIN HFA) 108 (90 Base) MCG/ACT inhaler Inhale 2 puffs into the lungs every 4 hours as needed for shortness of breath / dyspnea or wheezing       amLODIPine (NORVASC) 2.5 MG tablet Take 2.5 mg by mouth daily       aspirin (ASA) 81 MG chewable tablet Take 1 tablet (81 mg) by mouth daily       citalopram (CELEXA) 20 MG tablet Take 1 tablet (20 mg) by mouth daily 30 tablet 3     diclofenac (VOLTAREN) 1 % topical gel Apply 2 g topically 2 times daily as needed for moderate pain 1 Tube 0     donepezil (ARICEPT) 10 MG tablet Take 1 tablet (10 mg) by mouth At Bedtime 30 tablet      hydrocortisone (CORTAID) 1 % external cream Apply topically 2 times daily as needed (L forearm rash)       lisinopril-hydrochlorothiazide (ZESTORETIC) 20-12.5 MG tablet Take 1 tablet by mouth daily       melatonin 3 MG tablet Take 1 tablet (3 mg) by mouth nightly as needed for sleep       omeprazole (PRILOSEC) 20 MG DR capsule Take 1 capsule (20  "mg) by mouth 2 times daily       polyethylene glycol (MIRALAX) 17 GM/Dose powder Take 17 g (1 capful) by mouth daily as needed for constipation       simvastatin (ZOCOR) 20 MG tablet Take 1 tablet (20 mg) by mouth At Bedtime 90 tablet 3         ROS:  No chest pain, shortness of breath, fevers, chills, headache, nausea, vomiting, dysuria or bowel abnormalities.  Appetite is  normal.  No pain except occ RLE.    Vitals:  /66   Pulse 65   Resp 16   Ht 1.575 m (5' 2\")   SpO2 94%   BMI 29.81 kg/m    Exam:  GENERAL APPEARANCE:  Alert, in no distress, cooperative  ENT:  Mouth and posterior oropharynx normal, moist mucous membranes, [place]  EYES:  EOM normal, R pupil sl ovoid, fixed, L pupil round, reactive. L eye subconjuctival hemorrhage  RESP:  respiratory effort and palpation of chest normal, lungs clear to auscultation , no respiratory distress  CV:  Palpation and auscultation of heart done , regular rate and rhythm, no murmur, rub, or gallop, no edema  ABDOMEN:  normal bowel sounds, soft, nontender, no hepatosplenomegaly or other masses, no guarding or rebound  M/S:   Gait and station normal  muscle strength 5/5 all 4 ext.  NEURO:   Cranial nerves 2-12 are normal tested and grossly at patient's baseline, no tremor seen  PSYCH:  insight and judgement impaired, memory impaired , affect and mood normal    Lab/Diagnostic data:    Most Recent 3 CBC's:Recent Labs   Lab Test 08/21/22  0811 08/20/22  1520 07/27/22  0900 03/02/22  0644   WBC 11.2* 13.7*  --  10.0   HGB 12.1 12.6 12.3 11.9   MCV 91 90  --  89    263  --  223     Most Recent 3 BMP's:Recent Labs   Lab Test 08/20/22  1520 07/27/22  0900 03/02/22  0644    138 140   POTASSIUM 3.8 3.5 4.2   CHLORIDE 99 102 103   CO2 25 27 24   BUN 11.6 9.8 12   CR 0.81 0.68 0.76   ANIONGAP 13 9 13   MAUREEN 9.7* 9.3 9.4   * 133* 98     Most Recent 3 Troponin's:Recent Labs   Lab Test 08/06/20  2319   TROPI <0.015       ASSESSMENT/PLAN:  (R07.9) Chest pain, " unspecified type  (primary encounter diagnosis)  Comment: atypical, possibly r/t hiatal hernia, GERD.  Plan: 1. Cont. Increased prilosec x 1 month, then resume every day  2. Monitor for reports of GERD, nausea  3. Follow PO intake, wt.s  4. Monitor for further reports of c.p.    (I10) Essential hypertension  Comment: BP stable today, sl elevated in hosp.   Plan: 1. Cont. Norvasc, zestoretic  2. Follow BPs, HRs  3. Monitor for LE edema  4. Cbc, b mp in next 2-4 mos    (H11.32) Subconjunctival hematoma, left  Comment: newer onset. Cont. On asa. Noted to be rubbing L eye  Plan: 1. Start cool compress to L eye bid  2. Monitor for L eye irritation  3. For above s/s may start artificial tears      Electronically signed by:  BETH Hannon CNP

## 2022-08-25 ENCOUNTER — PATIENT OUTREACH (OUTPATIENT)
Dept: GERIATRIC MEDICINE | Facility: CLINIC | Age: 87
End: 2022-08-25

## 2022-08-25 NOTE — PROGRESS NOTES
Opening [facility] Medicare episode and updating targets and tasks per [employer] launch.

## 2022-08-30 ENCOUNTER — TELEPHONE (OUTPATIENT)
Dept: GERIATRICS | Facility: CLINIC | Age: 87
End: 2022-08-30

## 2022-08-30 NOTE — TELEPHONE ENCOUNTER
[facility] [place] [facility] Triage Nurse Telephone Encounter    Provider: BETH Lopez CNP   Facility: [facility]  Facility Type:  AL    Caller: Lina  Call Back Number: [phone]    Allergies:  No Known Allergies     Reason for call: Nurse called to report that patient will be leaving Thursday on an STEPHANIE with family.  Family is wondering if patient's Voltaren gel can be re-instated due to her left shoulder pain that comes and goes.        Verbal Order/Direction given by Provider: Start Voltaren gel 1% apply 2 grams to left shoulder BID PRN.     Provider giving Order:  BETH Lopez CNP     Verbal Order given to: Lina Mcdonald RN

## 2022-10-10 ENCOUNTER — HEALTH MAINTENANCE LETTER (OUTPATIENT)
Age: 87
End: 2022-10-10

## 2022-12-13 ENCOUNTER — LAB REQUISITION (OUTPATIENT)
Dept: LAB | Facility: CLINIC | Age: 87
End: 2022-12-13
Payer: COMMERCIAL

## 2022-12-13 ENCOUNTER — ASSISTED LIVING VISIT (OUTPATIENT)
Dept: GERIATRICS | Facility: CLINIC | Age: 87
End: 2022-12-13
Payer: COMMERCIAL

## 2022-12-13 VITALS
OXYGEN SATURATION: 95 % | HEART RATE: 76 BPM | SYSTOLIC BLOOD PRESSURE: 139 MMHG | RESPIRATION RATE: 16 BRPM | DIASTOLIC BLOOD PRESSURE: 78 MMHG

## 2022-12-13 DIAGNOSIS — I10 ESSENTIAL HYPERTENSION: Primary | ICD-10-CM

## 2022-12-13 DIAGNOSIS — I10 ESSENTIAL (PRIMARY) HYPERTENSION: ICD-10-CM

## 2022-12-13 DIAGNOSIS — F03.90 DEMENTIA WITHOUT BEHAVIORAL DISTURBANCE (H): ICD-10-CM

## 2022-12-13 DIAGNOSIS — L30.9 DERMATITIS: ICD-10-CM

## 2022-12-13 DIAGNOSIS — F33.0 MAJOR DEPRESSIVE DISORDER, RECURRENT EPISODE, MILD (H): ICD-10-CM

## 2022-12-13 NOTE — LETTER
12/13/2022        RE: Juany Collazo  [facility]  [address]  [facility] [address]        [place] [facility]  [place] Medical Record Number:  [id number]  Place of Service where encounter took place:  [facility] ([facility] [[id number]]  Chief Complaint   Patient presents with     Derm Problem       HPI:    Juany Collazo  is a 89 year old (1/7/1932), who is being seen today for an episodic care visit.  HPI information obtained from: facility chart records, facility staff, patient report and [facility] chart review. Today's concern is: dermatitis, HTN, depression, dementia. Has had rash on R foot past week. Started on bid HC cream which has ended. Has ongoing rash R foot. Denies pruritis. For HTN cont.on norvasc, zestoretic. BP stable today. No reports of dizziness. For depression cont.on celexa. Mood stable. Frequently out of apt. For dementia cont. On aricept. Behaviors stable. Memory loss. No reports of insomnia.       Past Medical and Surgical History reviewed in Epic today.    MEDICATIONS:    Current Outpatient Medications   Medication Sig Dispense Refill     acetaminophen (TYLENOL) 500 MG tablet Take 1,000 mg by mouth 2 times daily as needed for mild pain Up to 4,000 mg per 24 hours.       albuterol (PROAIR HFA/PROVENTIL HFA/VENTOLIN HFA) 108 (90 Base) MCG/ACT inhaler Inhale 2 puffs into the lungs every 4 hours as needed for shortness of breath / dyspnea or wheezing       amLODIPine (NORVASC) 2.5 MG tablet Take 2.5 mg by mouth daily       aspirin (ASA) 81 MG chewable tablet Take 1 tablet (81 mg) by mouth daily       citalopram (CELEXA) 20 MG tablet Take 1 tablet (20 mg) by mouth daily 30 tablet 3     diclofenac (VOLTAREN) 1 % topical gel Apply 2 g topically 2 times daily as needed for moderate pain 1 Tube 0     donepezil (ARICEPT) 10 MG tablet Take 1 tablet (10 mg) by mouth At Bedtime 30 tablet      hydrocortisone (CORTAID) 1 % external cream Apply  topically 2 times daily as needed (L forearm rash)       lisinopril-hydrochlorothiazide (ZESTORETIC) 20-12.5 MG tablet Take 1 tablet by mouth daily       melatonin 3 MG tablet Take 1 tablet (3 mg) by mouth nightly as needed for sleep       omeprazole (PRILOSEC) 20 MG DR capsule Take 1 capsule (20 mg) by mouth 2 times daily       polyethylene glycol (MIRALAX) 17 GM/Dose powder Take 17 g (1 capful) by mouth daily as needed for constipation       simvastatin (ZOCOR) 20 MG tablet Take 1 tablet (20 mg) by mouth At Bedtime 90 tablet 3         REVIEW OF SYSTEMS:  Limited secondary to cognitive impairment but today pt reports feeling ok. No current pain    Objective:  /78   Pulse 76   Resp 16   SpO2 95%   Exam:  GENERAL APPEARANCE:  Alert, in no distress, cooperative  ENT:  Mouth and posterior oropharynx normal, moist mucous membranes, [place]  EYES:  EOM, conjunctivae, lids, pupils and irises normal, PERRL  RESP:  respiratory effort and palpation of chest normal, lungs clear to auscultation , no respiratory distress  CV:  Palpation and auscultation of heart done , regular rate and rhythm, no murmur, rub, or gallop, no edema  ABDOMEN:  normal bowel sounds, soft, nontender, no hepatosplenomegaly or other masses, no guarding or rebound  M/S:   Gait and station normal  muscle strength 5/5 all 4 ext.  SKIN:  faint red rash, non-raised over lateral dorsal surface R foot  NEURO:   Cranial nerves 2-12 are normal tested and grossly at patient's baseline, no tremor  PSYCH:  insight and judgement impaired, memory impaired , affect and mood normal    Labs:     Most Recent 3 CBC's:Recent Labs   Lab Test 08/21/22  0811 08/20/22  1520 07/27/22  0900 03/02/22  0644   WBC 11.2* 13.7*  --  10.0   HGB 12.1 12.6 12.3 11.9   MCV 91 90  --  89    263  --  223     Most Recent 3 BMP's:Recent Labs   Lab Test 08/20/22  1520 07/27/22  0900 03/02/22  0644    138 140   POTASSIUM 3.8 3.5 4.2   CHLORIDE 99 102 103   CO2 25 27 24   BUN  11.6 9.8 12   CR 0.81 0.68 0.76   ANIONGAP 13 9 13   MAUREEN 9.7* 9.3 9.4   * 133* 98       ASSESSMENT/PLAN:  (I10) Essential hypertension  (primary encounter diagnosis)  Comment: BP stable  Plan: 1. Cont. Norvasc, zestoretic  2. Follow BPs, HRs  3. Monitor for LE edema  4. Bmp in next 1-3 mos    (L30.9) Dermatitis  Comment: ongoing R foot  Plan: 1. Restart bid HC cream to R foot  2. Reassess over next couple weeks  3. For ongoing s/s, may start triam cream or lotrisone  4. Instructed to leave open air as able    (F33.0) Major depressive disorder, recurrent episode, mild (H)  Comment: mood gen. stable  Plan: 1. Cont celexa  2. Monitor for increased anxiety  3. Encourage ongoing participation in activities    (F03.90) Dementia without behavioral disturbance (H)  Comment: memory loss, behaviors stable  Plan: 1. Cont aricept  2. Monitor for changes in behaviors, increased confusion  3. Monitor for functional decline    Electronically signed by:  BETH Hannon CNP               Sincerely,        BETH Hannon CNP

## 2022-12-13 NOTE — PROGRESS NOTES
[place] [facility]  [place] Medical Record Number:  [id number]  Place of Service where encounter took place:  [facility] ([facility] [[id number]]  Chief Complaint   Patient presents with     Derm Problem       HPI:    Juany Collazo  is a 89 year old (1/7/1932), who is being seen today for an episodic care visit.  HPI information obtained from: facility chart records, facility staff, patient report and [facility] chart review. Today's concern is: dermatitis, HTN, depression, dementia. Has had rash on R foot past week. Started on bid HC cream which has ended. Has ongoing rash R foot. Denies pruritis. For HTN cont.on norvasc, zestoretic. BP stable today. No reports of dizziness. For depression cont.on celexa. Mood stable. Frequently out of apt. For dementia cont. On aricept. Behaviors stable. Memory loss. No reports of insomnia.       Past Medical and Surgical History reviewed in Epic today.    MEDICATIONS:    Current Outpatient Medications   Medication Sig Dispense Refill     acetaminophen (TYLENOL) 500 MG tablet Take 1,000 mg by mouth 2 times daily as needed for mild pain Up to 4,000 mg per 24 hours.       albuterol (PROAIR HFA/PROVENTIL HFA/VENTOLIN HFA) 108 (90 Base) MCG/ACT inhaler Inhale 2 puffs into the lungs every 4 hours as needed for shortness of breath / dyspnea or wheezing       amLODIPine (NORVASC) 2.5 MG tablet Take 2.5 mg by mouth daily       aspirin (ASA) 81 MG chewable tablet Take 1 tablet (81 mg) by mouth daily       citalopram (CELEXA) 20 MG tablet Take 1 tablet (20 mg) by mouth daily 30 tablet 3     diclofenac (VOLTAREN) 1 % topical gel Apply 2 g topically 2 times daily as needed for moderate pain 1 Tube 0     donepezil (ARICEPT) 10 MG tablet Take 1 tablet (10 mg) by mouth At Bedtime 30 tablet      hydrocortisone (CORTAID) 1 % external cream Apply topically 2 times daily as needed (L forearm rash)       lisinopril-hydrochlorothiazide (ZESTORETIC) 20-12.5 MG  tablet Take 1 tablet by mouth daily       melatonin 3 MG tablet Take 1 tablet (3 mg) by mouth nightly as needed for sleep       omeprazole (PRILOSEC) 20 MG DR capsule Take 1 capsule (20 mg) by mouth 2 times daily       polyethylene glycol (MIRALAX) 17 GM/Dose powder Take 17 g (1 capful) by mouth daily as needed for constipation       simvastatin (ZOCOR) 20 MG tablet Take 1 tablet (20 mg) by mouth At Bedtime 90 tablet 3         REVIEW OF SYSTEMS:  Limited secondary to cognitive impairment but today pt reports feeling ok. No current pain    Objective:  /78   Pulse 76   Resp 16   SpO2 95%   Exam:  GENERAL APPEARANCE:  Alert, in no distress, cooperative  ENT:  Mouth and posterior oropharynx normal, moist mucous membranes, [place]  EYES:  EOM, conjunctivae, lids, pupils and irises normal, PERRL  RESP:  respiratory effort and palpation of chest normal, lungs clear to auscultation , no respiratory distress  CV:  Palpation and auscultation of heart done , regular rate and rhythm, no murmur, rub, or gallop, no edema  ABDOMEN:  normal bowel sounds, soft, nontender, no hepatosplenomegaly or other masses, no guarding or rebound  M/S:   Gait and station normal  muscle strength 5/5 all 4 ext.  SKIN:  faint red rash, non-raised over lateral dorsal surface R foot  NEURO:   Cranial nerves 2-12 are normal tested and grossly at patient's baseline, no tremor  PSYCH:  insight and judgement impaired, memory impaired , affect and mood normal    Labs:     Most Recent 3 CBC's:Recent Labs   Lab Test 08/21/22  0811 08/20/22  1520 07/27/22  0900 03/02/22  0644   WBC 11.2* 13.7*  --  10.0   HGB 12.1 12.6 12.3 11.9   MCV 91 90  --  89    263  --  223     Most Recent 3 BMP's:Recent Labs   Lab Test 08/20/22  1520 07/27/22  0900 03/02/22  0644    138 140   POTASSIUM 3.8 3.5 4.2   CHLORIDE 99 102 103   CO2 25 27 24   BUN 11.6 9.8 12   CR 0.81 0.68 0.76   ANIONGAP 13 9 13   MAUREEN 9.7* 9.3 9.4   * 133* 98        ASSESSMENT/PLAN:  (I10) Essential hypertension  (primary encounter diagnosis)  Comment: BP stable  Plan: 1. Cont. Norvasc, zestoretic  2. Follow BPs, HRs  3. Monitor for LE edema  4. Bmp in next 1-3 mos    (L30.9) Dermatitis  Comment: ongoing R foot  Plan: 1. Restart bid HC cream to R foot  2. Reassess over next couple weeks  3. For ongoing s/s, may start triam cream or lotrisone  4. Instructed to leave open air as able    (F33.0) Major depressive disorder, recurrent episode, mild (H)  Comment: mood gen. stable  Plan: 1. Cont celexa  2. Monitor for increased anxiety  3. Encourage ongoing participation in activities    (F03.90) Dementia without behavioral disturbance (H)  Comment: memory loss, behaviors stable  Plan: 1. Cont aricept  2. Monitor for changes in behaviors, increased confusion  3. Monitor for functional decline    Electronically signed by:  BETH Hannon CNP

## 2022-12-14 LAB
ANION GAP SERPL CALCULATED.3IONS-SCNC: 12 MMOL/L (ref 7–15)
BUN SERPL-MCNC: 12.2 MG/DL (ref 8–23)
CALCIUM SERPL-MCNC: 9.6 MG/DL (ref 8.2–9.6)
CHLORIDE SERPL-SCNC: 102 MMOL/L (ref 98–107)
CREAT SERPL-MCNC: 0.83 MG/DL (ref 0.51–0.95)
DEPRECATED HCO3 PLAS-SCNC: 26 MMOL/L (ref 22–29)
GFR SERPL CREATININE-BSD FRML MDRD: 67 ML/MIN/1.73M2
GLUCOSE SERPL-MCNC: 93 MG/DL (ref 70–99)
POTASSIUM SERPL-SCNC: 4.3 MMOL/L (ref 3.4–5.3)
SODIUM SERPL-SCNC: 140 MMOL/L (ref 136–145)

## 2022-12-14 PROCEDURE — P9604 ONE-WAY ALLOW PRORATED TRIP: HCPCS | Mod: ORL | Performed by: NURSE PRACTITIONER

## 2022-12-14 PROCEDURE — 36415 COLL VENOUS BLD VENIPUNCTURE: CPT | Mod: ORL | Performed by: NURSE PRACTITIONER

## 2022-12-14 PROCEDURE — 80048 BASIC METABOLIC PNL TOTAL CA: CPT | Mod: ORL | Performed by: NURSE PRACTITIONER

## 2023-04-05 ENCOUNTER — TELEPHONE (OUTPATIENT)
Dept: GERIATRICS | Facility: CLINIC | Age: 88
End: 2023-04-05
Payer: COMMERCIAL

## 2023-04-05 NOTE — TELEPHONE ENCOUNTER
[facility] Geriatric[facility] Triage Nurse Telephone Encounter    Provider: BETH Lopez CNP   Facility: [facility]  Facility Type:  AL    Caller: Ioana  Call Back Number: [phone]    Allergies:  No Known Allergies     Reason for call: Nurse is calling to report that patient has the rash back to her bilateral feet.  The hydrocortisone 1% cream was stopped as of 3/13/23.      Verbal Order/Direction given by Provider: Hydrocortisone 1% cream---apply to rash on bilateral feet BID until clear and then BID PRN thereafter.      Provider giving Order:  BETH Lopez CNP     Verbal Order given to: Lina Rosenthal RN

## 2023-04-07 ENCOUNTER — ASSISTED LIVING VISIT (OUTPATIENT)
Dept: GERIATRICS | Facility: CLINIC | Age: 88
End: 2023-04-07
Payer: COMMERCIAL

## 2023-04-07 ENCOUNTER — LAB REQUISITION (OUTPATIENT)
Dept: LAB | Facility: CLINIC | Age: 88
End: 2023-04-07
Payer: COMMERCIAL

## 2023-04-07 VITALS
HEART RATE: 62 BPM | SYSTOLIC BLOOD PRESSURE: 138 MMHG | OXYGEN SATURATION: 95 % | RESPIRATION RATE: 16 BRPM | DIASTOLIC BLOOD PRESSURE: 79 MMHG

## 2023-04-07 DIAGNOSIS — L30.9 DERMATITIS: Primary | ICD-10-CM

## 2023-04-07 DIAGNOSIS — F03.90 DEMENTIA WITHOUT BEHAVIORAL DISTURBANCE (H): ICD-10-CM

## 2023-04-07 DIAGNOSIS — I10 ESSENTIAL HYPERTENSION: ICD-10-CM

## 2023-04-07 DIAGNOSIS — I10 ESSENTIAL (PRIMARY) HYPERTENSION: ICD-10-CM

## 2023-04-07 DIAGNOSIS — F33.0 MAJOR DEPRESSIVE DISORDER, RECURRENT EPISODE, MILD (H): ICD-10-CM

## 2023-04-07 DIAGNOSIS — E78.49 OTHER HYPERLIPIDEMIA: ICD-10-CM

## 2023-04-07 PROCEDURE — 99349 HOME/RES VST EST MOD MDM 40: CPT | Performed by: NURSE PRACTITIONER

## 2023-04-07 NOTE — PROGRESS NOTES
[place] [facility]  [place] Medical Record Number:  [id number]  Place of Service where encounter took place:  [facility] ([facility]) [[id number]]  Chief Complaint   Patient presents with     Derm Problem       HPI:    Juany Collazo  is a 89 year old (1/7/1932), who is being seen today for an episodic care visit.  HPI information obtained from: facility chart records, facility staff, patient report, [facility] chart review and family/first contact dtr report. Today's concern is: dermatitis, HTN, dementia, depression. Has had recurrent rash of feet. Previously resolved with HC cream. Current red, scaly areas bilat feet-dorsal surface. No open areas. For HTN cont. On norvasc, zestoretic.  BP, HR stable. No reports of dizziness. For dementia cont. On aricept. STML. Recently started med administration by staff. Behaviors stable. Mood stable. Cont. On celexa for depression.       Past Medical and Surgical History reviewed in Epic today.    MEDICATIONS:    Current Outpatient Medications   Medication Sig Dispense Refill     acetaminophen (TYLENOL) 500 MG tablet Take 1,000 mg by mouth 2 times daily as needed for mild pain Up to 4,000 mg per 24 hours.       albuterol (PROAIR HFA/PROVENTIL HFA/VENTOLIN HFA) 108 (90 Base) MCG/ACT inhaler Inhale 2 puffs into the lungs every 4 hours as needed for shortness of breath / dyspnea or wheezing       amLODIPine (NORVASC) 2.5 MG tablet Take 2.5 mg by mouth daily       aspirin (ASA) 81 MG chewable tablet Take 1 tablet (81 mg) by mouth daily       citalopram (CELEXA) 20 MG tablet Take 1 tablet (20 mg) by mouth daily 30 tablet 3     diclofenac (VOLTAREN) 1 % topical gel Apply 2 g topically 2 times daily as needed for moderate pain 1 Tube 0     donepezil (ARICEPT) 10 MG tablet Take 1 tablet (10 mg) by mouth At Bedtime 30 tablet      hydrocortisone (CORTAID) 1 % external cream Apply to bilateral feet rash BID until clear, then BID PRN thereafter.          lisinopril-hydrochlorothiazide (ZESTORETIC) 20-12.5 MG tablet Take 1 tablet by mouth daily       omeprazole (PRILOSEC) 20 MG DR capsule Take 1 capsule (20 mg) by mouth 2 times daily       polyethylene glycol (MIRALAX) 17 GM/Dose powder Take 17 g (1 capful) by mouth daily as needed for constipation       simvastatin (ZOCOR) 20 MG tablet Take 1 tablet (20 mg) by mouth At Bedtime 90 tablet 3         REVIEW OF SYSTEMS:  Limited secondary to cognitive impairment but today pt reports feeling fine. Reports red areas of feet non-pruritic.     Objective:  /79   Pulse 62   Resp 16   SpO2 95%   Exam:  GENERAL APPEARANCE:  Alert, in no distress  ENT:  Mouth and posterior oropharynx normal, moist mucous membranes, [place]  EYES:  EOM, conjunctivae, lids, pupils and irises normal, PERRL  RESP:  respiratory effort and palpation of chest normal, lungs clear to auscultation , no respiratory distress  CV:  Palpation and auscultation of heart done , regular rate and rhythm, no murmur, rub, or gallop, no edema  ABDOMEN:  normal bowel sounds, soft, nontender, no hepatosplenomegaly or other masses, no guarding or rebound  M/S:   muscle strength 5/5 all 4 ext., normal tone  SKIN:  red, sl raised, scaly areas of dorsal surface bilat feet. no open areas  NEURO:   Cranial nerves 2-12 are normal tested and grossly at patient's baseline, no tremor  PSYCH:  insight and judgement impaired, memory impaired , affect and mood normal    Labs:     Most Recent 3 BMP's:Recent Labs   Lab Test 12/14/22  0752 08/20/22  1520 07/27/22  0900    137 138   POTASSIUM 4.3 3.8 3.5   CHLORIDE 102 99 102   CO2 26 25 27   BUN 12.2 11.6 9.8   CR 0.83 0.81 0.68   ANIONGAP 12 13 9   MAUREEN 9.6 9.7* 9.3   GLC 93 125* 133*       ASSESSMENT/PLAN:  (L30.9) Dermatitis  (primary encounter diagnosis)  Comment: recurrent. Feet. Non-pruritic  Plan: 1. HC cream bid  2. Reassess over next week, if not improved, change to triam cream.  3. Monitor for further areas of  rash    (I10) Essential hypertension  Comment: BP stable  Plan: 1. Cont. norvasc  2. Cont. zestoretic  3. Follow BPs, HRs  4. Hgb, bmp, FLP, AST, ALT next week    (F03.90) Dementia without behavioral disturbance (H)  Comment: memory loss. Behaviors gen. stable  Plan: 1. Cont. aricept  2. Cont. Staff med admin  3. Monitor for decline in functional status    (F33.0) Major depressive disorder, recurrent episode, mild (H)  Comment: mood stable.  Plan:1. Cont. celexa  2. Monitor for reports of increased anxiety  3. Monitor for insomnia    Electronically signed by:  BETH Hannon CNP

## 2023-04-07 NOTE — LETTER
4/7/2023        RE: Juany Collazo  [facility]  [address]  [facility] [address]        [place] [facility]  [place] Medical Record Number:  [id number]  Place of Service where encounter took place:  [facility] ([facility] [[id number]]  Chief Complaint   Patient presents with     Derm Problem       HPI:    Juany Collazo  is a 89 year old (1/7/1932), who is being seen today for an episodic care visit.  HPI information obtained from: facility chart records, facility staff, patient report, [facility] chart review and family/first contact dtr report. Today's concern is: dermatitis, HTN, dementia, depression. Has had recurrent rash of feet. Previously resolved with HC cream. Current red, scaly areas bilat feet-dorsal surface. No open areas. For HTN cont. On norvasc, zestoretic.  BP, HR stable. No reports of dizziness. For dementia cont. On aricept. STML. Recently started med administration by staff. Behaviors stable. Mood stable. Cont. On celexa for depression.       Past Medical and Surgical History reviewed in Epic today.    MEDICATIONS:    Current Outpatient Medications   Medication Sig Dispense Refill     acetaminophen (TYLENOL) 500 MG tablet Take 1,000 mg by mouth 2 times daily as needed for mild pain Up to 4,000 mg per 24 hours.       albuterol (PROAIR HFA/PROVENTIL HFA/VENTOLIN HFA) 108 (90 Base) MCG/ACT inhaler Inhale 2 puffs into the lungs every 4 hours as needed for shortness of breath / dyspnea or wheezing       amLODIPine (NORVASC) 2.5 MG tablet Take 2.5 mg by mouth daily       aspirin (ASA) 81 MG chewable tablet Take 1 tablet (81 mg) by mouth daily       citalopram (CELEXA) 20 MG tablet Take 1 tablet (20 mg) by mouth daily 30 tablet 3     diclofenac (VOLTAREN) 1 % topical gel Apply 2 g topically 2 times daily as needed for moderate pain 1 Tube 0     donepezil (ARICEPT) 10 MG tablet Take 1 tablet (10 mg) by mouth At Bedtime 30 tablet       hydrocortisone (CORTAID) 1 % external cream Apply to bilateral feet rash BID until clear, then BID PRN thereafter.         lisinopril-hydrochlorothiazide (ZESTORETIC) 20-12.5 MG tablet Take 1 tablet by mouth daily       omeprazole (PRILOSEC) 20 MG DR capsule Take 1 capsule (20 mg) by mouth 2 times daily       polyethylene glycol (MIRALAX) 17 GM/Dose powder Take 17 g (1 capful) by mouth daily as needed for constipation       simvastatin (ZOCOR) 20 MG tablet Take 1 tablet (20 mg) by mouth At Bedtime 90 tablet 3         REVIEW OF SYSTEMS:  Limited secondary to cognitive impairment but today pt reports feeling fine. Reports red areas of feet non-pruritic.     Objective:  /79   Pulse 62   Resp 16   SpO2 95%   Exam:  GENERAL APPEARANCE:  Alert, in no distress  ENT:  Mouth and posterior oropharynx normal, moist mucous membranes, [place]  EYES:  EOM, conjunctivae, lids, pupils and irises normal, PERRL  RESP:  respiratory effort and palpation of chest normal, lungs clear to auscultation , no respiratory distress  CV:  Palpation and auscultation of heart done , regular rate and rhythm, no murmur, rub, or gallop, no edema  ABDOMEN:  normal bowel sounds, soft, nontender, no hepatosplenomegaly or other masses, no guarding or rebound  M/S:   muscle strength 5/5 all 4 ext., normal tone  SKIN:  red, sl raised, scaly areas of dorsal surface bilat feet. no open areas  NEURO:   Cranial nerves 2-12 are normal tested and grossly at patient's baseline, no tremor  PSYCH:  insight and judgement impaired, memory impaired , affect and mood normal    Labs:     Most Recent 3 BMP's:Recent Labs   Lab Test 12/14/22  0752 08/20/22  1520 07/27/22  0900    137 138   POTASSIUM 4.3 3.8 3.5   CHLORIDE 102 99 102   CO2 26 25 27   BUN 12.2 11.6 9.8   CR 0.83 0.81 0.68   ANIONGAP 12 13 9   MAUREEN 9.6 9.7* 9.3   GLC 93 125* 133*       ASSESSMENT/PLAN:  (L30.9) Dermatitis  (primary encounter diagnosis)  Comment: recurrent. Feet. Non-pruritic  Plan:  1. HC cream bid  2. Reassess over next week, if not improved, change to triam cream.  3. Monitor for further areas of rash    (I10) Essential hypertension  Comment: BP stable  Plan: 1. Cont. norvasc  2. Cont. zestoretic  3. Follow BPs, HRs  4. Hgb, bmp, FLP, AST, ALT next week    (F03.90) Dementia without behavioral disturbance (H)  Comment: memory loss. Behaviors gen. stable  Plan: 1. Cont. aricept  2. Cont. Staff med admin  3. Monitor for decline in functional status    (F33.0) Major depressive disorder, recurrent episode, mild (H)  Comment: mood stable.  Plan:1. Cont. celexa  2. Monitor for reports of increased anxiety  3. Monitor for insomnia    Electronically signed by:  BETH Hannon CNP               Sincerely,        BETH Hannon CNP

## 2023-04-10 ENCOUNTER — LAB REQUISITION (OUTPATIENT)
Dept: LAB | Facility: CLINIC | Age: 88
End: 2023-04-10
Payer: COMMERCIAL

## 2023-04-10 DIAGNOSIS — I10 ESSENTIAL (PRIMARY) HYPERTENSION: ICD-10-CM

## 2023-04-10 LAB
ALT SERPL W P-5'-P-CCNC: 11 U/L (ref 10–35)
ANION GAP SERPL CALCULATED.3IONS-SCNC: 16 MMOL/L (ref 7–15)
AST SERPL W P-5'-P-CCNC: 18 U/L (ref 10–35)
BUN SERPL-MCNC: 14 MG/DL (ref 8–23)
CALCIUM SERPL-MCNC: 9.6 MG/DL (ref 8.2–9.6)
CHLORIDE SERPL-SCNC: 102 MMOL/L (ref 98–107)
CHOLEST SERPL-MCNC: 168 MG/DL
CREAT SERPL-MCNC: 0.79 MG/DL (ref 0.51–0.95)
DEPRECATED HCO3 PLAS-SCNC: 21 MMOL/L (ref 22–29)
GFR SERPL CREATININE-BSD FRML MDRD: 70 ML/MIN/1.73M2
GLUCOSE SERPL-MCNC: 95 MG/DL (ref 70–99)
HDLC SERPL-MCNC: 66 MG/DL
HGB BLD-MCNC: 11.7 G/DL (ref 11.7–15.7)
LDLC SERPL CALC-MCNC: 63 MG/DL
NONHDLC SERPL-MCNC: 102 MG/DL
POTASSIUM SERPL-SCNC: 4.1 MMOL/L (ref 3.4–5.3)
SODIUM SERPL-SCNC: 139 MMOL/L (ref 136–145)
TRIGL SERPL-MCNC: 195 MG/DL

## 2023-04-10 PROCEDURE — 80048 BASIC METABOLIC PNL TOTAL CA: CPT | Mod: ORL | Performed by: NURSE PRACTITIONER

## 2023-04-10 PROCEDURE — 36415 COLL VENOUS BLD VENIPUNCTURE: CPT | Mod: ORL | Performed by: NURSE PRACTITIONER

## 2023-04-10 PROCEDURE — 80061 LIPID PANEL: CPT | Mod: ORL | Performed by: NURSE PRACTITIONER

## 2023-04-10 PROCEDURE — P9603 ONE-WAY ALLOW PRORATED MILES: HCPCS | Mod: ORL | Performed by: NURSE PRACTITIONER

## 2023-04-10 PROCEDURE — 84460 ALANINE AMINO (ALT) (SGPT): CPT | Mod: ORL | Performed by: NURSE PRACTITIONER

## 2023-04-10 PROCEDURE — 84450 TRANSFERASE (AST) (SGOT): CPT | Mod: ORL | Performed by: NURSE PRACTITIONER

## 2023-04-10 PROCEDURE — 85018 HEMOGLOBIN: CPT | Mod: ORL | Performed by: NURSE PRACTITIONER

## 2023-05-29 ENCOUNTER — PATIENT OUTREACH (OUTPATIENT)
Dept: GERIATRIC MEDICINE | Facility: CLINIC | Age: 88
End: 2023-05-29
Payer: COMMERCIAL

## 2023-05-29 NOTE — PROGRESS NOTES
[facility] [employer] Chart review      chart   No triggering events at this time   CM will follow-up as needed     Nany Malcolm MA [facility] Care Coordinator   [phone] - [employer] cell phone   [phone] - work fax

## 2023-06-11 ENCOUNTER — HEALTH MAINTENANCE LETTER (OUTPATIENT)
Age: 88
End: 2023-06-11

## 2023-09-08 ENCOUNTER — ASSISTED LIVING VISIT (OUTPATIENT)
Dept: GERIATRICS | Facility: CLINIC | Age: 88
End: 2023-09-08
Payer: COMMERCIAL

## 2023-09-08 VITALS
OXYGEN SATURATION: 98 % | DIASTOLIC BLOOD PRESSURE: 66 MMHG | SYSTOLIC BLOOD PRESSURE: 128 MMHG | RESPIRATION RATE: 18 BRPM | HEART RATE: 68 BPM

## 2023-09-08 DIAGNOSIS — F33.0 MAJOR DEPRESSIVE DISORDER, RECURRENT EPISODE, MILD (H): ICD-10-CM

## 2023-09-08 DIAGNOSIS — K21.9 GASTROESOPHAGEAL REFLUX DISEASE, UNSPECIFIED WHETHER ESOPHAGITIS PRESENT: ICD-10-CM

## 2023-09-08 DIAGNOSIS — I10 ESSENTIAL HYPERTENSION: Primary | ICD-10-CM

## 2023-09-08 PROCEDURE — 99349 HOME/RES VST EST MOD MDM 40: CPT | Performed by: NURSE PRACTITIONER

## 2023-09-08 NOTE — LETTER
9/8/2023        RE: Juany Collazo  [address] [place]  [address] [facility] [address]        [place] [facility]  [place] Medical Record Number:  [id number]  Place of Service where encounter took place:  [facility] ([facility]) [[id number]]  Chief Complaint   Patient presents with     Hypertension       HPI:    Juany Collazo  is a 89 year old (1/7/1932), who is being seen today for an episodic care visit.  HPI information obtained from: facility chart records, facility staff, patient report, and [facility] chart review. Today's concern is:  HTN, GERD, depression. For HTN cont on norvasc, zestoretic.  BP stable. No current LE edema. No reports of nausea. Po intake stable. Cont. On prilosec for GERD. Reports mood as stable. Cont. On celexa for depression.     Past Medical and Surgical History reviewed in Epic today.    MEDICATIONS:    Current Outpatient Medications   Medication Sig Dispense Refill     acetaminophen (TYLENOL) 500 MG tablet Take 1,000 mg by mouth 2 times daily as needed for mild pain Up to 4,000 mg per 24 hours.       albuterol (PROAIR HFA/PROVENTIL HFA/VENTOLIN HFA) 108 (90 Base) MCG/ACT inhaler Inhale 2 puffs into the lungs every 4 hours as needed for shortness of breath / dyspnea or wheezing       amLODIPine (NORVASC) 2.5 MG tablet Take 2.5 mg by mouth daily       aspirin (ASA) 81 MG chewable tablet Take 1 tablet (81 mg) by mouth daily       citalopram (CELEXA) 20 MG tablet Take 1 tablet (20 mg) by mouth daily 30 tablet 3     diclofenac (VOLTAREN) 1 % topical gel Apply 2 g topically 2 times daily as needed for moderate pain 1 Tube 0     donepezil (ARICEPT) 10 MG tablet Take 1 tablet (10 mg) by mouth At Bedtime 30 tablet      hydrocortisone (CORTAID) 1 % external cream Apply to bilateral feet rash BID until clear, then BID PRN thereafter.         lisinopril-hydrochlorothiazide (ZESTORETIC) 20-12.5 MG tablet Take 1 tablet by mouth daily        omeprazole (PRILOSEC) 20 MG DR capsule Take 1 capsule (20 mg) by mouth 2 times daily       polyethylene glycol (MIRALAX) 17 GM/Dose powder Take 17 g (1 capful) by mouth daily as needed for constipation       simvastatin (ZOCOR) 20 MG tablet Take 1 tablet (20 mg) by mouth At Bedtime 90 tablet 3         REVIEW OF SYSTEMS:  Limited secondary to cognitive impairment but today pt reports feeling. Good. No resp. Or GI distress. Activity level baseline.    Objective:  /66   Pulse 68   Resp 18   SpO2 98%   Exam:  GENERAL APPEARANCE:  Alert, in no distress  ENT:  Mouth and posterior oropharynx normal, moist mucous membranes, [place]  EYES:  EOM, conjunctivae, lids, pupils and irises normal, PERRL  RESP:  respiratory effort and palpation of chest normal, lungs clear to auscultation , no respiratory distress  CV:  Palpation and auscultation of heart done , regular rate and rhythm, no murmur, rub, or gallop, no edema  ABDOMEN:  normal bowel sounds, soft, nontender, no hepatosplenomegaly or other masses, no guarding or rebound  M/S:   gait steady with walker. Muscle strength 5/5 all 4 ext  NEURO:   Cranial nerves 2-12 are normal tested and grossly at patient's baseline, no tremor  PSYCH:  insight and judgement impaired, memory impaired , affect and mood normal    Labs:   Most Recent 3 CBC's:  Recent Labs   Lab Test 04/10/23  1151 08/21/22  0811 08/20/22  1520 07/27/22  0900 03/02/22  0644   WBC  --  11.2* 13.7*  --  10.0   HGB 11.7 12.1 12.6   < > 11.9   MCV  --  91 90  --  89   PLT  --  237 263  --  223    < > = values in this interval not displayed.     Most Recent 3 BMP's:  Recent Labs   Lab Test 04/10/23  0820 12/14/22  0752 08/20/22  1520    140 137   POTASSIUM 4.1 4.3 3.8   CHLORIDE 102 102 99   CO2 21* 26 25   BUN 14.0 12.2 11.6   CR 0.79 0.83 0.81   ANIONGAP 16* 12 13   MAUREEN 9.6 9.6 9.7*   GLC 95 93 125*       ASSESSMENT/PLAN:  (I10) Essential hypertension  (primary encounter diagnosis)  Comment: BP  stable  Plan: 1. Cont. Norvasc  2. Cont. Zestoretic  3. Follow Bps, Hrs  4. Monitor for LE edema  5. Bmp next week    (K21.9) Gastroesophageal reflux disease, unspecified whether esophagitis present  Comment: currently stable  Plan: 1. Cont. Prilosec  2. Monitor for reports of nausea  3. Follow wt, po intake    (F33.0) Major depressive disorder, recurrent episode, mild (H)  Comment: mood stable. No reports of changes in behaviors  Plan: 1. Cont. Celexa  2. Cont. aricept  3. Monitor for changes in mood, behaviors            Electronically signed by:  BETH Hannon CNP              Sincerely,        BETH Hannon CNP

## 2023-09-08 NOTE — PROGRESS NOTES
[place] [facility]  [place] Medical Record Number:  [id number]  Place of Service where encounter took place:  [facility] ([facility]) [[id number]]  Chief Complaint   Patient presents with    Hypertension       HPI:    Juany Collazo  is a 89 year old (1/7/1932), who is being seen today for an episodic care visit.  HPI information obtained from: facility chart records, facility staff, patient report, and [facility] chart review. Today's concern is:  HTN, GERD, depression. For HTN cont on norvasc, zestoretic.  BP stable. No current LE edema. No reports of nausea. Po intake stable. Cont. On prilosec for GERD. Reports mood as stable. Cont. On celexa for depression.     Past Medical and Surgical History reviewed in Epic today.    MEDICATIONS:    Current Outpatient Medications   Medication Sig Dispense Refill    acetaminophen (TYLENOL) 500 MG tablet Take 1,000 mg by mouth 2 times daily as needed for mild pain Up to 4,000 mg per 24 hours.      albuterol (PROAIR HFA/PROVENTIL HFA/VENTOLIN HFA) 108 (90 Base) MCG/ACT inhaler Inhale 2 puffs into the lungs every 4 hours as needed for shortness of breath / dyspnea or wheezing      amLODIPine (NORVASC) 2.5 MG tablet Take 2.5 mg by mouth daily      aspirin (ASA) 81 MG chewable tablet Take 1 tablet (81 mg) by mouth daily      citalopram (CELEXA) 20 MG tablet Take 1 tablet (20 mg) by mouth daily 30 tablet 3    diclofenac (VOLTAREN) 1 % topical gel Apply 2 g topically 2 times daily as needed for moderate pain 1 Tube 0    donepezil (ARICEPT) 10 MG tablet Take 1 tablet (10 mg) by mouth At Bedtime 30 tablet     hydrocortisone (CORTAID) 1 % external cream Apply to bilateral feet rash BID until clear, then BID PRN thereafter.        lisinopril-hydrochlorothiazide (ZESTORETIC) 20-12.5 MG tablet Take 1 tablet by mouth daily      omeprazole (PRILOSEC) 20 MG DR capsule Take 1 capsule (20 mg) by mouth 2 times daily      polyethylene glycol (MIRALAX) 17  GM/Dose powder Take 17 g (1 capful) by mouth daily as needed for constipation      simvastatin (ZOCOR) 20 MG tablet Take 1 tablet (20 mg) by mouth At Bedtime 90 tablet 3         REVIEW OF SYSTEMS:  Limited secondary to cognitive impairment but today pt reports feeling. Good. No resp. Or GI distress. Activity level baseline.    Objective:  /66   Pulse 68   Resp 18   SpO2 98%   Exam:  GENERAL APPEARANCE:  Alert, in no distress  ENT:  Mouth and posterior oropharynx normal, moist mucous membranes, [place]  EYES:  EOM, conjunctivae, lids, pupils and irises normal, PERRL  RESP:  respiratory effort and palpation of chest normal, lungs clear to auscultation , no respiratory distress  CV:  Palpation and auscultation of heart done , regular rate and rhythm, no murmur, rub, or gallop, no edema  ABDOMEN:  normal bowel sounds, soft, nontender, no hepatosplenomegaly or other masses, no guarding or rebound  M/S:   gait steady with walker. Muscle strength 5/5 all 4 ext  NEURO:   Cranial nerves 2-12 are normal tested and grossly at patient's baseline, no tremor  PSYCH:  insight and judgement impaired, memory impaired , affect and mood normal    Labs:   Most Recent 3 CBC's:  Recent Labs   Lab Test 04/10/23  1151 08/21/22  0811 08/20/22  1520 07/27/22  0900 03/02/22  0644   WBC  --  11.2* 13.7*  --  10.0   HGB 11.7 12.1 12.6   < > 11.9   MCV  --  91 90  --  89   PLT  --  237 263  --  223    < > = values in this interval not displayed.     Most Recent 3 BMP's:  Recent Labs   Lab Test 04/10/23  0820 12/14/22  0752 08/20/22  1520    140 137   POTASSIUM 4.1 4.3 3.8   CHLORIDE 102 102 99   CO2 21* 26 25   BUN 14.0 12.2 11.6   CR 0.79 0.83 0.81   ANIONGAP 16* 12 13   MAUREEN 9.6 9.6 9.7*   GLC 95 93 125*       ASSESSMENT/PLAN:  (I10) Essential hypertension  (primary encounter diagnosis)  Comment: BP stable  Plan: 1. Cont. Norvasc  2. Cont. Zestoretic  3. Follow Bps, Hrs  4. Monitor for LE edema  5. Bmp next week    (K21.9)  Gastroesophageal reflux disease, unspecified whether esophagitis present  Comment: currently stable  Plan: 1. Cont. Prilosec  2. Monitor for reports of nausea  3. Follow wt, po intake    (F33.0) Major depressive disorder, recurrent episode, mild (H)  Comment: mood stable. No reports of changes in behaviors  Plan: 1. Cont. Celexa  2. Cont. aricept  3. Monitor for changes in mood, behaviors            Electronically signed by:  BETH Hannon CNP

## 2023-09-09 ENCOUNTER — LAB REQUISITION (OUTPATIENT)
Dept: LAB | Facility: CLINIC | Age: 88
End: 2023-09-09
Payer: COMMERCIAL

## 2023-09-09 DIAGNOSIS — I10 ESSENTIAL (PRIMARY) HYPERTENSION: ICD-10-CM

## 2023-09-11 LAB
ANION GAP SERPL CALCULATED.3IONS-SCNC: 13 MMOL/L (ref 7–15)
BUN SERPL-MCNC: 10.9 MG/DL (ref 8–23)
CALCIUM SERPL-MCNC: 9.5 MG/DL (ref 8.2–9.6)
CHLORIDE SERPL-SCNC: 102 MMOL/L (ref 98–107)
CREAT SERPL-MCNC: 0.75 MG/DL (ref 0.51–0.95)
DEPRECATED HCO3 PLAS-SCNC: 22 MMOL/L (ref 22–29)
EGFRCR SERPLBLD CKD-EPI 2021: 75 ML/MIN/1.73M2
GLUCOSE SERPL-MCNC: 93 MG/DL (ref 70–99)
POTASSIUM SERPL-SCNC: 4.2 MMOL/L (ref 3.4–5.3)
SODIUM SERPL-SCNC: 137 MMOL/L (ref 136–145)

## 2023-09-11 PROCEDURE — P9603 ONE-WAY ALLOW PRORATED MILES: HCPCS | Mod: ORL | Performed by: NURSE PRACTITIONER

## 2023-09-11 PROCEDURE — 36415 COLL VENOUS BLD VENIPUNCTURE: CPT | Mod: ORL | Performed by: NURSE PRACTITIONER

## 2023-09-11 PROCEDURE — 80048 BASIC METABOLIC PNL TOTAL CA: CPT | Mod: ORL | Performed by: NURSE PRACTITIONER

## 2024-01-09 ENCOUNTER — LAB REQUISITION (OUTPATIENT)
Dept: LAB | Facility: CLINIC | Age: 89
End: 2024-01-09
Payer: COMMERCIAL

## 2024-01-09 ENCOUNTER — ASSISTED LIVING VISIT (OUTPATIENT)
Dept: GERIATRICS | Facility: CLINIC | Age: 89
End: 2024-01-09
Payer: COMMERCIAL

## 2024-01-09 VITALS — BODY MASS INDEX: 29.81 KG/M2 | HEIGHT: 62 IN

## 2024-01-09 DIAGNOSIS — F43.23 ADJUSTMENT DISORDER WITH MIXED ANXIETY AND DEPRESSED MOOD: ICD-10-CM

## 2024-01-09 DIAGNOSIS — K21.9 GASTROESOPHAGEAL REFLUX DISEASE, UNSPECIFIED WHETHER ESOPHAGITIS PRESENT: ICD-10-CM

## 2024-01-09 DIAGNOSIS — I10 ESSENTIAL (PRIMARY) HYPERTENSION: ICD-10-CM

## 2024-01-09 DIAGNOSIS — F03.90 DEMENTIA WITHOUT BEHAVIORAL DISTURBANCE (H): ICD-10-CM

## 2024-01-09 DIAGNOSIS — I10 ESSENTIAL HYPERTENSION, BENIGN: Primary | ICD-10-CM

## 2024-01-09 DIAGNOSIS — F33.0 MAJOR DEPRESSIVE DISORDER, RECURRENT EPISODE, MILD (H): ICD-10-CM

## 2024-01-09 PROCEDURE — 99349 HOME/RES VST EST MOD MDM 40: CPT | Performed by: INTERNAL MEDICINE

## 2024-01-09 NOTE — LETTER
1/9/2024        RE: Jauny Collazo  [facility]  [address]  [facility] [address]        No notes on file      Sincerely,        Citlalli Saucedo MD

## 2024-01-10 LAB
ANION GAP SERPL CALCULATED.3IONS-SCNC: 10 MMOL/L (ref 7–15)
BASOPHILS # BLD AUTO: 0.1 10E3/UL (ref 0–0.2)
BASOPHILS NFR BLD AUTO: 1 %
BUN SERPL-MCNC: 8.7 MG/DL (ref 8–23)
CALCIUM SERPL-MCNC: 9.8 MG/DL (ref 8.2–9.6)
CHLORIDE SERPL-SCNC: 100 MMOL/L (ref 98–107)
CREAT SERPL-MCNC: 0.79 MG/DL (ref 0.51–0.95)
DEPRECATED HCO3 PLAS-SCNC: 24 MMOL/L (ref 22–29)
EGFRCR SERPLBLD CKD-EPI 2021: 70 ML/MIN/1.73M2
EOSINOPHIL # BLD AUTO: 0.1 10E3/UL (ref 0–0.7)
EOSINOPHIL NFR BLD AUTO: 1 %
ERYTHROCYTE [DISTWIDTH] IN BLOOD BY AUTOMATED COUNT: 14.6 % (ref 10–15)
GLUCOSE SERPL-MCNC: 103 MG/DL (ref 70–99)
HCT VFR BLD AUTO: 39.5 % (ref 35–47)
HGB BLD-MCNC: 12.6 G/DL (ref 11.7–15.7)
IMM GRANULOCYTES # BLD: 0 10E3/UL
IMM GRANULOCYTES NFR BLD: 0 %
LYMPHOCYTES # BLD AUTO: 4.3 10E3/UL (ref 0.8–5.3)
LYMPHOCYTES NFR BLD AUTO: 40 %
MCH RBC QN AUTO: 27.5 PG (ref 26.5–33)
MCHC RBC AUTO-ENTMCNC: 31.9 G/DL (ref 31.5–36.5)
MCV RBC AUTO: 86 FL (ref 78–100)
MONOCYTES # BLD AUTO: 1.2 10E3/UL (ref 0–1.3)
MONOCYTES NFR BLD AUTO: 11 %
NEUTROPHILS # BLD AUTO: 5.1 10E3/UL (ref 1.6–8.3)
NEUTROPHILS NFR BLD AUTO: 47 %
NRBC # BLD AUTO: 0 10E3/UL
NRBC BLD AUTO-RTO: 0 /100
PLATELET # BLD AUTO: 202 10E3/UL (ref 150–450)
POTASSIUM SERPL-SCNC: 4 MMOL/L (ref 3.4–5.3)
RBC # BLD AUTO: 4.59 10E6/UL (ref 3.8–5.2)
SODIUM SERPL-SCNC: 134 MMOL/L (ref 135–145)
WBC # BLD AUTO: 10.8 10E3/UL (ref 4–11)

## 2024-01-10 PROCEDURE — 36415 COLL VENOUS BLD VENIPUNCTURE: CPT | Mod: ORL | Performed by: NURSE PRACTITIONER

## 2024-01-10 PROCEDURE — 80048 BASIC METABOLIC PNL TOTAL CA: CPT | Mod: ORL | Performed by: NURSE PRACTITIONER

## 2024-01-10 PROCEDURE — 85025 COMPLETE CBC W/AUTO DIFF WBC: CPT | Mod: ORL | Performed by: NURSE PRACTITIONER

## 2024-01-10 PROCEDURE — P9604 ONE-WAY ALLOW PRORATED TRIP: HCPCS | Mod: ORL | Performed by: NURSE PRACTITIONER

## 2024-01-25 ENCOUNTER — DOCUMENTATION ONLY (OUTPATIENT)
Dept: GERIATRICS | Facility: CLINIC | Age: 89
End: 2024-01-25
Payer: COMMERCIAL

## 2024-01-27 RX ORDER — DONEPEZIL HYDROCHLORIDE 10 MG/1
5 TABLET, FILM COATED ORAL AT BEDTIME
Start: 2024-01-27

## 2024-01-28 NOTE — PROGRESS NOTES
Juany Collazo is a 89 year old female seen January 9, 2024 at [facility] where she has resided for 5 years (admit 2/2019) seem to follow up progressive dementia and HTN.   Pt is seen in her apartment resting abed     Reports she is feeling okay, denies any current pain or other symptoms       By chart review, patient has had progressive dementia, with first evaluation for memory loss in 2013.   She had been followed by [facility], and has been treated with donepezil since then.   She lived in her home with PCA and family help until a fall in November 2018  in which she suffered trauma to her left knee and right eye   She was in [facility] until mid December 2018, discharged to her daughter's home in [place], then transferred to [place] for permanent placement.   Pt had elective left TKA in March 2020.  In August 2020 she had a [facility] hospitalization following a syncopal episode that occurred while out with family who were massaging her sore neck.  In the fall she sustained a displaced right trimalleolar fracture and underwent ORIF.   After workup, syncope thought secondary to vasovagal cause in setting of carotid massage.   She transitioned to TCU before return to AL.    Pt seen in the ED in August 2022 for CP.  Pain resolved and pt had trouble giving any meaningful history    Unremarkable cardiac workup, thought to be related to hiatal hernia and GERD.   Discharged back to AL on omeprazole       Past Medical History:   Diagnosis Date    BENIGN HYPERTENSION 10/27/2003    BONE & CARTILAGE DIS NOS 1/18/2006    Carpal tunnel syndrome     Chronic pain     right leg    Osteoporosis     GERD (gastroesophageal reflux disease) 10/14/2008    History of blood transfusion     Hyperlipidemia LDL goal <130 10/31/2010    Mild major depression (H) 4/23/2014   Late onset Alzheimer's dementia     SH:   Lives alone, AL apartment.     She previously lived in a 3 story home in [place]  "with [facility]    She has 5 children.   One daughter lives in [place].       ROS:  Ambulatory with 4WW  Left shoulder pain, had cortisone joint injection 6/2020  SLUMS 21/30   CPT 5.0 in 2018.   No recent testing         EXAM:  NAD  Ht 1.575 m (5' 2\")   BMI 29.81 kg/m     Neck supple without adenopathy  Lungs clear bilaterally with fair air movement  Heart RRR s1s2 without murmur  Abd soft, NT, no distention, +BS  Ext without edema   Neuro: no focal findings, limited hx  Psych: affect okay, laughs     Labs reviewed: unremarkable BMP, CBC in Sept 2023      ECHO 8/2022:    1. The left ventricle is normal in size. Proximal septal thickening is noted.  Left ventricular systolic function is normal. The visual ejection fraction is  65-70%. Diastolic Doppler findings (E/E' ratio and/or other parameters)  suggest left ventricular filling pressures are indeterminate. No regional wall motion abnormalities noted.  2. The right ventricle is normal size. The right ventricular systolic function is normal.  3. Trace to mild mitral and tricuspid regurgitation.  4. No pericardial effusion.  5. In comparison to the previous report dated 08/07/2020, the findings are similar.      IMP/PLAN:   (E78.5) Hyperlipidemia, unspecified hyperlipidemia type  Comment: no documentation of CV event    Did have an episode of CP last year, ECHO as above    Lab Results   Component Value Date    CHOL 168 04/10/2023      HDL 66 04/10/2023      LDL 63 04/10/2023      TRIG 195 04/10/2023     Plan: remains on daily ASA and simvastatin 20 mg/day for primary prevention.      (I10) Essential hypertension, benign    Comment:   BP Readings from Last 3 Encounters:   09/08/23 128/66   04/07/23 138/79   12/13/22 139/78      Plan: continue lisinopril 20 mg/day, hydrochlorothiazide 12.5 mg/day  Will discontinue amlodipine and follow bps    Recheck BMP on diuretic       (G30.1,  F02.80) Late onset Alzheimer's disease without behavioral " disturbance  Comment: decline in memory and functional status   Plan: AL support for meds, meals, activity and ADLs  Will try decreasing donepezil to 5 mg/day, likely not getting any benefit to outweigh risk at this point.    Consider discontinuing if stable     (F43.21) Adjustment disorder with depressed mood  Comment: many changes, loss of coping skills  Plan: continue citalopram 20 mg/day     (K21.9) Gastroesophageal reflux disease without esophagitis  Comment: ongoing   Plan: continue omeprazole 20 mg/day.      Advance directive: DNR/DNI per signed EDDIE Saucedo MD

## 2024-02-15 NOTE — TELEPHONE ENCOUNTER
Please call patient for Inpatient Discharge f/u 08/11/20. Syncope, Closed Fracture Of Ankle. Ruth Behrens.   
Pt is followed by gerontologist, Gaurang Reeves NP. Call not initiated.  Otoniel Kenny RN    
15-Feb-2024 19:41

## 2024-04-12 ENCOUNTER — ASSISTED LIVING VISIT (OUTPATIENT)
Dept: GERIATRICS | Facility: CLINIC | Age: 89
End: 2024-04-12
Payer: COMMERCIAL

## 2024-04-12 VITALS
HEIGHT: 62 IN | RESPIRATION RATE: 16 BRPM | WEIGHT: 180 LBS | DIASTOLIC BLOOD PRESSURE: 67 MMHG | SYSTOLIC BLOOD PRESSURE: 122 MMHG | HEART RATE: 69 BPM | OXYGEN SATURATION: 95 % | BODY MASS INDEX: 33.13 KG/M2

## 2024-04-12 DIAGNOSIS — F33.0 MAJOR DEPRESSIVE DISORDER, RECURRENT EPISODE, MILD (H): ICD-10-CM

## 2024-04-12 DIAGNOSIS — F03.90 DEMENTIA WITHOUT BEHAVIORAL DISTURBANCE (H): ICD-10-CM

## 2024-04-12 DIAGNOSIS — I10 ESSENTIAL HYPERTENSION, BENIGN: Primary | ICD-10-CM

## 2024-04-12 PROCEDURE — 99349 HOME/RES VST EST MOD MDM 40: CPT | Performed by: NURSE PRACTITIONER

## 2024-04-12 NOTE — LETTER
4/12/2024        RE: Juany Collazo  [facility]  [address]  [facility] [address]        [place] [facility]  [place] Medical Record Number:  [id number]  Place of Service where encounter took place:  [facility] [[id number]]  Chief Complaint   Patient presents with     Gastrophageal Reflux     Hypertension       HPI:    Juany Collazo  is a 89 year old (1/7/1932), who is being seen today for an episodic care visit.  HPI information obtained from: facility chart records, facility staff, patient report, [facility] chart review, and family/first contact dtr report. Today's concern is: HTN, dementia, depression. For HTN cont. On norvasc, zestoretic. BP stable. No LE edema. Memory loss due to dementia. Aricept dose decrease 1/24. No reports of changes in mood or functional decline. For depression taking celexa. Mood stable. Wt. Stable.        Past Medical and Surgical History reviewed in Epic today.    MEDICATIONS:    Current Outpatient Medications   Medication Sig Dispense Refill     acetaminophen (TYLENOL) 500 MG tablet Take 1,000 mg by mouth 2 times daily as needed for mild pain Up to 4,000 mg per 24 hours.       albuterol (PROAIR HFA/PROVENTIL HFA/VENTOLIN HFA) 108 (90 Base) MCG/ACT inhaler Inhale 2 puffs into the lungs every 4 hours as needed for shortness of breath / dyspnea or wheezing       amLODIPine (NORVASC) 2.5 MG tablet Take 2.5 mg by mouth daily       aspirin (ASA) 81 MG chewable tablet Take 1 tablet (81 mg) by mouth daily       citalopram (CELEXA) 20 MG tablet Take 1 tablet (20 mg) by mouth daily 30 tablet 3     diclofenac (VOLTAREN) 1 % topical gel Apply 2 g topically 2 times daily as needed for moderate pain 1 Tube 0     donepezil (ARICEPT) 10 MG tablet Take 0.5 tablets (5 mg) by mouth at bedtime       hydrocortisone (CORTAID) 1 % external cream Apply to bilateral feet rash BID until clear, then BID PRN thereafter.          "lisinopril-hydrochlorothiazide (ZESTORETIC) 20-12.5 MG tablet Take 1 tablet by mouth daily       omeprazole (PRILOSEC) 20 MG DR capsule Take 1 capsule (20 mg) by mouth 2 times daily       polyethylene glycol (MIRALAX) 17 GM/Dose powder Take 17 g (1 capful) by mouth daily as needed for constipation       simvastatin (ZOCOR) 20 MG tablet Take 1 tablet (20 mg) by mouth At Bedtime 90 tablet 3         REVIEW OF SYSTEMS:  No chest pain, shortness of breath, fevers, chills, headache, nausea, vomiting, dysuria or bowel abnormalities.  Appetite is  normal.  No pain except occ aches.    Objective:  /67   Pulse 69   Resp 16   Ht 1.575 m (5' 2\")   Wt 81.6 kg (180 lb)   SpO2 95%   BMI 32.92 kg/m    Exam:  GENERAL APPEARANCE:  Alert, in no distress, cooperative  ENT:  Mouth and posterior oropharynx normal, moist mucous membranes, [place]  EYES:  EOM, conjunctivae, lids, pupils and irises normal, PERRL  RESP:  respiratory effort and palpation of chest normal, lungs clear to auscultation , no respiratory distress  CV:  Palpation and auscultation of heart done , regular rate and rhythm, no murmur, rub, or gallop, no edema  ABDOMEN:  normal bowel sounds, soft, nontender, no hepatosplenomegaly or other masses, no guarding or rebound  M/S:   Gait and station normal  Muscle strength 5/5 all 4 ext., normal tone  NEURO:   Cranial nerves 2-12 are normal tested and grossly at patient's baseline, speech fluid, no tremor  PSYCH:  insight and judgement impaired, memory impaired , affect and mood normal    Labs:   Most Recent 3 CBC's:  Recent Labs   Lab Test 01/10/24  0723 04/10/23  1151 08/21/22  0811 08/20/22  1520   WBC 10.8  --  11.2* 13.7*   HGB 12.6 11.7 12.1 12.6   MCV 86  --  91 90     --  237 263     Most Recent 3 BMP's:  Recent Labs   Lab Test 01/10/24  0723 09/11/23  0913 04/10/23  0820   * 137 139   POTASSIUM 4.0 4.2 4.1   CHLORIDE 100 102 102   CO2 24 22 21*   BUN 8.7 10.9 14.0   CR 0.79 0.75 0.79   ANIONGAP 10 " 13 16*   MAUREEN 9.8* 9.5 9.6   * 93 95       ASSESSMENT/PLAN:  (I10) Essential hypertension, benign  (primary encounter diagnosis)  Comment: BP stable  Plan: 1. Cont. Norvasc, zestoretic  2. Follow BPS, Hrs  3. Hgb, bmp next week  4. Monitor for LE edema    (F03.90) Dementia without behavioral disturbance (H)  Comment: memory loss. No changes in mood, behaviors, further cog. Decline since decrease in aricept dose per staff.  Plan: 1. Left vm for dtr to discuss any cog changes she may have noted, if none, plan to discontinue aricept  2. Follow po intake, wt.  3. Cont. Prilosec for GERD. Follow wt.s    (F33.0) Major depressive disorder, recurrent episode, mild (H24)  Comment: mood gen. stable  Plan: 1. Cont. Celexa  2. Monitor for increased anxiety  3. Monitor for insomnia               Electronically signed by:  BETH Hannon CNP              Sincerely,        BETH Hannon CNP

## 2024-04-12 NOTE — PROGRESS NOTES
[place] [facility]  [place] Medical Record Number:  [id number]  Place of Service where encounter took place:  [facility] ([facility]) [[id number]]  Chief Complaint   Patient presents with    Gastrophageal Reflux    Hypertension       HPI:    Juany Collazo  is a 89 year old (1/7/1932), who is being seen today for an episodic care visit.  HPI information obtained from: facility chart records, facility staff, patient report, [facility] chart review, and family/first contact dtr report. Today's concern is: HTN, dementia, depression. For HTN cont. On norvasc, zestoretic. BP stable. No LE edema. Memory loss due to dementia. Aricept dose decrease 1/24. No reports of changes in mood or functional decline. For depression taking celexa. Mood stable. Wt. Stable.        Past Medical and Surgical History reviewed in Epic today.    MEDICATIONS:    Current Outpatient Medications   Medication Sig Dispense Refill    acetaminophen (TYLENOL) 500 MG tablet Take 1,000 mg by mouth 2 times daily as needed for mild pain Up to 4,000 mg per 24 hours.      albuterol (PROAIR HFA/PROVENTIL HFA/VENTOLIN HFA) 108 (90 Base) MCG/ACT inhaler Inhale 2 puffs into the lungs every 4 hours as needed for shortness of breath / dyspnea or wheezing      amLODIPine (NORVASC) 2.5 MG tablet Take 2.5 mg by mouth daily      aspirin (ASA) 81 MG chewable tablet Take 1 tablet (81 mg) by mouth daily      citalopram (CELEXA) 20 MG tablet Take 1 tablet (20 mg) by mouth daily 30 tablet 3    diclofenac (VOLTAREN) 1 % topical gel Apply 2 g topically 2 times daily as needed for moderate pain 1 Tube 0    donepezil (ARICEPT) 10 MG tablet Take 0.5 tablets (5 mg) by mouth at bedtime      hydrocortisone (CORTAID) 1 % external cream Apply to bilateral feet rash BID until clear, then BID PRN thereafter.        lisinopril-hydrochlorothiazide (ZESTORETIC) 20-12.5 MG tablet Take 1 tablet by mouth daily      omeprazole (PRILOSEC) 20 MG   "capsule Take 1 capsule (20 mg) by mouth 2 times daily      polyethylene glycol (MIRALAX) 17 GM/Dose powder Take 17 g (1 capful) by mouth daily as needed for constipation      simvastatin (ZOCOR) 20 MG tablet Take 1 tablet (20 mg) by mouth At Bedtime 90 tablet 3         REVIEW OF SYSTEMS:  No chest pain, shortness of breath, fevers, chills, headache, nausea, vomiting, dysuria or bowel abnormalities.  Appetite is  normal.  No pain except occ aches.    Objective:  /67   Pulse 69   Resp 16   Ht 1.575 m (5' 2\")   Wt 81.6 kg (180 lb)   SpO2 95%   BMI 32.92 kg/m    Exam:  GENERAL APPEARANCE:  Alert, in no distress, cooperative  ENT:  Mouth and posterior oropharynx normal, moist mucous membranes, [place]  EYES:  EOM, conjunctivae, lids, pupils and irises normal, PERRL  RESP:  respiratory effort and palpation of chest normal, lungs clear to auscultation , no respiratory distress  CV:  Palpation and auscultation of heart done , regular rate and rhythm, no murmur, rub, or gallop, no edema  ABDOMEN:  normal bowel sounds, soft, nontender, no hepatosplenomegaly or other masses, no guarding or rebound  M/S:   Gait and station normal  Muscle strength 5/5 all 4 ext., normal tone  NEURO:   Cranial nerves 2-12 are normal tested and grossly at patient's baseline, speech fluid, no tremor  PSYCH:  insight and judgement impaired, memory impaired , affect and mood normal    Labs:   Most Recent 3 CBC's:  Recent Labs   Lab Test 01/10/24  0723 04/10/23  1151 08/21/22  0811 08/20/22  1520   WBC 10.8  --  11.2* 13.7*   HGB 12.6 11.7 12.1 12.6   MCV 86  --  91 90     --  237 263     Most Recent 3 BMP's:  Recent Labs   Lab Test 01/10/24  0723 09/11/23  0913 04/10/23  0820   * 137 139   POTASSIUM 4.0 4.2 4.1   CHLORIDE 100 102 102   CO2 24 22 21*   BUN 8.7 10.9 14.0   CR 0.79 0.75 0.79   ANIONGAP 10 13 16*   MAUREEN 9.8* 9.5 9.6   * 93 95       ASSESSMENT/PLAN:  (I10) Essential hypertension, benign  (primary encounter " diagnosis)  Comment: BP stable  Plan: 1. Cont. Norvasc, zestoretic  2. Follow BPS, Hrs  3. Hgb, bmp next week  4. Monitor for LE edema    (F03.90) Dementia without behavioral disturbance (H)  Comment: memory loss. No changes in mood, behaviors, further cog. Decline since decrease in aricept dose per staff.  Plan: 1. Left vm for dtr to discuss any cog changes she may have noted, if none, plan to discontinue aricept  2. Follow po intake, wt.  3. Cont. Prilosec for GERD. Follow wt.s    (F33.0) Major depressive disorder, recurrent episode, mild (H24)  Comment: mood gen. stable  Plan: 1. Cont. Celexa  2. Monitor for increased anxiety  3. Monitor for insomnia               Electronically signed by:  BETH Hannon CNP

## 2024-04-13 ENCOUNTER — LAB REQUISITION (OUTPATIENT)
Dept: LAB | Facility: CLINIC | Age: 89
End: 2024-04-13
Payer: COMMERCIAL

## 2024-04-13 DIAGNOSIS — I10 ESSENTIAL (PRIMARY) HYPERTENSION: ICD-10-CM

## 2024-04-15 LAB
ANION GAP SERPL CALCULATED.3IONS-SCNC: 11 MMOL/L (ref 7–15)
BUN SERPL-MCNC: 14.1 MG/DL (ref 8–23)
CALCIUM SERPL-MCNC: 9.1 MG/DL (ref 8.2–9.6)
CHLORIDE SERPL-SCNC: 103 MMOL/L (ref 98–107)
CREAT SERPL-MCNC: 0.85 MG/DL (ref 0.51–0.95)
DEPRECATED HCO3 PLAS-SCNC: 23 MMOL/L (ref 22–29)
EGFRCR SERPLBLD CKD-EPI 2021: 64 ML/MIN/1.73M2
GLUCOSE SERPL-MCNC: 96 MG/DL (ref 70–99)
HGB BLD-MCNC: 11.7 G/DL (ref 11.7–15.7)
POTASSIUM SERPL-SCNC: 4.1 MMOL/L (ref 3.4–5.3)
SODIUM SERPL-SCNC: 137 MMOL/L (ref 135–145)

## 2024-04-15 PROCEDURE — 36415 COLL VENOUS BLD VENIPUNCTURE: CPT | Mod: ORL | Performed by: NURSE PRACTITIONER

## 2024-04-15 PROCEDURE — 85018 HEMOGLOBIN: CPT | Mod: ORL | Performed by: NURSE PRACTITIONER

## 2024-04-15 PROCEDURE — 80048 BASIC METABOLIC PNL TOTAL CA: CPT | Mod: ORL | Performed by: NURSE PRACTITIONER

## 2024-04-15 PROCEDURE — P9603 ONE-WAY ALLOW PRORATED MILES: HCPCS | Mod: ORL | Performed by: NURSE PRACTITIONER

## 2024-05-22 ENCOUNTER — PATIENT OUTREACH (OUTPATIENT)
Dept: GERIATRIC MEDICINE | Facility: CLINIC | Age: 89
End: 2024-05-22
Payer: COMMERCIAL

## 2024-05-22 NOTE — PROGRESS NOTES
[facility] Care Coordination Contact    [employer] Care Coordination    Annual chart review preformed for [employer] member.     Reviewed Epic notes and no triggering events noted. No hospitalizations or ED visits in the last 6 months. Daily assistance (including med management) provided by assisted living facility. Followed by onsite NP. Has informal family support in place.     Writer will continue to follow via EMR and is available as needed.    Glendy Ramirez RN  [facility]  Cell: [phone]

## 2024-06-10 ENCOUNTER — TELEPHONE (OUTPATIENT)
Dept: GERIATRICS | Facility: CLINIC | Age: 89
End: 2024-06-10
Payer: COMMERCIAL

## 2024-06-10 NOTE — TELEPHONE ENCOUNTER
[facility] Triage Nurse Telephone Encounter    Provider: BETH Lopez CNP   Facility: [facility]  Facility Type:  AL    Caller: Nany  Call Back Number: [phone]    Allergies:  No Known Allergies     Reason for call: DHS called to report a rash to patient's left shoulder blade that is extending up towards her neck and into her hair.  Some areas are scabbed over.  There are patchy areas as well.  No blisters present.  Daughter states this rash has been present since mid-May.  Nurse feels it possibly looks like shingles but daughter doesn't think so.  Patient does not appear to be in pain.  No open areas present to the rash.  Daughter at this time wants the rash to be monitored by nurses and then to have NP assess the area on Friday when at facility.      Marii Mcdonald RN

## 2024-06-14 ENCOUNTER — ASSISTED LIVING VISIT (OUTPATIENT)
Dept: GERIATRICS | Facility: CLINIC | Age: 89
End: 2024-06-14
Payer: COMMERCIAL

## 2024-06-14 VITALS
SYSTOLIC BLOOD PRESSURE: 139 MMHG | HEIGHT: 62 IN | OXYGEN SATURATION: 95 % | HEART RATE: 73 BPM | RESPIRATION RATE: 16 BRPM | BODY MASS INDEX: 32.92 KG/M2 | DIASTOLIC BLOOD PRESSURE: 76 MMHG

## 2024-06-14 DIAGNOSIS — I10 ESSENTIAL HYPERTENSION, BENIGN: ICD-10-CM

## 2024-06-14 DIAGNOSIS — K21.9 GASTROESOPHAGEAL REFLUX DISEASE, UNSPECIFIED WHETHER ESOPHAGITIS PRESENT: ICD-10-CM

## 2024-06-14 DIAGNOSIS — L30.9 DERMATITIS: Primary | ICD-10-CM

## 2024-06-14 PROCEDURE — 99349 HOME/RES VST EST MOD MDM 40: CPT | Performed by: NURSE PRACTITIONER

## 2024-06-14 NOTE — LETTER
6/14/2024      Juany Collazo  [facility]  [address]  [facility] [address]        [place] [facility]  [place] Medical Record Number:  [id number]  Place of Service where encounter took place:  [facility] ([facility] [[id number]]  Chief Complaint   Patient presents with     Derm Problem       HPI:    Juany Collazo  is a 89 year old (1/7/1932), who is being seen today for an episodic care visit.  HPI information obtained from: facility chart records, facility staff, patient report, [facility] chart review, and family/first contact dtr report. Today's concern is: dermatitis, HTN, GERD. Noted to have rash of L upper back, shoulder. Reports present for past few mos. Pruritus at site. Had voltaren gel to area for pain. Gel now dc'd. For HTN cont. On norvasc, zestoretic. BP stable. No reports of nausea, GI distress. Cont. On prilosec for GERD.        Past Medical and Surgical History reviewed in Epic today.    MEDICATIONS:    Current Outpatient Medications   Medication Sig Dispense Refill     acetaminophen (TYLENOL) 500 MG tablet Take 1,000 mg by mouth 2 times daily as needed for mild pain Up to 4,000 mg per 24 hours.       albuterol (PROAIR HFA/PROVENTIL HFA/VENTOLIN HFA) 108 (90 Base) MCG/ACT inhaler Inhale 2 puffs into the lungs every 4 hours as needed for shortness of breath / dyspnea or wheezing       amLODIPine (NORVASC) 2.5 MG tablet Take 2.5 mg by mouth daily       aspirin (ASA) 81 MG chewable tablet Take 1 tablet (81 mg) by mouth daily       citalopram (CELEXA) 20 MG tablet Take 1 tablet (20 mg) by mouth daily 30 tablet 3     donepezil (ARICEPT) 10 MG tablet Take 0.5 tablets (5 mg) by mouth at bedtime       hydrocortisone (CORTAID) 1 % external cream Apply to bilateral feet rash BID until clear, then BID PRN thereafter.         lisinopril-hydrochlorothiazide (ZESTORETIC) 20-12.5 MG tablet Take 1 tablet by mouth daily       omeprazole (PRILOSEC) 20 MG   "capsule Take 1 capsule (20 mg) by mouth 2 times daily       simvastatin (ZOCOR) 20 MG tablet Take 1 tablet (20 mg) by mouth At Bedtime 90 tablet 3         REVIEW OF SYSTEMS:  No chest pain, shortness of breath, fevers, chills, headache, nausea, vomiting, dysuria or bowel abnormalities.  Appetite is  normal.  No pain except occ L shoulder.    Objective:  /76   Pulse 73   Resp 16   Ht 1.575 m (5' 2\")   SpO2 95%   BMI 32.92 kg/m    Exam:  GENERAL APPEARANCE:  Alert, in no distress, cooperative  ENT:  Mouth and posterior oropharynx normal, moist mucous membranes, [place]  EYES:  EOM, conjunctivae, lids, pupils and irises normal, PERRL  RESP:  respiratory effort and palpation of chest normal, lungs clear to auscultation , no respiratory distress  CV:  Palpation and auscultation of heart done , regular rate and rhythm, no murmur, rub, or gallop, peripheral edema trace+ in LEs  ABDOMEN:  normal bowel sounds, soft, nontender, no hepatosplenomegaly or other masses, no guarding or rebound  M/S:   Gait and station normal  Muscle strength 5/5 all 4 ext.  SKIN:  sl red, patches L shoulder, sever excoriated areas. No drainage  NEURO:   Cranial nerves 2-12 are normal tested and grossly at patient's baseline, no tremor  PSYCH:  insight and judgement impaired, memory impaired , affect and mood normal    Labs:   Most Recent 3 CBC's:  Recent Labs   Lab Test 04/15/24  1006 01/10/24  0723 04/10/23  1151 08/21/22  0811 08/20/22  1520   WBC  --  10.8  --  11.2* 13.7*   HGB 11.7 12.6 11.7 12.1 12.6   MCV  --  86  --  91 90   PLT  --  202  --  237 263     Most Recent 3 BMP's:  Recent Labs   Lab Test 04/15/24  1006 01/10/24  0723 09/11/23  0913    134* 137   POTASSIUM 4.1 4.0 4.2   CHLORIDE 103 100 102   CO2 23 24 22   BUN 14.1 8.7 10.9   CR 0.85 0.79 0.75   ANIONGAP 11 10 13   MAUREEN 9.1 9.8* 9.5   GLC 96 103* 93       ASSESSMENT/PLAN:  (L30.9) Dermatitis  (primary encounter diagnosis)  Comment: L shoulder, pruritus. Possibly rx " to voltaren gel at site  Plan: 1. Voltaren gel dc'd. Rash has improved past couple days per staff  2. Monitor for further pruritus, scratching at site-consider prn HC cream    (I10) Essential hypertension, benign  Comment: BP stable  Plan: 1. Cont. Norvasc, zestoretic  2. Follow Bps, Hrs  3. Monitor for increased LE edema  4. Bmp in next 2-3 mos    (K21.9) Gastroesophageal reflux disease, unspecified whether esophagitis present  Comment: currently stable  Plan: 1. Cont. Prilosec  2. Monitor for reports of nausea  3. Follow po intake, wt.s              Electronically signed by:  BETH Hannon CNP              Sincerely,        BETH Hannon CNP

## 2024-06-14 NOTE — PROGRESS NOTES
[place] [facility]  [facility] Record Number:  [id number]  Place of Service where encounter took place:  [facility] ([facility] [[id number]]  Chief Complaint   Patient presents with    Derm Problem       HPI:    Juany Collazo  is a 89 year old (1/7/1932), who is being seen today for an episodic care visit.  HPI information obtained from: facility chart records, facility staff, patient report, [facility] chart review, and family/first contact dtr report. Today's concern is: dermatitis, HTN, GERD. Noted to have rash of L upper back, shoulder. Reports present for past few mos. Pruritus at site. Had voltaren gel to area for pain. Gel now dc'd. For HTN cont. On norvasc, zestoretic. BP stable. No reports of nausea, GI distress. Cont. On prilosec for GERD.        Past Medical and Surgical History reviewed in Epic today.    MEDICATIONS:    Current Outpatient Medications   Medication Sig Dispense Refill    acetaminophen (TYLENOL) 500 MG tablet Take 1,000 mg by mouth 2 times daily as needed for mild pain Up to 4,000 mg per 24 hours.      albuterol (PROAIR HFA/PROVENTIL HFA/VENTOLIN HFA) 108 (90 Base) MCG/ACT inhaler Inhale 2 puffs into the lungs every 4 hours as needed for shortness of breath / dyspnea or wheezing      amLODIPine (NORVASC) 2.5 MG tablet Take 2.5 mg by mouth daily      aspirin (ASA) 81 MG chewable tablet Take 1 tablet (81 mg) by mouth daily      citalopram (CELEXA) 20 MG tablet Take 1 tablet (20 mg) by mouth daily 30 tablet 3    donepezil (ARICEPT) 10 MG tablet Take 0.5 tablets (5 mg) by mouth at bedtime      hydrocortisone (CORTAID) 1 % external cream Apply to bilateral feet rash BID until clear, then BID PRN thereafter.        lisinopril-hydrochlorothiazide (ZESTORETIC) 20-12.5 MG tablet Take 1 tablet by mouth daily      omeprazole (PRILOSEC) 20 MG DR capsule Take 1 capsule (20 mg) by mouth 2 times daily      simvastatin (ZOCOR) 20 MG tablet Take 1 tablet (20 mg)  "by mouth At Bedtime 90 tablet 3         REVIEW OF SYSTEMS:  No chest pain, shortness of breath, fevers, chills, headache, nausea, vomiting, dysuria or bowel abnormalities.  Appetite is  normal.  No pain except occ L shoulder.    Objective:  /76   Pulse 73   Resp 16   Ht 1.575 m (5' 2\")   SpO2 95%   BMI 32.92 kg/m    Exam:  GENERAL APPEARANCE:  Alert, in no distress, cooperative  ENT:  Mouth and posterior oropharynx normal, moist mucous membranes, [place]  EYES:  EOM, conjunctivae, lids, pupils and irises normal, PERRL  RESP:  respiratory effort and palpation of chest normal, lungs clear to auscultation , no respiratory distress  CV:  Palpation and auscultation of heart done , regular rate and rhythm, no murmur, rub, or gallop, peripheral edema trace+ in LEs  ABDOMEN:  normal bowel sounds, soft, nontender, no hepatosplenomegaly or other masses, no guarding or rebound  M/S:   Gait and station normal  Muscle strength 5/5 all 4 ext.  SKIN:  sl red, patches L shoulder, sever excoriated areas. No drainage  NEURO:   Cranial nerves 2-12 are normal tested and grossly at patient's baseline, no tremor  PSYCH:  insight and judgement impaired, memory impaired , affect and mood normal    Labs:   Most Recent 3 CBC's:  Recent Labs   Lab Test 04/15/24  1006 01/10/24  0723 04/10/23  1151 08/21/22  0811 08/20/22  1520   WBC  --  10.8  --  11.2* 13.7*   HGB 11.7 12.6 11.7 12.1 12.6   MCV  --  86  --  91 90   PLT  --  202  --  237 263     Most Recent 3 BMP's:  Recent Labs   Lab Test 04/15/24  1006 01/10/24  0723 09/11/23  0913    134* 137   POTASSIUM 4.1 4.0 4.2   CHLORIDE 103 100 102   CO2 23 24 22   BUN 14.1 8.7 10.9   CR 0.85 0.79 0.75   ANIONGAP 11 10 13   MAUREEN 9.1 9.8* 9.5   GLC 96 103* 93       ASSESSMENT/PLAN:  (L30.9) Dermatitis  (primary encounter diagnosis)  Comment: L shoulder, pruritus. Possibly rx to voltaren gel at site  Plan: 1. Voltaren gel dc'd. Rash has improved past couple days per staff  2. Monitor for " further pruritus, scratching at site-consider prn HC cream    (I10) Essential hypertension, benign  Comment: BP stable  Plan: 1. Cont. Norvasc, zestoretic  2. Follow Bps, Hrs  3. Monitor for increased LE edema  4. Bmp in next 2-3 mos    (K21.9) Gastroesophageal reflux disease, unspecified whether esophagitis present  Comment: currently stable  Plan: 1. Cont. Prilosec  2. Monitor for reports of nausea  3. Follow po intake, wt.s              Electronically signed by:  BETH Hannon CNP

## 2024-08-04 ENCOUNTER — HEALTH MAINTENANCE LETTER (OUTPATIENT)
Age: 89
End: 2024-08-04

## 2024-10-08 ENCOUNTER — ASSISTED LIVING VISIT (OUTPATIENT)
Dept: GERIATRICS | Facility: CLINIC | Age: 89
End: 2024-10-08
Payer: COMMERCIAL

## 2024-10-08 VITALS
SYSTOLIC BLOOD PRESSURE: 131 MMHG | RESPIRATION RATE: 18 BRPM | HEIGHT: 62 IN | BODY MASS INDEX: 32.92 KG/M2 | OXYGEN SATURATION: 95 % | DIASTOLIC BLOOD PRESSURE: 65 MMHG | HEART RATE: 66 BPM

## 2024-10-08 DIAGNOSIS — K21.9 GASTROESOPHAGEAL REFLUX DISEASE, UNSPECIFIED WHETHER ESOPHAGITIS PRESENT: ICD-10-CM

## 2024-10-08 DIAGNOSIS — I10 ESSENTIAL HYPERTENSION, BENIGN: Primary | ICD-10-CM

## 2024-10-08 DIAGNOSIS — F33.0 MAJOR DEPRESSIVE DISORDER, RECURRENT EPISODE, MILD (H): ICD-10-CM

## 2024-10-08 PROCEDURE — 99349 HOME/RES VST EST MOD MDM 40: CPT | Performed by: NURSE PRACTITIONER

## 2024-10-08 NOTE — PROGRESS NOTES
[facility]  [facility] Record Number:  [id number]  Place of Service where encounter took place:  [facility] ([facility]) [[id number]]  Chief Complaint   Patient presents with    Hypertension       HPI:    Juany Collazo  is a 89 year old (1/7/1932), who is being seen today for an episodic care visit.  HPI information obtained from: facility chart records, facility staff, patient report, and [facility] chart review. Today's concern is:  HTN, GERD, depression. For HTN cont.on norvasc, zestoretic. Bps, Hrs stable. No increase in LE edema. Po intake stable. No recent reports of nausea. Cont. On prilosec for GERD. For depression taking celexa. No reports of anxiety, insomnia. Mood stable.     Past Medical and Surgical History reviewed in Epic today.    MEDICATIONS:    Current Outpatient Medications   Medication Sig Dispense Refill    acetaminophen (TYLENOL) 500 MG tablet Take 1,000 mg by mouth 2 times daily as needed for mild pain Up to 4,000 mg per 24 hours.      albuterol (PROAIR HFA/PROVENTIL HFA/VENTOLIN HFA) 108 (90 Base) MCG/ACT inhaler Inhale 2 puffs into the lungs every 4 hours as needed for shortness of breath / dyspnea or wheezing      amLODIPine (NORVASC) 2.5 MG tablet Take 2.5 mg by mouth daily      aspirin (ASA) 81 MG chewable tablet Take 1 tablet (81 mg) by mouth daily      citalopram (CELEXA) 20 MG tablet Take 1 tablet (20 mg) by mouth daily 30 tablet 3    donepezil (ARICEPT) 10 MG tablet Take 0.5 tablets (5 mg) by mouth at bedtime      hydrocortisone (CORTAID) 1 % external cream Apply to bilateral feet rash BID until clear, then BID PRN thereafter.        lisinopril-hydrochlorothiazide (ZESTORETIC) 20-12.5 MG tablet Take 1 tablet by mouth daily      omeprazole (PRILOSEC) 20 MG DR capsule Take 1 capsule (20 mg) by mouth 2 times daily      simvastatin (ZOCOR) 20 MG tablet Take 1 tablet (20 mg) by mouth At Bedtime 90 tablet 3         REVIEW OF  "SYSTEMS:  Limited secondary to cognitive impairment but today pt reports feeling fine. No GI distress. No resp. Distress. Spirits good    Objective:  /65   Pulse 66   Resp 18   Ht 1.575 m (5' 2\")   SpO2 95%   BMI 32.92 kg/m    Exam:  GENERAL APPEARANCE:  Alert, in no distress, cooperative  ENT:  Mouth and posterior oropharynx normal, moist mucous membranes, [place]  EYES:  EOM, conjunctivae, lids, pupils and irises normal, PERRL  RESP:  respiratory effort and palpation of chest normal, lungs clear to auscultation , no respiratory distress  CV:  Palpation and auscultation of heart done , regular rate and rhythm, no murmur, rub, or gallop, no edema  ABDOMEN:  normal bowel sounds, soft, nontender, no hepatosplenomegaly or other masses, no guarding or rebound  M/S:   gait steady with walker. Muscle strength 5/5 all 4 ext.  NEURO:   Cranial nerves 2-12 are normal tested and grossly at patient's baseline, no tremor  PSYCH:  insight and judgement impaired, memory impaired , affect and mood normal    Labs:   Most Recent 3 CBC's:  Recent Labs   Lab Test 04/15/24  1006 01/10/24  0723 04/10/23  1151 08/21/22  0811 08/20/22  1520   WBC  --  10.8  --  11.2* 13.7*   HGB 11.7 12.6 11.7 12.1 12.6   MCV  --  86  --  91 90   PLT  --  202  --  237 263     Most Recent 3 BMP's:  Recent Labs   Lab Test 04/15/24  1006 01/10/24  0723 09/11/23  0913    134* 137   POTASSIUM 4.1 4.0 4.2   CHLORIDE 103 100 102   CO2 23 24 22   BUN 14.1 8.7 10.9   CR 0.85 0.79 0.75   ANIONGAP 11 10 13   MAUREEN 9.1 9.8* 9.5   GLC 96 103* 93       ASSESSMENT/PLAN:  (I10) Essential hypertension, benign  (primary encounter diagnosis)  Comment: BP stable  Plan: 1. Cont. Norvasc, zestoretic  2. Follow Bps, Hrs  3. Bmp in next 1-3 mos    (K21.9) Gastroesophageal reflux disease, unspecified whether esophagitis present  Comment: currently stable  Plan: 1. Cont. Prilosec  2. Follow po intake, wt.  3. Monitor for reports of nausea    (F33.0) Major depressive " disorder, recurrent episode, mild (H)  Comment: mood gen. stable  Plan: 1. Cont. Celexa  2. Monitor for increased isolative behaviors  3. Monitor for insomnia, increased anxiety              Electronically signed by:  BETH Hannon CNP

## 2024-10-08 NOTE — LETTER
10/8/2024      Juany Collazo  [facility]  [address]  [facility] [address]        [place] [facility]  [facility] Medical Record Number:  [id number]  Place of Service where encounter took place:  [facility] ([facility] [[id number]]  Chief Complaint   Patient presents with     Hypertension       HPI:    Juany Collazo  is a 89 year old (1/7/1932), who is being seen today for an episodic care visit.  HPI information obtained from: facility chart records, facility staff, patient report, and [facility] chart review. Today's concern is:  HTN, GERD, depression. For HTN cont.on norvasc, zestoretic. Bps, Hrs stable. No increase in LE edema. Po intake stable. No recent reports of nausea. Cont. On prilosec for GERD. For depression taking celexa. No reports of anxiety, insomnia. Mood stable.     Past Medical and Surgical History reviewed in Epic today.    MEDICATIONS:    Current Outpatient Medications   Medication Sig Dispense Refill     acetaminophen (TYLENOL) 500 MG tablet Take 1,000 mg by mouth 2 times daily as needed for mild pain Up to 4,000 mg per 24 hours.       albuterol (PROAIR HFA/PROVENTIL HFA/VENTOLIN HFA) 108 (90 Base) MCG/ACT inhaler Inhale 2 puffs into the lungs every 4 hours as needed for shortness of breath / dyspnea or wheezing       amLODIPine (NORVASC) 2.5 MG tablet Take 2.5 mg by mouth daily       aspirin (ASA) 81 MG chewable tablet Take 1 tablet (81 mg) by mouth daily       citalopram (CELEXA) 20 MG tablet Take 1 tablet (20 mg) by mouth daily 30 tablet 3     donepezil (ARICEPT) 10 MG tablet Take 0.5 tablets (5 mg) by mouth at bedtime       hydrocortisone (CORTAID) 1 % external cream Apply to bilateral feet rash BID until clear, then BID PRN thereafter.         lisinopril-hydrochlorothiazide (ZESTORETIC) 20-12.5 MG tablet Take 1 tablet by mouth daily       omeprazole (PRILOSEC) 20 MG DR capsule Take 1 capsule (20 mg) by mouth 2 times daily        "simvastatin (ZOCOR) 20 MG tablet Take 1 tablet (20 mg) by mouth At Bedtime 90 tablet 3         REVIEW OF SYSTEMS:  Limited secondary to cognitive impairment but today pt reports feeling fine. No GI distress. No resp. Distress. Spirits good    Objective:  /65   Pulse 66   Resp 18   Ht 1.575 m (5' 2\")   SpO2 95%   BMI 32.92 kg/m    Exam:  GENERAL APPEARANCE:  Alert, in no distress, cooperative  ENT:  Mouth and posterior oropharynx normal, moist mucous membranes, [place]  EYES:  EOM, conjunctivae, lids, pupils and irises normal, PERRL  RESP:  respiratory effort and palpation of chest normal, lungs clear to auscultation , no respiratory distress  CV:  Palpation and auscultation of heart done , regular rate and rhythm, no murmur, rub, or gallop, no edema  ABDOMEN:  normal bowel sounds, soft, nontender, no hepatosplenomegaly or other masses, no guarding or rebound  M/S:   gait steady with walker. Muscle strength 5/5 all 4 ext.  NEURO:   Cranial nerves 2-12 are normal tested and grossly at patient's baseline, no tremor  PSYCH:  insight and judgement impaired, memory impaired , affect and mood normal    Labs:   Most Recent 3 CBC's:  Recent Labs   Lab Test 04/15/24  1006 01/10/24  0723 04/10/23  1151 08/21/22  0811 08/20/22  1520   WBC  --  10.8  --  11.2* 13.7*   HGB 11.7 12.6 11.7 12.1 12.6   MCV  --  86  --  91 90   PLT  --  202  --  237 263     Most Recent 3 BMP's:  Recent Labs   Lab Test 04/15/24  1006 01/10/24  0723 09/11/23  0913    134* 137   POTASSIUM 4.1 4.0 4.2   CHLORIDE 103 100 102   CO2 23 24 22   BUN 14.1 8.7 10.9   CR 0.85 0.79 0.75   ANIONGAP 11 10 13   MAUREEN 9.1 9.8* 9.5   GLC 96 103* 93       ASSESSMENT/PLAN:  (I10) Essential hypertension, benign  (primary encounter diagnosis)  Comment: BP stable  Plan: 1. Cont. Norvasc, zestoretic  2. Follow Bps, Hrs  3. Bmp in next 1-3 mos    (K21.9) Gastroesophageal reflux disease, unspecified whether esophagitis present  Comment: currently stable  Plan: 1. " Cont. Prilosec  2. Follow po intake, wt.  3. Monitor for reports of nausea    (F33.0) Major depressive disorder, recurrent episode, mild (H)  Comment: mood gen. stable  Plan: 1. Cont. Celexa  2. Monitor for increased isolative behaviors  3. Monitor for insomnia, increased anxiety              Electronically signed by:  BETH Hannon CNP              Sincerely,        BETH Hannon CNP

## 2025-01-14 ENCOUNTER — LAB REQUISITION (OUTPATIENT)
Dept: LAB | Facility: CLINIC | Age: OVER 89
End: 2025-01-14
Payer: COMMERCIAL

## 2025-01-14 ENCOUNTER — ASSISTED LIVING VISIT (OUTPATIENT)
Dept: GERIATRICS | Facility: CLINIC | Age: OVER 89
End: 2025-01-14
Payer: COMMERCIAL

## 2025-01-14 VITALS
HEIGHT: 62 IN | HEART RATE: 65 BPM | SYSTOLIC BLOOD PRESSURE: 127 MMHG | DIASTOLIC BLOOD PRESSURE: 63 MMHG | RESPIRATION RATE: 16 BRPM | BODY MASS INDEX: 32.92 KG/M2 | OXYGEN SATURATION: 95 %

## 2025-01-14 DIAGNOSIS — I10 ESSENTIAL HYPERTENSION, BENIGN: Primary | ICD-10-CM

## 2025-01-14 DIAGNOSIS — K21.9 GASTROESOPHAGEAL REFLUX DISEASE, UNSPECIFIED WHETHER ESOPHAGITIS PRESENT: ICD-10-CM

## 2025-01-14 DIAGNOSIS — E78.41 ELEVATED LIPOPROTEIN(A): ICD-10-CM

## 2025-01-14 DIAGNOSIS — I10 ESSENTIAL (PRIMARY) HYPERTENSION: ICD-10-CM

## 2025-01-14 DIAGNOSIS — F03.90 DEMENTIA WITHOUT BEHAVIORAL DISTURBANCE (H): ICD-10-CM

## 2025-01-14 PROCEDURE — 99349 HOME/RES VST EST MOD MDM 40: CPT | Performed by: NURSE PRACTITIONER

## 2025-01-14 NOTE — PROGRESS NOTES
[place] [facility]  [facility] Record Number:  [id number]  Place of Service where encounter took place:  [facility] ([facility]) [[id number]]  Chief Complaint   Patient presents with    Hypertension       HPI:    Juany Collazo  is a 89 year old (1/7/1932), who is being seen today for an episodic care visit.  HPI information obtained from: facility chart records, facility staff, patient report, and [facility] chart review. Today's concern is: HTN, GERD, dementia. For HTN cont. On norvasc, zestoretic. BP stable. No LE edema. For GERD taking prilosec. Po intake stable. No reports of nausea. For dementia cont.on aricept, celexa. No reports of increased anxiety or changes in behaviors. No reports of increased diarrhea.        Past Medical and Surgical History reviewed in Epic today.    MEDICATIONS:    Current Outpatient Medications   Medication Sig Dispense Refill    acetaminophen (TYLENOL) 500 MG tablet Take 1,000 mg by mouth 2 times daily as needed for mild pain Up to 4,000 mg per 24 hours.      albuterol (PROAIR HFA/PROVENTIL HFA/VENTOLIN HFA) 108 (90 Base) MCG/ACT inhaler Inhale 2 puffs into the lungs every 4 hours as needed for shortness of breath / dyspnea or wheezing      amLODIPine (NORVASC) 2.5 MG tablet Take 2.5 mg by mouth daily      aspirin (ASA) 81 MG chewable tablet Take 1 tablet (81 mg) by mouth daily      citalopram (CELEXA) 20 MG tablet Take 1 tablet (20 mg) by mouth daily 30 tablet 3    donepezil (ARICEPT) 10 MG tablet Take 0.5 tablets (5 mg) by mouth at bedtime      hydrocortisone (CORTAID) 1 % external cream Apply to bilateral feet rash BID until clear, then BID PRN thereafter.        lisinopril-hydrochlorothiazide (ZESTORETIC) 20-12.5 MG tablet Take 1 tablet by mouth daily      omeprazole (PRILOSEC) 20 MG DR capsule Take 1 capsule (20 mg) by mouth 2 times daily      simvastatin (ZOCOR) 20 MG tablet Take 1 tablet (20 mg) by mouth At Bedtime 90 tablet 3  "        REVIEW OF SYSTEMS:  No chest pain, shortness of breath, fevers, chills, headache, nausea, vomiting, dysuria or bowel abnormalities.  Appetite is  normal.  No pain except occ LEs.    Objective:  /63   Pulse 65   Resp 16   Ht 1.575 m (5' 2\")   SpO2 95%   BMI 32.92 kg/m    Exam:  GENERAL APPEARANCE:  Alert, in no distress, cooperative  ENT:  Mouth and posterior oropharynx normal, moist mucous membranes, [place]  EYES:  EOM, conjunctivae, lids, pupils and irises normal, PERRL  NECK:  FROM, no carotid bruit  RESP:  respiratory effort and palpation of chest normal, lungs clear to auscultation , no respiratory distress  CV:  Palpation and auscultation of heart done , regular rate and rhythm, no murmur, rub, or gallop, no edema  ABDOMEN:  normal bowel sounds, soft, nontender, no hepatosplenomegaly or other masses, no guarding or rebound  M/S:   muscle strength 5/5 all 4 ext. Normal tone  NEURO:   Cranial nerves 2-12 are normal tested and grossly at patient's baseline, no tremor  PSYCH:  memory impaired , affect and mood normal    Labs:   Most Recent 3 CBC's:  Recent Labs   Lab Test 04/15/24  1006 01/10/24  0723 04/10/23  1151 08/21/22  0811 08/20/22  1520   WBC  --  10.8  --  11.2* 13.7*   HGB 11.7 12.6 11.7 12.1 12.6   MCV  --  86  --  91 90   PLT  --  202  --  237 263     Most Recent 3 BMP's:  Recent Labs   Lab Test 04/15/24  1006 01/10/24  0723 09/11/23  0913    134* 137   POTASSIUM 4.1 4.0 4.2   CHLORIDE 103 100 102   CO2 23 24 22   BUN 14.1 8.7 10.9   CR 0.85 0.79 0.75   ANIONGAP 11 10 13   MAUREEN 9.1 9.8* 9.5   GLC 96 103* 93       ASSESSMENT/PLAN:  (I10) Essential hypertension, benign  (primary encounter diagnosis)  Comment: BP stable  Plan: 1. Cont. Zestoretic, norvasc  2. Follow Bps, Hrs  3. Monitor for LE edema  4. Bmp tomorrow    (K21.9) Gastroesophageal reflux disease, unspecified whether esophagitis present  Comment: currently stable  Plan: 1. Cont. Prilosec  2. Follow wt.s  3. Monitor for " reports of nausea  4. Hgb tomorrow    (F03.90) Dementia without behavioral disturbance (H)  Comment: memory loss. Mood stable.  Plan: 1. Cont. Aricept  2. Cont. Celexa  3. Monitor for increased anxiety, changes in behaviors              Electronically signed by:  BETH Hannon CNP

## 2025-01-14 NOTE — LETTER
1/14/2025      Juany Collazo  [facility]  [address]  [facility] [address]        [place] [facility]  [place] Medical Record Number:  [id number]  Place of Service where encounter took place:  [facility] [[id number]]  Chief Complaint   Patient presents with     Hypertension       HPI:    Juany Collazo  is a 89 year old (1/7/1932), who is being seen today for an episodic care visit.  HPI information obtained from: facility chart records, facility staff, patient report, and [facility] chart review. Today's concern is: HTN, GERD, dementia. For HTN cont. On norvasc, zestoretic. BP stable. No LE edema. For GERD taking prilosec. Po intake stable. No reports of nausea. For dementia cont.on aricept, celexa. No reports of increased anxiety or changes in behaviors. No reports of increased diarrhea.        Past Medical and Surgical History reviewed in Epic today.    MEDICATIONS:    Current Outpatient Medications   Medication Sig Dispense Refill     acetaminophen (TYLENOL) 500 MG tablet Take 1,000 mg by mouth 2 times daily as needed for mild pain Up to 4,000 mg per 24 hours.       albuterol (PROAIR HFA/PROVENTIL HFA/VENTOLIN HFA) 108 (90 Base) MCG/ACT inhaler Inhale 2 puffs into the lungs every 4 hours as needed for shortness of breath / dyspnea or wheezing       amLODIPine (NORVASC) 2.5 MG tablet Take 2.5 mg by mouth daily       aspirin (ASA) 81 MG chewable tablet Take 1 tablet (81 mg) by mouth daily       citalopram (CELEXA) 20 MG tablet Take 1 tablet (20 mg) by mouth daily 30 tablet 3     donepezil (ARICEPT) 10 MG tablet Take 0.5 tablets (5 mg) by mouth at bedtime       hydrocortisone (CORTAID) 1 % external cream Apply to bilateral feet rash BID until clear, then BID PRN thereafter.         lisinopril-hydrochlorothiazide (ZESTORETIC) 20-12.5 MG tablet Take 1 tablet by mouth daily       omeprazole (PRILOSEC) 20 MG DR capsule Take 1 capsule (20 mg) by  "mouth 2 times daily       simvastatin (ZOCOR) 20 MG tablet Take 1 tablet (20 mg) by mouth At Bedtime 90 tablet 3         REVIEW OF SYSTEMS:  No chest pain, shortness of breath, fevers, chills, headache, nausea, vomiting, dysuria or bowel abnormalities.  Appetite is  normal.  No pain except occ LEs.    Objective:  /63   Pulse 65   Resp 16   Ht 1.575 m (5' 2\")   SpO2 95%   BMI 32.92 kg/m    Exam:  GENERAL APPEARANCE:  Alert, in no distress, cooperative  ENT:  Mouth and posterior oropharynx normal, moist mucous membranes, [place]  EYES:  EOM, conjunctivae, lids, pupils and irises normal, PERRL  NECK:  FROM, no carotid bruit  RESP:  respiratory effort and palpation of chest normal, lungs clear to auscultation , no respiratory distress  CV:  Palpation and auscultation of heart done , regular rate and rhythm, no murmur, rub, or gallop, no edema  ABDOMEN:  normal bowel sounds, soft, nontender, no hepatosplenomegaly or other masses, no guarding or rebound  M/S:   muscle strength 5/5 all 4 ext. Normal tone  NEURO:   Cranial nerves 2-12 are normal tested and grossly at patient's baseline, no tremor  PSYCH:  memory impaired , affect and mood normal    Labs:   Most Recent 3 CBC's:  Recent Labs   Lab Test 04/15/24  1006 01/10/24  0723 04/10/23  1151 08/21/22  0811 08/20/22  1520   WBC  --  10.8  --  11.2* 13.7*   HGB 11.7 12.6 11.7 12.1 12.6   MCV  --  86  --  91 90   PLT  --  202  --  237 263     Most Recent 3 BMP's:  Recent Labs   Lab Test 04/15/24  1006 01/10/24  0723 09/11/23  0913    134* 137   POTASSIUM 4.1 4.0 4.2   CHLORIDE 103 100 102   CO2 23 24 22   BUN 14.1 8.7 10.9   CR 0.85 0.79 0.75   ANIONGAP 11 10 13   MAUREEN 9.1 9.8* 9.5   GLC 96 103* 93       ASSESSMENT/PLAN:  (I10) Essential hypertension, benign  (primary encounter diagnosis)  Comment: BP stable  Plan: 1. Cont. Zestoretic, norvasc  2. Follow Bps, Hrs  3. Monitor for LE edema  4. Bmp tomorrow    (K21.9) Gastroesophageal reflux disease, unspecified " whether esophagitis present  Comment: currently stable  Plan: 1. Cont. Prilosec  2. Follow wt.s  3. Monitor for reports of nausea  4. Hgb tomorrow    (F03.90) Dementia without behavioral disturbance (H)  Comment: memory loss. Mood stable.  Plan: 1. Cont. Aricept  2. Cont. Celexa  3. Monitor for increased anxiety, changes in behaviors              Electronically signed by:  BETH Hannon CNP              Sincerely,        BETH aHnnon CNP    Electronically signed

## 2025-01-15 LAB
ALT SERPL W P-5'-P-CCNC: 14 U/L (ref 0–50)
ANION GAP SERPL CALCULATED.3IONS-SCNC: 10 MMOL/L (ref 7–15)
AST SERPL W P-5'-P-CCNC: 32 U/L (ref 0–45)
BUN SERPL-MCNC: 13.8 MG/DL (ref 8–23)
CALCIUM SERPL-MCNC: 9.7 MG/DL (ref 8.8–10.4)
CHLORIDE SERPL-SCNC: 101 MMOL/L (ref 98–107)
CHOLEST SERPL-MCNC: 182 MG/DL
CREAT SERPL-MCNC: 0.82 MG/DL (ref 0.51–0.95)
EGFRCR SERPLBLD CKD-EPI 2021: 66 ML/MIN/1.73M2
FASTING STATUS PATIENT QL REPORTED: ABNORMAL
GLUCOSE SERPL-MCNC: 102 MG/DL (ref 70–99)
HCO3 SERPL-SCNC: 24 MMOL/L (ref 22–29)
HDLC SERPL-MCNC: 70 MG/DL
HGB BLD-MCNC: 12.8 G/DL (ref 11.7–15.7)
LDLC SERPL CALC-MCNC: 80 MG/DL
NONHDLC SERPL-MCNC: 112 MG/DL
POTASSIUM SERPL-SCNC: 4.5 MMOL/L (ref 3.4–5.3)
SODIUM SERPL-SCNC: 135 MMOL/L (ref 135–145)
TRIGL SERPL-MCNC: 162 MG/DL

## 2025-01-15 PROCEDURE — 80061 LIPID PANEL: CPT | Mod: ORL | Performed by: NURSE PRACTITIONER

## 2025-01-15 PROCEDURE — 80048 BASIC METABOLIC PNL TOTAL CA: CPT | Mod: ORL | Performed by: NURSE PRACTITIONER

## 2025-01-15 PROCEDURE — 84460 ALANINE AMINO (ALT) (SGPT): CPT | Mod: ORL | Performed by: NURSE PRACTITIONER

## 2025-01-15 PROCEDURE — 85018 HEMOGLOBIN: CPT | Mod: ORL | Performed by: NURSE PRACTITIONER

## 2025-01-15 PROCEDURE — 84450 TRANSFERASE (AST) (SGOT): CPT | Mod: ORL | Performed by: NURSE PRACTITIONER

## 2025-01-15 PROCEDURE — P9604 ONE-WAY ALLOW PRORATED TRIP: HCPCS | Mod: ORL | Performed by: NURSE PRACTITIONER

## 2025-01-15 PROCEDURE — 36415 COLL VENOUS BLD VENIPUNCTURE: CPT | Mod: ORL | Performed by: NURSE PRACTITIONER

## 2025-01-21 ENCOUNTER — ASSISTED LIVING VISIT (OUTPATIENT)
Dept: GERIATRICS | Facility: CLINIC | Age: OVER 89
End: 2025-01-21
Payer: COMMERCIAL

## 2025-01-21 VITALS
SYSTOLIC BLOOD PRESSURE: 139 MMHG | HEART RATE: 65 BPM | BODY MASS INDEX: 32.92 KG/M2 | DIASTOLIC BLOOD PRESSURE: 66 MMHG | RESPIRATION RATE: 18 BRPM | OXYGEN SATURATION: 97 % | HEIGHT: 62 IN

## 2025-01-21 DIAGNOSIS — M25.512 LEFT SHOULDER PAIN, UNSPECIFIED CHRONICITY: ICD-10-CM
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
## 2025-01-21 DIAGNOSIS — I10 ESSENTIAL HYPERTENSION, BENIGN: ICD-10-CM

## 2025-01-21 PROCEDURE — 99349 HOME/RES VST EST MOD MDM 40: CPT | Performed by: NURSE PRACTITIONER

## 2025-01-21 NOTE — LETTER
1/21/2025      Juany Collazo  [facility]  [address]  [facility] [address]        [place] [facility]  [place] Medical Record Number:  [id number]  Place of Service where encounter took place:  [facility] ([facility]) [[id number]]  Chief Complaint   Patient presents with     Fall       HPI:    Juany Collazo  is a 89 year old (1/7/1932), who is being seen today for an episodic care visit.  HPI information obtained from: facility chart records, facility staff, patient report, and [facility] chart review. Today's concern is: fall, HTN, L shoulder pain. Found on floor last hs. Apparent head strike with small lump on back of head. EMS arrived. Resident refused ED. Had c/o R shoulder pain. Today Neuros stable, reports L shoulder pain. Has prn tylenol. For HTN cont. On zestoretic, norvasc. BP sl  elevated following fall. Stable today.        Past Medical and Surgical History reviewed in Epic today.    MEDICATIONS:    Current Outpatient Medications   Medication Sig Dispense Refill     acetaminophen (TYLENOL) 500 MG tablet Take 1,000 mg by mouth 2 times daily as needed for mild pain Up to 4,000 mg per 24 hours.       albuterol (PROAIR HFA/PROVENTIL HFA/VENTOLIN HFA) 108 (90 Base) MCG/ACT inhaler Inhale 2 puffs into the lungs every 4 hours as needed for shortness of breath / dyspnea or wheezing       amLODIPine (NORVASC) 2.5 MG tablet Take 2.5 mg by mouth daily       aspirin (ASA) 81 MG chewable tablet Take 1 tablet (81 mg) by mouth daily       citalopram (CELEXA) 20 MG tablet Take 1 tablet (20 mg) by mouth daily 30 tablet 3     donepezil (ARICEPT) 10 MG tablet Take 0.5 tablets (5 mg) by mouth at bedtime       hydrocortisone (CORTAID) 1 % external cream Apply to bilateral feet rash BID until clear, then BID PRN thereafter.         lisinopril-hydrochlorothiazide (ZESTORETIC) 20-12.5 MG tablet Take 1 tablet by mouth daily       omeprazole (PRILOSEC) 20 MG DR capsule  "Take 1 capsule (20 mg) by mouth 2 times daily       simvastatin (ZOCOR) 20 MG tablet Take 1 tablet (20 mg) by mouth At Bedtime 90 tablet 3         REVIEW OF SYSTEMS:  Limited secondary to cognitive impairment but today pt reports some L shoulder pain, somewhat mor than baseline. No dizziness. No headache or GI distress.     Objective:  /66   Pulse 65   Resp 18   Ht 1.575 m (5' 2\")   SpO2 97%   BMI 32.92 kg/m    Exam:  GENERAL APPEARANCE:  Alert, in no distress, cooperative  ENT:  Mouth and posterior oropharynx normal, moist mucous membranes, [place]  EYES:  EOM, conjunctivae, lids, pupils and irises normal, R pupil fixed, sl ovoid (not new)  NECK:  No adenopathy,masses or thyromegaly, FROM  RESP:  respiratory effort and palpation of chest normal, lungs clear to auscultation , no respiratory distress  CV:  Palpation and auscultation of heart done , regular rate and rhythm, no murmur, rub, or gallop, no edema  ABDOMEN:  normal bowel sounds, soft, nontender, no hepatosplenomegaly or other masses, no guarding or rebound  M/S:   Gait and station normal  Muscle strength 5/5 all 4 ext. Some decreased ROM bilat shoulders.   SKIN:  Inspection of skin and subcutaneous tissue baseline, no bruising to shoulders  NEURO:   Cranial nerves 2-12 are normal tested and grossly at patient's baseline, speech fluid, no tremor  PSYCH:  insight and judgement impaired, memory impaired , affect and mood normal    Labs:   Most Recent 3 CBC's:  Recent Labs   Lab Test 01/15/25  0856 04/15/24  1006 01/10/24  0723 04/10/23  1151 08/21/22  0811 08/20/22  1520   WBC  --   --  10.8  --  11.2* 13.7*   HGB 12.8 11.7 12.6   < > 12.1 12.6   MCV  --   --  86  --  91 90   PLT  --   --  202  --  237 263    < > = values in this interval not displayed.     Most Recent 3 BMP's:  Recent Labs   Lab Test 01/15/25  0856 04/15/24  1006 01/10/24  0723    137 134*   POTASSIUM 4.5 4.1 4.0   CHLORIDE 101 103 100   CO2 24 23 24   BUN 13.8 14.1 8.7   CR " 0.82 0.85 0.79   ANIONGAP 10 11 10   MAUREEN 9.7 9.1 9.8*   * 96 103*       ASSESSMENT/PLAN:  (W19.XXXA) Fall, initial encounter  (primary encounter diagnosis)  Comment: found on floor. Apparent head strike, small bump on scalp-not present today. Neuros stable. No reports of dizziness  Plan: 1. Follow Neuros  2. Monitor for headache pain  3. Cont. Walker with all amb. Monitor for changes in gait, LE weakness    (I10) Essential hypertension, benign  Comment: BP stable today, sl elevated after fall  Plan: 1. Cont. Norvasc, zestoretic  2. Follow Bps, Hrs  3. Monitor for dizziness    (M25.512) Left shoulder pain, unspecified chronicity  Comment: chronic, possible exac. Following fall. No bruising. Chronic R shoulder pain as well.  Plan: 1. Sched. Tylenol x 7 days  2. Cont. Prn tylenol  3. Monitor for ongoing, increased shoulder pain.               Electronically signed by:  BETH Hannon CNP              Sincerely,        BETH Hannon CNP    Electronically signed

## 2025-01-21 NOTE — PROGRESS NOTES
[place] [facility]  [place] Medical Record Number:  [id number]  Place of Service where encounter took place:  [facility] ([facility]) [[id number]]  Chief Complaint   Patient presents with    Fall       HPI:    Juany Collazo  is a 89 year old (1/7/1932), who is being seen today for an episodic care visit.  HPI information obtained from: facility chart records, facility staff, patient report, and [facility] chart review. Today's concern is: fall, HTN, L shoulder pain. Found on floor last hs. Apparent head strike with small lump on back of head. EMS arrived. Resident refused ED. Had c/o R shoulder pain. Today Neuros stable, reports L shoulder pain. Has prn tylenol. For HTN cont. On zestoretic, norvasc. BP sl  elevated following fall. Stable today.        Past Medical and Surgical History reviewed in Epic today.    MEDICATIONS:    Current Outpatient Medications   Medication Sig Dispense Refill    acetaminophen (TYLENOL) 500 MG tablet Take 1,000 mg by mouth 2 times daily as needed for mild pain Up to 4,000 mg per 24 hours.      albuterol (PROAIR HFA/PROVENTIL HFA/VENTOLIN HFA) 108 (90 Base) MCG/ACT inhaler Inhale 2 puffs into the lungs every 4 hours as needed for shortness of breath / dyspnea or wheezing      amLODIPine (NORVASC) 2.5 MG tablet Take 2.5 mg by mouth daily      aspirin (ASA) 81 MG chewable tablet Take 1 tablet (81 mg) by mouth daily      citalopram (CELEXA) 20 MG tablet Take 1 tablet (20 mg) by mouth daily 30 tablet 3    donepezil (ARICEPT) 10 MG tablet Take 0.5 tablets (5 mg) by mouth at bedtime      hydrocortisone (CORTAID) 1 % external cream Apply to bilateral feet rash BID until clear, then BID PRN thereafter.        lisinopril-hydrochlorothiazide (ZESTORETIC) 20-12.5 MG tablet Take 1 tablet by mouth daily      omeprazole (PRILOSEC) 20 MG DR capsule Take 1 capsule (20 mg) by mouth 2 times daily      simvastatin (ZOCOR) 20 MG tablet Take 1 tablet (20 mg) by mouth At  "Bedtime 90 tablet 3         REVIEW OF SYSTEMS:  Limited secondary to cognitive impairment but today pt reports some L shoulder pain, somewhat mor than baseline. No dizziness. No headache or GI distress.     Objective:  /66   Pulse 65   Resp 18   Ht 1.575 m (5' 2\")   SpO2 97%   BMI 32.92 kg/m    Exam:  GENERAL APPEARANCE:  Alert, in no distress, cooperative  ENT:  Mouth and posterior oropharynx normal, moist mucous membranes, [place]  EYES:  EOM, conjunctivae, lids, pupils and irises normal, R pupil fixed, sl ovoid (not new)  NECK:  No adenopathy,masses or thyromegaly, FROM  RESP:  respiratory effort and palpation of chest normal, lungs clear to auscultation , no respiratory distress  CV:  Palpation and auscultation of heart done , regular rate and rhythm, no murmur, rub, or gallop, no edema  ABDOMEN:  normal bowel sounds, soft, nontender, no hepatosplenomegaly or other masses, no guarding or rebound  M/S:   Gait and station normal  Muscle strength 5/5 all 4 ext. Some decreased ROM bilat shoulders.   SKIN:  Inspection of skin and subcutaneous tissue baseline, no bruising to shoulders  NEURO:   Cranial nerves 2-12 are normal tested and grossly at patient's baseline, speech fluid, no tremor  PSYCH:  insight and judgement impaired, memory impaired , affect and mood normal    Labs:   Most Recent 3 CBC's:  Recent Labs   Lab Test 01/15/25  0856 04/15/24  1006 01/10/24  0723 04/10/23  1151 08/21/22  0811 08/20/22  1520   WBC  --   --  10.8  --  11.2* 13.7*   HGB 12.8 11.7 12.6   < > 12.1 12.6   MCV  --   --  86  --  91 90   PLT  --   --  202  --  237 263    < > = values in this interval not displayed.     Most Recent 3 BMP's:  Recent Labs   Lab Test 01/15/25  0856 04/15/24  1006 01/10/24  0723    137 134*   POTASSIUM 4.5 4.1 4.0   CHLORIDE 101 103 100   CO2 24 23 24   BUN 13.8 14.1 8.7   CR 0.82 0.85 0.79   ANIONGAP 10 11 10   MAUREEN 9.7 9.1 9.8*   * 96 103*       ASSESSMENT/PLAN:  (W19.XXXA) Fall, initial " encounter  (primary encounter diagnosis)  Comment: found on floor. Apparent head strike, small bump on scalp-not present today. Neuros stable. No reports of dizziness  Plan: 1. Follow Neuros  2. Monitor for headache pain  3. Cont. Walker with all amb. Monitor for changes in gait, LE weakness    (I10) Essential hypertension, benign  Comment: BP stable today, sl elevated after fall  Plan: 1. Cont. Norvasc, zestoretic  2. Follow Bps, Hrs  3. Monitor for dizziness    (M25.512) Left shoulder pain, unspecified chronicity  Comment: chronic, possible exac. Following fall. No bruising. Chronic R shoulder pain as well.  Plan: 1. Sched. Tylenol x 7 days  2. Cont. Prn tylenol  3. Monitor for ongoing, increased shoulder pain.               Electronically signed by:  BETH Hannon CNP

## 2025-04-15 ENCOUNTER — ASSISTED LIVING VISIT (OUTPATIENT)
Dept: GERIATRICS | Facility: CLINIC | Age: OVER 89
End: 2025-04-15
Payer: COMMERCIAL

## 2025-04-15 ENCOUNTER — LAB REQUISITION (OUTPATIENT)
Dept: LAB | Facility: CLINIC | Age: OVER 89
End: 2025-04-15
Payer: COMMERCIAL

## 2025-04-15 VITALS
DIASTOLIC BLOOD PRESSURE: 84 MMHG | SYSTOLIC BLOOD PRESSURE: 132 MMHG | HEART RATE: 62 BPM | HEIGHT: 62 IN | OXYGEN SATURATION: 93 % | RESPIRATION RATE: 18 BRPM | BODY MASS INDEX: 32.92 KG/M2

## 2025-04-15 DIAGNOSIS — K21.9 GASTROESOPHAGEAL REFLUX DISEASE, UNSPECIFIED WHETHER ESOPHAGITIS PRESENT: Primary | ICD-10-CM

## 2025-04-15 DIAGNOSIS — F33.0 MAJOR DEPRESSIVE DISORDER, RECURRENT EPISODE, MILD: ICD-10-CM

## 2025-04-15 DIAGNOSIS — I10 ESSENTIAL (PRIMARY) HYPERTENSION: ICD-10-CM

## 2025-04-15 DIAGNOSIS — I10 ESSENTIAL HYPERTENSION: ICD-10-CM

## 2025-04-15 PROCEDURE — 99349 HOME/RES VST EST MOD MDM 40: CPT | Performed by: NURSE PRACTITIONER

## 2025-04-15 NOTE — PROGRESS NOTES
[place] [facility]  [place] Medical Record Number:  [id number]  Place of Service where encounter took place:  [facility] ([facility]) [[id number]]  Chief Complaint   Patient presents with    Gastrophageal Reflux       HPI:    Juany Collazo  is a 89 year old (1/7/1932), who is being seen today for an episodic care visit.  HPI information obtained from: facility chart records, facility staff, patient report, and [facility] chart review. Today's concern is: GERD, depression, HTN. For GERD cont. On prilosec. Po intake, wt stable. No recent reports of nausea, emesis. Hgb stable. For depression cont. On celexa. Mood gen. Stable. No reports of functional decline. Taking aricept for dementia. For HTN cont. On norvasc, zestoretic.  Bps, Hrs stable. No LE edema. No reports of dizziness.        Past Medical and Surgical History reviewed in Epic today.    MEDICATIONS:    Current Outpatient Medications   Medication Sig Dispense Refill    acetaminophen (TYLENOL) 500 MG tablet Take 1,000 mg by mouth 2 times daily as needed for mild pain Up to 4,000 mg per 24 hours.      albuterol (PROAIR HFA/PROVENTIL HFA/VENTOLIN HFA) 108 (90 Base) MCG/ACT inhaler Inhale 2 puffs into the lungs every 4 hours as needed for shortness of breath / dyspnea or wheezing      amLODIPine (NORVASC) 2.5 MG tablet Take 2.5 mg by mouth daily      aspirin (ASA) 81 MG chewable tablet Take 1 tablet (81 mg) by mouth daily      citalopram (CELEXA) 20 MG tablet Take 1 tablet (20 mg) by mouth daily 30 tablet 3    donepezil (ARICEPT) 10 MG tablet Take 0.5 tablets (5 mg) by mouth at bedtime      hydrocortisone (CORTAID) 1 % external cream Apply to bilateral feet rash BID until clear, then BID PRN thereafter.        lisinopril-hydrochlorothiazide (ZESTORETIC) 20-12.5 MG tablet Take 1 tablet by mouth daily      omeprazole (PRILOSEC) 20 MG DR capsule Take 1 capsule (20 mg) by mouth 2 times daily      simvastatin (ZOCOR) 20 MG tablet  "Take 1 tablet (20 mg) by mouth At Bedtime 90 tablet 3         REVIEW OF SYSTEMS:  Limited secondary to cognitive impairment but today pt reports feeling fine. No pain, no GI or resp. Distress. No dizziness    Objective:  /84   Pulse 62   Resp 18   Ht 1.575 m (5' 2\")   SpO2 93%   BMI 32.92 kg/m    Exam:  GENERAL APPEARANCE:  Alert, in no distress, cooperative  ENT:  Mouth and posterior oropharynx normal, moist mucous membranes, [place]  EYES:  EOM, conjunctivae, lids, pupils and irises normal, PERRL  RESP:  respiratory effort and palpation of chest normal, lungs clear to auscultation , no respiratory distress, diminished breath sounds bibasilar  CV:  Palpation and auscultation of heart done , regular rate and rhythm, no murmur, rub, or gallop, no edema  ABDOMEN:  normal bowel sounds, soft, nontender, no hepatosplenomegaly or other masses, no guarding or rebound  M/S:   muscle strength 5/5 all 4 ext. Normal tone  NEURO:   Cranial nerves 2-12 are normal tested and grossly at patient's baseline, speech fluid  PSYCH:  memory impaired , affect and mood normal, no apparent anxiety    Labs:   Most Recent 3 CBC's:  Recent Labs   Lab Test 01/15/25  0856 04/15/24  1006 01/10/24  0723 04/10/23  1151 08/21/22  0811 08/20/22  1520   WBC  --   --  10.8  --  11.2* 13.7*   HGB 12.8 11.7 12.6   < > 12.1 12.6   MCV  --   --  86  --  91 90   PLT  --   --  202  --  237 263    < > = values in this interval not displayed.     Most Recent 3 BMP's:  Recent Labs   Lab Test 01/15/25  0856 04/15/24  1006 01/10/24  0723    137 134*   POTASSIUM 4.5 4.1 4.0   CHLORIDE 101 103 100   CO2 24 23 24   BUN 13.8 14.1 8.7   CR 0.82 0.85 0.79   ANIONGAP 10 11 10   MAUREEN 9.7 9.1 9.8*   * 96 103*       ASSESSMENT/PLAN:  (K21.9) Gastroesophageal reflux disease, unspecified whether esophagitis present  (primary encounter diagnosis)  Comment: currently stable  Plan: 1. Cont. Prilosec  2. Monitor for reports of nausea, emesis  3. Follow po " intake, wt.s  4. Hgb tomorrow    (F33.0) Major depressive disorder, recurrent episode, mild  Comment: mood stable. No reports of increased anxiety. Memory loss due to dementia  Plan: 1. Cont. Celexa  2. Monitor for anxiety, insomnia    (I10) Essential hypertension  Comment: BP stable  Plan: 1. Cont. Norvasc, zestoretic  2. Follow Bps, Hrs  3. Monitor for LE edema  4. Bmp tomorrow              Electronically signed by:  BETH Hannon CNP

## 2025-04-15 NOTE — LETTER
4/15/2025      Juany Collazo  [facility]  [address]  [facility] [address]        [place] [facility]  [place] Medical Record Number:  [id number]  Place of Service where encounter took place:  [facility] ([facility] [[id number]]  Chief Complaint   Patient presents with     Gastrophageal Reflux       HPI:    Juany Collazo  is a 89 year old (1/7/1932), who is being seen today for an episodic care visit.  HPI information obtained from: facility chart records, facility staff, patient report, and [facility] chart review. Today's concern is: GERD, depression, HTN. For GERD cont. On prilosec. Po intake, wt stable. No recent reports of nausea, emesis. Hgb stable. For depression cont. On celexa. Mood gen. Stable. No reports of functional decline. Taking aricept for dementia. For HTN cont. On norvasc, zestoretic.  Bps, Hrs stable. No LE edema. No reports of dizziness.        Past Medical and Surgical History reviewed in Epic today.    MEDICATIONS:    Current Outpatient Medications   Medication Sig Dispense Refill     acetaminophen (TYLENOL) 500 MG tablet Take 1,000 mg by mouth 2 times daily as needed for mild pain Up to 4,000 mg per 24 hours.       albuterol (PROAIR HFA/PROVENTIL HFA/VENTOLIN HFA) 108 (90 Base) MCG/ACT inhaler Inhale 2 puffs into the lungs every 4 hours as needed for shortness of breath / dyspnea or wheezing       amLODIPine (NORVASC) 2.5 MG tablet Take 2.5 mg by mouth daily       aspirin (ASA) 81 MG chewable tablet Take 1 tablet (81 mg) by mouth daily       citalopram (CELEXA) 20 MG tablet Take 1 tablet (20 mg) by mouth daily 30 tablet 3     donepezil (ARICEPT) 10 MG tablet Take 0.5 tablets (5 mg) by mouth at bedtime       hydrocortisone (CORTAID) 1 % external cream Apply to bilateral feet rash BID until clear, then BID PRN thereafter.         lisinopril-hydrochlorothiazide (ZESTORETIC) 20-12.5 MG tablet Take 1 tablet by mouth daily        "omeprazole (PRILOSEC) 20 MG DR capsule Take 1 capsule (20 mg) by mouth 2 times daily       simvastatin (ZOCOR) 20 MG tablet Take 1 tablet (20 mg) by mouth At Bedtime 90 tablet 3         REVIEW OF SYSTEMS:  Limited secondary to cognitive impairment but today pt reports feeling fine. No pain, no GI or resp. Distress. No dizziness    Objective:  /84   Pulse 62   Resp 18   Ht 1.575 m (5' 2\")   SpO2 93%   BMI 32.92 kg/m    Exam:  GENERAL APPEARANCE:  Alert, in no distress, cooperative  ENT:  Mouth and posterior oropharynx normal, moist mucous membranes, [place]  EYES:  EOM, conjunctivae, lids, pupils and irises normal, PERRL  RESP:  respiratory effort and palpation of chest normal, lungs clear to auscultation , no respiratory distress, diminished breath sounds bibasilar  CV:  Palpation and auscultation of heart done , regular rate and rhythm, no murmur, rub, or gallop, no edema  ABDOMEN:  normal bowel sounds, soft, nontender, no hepatosplenomegaly or other masses, no guarding or rebound  M/S:   muscle strength 5/5 all 4 ext. Normal tone  NEURO:   Cranial nerves 2-12 are normal tested and grossly at patient's baseline, speech fluid  PSYCH:  memory impaired , affect and mood normal, no apparent anxiety    Labs:   Most Recent 3 CBC's:  Recent Labs   Lab Test 01/15/25  0856 04/15/24  1006 01/10/24  0723 04/10/23  1151 08/21/22  0811 08/20/22  1520   WBC  --   --  10.8  --  11.2* 13.7*   HGB 12.8 11.7 12.6   < > 12.1 12.6   MCV  --   --  86  --  91 90   PLT  --   --  202  --  237 263    < > = values in this interval not displayed.     Most Recent 3 BMP's:  Recent Labs   Lab Test 01/15/25  0856 04/15/24  1006 01/10/24  0723    137 134*   POTASSIUM 4.5 4.1 4.0   CHLORIDE 101 103 100   CO2 24 23 24   BUN 13.8 14.1 8.7   CR 0.82 0.85 0.79   ANIONGAP 10 11 10   MAUREEN 9.7 9.1 9.8*   * 96 103*       ASSESSMENT/PLAN:  (K21.9) Gastroesophageal reflux disease, unspecified whether esophagitis present  (primary " encounter diagnosis)  Comment: currently stable  Plan: 1. Cont. Prilosec  2. Monitor for reports of nausea, emesis  3. Follow po intake, wt.s  4. Hgb tomorrow    (F33.0) Major depressive disorder, recurrent episode, mild  Comment: mood stable. No reports of increased anxiety. Memory loss due to dementia  Plan: 1. Cont. Celexa  2. Monitor for anxiety, insomnia    (I10) Essential hypertension  Comment: BP stable  Plan: 1. Cont. Norvasc, zestoretic  2. Follow Bps, Hrs  3. Monitor for LE edema  4. Bmp tomorrow              Electronically signed by:  BETH Hannon CNP              Sincerely,        BETH Hannon CNP    Electronically signed

## 2025-04-16 LAB
ANION GAP SERPL CALCULATED.3IONS-SCNC: 11 MMOL/L (ref 7–15)
BUN SERPL-MCNC: 13.7 MG/DL (ref 8–23)
CALCIUM SERPL-MCNC: 9.2 MG/DL (ref 8.8–10.4)
CHLORIDE SERPL-SCNC: 103 MMOL/L (ref 98–107)
CREAT SERPL-MCNC: 0.76 MG/DL (ref 0.51–0.95)
EGFRCR SERPLBLD CKD-EPI 2021: 73 ML/MIN/1.73M2
GLUCOSE SERPL-MCNC: 94 MG/DL (ref 70–99)
HCO3 SERPL-SCNC: 25 MMOL/L (ref 22–29)
HGB BLD-MCNC: 11.2 G/DL (ref 11.7–15.7)
POTASSIUM SERPL-SCNC: 4.2 MMOL/L (ref 3.4–5.3)
SODIUM SERPL-SCNC: 139 MMOL/L (ref 135–145)

## 2025-04-16 PROCEDURE — P9604 ONE-WAY ALLOW PRORATED TRIP: HCPCS | Mod: ORL | Performed by: NURSE PRACTITIONER

## 2025-04-16 PROCEDURE — 85018 HEMOGLOBIN: CPT | Mod: ORL | Performed by: NURSE PRACTITIONER

## 2025-04-16 PROCEDURE — 80048 BASIC METABOLIC PNL TOTAL CA: CPT | Mod: ORL | Performed by: NURSE PRACTITIONER

## 2025-04-16 PROCEDURE — 36415 COLL VENOUS BLD VENIPUNCTURE: CPT | Mod: ORL | Performed by: NURSE PRACTITIONER

## 2025-05-07 ENCOUNTER — TELEPHONE (OUTPATIENT)
Dept: GERIATRICS | Facility: CLINIC | Age: OVER 89
End: 2025-05-07
Payer: COMMERCIAL

## 2025-05-07 NOTE — TELEPHONE ENCOUNTER
"[facility] [place] [facility] Triage Call    Provider: BETH Lopez CNP   [facility]  Facility [place]:  [place]    Caller: Riri  Call Back Number: [phone]    Allergies:  No Known Allergies     SBAR:     S-(situation): Nurse called to report that patient has a flat, itchy red rash to her left foot below her ankle.  Patient stated she started to wear sandals again.  Patient has had this in the past and Hydrocortisone cream was ordered.  Patient has been applying Vaseline to the area but the itchiness is what is bothering patient.       B-(background):     A-(assessment): Patient did have PRN Hydrocortisone cream but was discontinued.      R-(recommendations): Hydrocortisone cream?       Telephone encounter sent to:  BETH Lopez CNP     Please send response/orders to \"Geriatrics Nurse Pool\"    Marii Mcdonald RN      "

## 2025-06-03 ENCOUNTER — PATIENT OUTREACH (OUTPATIENT)
Dept: GERIATRIC MEDICINE | Facility: CLINIC | Age: OVER 89
End: 2025-06-03
Payer: COMMERCIAL

## 2025-06-03 NOTE — PROGRESS NOTES
[facility] [employer] Annual Chart Review    Reviewed member's chart per [facility] protocol.    ER visits in last 12 months: No  Hospitalizations in last 12 months: No  Memory loss due to dementia, has been stable.     RIKKI Bustamante, RN, PHN, [facility]  Care Coordinator-[address] [address]  [email or web address]   [email or web address]     Office: [phone]   Fax: [phone]

## 2025-07-15 ENCOUNTER — TELEPHONE (OUTPATIENT)
Dept: GERIATRICS | Facility: CLINIC | Age: OVER 89
End: 2025-07-15
Payer: COMMERCIAL

## 2025-07-15 NOTE — TELEPHONE ENCOUNTER
[facility] [place] [facility] Triage Nurse Telephone Encounter    Provider: BETH Lopez CNP   Facility: [facility]  Facility Type:  [place]    Caller: Lina    Allergies:  No Known Allergies     Reason for call: Itchy, rash to bilateral ankles. This happened previously     Verbal Order/Direction given by Provider: Resume Hydrocortisone BID x 14 days    Provider giving Order:  BETH Lopez CNP     Verbal Order given to: Lina Rivera RN

## 2025-08-12 ENCOUNTER — TELEPHONE (OUTPATIENT)
Dept: GERIATRICS | Facility: CLINIC | Age: OVER 89
End: 2025-08-12
Payer: COMMERCIAL

## 2025-08-18 ENCOUNTER — LAB REQUISITION (OUTPATIENT)
Dept: LAB | Facility: CLINIC | Age: OVER 89
End: 2025-08-18
Payer: COMMERCIAL

## 2025-08-18 DIAGNOSIS — I10 ESSENTIAL (PRIMARY) HYPERTENSION: ICD-10-CM

## 2025-08-19 LAB
ANION GAP SERPL CALCULATED.3IONS-SCNC: 10 MMOL/L (ref 7–15)
BASOPHILS # BLD AUTO: 0.09 10E3/UL (ref 0–0.2)
BASOPHILS NFR BLD AUTO: 0.9 %
BUN SERPL-MCNC: 9 MG/DL (ref 8–23)
CALCIUM SERPL-MCNC: 9.6 MG/DL (ref 8.8–10.4)
CHLORIDE SERPL-SCNC: 102 MMOL/L (ref 98–107)
CREAT SERPL-MCNC: 0.82 MG/DL (ref 0.51–0.95)
EGFRCR SERPLBLD CKD-EPI 2021: 66 ML/MIN/1.73M2
EOSINOPHIL # BLD AUTO: 0.18 10E3/UL (ref 0–0.7)
EOSINOPHIL NFR BLD AUTO: 1.8 %
ERYTHROCYTE [DISTWIDTH] IN BLOOD BY AUTOMATED COUNT: 14.9 % (ref 10–15)
GLUCOSE SERPL-MCNC: 96 MG/DL (ref 70–99)
HCO3 SERPL-SCNC: 26 MMOL/L (ref 22–29)
HCT VFR BLD AUTO: 36.5 % (ref 35–47)
HGB BLD-MCNC: 11.5 G/DL (ref 11.7–15.7)
IMM GRANULOCYTES # BLD: <0.03 10E3/UL
IMM GRANULOCYTES NFR BLD: 0.2 %
LYMPHOCYTES # BLD AUTO: 5.69 10E3/UL (ref 0.8–5.3)
LYMPHOCYTES NFR BLD AUTO: 58.3 %
MCH RBC QN AUTO: 27.5 PG (ref 26.5–33)
MCHC RBC AUTO-ENTMCNC: 31.5 G/DL (ref 31.5–36.5)
MCV RBC AUTO: 87.3 FL (ref 78–100)
MONOCYTES # BLD AUTO: 0.65 10E3/UL (ref 0–1.3)
MONOCYTES NFR BLD AUTO: 6.7 %
NEUTROPHILS # BLD AUTO: 3.13 10E3/UL (ref 1.6–8.3)
NEUTROPHILS NFR BLD AUTO: 32.1 %
NRBC # BLD AUTO: <0.03 10E3/UL
NRBC BLD AUTO-RTO: 0 /100
PATH REV: ABNORMAL
PLAT MORPH BLD: ABNORMAL
PLATELET # BLD AUTO: 230 10E3/UL (ref 150–450)
POTASSIUM SERPL-SCNC: 4.3 MMOL/L (ref 3.4–5.3)
RBC # BLD AUTO: 4.18 10E6/UL (ref 3.8–5.2)
RBC MORPH BLD: ABNORMAL
SODIUM SERPL-SCNC: 138 MMOL/L (ref 135–145)
VARIANT LYMPHS BLD QL SMEAR: PRESENT
WBC # BLD AUTO: 9.76 10E3/UL (ref 4–11)

## (undated) DEVICE — STPL SKIN 35W 6.9MM  PXW35

## (undated) DEVICE — BNDG ELASTIC 4" DBL LENGTH UNSTERILE 6611-14

## (undated) DEVICE — SOL WATER IRRIG 1000ML BOTTLE 2F7114

## (undated) DEVICE — DRILL BIT QUICK COUPLING 2.5X110MM GOLD 310.25

## (undated) DEVICE — BAG CLEAR TRASH 1.3M 39X33" P4040C

## (undated) DEVICE — CAST PLASTER SPLINT XTRA FAST 5X30" 7392

## (undated) DEVICE — LINEN FULL SHEET 5511

## (undated) DEVICE — DRILL BIT SYN QUICK COUPLING 3.5X110MM 310.35

## (undated) DEVICE — CAST PADDING 6" UNSTERILE 9046

## (undated) DEVICE — DRAPE C-ARM 60X42" 1013

## (undated) DEVICE — DRILL BIT SYN QUICK COUPLING 2.7X125MM 315.28

## (undated) DEVICE — GLOVE PROTEXIS BLUE W/NEU-THERA 6.5  2D73EB65

## (undated) DEVICE — SOL NACL 0.9% IRRIG 1000ML BOTTLE 2F7124

## (undated) DEVICE — SUCTION MANIFOLD NEPTUNE 2 SYS 4 PORT 0702-020-000

## (undated) DEVICE — LINEN HALF SHEET 5512

## (undated) DEVICE — CAST PADDING 6" STERILE 9046S

## (undated) DEVICE — GLOVE PROTEXIS POWDER FREE 7.5 ORTHOPEDIC 2D73ET75

## (undated) DEVICE — PREP CHLORAPREP 26ML TINTED ORANGE  260815

## (undated) DEVICE — GLOVE PROTEXIS POWDER FREE 6.5 ORTHOPEDIC 2D73ET65

## (undated) DEVICE — IMP SCR SYN CAN 4.0X14MM FT SS 206.014: Type: IMPLANTABLE DEVICE | Site: ANKLE | Status: NON-FUNCTIONAL

## (undated) DEVICE — CAST BUCKET

## (undated) DEVICE — GLOVE PROTEXIS BLUE W/NEU-THERA 8.0  2D73EB80

## (undated) DEVICE — DRSG ADAPTIC 3X8" 6113

## (undated) DEVICE — LINEN ORTHO ACL PACK 5447

## (undated) DEVICE — GLOVE PROTEXIS POWDER FREE 7.0 ORTHOPEDIC 2D73ET70

## (undated) DEVICE — DRSG GAUZE 4X4" TRAY

## (undated) DEVICE — IMM KNEE 20"

## (undated) DEVICE — SET HANDPIECE INTERPULSE W/COAXIAL FAN SPRAY TIP 0210118000

## (undated) DEVICE — PACK TOTAL KNEE BOXED LATEX FREE PO15TKFCT

## (undated) DEVICE — IMP SCR SYN CORTEX 3.5X10MM SELF TAP SS 204.810: Type: IMPLANTABLE DEVICE | Site: ANKLE | Status: NON-FUNCTIONAL

## (undated) DEVICE — GLOVE PROTEXIS BLUE W/NEU-THERA 8.5  2D73EB85

## (undated) DEVICE — DRAIN HEMOVAC RESERVOIR KIT 10FR 1/8" MED 00-2550-002-10

## (undated) DEVICE — PACK LOWER EXTREMITY RIDGES

## (undated) DEVICE — DRILL BIT QUICK COUPLING CANN 2.7MM 310.67

## (undated) DEVICE — CAST PADDING 6" STERILE CS9046

## (undated) DEVICE — SU VICRYL 2-0 CT-1 27" UND J259H

## (undated) DEVICE — ESU GROUND PAD ADULT W/CORD E7507

## (undated) DEVICE — SU VICRYL 1 MO-4 18" J702D

## (undated) DEVICE — NDL 22GA 1.5"

## (undated) DEVICE — SU VICRYL 2-0 CT-2 27" UND J269H

## (undated) DEVICE — GLOVE PROTEXIS W/NEU-THERA 8.5  2D73TE85

## (undated) DEVICE — SU VICRYL 0 CT-2 27" UND J270H

## (undated) DEVICE — GLOVE PROTEXIS POWDER FREE 8.5 ORTHOPEDIC 2D73ET85

## (undated) DEVICE — GLOVE PROTEXIS BLUE W/NEU-THERA 7.0  2D73EB70

## (undated) DEVICE — BONE CEMENT MIXEVAC III HI VAC KIT  0206-015-000

## (undated) DEVICE — BLADE SAW SAGITTAL STRK 18X90X1.27MM HD SYS 6 6118-127-090

## (undated) DEVICE — DRSG ABDOMINAL 07 1/2X8" 7197D

## (undated) DEVICE — SUCTION TIP YANKAUER W/O VENT K86

## (undated) DEVICE — GLOVE PROTEXIS W/NEU-THERA 7.0  2D73TE70

## (undated) DEVICE — SPONGE LAP 18X18" X8435

## (undated) DEVICE — DRSG GAUZE 4X4" 8044

## (undated) DEVICE — TOURNIQUET SGL  BLADDER 30"X4" BLUE 5921030135

## (undated) DEVICE — GUIDE WIRE 1.25X150MM NON THRD  900.721

## (undated) RX ORDER — NEOSTIGMINE METHYLSULFATE 1 MG/ML
VIAL (ML) INJECTION
Status: DISPENSED
Start: 2020-03-09

## (undated) RX ORDER — PROPOFOL 10 MG/ML
INJECTION, EMULSION INTRAVENOUS
Status: DISPENSED
Start: 2020-03-09

## (undated) RX ORDER — KETAMINE HCL IN 0.9 % NACL 50 MG/5 ML
SYRINGE (ML) INTRAVENOUS
Status: DISPENSED
Start: 2020-03-09

## (undated) RX ORDER — FENTANYL CITRATE 50 UG/ML
INJECTION, SOLUTION INTRAMUSCULAR; INTRAVENOUS
Status: DISPENSED
Start: 2020-03-09

## (undated) RX ORDER — FENTANYL CITRATE 50 UG/ML
INJECTION, SOLUTION INTRAMUSCULAR; INTRAVENOUS
Status: DISPENSED
Start: 2020-08-08

## (undated) RX ORDER — ACETAMINOPHEN 500 MG
TABLET ORAL
Status: DISPENSED
Start: 2020-03-09

## (undated) RX ORDER — CEFAZOLIN SODIUM 2 G/100ML
INJECTION, SOLUTION INTRAVENOUS
Status: DISPENSED
Start: 2020-08-08

## (undated) RX ORDER — CEFAZOLIN SODIUM 2 G/100ML
INJECTION, SOLUTION INTRAVENOUS
Status: DISPENSED
Start: 2020-03-09

## (undated) RX ORDER — GLYCOPYRROLATE 0.2 MG/ML
INJECTION INTRAMUSCULAR; INTRAVENOUS
Status: DISPENSED
Start: 2020-03-09

## (undated) RX ORDER — BUPIVACAINE HYDROCHLORIDE 5 MG/ML
INJECTION, SOLUTION EPIDURAL; INTRACAUDAL
Status: DISPENSED
Start: 2020-08-08

## (undated) RX ORDER — LABETALOL 20 MG/4 ML (5 MG/ML) INTRAVENOUS SYRINGE
Status: DISPENSED
Start: 2020-08-08

## (undated) RX ORDER — LABETALOL 20 MG/4 ML (5 MG/ML) INTRAVENOUS SYRINGE
Status: DISPENSED
Start: 2020-03-09